# Patient Record
Sex: FEMALE | Race: WHITE | NOT HISPANIC OR LATINO | Employment: OTHER | URBAN - METROPOLITAN AREA
[De-identification: names, ages, dates, MRNs, and addresses within clinical notes are randomized per-mention and may not be internally consistent; named-entity substitution may affect disease eponyms.]

---

## 2017-02-22 ENCOUNTER — RECORDS - HEALTHEAST (OUTPATIENT)
Dept: LAB | Facility: CLINIC | Age: 67
End: 2017-02-22

## 2017-02-22 LAB
CHOLEST SERPL-MCNC: 180 MG/DL
FASTING STATUS PATIENT QL REPORTED: NO
HDLC SERPL-MCNC: 40 MG/DL
LDLC SERPL CALC-MCNC: 101 MG/DL
TRIGL SERPL-MCNC: 197 MG/DL

## 2017-09-20 ENCOUNTER — RECORDS - HEALTHEAST (OUTPATIENT)
Dept: LAB | Facility: CLINIC | Age: 67
End: 2017-09-20

## 2017-09-20 LAB
CHOLEST SERPL-MCNC: 247 MG/DL
FASTING STATUS PATIENT QL REPORTED: YES
HDLC SERPL-MCNC: 66 MG/DL
LDLC SERPL CALC-MCNC: 155 MG/DL
TRIGL SERPL-MCNC: 130 MG/DL

## 2017-10-20 ENCOUNTER — RECORDS - HEALTHEAST (OUTPATIENT)
Dept: ADMINISTRATIVE | Facility: OTHER | Age: 67
End: 2017-10-20

## 2017-10-20 ENCOUNTER — AMBULATORY - HEALTHEAST (OUTPATIENT)
Dept: CARDIOLOGY | Facility: CLINIC | Age: 67
End: 2017-10-20

## 2017-10-25 ENCOUNTER — OFFICE VISIT - HEALTHEAST (OUTPATIENT)
Dept: CARDIOLOGY | Facility: CLINIC | Age: 67
End: 2017-10-25

## 2017-10-25 ENCOUNTER — COMMUNICATION - HEALTHEAST (OUTPATIENT)
Dept: TELEHEALTH | Facility: CLINIC | Age: 67
End: 2017-10-25

## 2017-10-25 DIAGNOSIS — E78.00 HYPERCHOLESTEROLEMIA: ICD-10-CM

## 2017-10-25 DIAGNOSIS — Z95.2 S/P AVR: ICD-10-CM

## 2017-10-25 DIAGNOSIS — E03.9 HYPOTHYROIDISM: ICD-10-CM

## 2017-10-25 ASSESSMENT — MIFFLIN-ST. JEOR: SCORE: 1379.11

## 2017-11-06 ENCOUNTER — HOSPITAL ENCOUNTER (OUTPATIENT)
Dept: CARDIOLOGY | Facility: CLINIC | Age: 67
Discharge: HOME | End: 2017-11-06
Attending: INTERNAL MEDICINE

## 2017-11-06 DIAGNOSIS — Z95.2 S/P AVR: ICD-10-CM

## 2017-11-06 LAB
AORTIC ROOT: 3 CM
AORTIC VALVE MEAN VELOCITY: 161 CM/S
AV DIMENSIONLESS INDEX VTI: 0.4
AV MEAN GRADIENT: 12 MMHG
AV PEAK GRADIENT: 23.8 MMHG
AV VALVE AREA: 1.2 CM2
AV VELOCITY RATIO: 0.4
BSA FOR ECHO PROCEDURE: 1.94 M2
CV ECHO HEIGHT: 63 IN
CV ECHO WEIGHT: 188 LBS
DOP CALC AO PEAK VEL: 244 CM/S
DOP CALC AO VTI: 54.9 CM
DOP CALC LVOT AREA: 2.83 CM2
DOP CALC LVOT DIAMETER: 1.9 CM
DOP CALC LVOT PEAK VEL: 96.8 CM/S
DOP CALC LVOT STROKE VOLUME: 63.5 CM3
DOP CALCLVOT PEAK VEL VTI: 22.4 CM
EJECTION FRACTION: 63 % (ref 55–75)
FRACTIONAL SHORTENING: 35.6 % (ref 28–44)
INTERVENTRICULAR SEPTUM IN END DIASTOLE: 1.05 CM (ref 0.6–0.9)
IVS/PW RATIO: 1
LA AREA 1: 18.5 CM2
LA AREA 2: 20.2 CM2
LEFT ATRIUM LENGTH: 5.1 CM
LEFT ATRIUM SIZE: 3.5 CM
LEFT ATRIUM VOLUME INDEX: 32.1 ML/M2
LEFT ATRIUM VOLUME: 62.3 CM3
LEFT VENTRICLE CARDIAC INDEX: 2.1 L/MIN/M2
LEFT VENTRICLE CARDIAC OUTPUT: 4.1 L/MIN
LEFT VENTRICLE DIASTOLIC VOLUME INDEX: 33 CM3/M2 (ref 34–74)
LEFT VENTRICLE DIASTOLIC VOLUME: 64 CM3 (ref 46–106)
LEFT VENTRICLE HEART RATE: 64 BPM
LEFT VENTRICLE MASS INDEX: 83.1 G/M2
LEFT VENTRICLE SYSTOLIC VOLUME INDEX: 12.4 CM3/M2 (ref 11–31)
LEFT VENTRICLE SYSTOLIC VOLUME: 24 CM3 (ref 14–42)
LEFT VENTRICULAR INTERNAL DIMENSION IN DIASTOLE: 4.38 CM (ref 3.8–5.2)
LEFT VENTRICULAR INTERNAL DIMENSION IN SYSTOLE: 2.82 CM (ref 2.2–3.5)
LEFT VENTRICULAR MASS: 161.3 G
LEFT VENTRICULAR OUTFLOW TRACT MEAN GRADIENT: 2 MMHG
LEFT VENTRICULAR OUTFLOW TRACT MEAN VELOCITY: 67 CM/S
LEFT VENTRICULAR OUTFLOW TRACT PEAK GRADIENT: 4 MMHG
LEFT VENTRICULAR POSTERIOR WALL IN END DIASTOLE: 1.09 CM (ref 0.6–0.9)
LV STROKE VOLUME INDEX: 32.7 ML/M2
MITRAL VALVE E/A RATIO: 0.7
MV AVERAGE E/E' RATIO: 12.1 CM/S
MV DECELERATION TIME: 327 MS
MV E'TISSUE VEL-LAT: 7.6 CM/S
MV E'TISSUE VEL-MED: 5.75 CM/S
MV LATERAL E/E' RATIO: 10.6
MV MEDIAL E/E' RATIO: 14.1
MV PEAK A VELOCITY: 114 CM/S
MV PEAK E VELOCITY: 80.8 CM/S
NUC REST DIASTOLIC VOLUME INDEX: 3008 LBS
NUC REST SYSTOLIC VOLUME INDEX: 63 IN
PR MAX PG: 3 MMHG
PR PEAK VELOCITY: 88.6 CM/S
TRICUSPID REGURGITATION PEAK PRESSURE GRADIENT: 20.1 MMHG
TRICUSPID VALVE ANULAR PLANE SYSTOLIC EXCURSION: 2.2 CM
TRICUSPID VALVE PEAK REGURGITANT VELOCITY: 224 CM/S

## 2017-11-06 ASSESSMENT — MIFFLIN-ST. JEOR: SCORE: 1351.89

## 2018-02-05 ENCOUNTER — RECORDS - HEALTHEAST (OUTPATIENT)
Dept: ADMINISTRATIVE | Facility: OTHER | Age: 68
End: 2018-02-05

## 2018-03-19 ENCOUNTER — RECORDS - HEALTHEAST (OUTPATIENT)
Dept: LAB | Facility: CLINIC | Age: 68
End: 2018-03-19

## 2018-03-19 ENCOUNTER — RECORDS - HEALTHEAST (OUTPATIENT)
Dept: ADMINISTRATIVE | Facility: OTHER | Age: 68
End: 2018-03-19

## 2018-03-19 LAB
ALBUMIN SERPL-MCNC: 3.8 G/DL (ref 3.5–5)
ALP SERPL-CCNC: 72 U/L (ref 45–120)
ALT SERPL W P-5'-P-CCNC: 31 U/L (ref 0–45)
AST SERPL W P-5'-P-CCNC: 36 U/L (ref 0–40)
BILIRUB DIRECT SERPL-MCNC: 0.2 MG/DL
BILIRUB SERPL-MCNC: 0.7 MG/DL (ref 0–1)
CHOLEST SERPL-MCNC: 244 MG/DL
FASTING STATUS PATIENT QL REPORTED: YES
HDLC SERPL-MCNC: 66 MG/DL
LDLC SERPL CALC-MCNC: 150 MG/DL
PROT SERPL-MCNC: 7.2 G/DL (ref 6–8)
TRIGL SERPL-MCNC: 140 MG/DL
TSH SERPL DL<=0.005 MIU/L-ACNC: 0.85 UIU/ML (ref 0.3–5)

## 2018-05-01 ENCOUNTER — RECORDS - HEALTHEAST (OUTPATIENT)
Dept: ADMINISTRATIVE | Facility: OTHER | Age: 68
End: 2018-05-01

## 2018-06-21 ENCOUNTER — RECORDS - HEALTHEAST (OUTPATIENT)
Dept: ADMINISTRATIVE | Facility: OTHER | Age: 68
End: 2018-06-21

## 2018-06-21 ENCOUNTER — SURGERY - HEALTHEAST (OUTPATIENT)
Dept: CARDIOLOGY | Facility: CLINIC | Age: 68
End: 2018-06-21

## 2018-06-22 ENCOUNTER — RECORDS - HEALTHEAST (OUTPATIENT)
Dept: ADMINISTRATIVE | Facility: OTHER | Age: 68
End: 2018-06-22

## 2018-06-28 ENCOUNTER — RECORDS - HEALTHEAST (OUTPATIENT)
Dept: ADMINISTRATIVE | Facility: OTHER | Age: 68
End: 2018-06-28

## 2018-06-29 ENCOUNTER — AMBULATORY - HEALTHEAST (OUTPATIENT)
Dept: CARDIOLOGY | Facility: CLINIC | Age: 68
End: 2018-06-29

## 2018-06-29 DIAGNOSIS — Z95.0 CARDIAC PACEMAKER IN SITU: ICD-10-CM

## 2018-06-29 LAB
HCC DEVICE COMMENTS: NORMAL
HCC DEVICE IMPLANTING PROVIDER: NORMAL
HCC DEVICE MANUFACTURE: NORMAL
HCC DEVICE MODEL: NORMAL
HCC DEVICE SERIAL NUMBER: NORMAL
HCC DEVICE TYPE: NORMAL

## 2018-06-29 ASSESSMENT — MIFFLIN-ST. JEOR: SCORE: 1351.89

## 2018-07-24 ENCOUNTER — AMBULATORY - HEALTHEAST (OUTPATIENT)
Dept: CARDIOLOGY | Facility: CLINIC | Age: 68
End: 2018-07-24

## 2018-07-24 DIAGNOSIS — Z95.0 CARDIAC PACEMAKER IN SITU: ICD-10-CM

## 2018-08-27 ENCOUNTER — RECORDS - HEALTHEAST (OUTPATIENT)
Dept: ADMINISTRATIVE | Facility: OTHER | Age: 68
End: 2018-08-27

## 2018-09-25 ENCOUNTER — AMBULATORY - HEALTHEAST (OUTPATIENT)
Dept: CARDIOLOGY | Facility: CLINIC | Age: 68
End: 2018-09-25

## 2018-09-25 DIAGNOSIS — Z95.0 CARDIAC PACEMAKER IN SITU: ICD-10-CM

## 2018-09-25 ASSESSMENT — MIFFLIN-ST. JEOR: SCORE: 1351.89

## 2018-10-23 ENCOUNTER — AMBULATORY - HEALTHEAST (OUTPATIENT)
Dept: CARDIOLOGY | Facility: CLINIC | Age: 68
End: 2018-10-23

## 2018-10-23 DIAGNOSIS — Z95.0 CARDIAC PACEMAKER IN SITU: ICD-10-CM

## 2018-10-24 ENCOUNTER — COMMUNICATION - HEALTHEAST (OUTPATIENT)
Dept: CARDIOLOGY | Facility: CLINIC | Age: 68
End: 2018-10-24

## 2018-10-29 ENCOUNTER — RECORDS - HEALTHEAST (OUTPATIENT)
Dept: ADMINISTRATIVE | Facility: OTHER | Age: 68
End: 2018-10-29

## 2018-12-17 ENCOUNTER — RECORDS - HEALTHEAST (OUTPATIENT)
Dept: LAB | Facility: CLINIC | Age: 68
End: 2018-12-17

## 2018-12-17 LAB — TSH SERPL DL<=0.005 MIU/L-ACNC: 0.47 UIU/ML (ref 0.3–5)

## 2018-12-19 ENCOUNTER — RECORDS - HEALTHEAST (OUTPATIENT)
Dept: ADMINISTRATIVE | Facility: OTHER | Age: 68
End: 2018-12-19

## 2018-12-26 ENCOUNTER — AMBULATORY - HEALTHEAST (OUTPATIENT)
Dept: CARDIOLOGY | Facility: CLINIC | Age: 68
End: 2018-12-26

## 2018-12-26 DIAGNOSIS — Z95.0 CARDIAC PACEMAKER IN SITU: ICD-10-CM

## 2018-12-31 ENCOUNTER — RECORDS - HEALTHEAST (OUTPATIENT)
Dept: ADMINISTRATIVE | Facility: OTHER | Age: 68
End: 2018-12-31

## 2019-02-05 ENCOUNTER — COMMUNICATION - HEALTHEAST (OUTPATIENT)
Dept: SCHEDULING | Facility: CLINIC | Age: 69
End: 2019-02-05

## 2019-02-12 ENCOUNTER — RECORDS - HEALTHEAST (OUTPATIENT)
Dept: ADMINISTRATIVE | Facility: OTHER | Age: 69
End: 2019-02-12

## 2019-02-18 ENCOUNTER — RECORDS - HEALTHEAST (OUTPATIENT)
Dept: ADMINISTRATIVE | Facility: OTHER | Age: 69
End: 2019-02-18

## 2019-03-28 ENCOUNTER — AMBULATORY - HEALTHEAST (OUTPATIENT)
Dept: CARDIOLOGY | Facility: CLINIC | Age: 69
End: 2019-03-28

## 2019-03-28 DIAGNOSIS — Z95.0 CARDIAC PACEMAKER IN SITU: ICD-10-CM

## 2019-04-16 ENCOUNTER — RECORDS - HEALTHEAST (OUTPATIENT)
Dept: ADMINISTRATIVE | Facility: OTHER | Age: 69
End: 2019-04-16

## 2019-04-16 ENCOUNTER — RECORDS - HEALTHEAST (OUTPATIENT)
Dept: LAB | Facility: CLINIC | Age: 69
End: 2019-04-16

## 2019-04-16 LAB
ALBUMIN SERPL-MCNC: 3.8 G/DL (ref 3.5–5)
ALP SERPL-CCNC: 70 U/L (ref 45–120)
ALT SERPL W P-5'-P-CCNC: 16 U/L (ref 0–45)
ANION GAP SERPL CALCULATED.3IONS-SCNC: 7 MMOL/L (ref 5–18)
AST SERPL W P-5'-P-CCNC: 26 U/L (ref 0–40)
BILIRUB SERPL-MCNC: 0.4 MG/DL (ref 0–1)
BUN SERPL-MCNC: 15 MG/DL (ref 8–22)
CALCIUM SERPL-MCNC: 9.5 MG/DL (ref 8.5–10.5)
CHLORIDE BLD-SCNC: 108 MMOL/L (ref 98–107)
CHOLEST SERPL-MCNC: 209 MG/DL
CO2 SERPL-SCNC: 25 MMOL/L (ref 22–31)
CREAT SERPL-MCNC: 0.8 MG/DL (ref 0.6–1.1)
FASTING STATUS PATIENT QL REPORTED: NO
GFR SERPL CREATININE-BSD FRML MDRD: >60 ML/MIN/1.73M2
GLUCOSE BLD-MCNC: 82 MG/DL (ref 70–125)
HDLC SERPL-MCNC: 65 MG/DL
LDLC SERPL CALC-MCNC: 115 MG/DL
POTASSIUM BLD-SCNC: 4.4 MMOL/L (ref 3.5–5)
PROT SERPL-MCNC: 7.2 G/DL (ref 6–8)
SODIUM SERPL-SCNC: 140 MMOL/L (ref 136–145)
TRIGL SERPL-MCNC: 144 MG/DL
TSH SERPL DL<=0.005 MIU/L-ACNC: 0.73 UIU/ML (ref 0.3–5)

## 2019-06-13 ENCOUNTER — RECORDS - HEALTHEAST (OUTPATIENT)
Dept: ADMINISTRATIVE | Facility: OTHER | Age: 69
End: 2019-06-13

## 2019-06-30 ENCOUNTER — AMBULATORY - HEALTHEAST (OUTPATIENT)
Dept: CARDIOLOGY | Facility: CLINIC | Age: 69
End: 2019-06-30

## 2019-06-30 DIAGNOSIS — Z95.0 CARDIAC PACEMAKER IN SITU: ICD-10-CM

## 2019-07-01 ENCOUNTER — COMMUNICATION - HEALTHEAST (OUTPATIENT)
Dept: CARDIOLOGY | Facility: CLINIC | Age: 69
End: 2019-07-01

## 2019-07-02 ENCOUNTER — AMBULATORY - HEALTHEAST (OUTPATIENT)
Dept: CARDIOLOGY | Facility: CLINIC | Age: 69
End: 2019-07-02

## 2019-07-02 ENCOUNTER — HOSPITAL ENCOUNTER (OUTPATIENT)
Dept: CARDIOLOGY | Facility: CLINIC | Age: 69
Discharge: HOME | End: 2019-07-02
Attending: INTERNAL MEDICINE

## 2019-07-02 DIAGNOSIS — I25.10 ATHEROSCLEROTIC HEART DISEASE OF NATIVE CORONARY ARTERY WITHOUT ANGINA PECTORIS: ICD-10-CM

## 2019-07-02 DIAGNOSIS — I47.29 NON-SUSTAINED VENTRICULAR TACHYCARDIA (H): ICD-10-CM

## 2019-07-02 DIAGNOSIS — Z95.0 CARDIAC PACEMAKER IN SITU: ICD-10-CM

## 2019-07-02 DIAGNOSIS — I49.2: ICD-10-CM

## 2019-07-02 DIAGNOSIS — Z95.2 S/P AVR: ICD-10-CM

## 2019-07-02 ASSESSMENT — MIFFLIN-ST. JEOR: SCORE: 1320.14

## 2019-07-03 LAB
ATRIAL RATE - MUSE: 78 BPM
DIASTOLIC BLOOD PRESSURE - MUSE: NORMAL MMHG
INTERPRETATION ECG - MUSE: NORMAL
P AXIS - MUSE: 59 DEGREES
PR INTERVAL - MUSE: 182 MS
QRS DURATION - MUSE: 80 MS
QT - MUSE: 396 MS
QTC - MUSE: 451 MS
R AXIS - MUSE: 9 DEGREES
SYSTOLIC BLOOD PRESSURE - MUSE: NORMAL MMHG
T AXIS - MUSE: 64 DEGREES
VENTRICULAR RATE- MUSE: 78 BPM

## 2019-07-10 ENCOUNTER — AMBULATORY - HEALTHEAST (OUTPATIENT)
Dept: CARDIOLOGY | Facility: CLINIC | Age: 69
End: 2019-07-10

## 2019-07-10 DIAGNOSIS — Z95.0 CARDIAC PACEMAKER IN SITU: ICD-10-CM

## 2019-07-11 ENCOUNTER — HOSPITAL ENCOUNTER (OUTPATIENT)
Dept: NUCLEAR MEDICINE | Facility: CLINIC | Age: 69
Discharge: HOME | End: 2019-07-11
Attending: INTERNAL MEDICINE

## 2019-07-11 ENCOUNTER — HOSPITAL ENCOUNTER (OUTPATIENT)
Dept: CARDIOLOGY | Facility: CLINIC | Age: 69
Discharge: HOME | End: 2019-07-11
Attending: INTERNAL MEDICINE

## 2019-07-11 DIAGNOSIS — I25.10 ATHEROSCLEROTIC HEART DISEASE OF NATIVE CORONARY ARTERY WITHOUT ANGINA PECTORIS: ICD-10-CM

## 2019-07-11 DIAGNOSIS — I47.29 NON-SUSTAINED VENTRICULAR TACHYCARDIA (H): ICD-10-CM

## 2019-07-11 DIAGNOSIS — I49.2: ICD-10-CM

## 2019-07-11 LAB
CV STRESS CURRENT BP HE: NORMAL
CV STRESS CURRENT HR HE: 105
CV STRESS CURRENT HR HE: 108
CV STRESS CURRENT HR HE: 109
CV STRESS CURRENT HR HE: 112
CV STRESS CURRENT HR HE: 122
CV STRESS CURRENT HR HE: 126
CV STRESS CURRENT HR HE: 126
CV STRESS CURRENT HR HE: 128
CV STRESS CURRENT HR HE: 128
CV STRESS CURRENT HR HE: 68
CV STRESS CURRENT HR HE: 74
CV STRESS CURRENT HR HE: 80
CV STRESS CURRENT HR HE: 81
CV STRESS CURRENT HR HE: 81
CV STRESS CURRENT HR HE: 82
CV STRESS CURRENT HR HE: 85
CV STRESS CURRENT HR HE: 85
CV STRESS CURRENT HR HE: 87
CV STRESS CURRENT HR HE: 88
CV STRESS CURRENT HR HE: 93
CV STRESS CURRENT HR HE: 96
CV STRESS CURRENT HR HE: 96
CV STRESS CURRENT HR HE: 99
CV STRESS DEVIATION TIME HE: NORMAL
CV STRESS ECHO PERCENT HR HE: NORMAL
CV STRESS EXERCISE STAGE HE: NORMAL
CV STRESS FINAL RESTING BP HE: NORMAL
CV STRESS FINAL RESTING HR HE: 85
CV STRESS MAX HR HE: 128
CV STRESS MAX TREADMILL GRADE HE: 14
CV STRESS MAX TREADMILL SPEED HE: 3.4
CV STRESS PEAK DIA BP HE: NORMAL
CV STRESS PEAK SYS BP HE: NORMAL
CV STRESS PHASE HE: NORMAL
CV STRESS PROTOCOL HE: NORMAL
CV STRESS RESTING PT POSITION HE: NORMAL
CV STRESS RESTING PT POSITION HE: NORMAL
CV STRESS ST DEVIATION AMOUNT HE: NORMAL
CV STRESS ST DEVIATION ELEVATION HE: NORMAL
CV STRESS ST EVELATION AMOUNT HE: NORMAL
CV STRESS TEST TYPE HE: NORMAL
CV STRESS TOTAL STAGE TIME MIN 1 HE: NORMAL
NUC STRESS EJECTION FRACTION: 68 %
STRESS ECHO BASELINE BP: NORMAL
STRESS ECHO BASELINE HR: 69
STRESS ECHO CALCULATED PERCENT HR: 84 %
STRESS ECHO LAST STRESS BP: NORMAL
STRESS ECHO LAST STRESS HR: 128
STRESS ECHO POST ESTIMATED WORKLOAD: 9.7
STRESS ECHO POST EXERCISE DUR MIN: 8
STRESS ECHO POST EXERCISE DUR SEC: 0
STRESS ECHO TARGET HR: 130

## 2019-07-12 ENCOUNTER — AMBULATORY - HEALTHEAST (OUTPATIENT)
Dept: CARDIOLOGY | Facility: CLINIC | Age: 69
End: 2019-07-12

## 2019-07-12 DIAGNOSIS — I49.2: ICD-10-CM

## 2019-07-12 DIAGNOSIS — R00.0 TACHYCARDIA: ICD-10-CM

## 2019-07-16 ENCOUNTER — AMBULATORY - HEALTHEAST (OUTPATIENT)
Dept: CARDIOLOGY | Facility: CLINIC | Age: 69
End: 2019-07-16

## 2019-07-16 ENCOUNTER — COMMUNICATION - HEALTHEAST (OUTPATIENT)
Dept: CARDIOLOGY | Facility: CLINIC | Age: 69
End: 2019-07-16

## 2019-07-16 ENCOUNTER — RECORDS - HEALTHEAST (OUTPATIENT)
Dept: ADMINISTRATIVE | Facility: OTHER | Age: 69
End: 2019-07-16

## 2019-07-16 DIAGNOSIS — I49.2: ICD-10-CM

## 2019-07-16 DIAGNOSIS — R00.0 TACHYCARDIA: ICD-10-CM

## 2019-07-17 ENCOUNTER — AMBULATORY - HEALTHEAST (OUTPATIENT)
Dept: CARDIOLOGY | Facility: CLINIC | Age: 69
End: 2019-07-17

## 2019-07-17 DIAGNOSIS — I25.10 CORONARY ARTERY DISEASE INVOLVING NATIVE CORONARY ARTERY WITHOUT ANGINA PECTORIS: ICD-10-CM

## 2019-07-17 DIAGNOSIS — Z95.0 CARDIAC PACEMAKER IN SITU: ICD-10-CM

## 2019-07-17 DIAGNOSIS — I47.29 NON-SUSTAINED VENTRICULAR TACHYCARDIA (H): ICD-10-CM

## 2019-07-17 DIAGNOSIS — Z95.2 S/P AVR: ICD-10-CM

## 2019-07-17 ASSESSMENT — MIFFLIN-ST. JEOR: SCORE: 1324.68

## 2019-07-24 ENCOUNTER — HOSPITAL ENCOUNTER (OUTPATIENT)
Dept: CARDIOLOGY | Facility: CLINIC | Age: 69
Discharge: HOME | End: 2019-07-24
Attending: INTERNAL MEDICINE

## 2019-07-24 DIAGNOSIS — I47.29 NON-SUSTAINED VENTRICULAR TACHYCARDIA (H): ICD-10-CM

## 2019-08-08 ENCOUNTER — RECORDS - HEALTHEAST (OUTPATIENT)
Dept: ADMINISTRATIVE | Facility: OTHER | Age: 69
End: 2019-08-08

## 2019-10-03 ENCOUNTER — RECORDS - HEALTHEAST (OUTPATIENT)
Dept: ADMINISTRATIVE | Facility: OTHER | Age: 69
End: 2019-10-03

## 2019-10-20 ENCOUNTER — AMBULATORY - HEALTHEAST (OUTPATIENT)
Dept: CARDIOLOGY | Facility: CLINIC | Age: 69
End: 2019-10-20

## 2019-10-20 DIAGNOSIS — Z95.0 CARDIAC PACEMAKER IN SITU: ICD-10-CM

## 2019-10-29 ENCOUNTER — RECORDS - HEALTHEAST (OUTPATIENT)
Dept: ADMINISTRATIVE | Facility: OTHER | Age: 69
End: 2019-10-29

## 2019-11-21 ENCOUNTER — RECORDS - HEALTHEAST (OUTPATIENT)
Dept: ADMINISTRATIVE | Facility: OTHER | Age: 69
End: 2019-11-21

## 2020-01-06 ENCOUNTER — RECORDS - HEALTHEAST (OUTPATIENT)
Dept: ADMINISTRATIVE | Facility: OTHER | Age: 70
End: 2020-01-06

## 2020-01-16 ENCOUNTER — RECORDS - HEALTHEAST (OUTPATIENT)
Dept: ADMINISTRATIVE | Facility: OTHER | Age: 70
End: 2020-01-16

## 2020-01-23 ENCOUNTER — AMBULATORY - HEALTHEAST (OUTPATIENT)
Dept: CARDIOLOGY | Facility: CLINIC | Age: 70
End: 2020-01-23

## 2020-01-23 DIAGNOSIS — I45.9 STOKES-ADAMS SYNCOPE: ICD-10-CM

## 2020-01-23 DIAGNOSIS — Z95.0 CARDIAC PACEMAKER IN SITU: ICD-10-CM

## 2020-02-27 ENCOUNTER — COMMUNICATION - HEALTHEAST (OUTPATIENT)
Dept: ANTICOAGULATION | Facility: CLINIC | Age: 70
End: 2020-02-27

## 2020-02-27 ENCOUNTER — OFFICE VISIT - HEALTHEAST (OUTPATIENT)
Dept: FAMILY MEDICINE | Facility: CLINIC | Age: 70
End: 2020-02-27

## 2020-02-27 DIAGNOSIS — Z86.2 HX OF LEUKOCYTOSIS: ICD-10-CM

## 2020-02-27 DIAGNOSIS — I47.29 NON-SUSTAINED VENTRICULAR TACHYCARDIA (H): ICD-10-CM

## 2020-02-27 DIAGNOSIS — E78.00 HYPERCHOLESTEROLEMIA: ICD-10-CM

## 2020-02-27 DIAGNOSIS — Z23 ENCOUNTER FOR ADMINISTRATION OF VACCINE: ICD-10-CM

## 2020-02-27 DIAGNOSIS — Z95.2 S/P AVR: ICD-10-CM

## 2020-02-27 DIAGNOSIS — Z11.59 NEED FOR HEPATITIS C SCREENING TEST: ICD-10-CM

## 2020-02-27 DIAGNOSIS — Z79.01 ANTICOAGULATED ON WARFARIN: ICD-10-CM

## 2020-02-27 DIAGNOSIS — E03.9 ACQUIRED HYPOTHYROIDISM: ICD-10-CM

## 2020-02-27 DIAGNOSIS — Z95.0 CARDIAC PACEMAKER IN SITU: ICD-10-CM

## 2020-02-27 DIAGNOSIS — Z95.2 HX OF MECHANICAL AORTIC VALVE REPLACEMENT: ICD-10-CM

## 2020-02-27 DIAGNOSIS — R31.29 MICROSCOPIC HEMATURIA: ICD-10-CM

## 2020-02-27 DIAGNOSIS — I25.10 CORONARY ARTERY DISEASE INVOLVING NATIVE CORONARY ARTERY OF NATIVE HEART WITHOUT ANGINA PECTORIS: ICD-10-CM

## 2020-02-27 DIAGNOSIS — Z76.89 ENCOUNTER TO ESTABLISH CARE WITH NEW DOCTOR: ICD-10-CM

## 2020-02-27 LAB
ALBUMIN UR-MCNC: NEGATIVE MG/DL
ANION GAP SERPL CALCULATED.3IONS-SCNC: 10 MMOL/L (ref 5–18)
APPEARANCE UR: CLEAR
BACTERIA #/AREA URNS HPF: ABNORMAL HPF
BILIRUB UR QL STRIP: NEGATIVE
BUN SERPL-MCNC: 14 MG/DL (ref 8–22)
CALCIUM SERPL-MCNC: 9.8 MG/DL (ref 8.5–10.5)
CHLORIDE BLD-SCNC: 105 MMOL/L (ref 98–107)
CHOLEST SERPL-MCNC: 227 MG/DL
CO2 SERPL-SCNC: 25 MMOL/L (ref 22–31)
COLOR UR AUTO: YELLOW
CREAT SERPL-MCNC: 0.91 MG/DL (ref 0.6–1.1)
ERYTHROCYTE [DISTWIDTH] IN BLOOD BY AUTOMATED COUNT: 12.6 % (ref 11–14.5)
FASTING STATUS PATIENT QL REPORTED: NO
GFR SERPL CREATININE-BSD FRML MDRD: >60 ML/MIN/1.73M2
GLUCOSE BLD-MCNC: 78 MG/DL (ref 70–125)
GLUCOSE UR STRIP-MCNC: NEGATIVE MG/DL
HCT VFR BLD AUTO: 41.3 % (ref 35–47)
HDLC SERPL-MCNC: 69 MG/DL
HGB BLD-MCNC: 14 G/DL (ref 12–16)
HGB UR QL STRIP: ABNORMAL
INR PPP: 2.3 (ref 0.9–1.1)
KETONES UR STRIP-MCNC: NEGATIVE MG/DL
LDLC SERPL CALC-MCNC: 127 MG/DL
LEUKOCYTE ESTERASE UR QL STRIP: NEGATIVE
MCH RBC QN AUTO: 30.3 PG (ref 27–34)
MCHC RBC AUTO-ENTMCNC: 33.9 G/DL (ref 32–36)
MCV RBC AUTO: 89 FL (ref 80–100)
MUCOUS THREADS #/AREA URNS LPF: ABNORMAL LPF
NITRATE UR QL: NEGATIVE
PH UR STRIP: 5.5 [PH] (ref 5–8)
PLATELET # BLD AUTO: 226 THOU/UL (ref 140–440)
PMV BLD AUTO: 8.6 FL (ref 7–10)
POTASSIUM BLD-SCNC: 4.4 MMOL/L (ref 3.5–5)
RBC # BLD AUTO: 4.63 MILL/UL (ref 3.8–5.4)
RBC #/AREA URNS AUTO: ABNORMAL HPF
SODIUM SERPL-SCNC: 140 MMOL/L (ref 136–145)
SP GR UR STRIP: 1.02 (ref 1–1.03)
SQUAMOUS #/AREA URNS AUTO: ABNORMAL LPF
TRIGL SERPL-MCNC: 157 MG/DL
TSH SERPL DL<=0.005 MIU/L-ACNC: 1.72 UIU/ML (ref 0.3–5)
UROBILINOGEN UR STRIP-ACNC: ABNORMAL
WBC #/AREA URNS AUTO: ABNORMAL HPF
WBC: 7.5 THOU/UL (ref 4–11)

## 2020-02-27 ASSESSMENT — MIFFLIN-ST. JEOR: SCORE: 1353.88

## 2020-02-28 LAB — HCV AB SERPL QL IA: NEGATIVE

## 2020-03-12 ENCOUNTER — AMBULATORY - HEALTHEAST (OUTPATIENT)
Dept: LAB | Facility: CLINIC | Age: 70
End: 2020-03-12

## 2020-03-12 ENCOUNTER — COMMUNICATION - HEALTHEAST (OUTPATIENT)
Dept: ANTICOAGULATION | Facility: CLINIC | Age: 70
End: 2020-03-12

## 2020-03-12 DIAGNOSIS — Z95.2 S/P AVR: ICD-10-CM

## 2020-03-12 DIAGNOSIS — Z79.01 ANTICOAGULATED ON WARFARIN: ICD-10-CM

## 2020-03-12 DIAGNOSIS — Z95.2 HX OF MECHANICAL AORTIC VALVE REPLACEMENT: ICD-10-CM

## 2020-03-12 LAB — INR PPP: 3.7 (ref 0.9–1.1)

## 2020-03-17 ENCOUNTER — RECORDS - HEALTHEAST (OUTPATIENT)
Dept: ADMINISTRATIVE | Facility: OTHER | Age: 70
End: 2020-03-17

## 2020-03-19 ENCOUNTER — COMMUNICATION - HEALTHEAST (OUTPATIENT)
Dept: ANTICOAGULATION | Facility: CLINIC | Age: 70
End: 2020-03-19

## 2020-03-19 ENCOUNTER — AMBULATORY - HEALTHEAST (OUTPATIENT)
Dept: LAB | Facility: CLINIC | Age: 70
End: 2020-03-19

## 2020-03-19 DIAGNOSIS — Z79.01 ANTICOAGULATED ON WARFARIN: ICD-10-CM

## 2020-03-19 DIAGNOSIS — Z95.2 S/P AVR: ICD-10-CM

## 2020-03-19 DIAGNOSIS — Z95.2 HX OF MECHANICAL AORTIC VALVE REPLACEMENT: ICD-10-CM

## 2020-03-19 LAB — INR PPP: 2.1 (ref 0.9–1.1)

## 2020-04-02 ENCOUNTER — COMMUNICATION - HEALTHEAST (OUTPATIENT)
Dept: ANTICOAGULATION | Facility: CLINIC | Age: 70
End: 2020-04-02

## 2020-04-02 ENCOUNTER — AMBULATORY - HEALTHEAST (OUTPATIENT)
Dept: LAB | Facility: CLINIC | Age: 70
End: 2020-04-02

## 2020-04-02 DIAGNOSIS — Z95.2 HX OF MECHANICAL AORTIC VALVE REPLACEMENT: ICD-10-CM

## 2020-04-02 DIAGNOSIS — Z79.01 ANTICOAGULATED ON WARFARIN: ICD-10-CM

## 2020-04-02 DIAGNOSIS — Z95.2 S/P AVR: ICD-10-CM

## 2020-04-02 LAB — INR PPP: 2.3 (ref 0.9–1.1)

## 2020-04-16 ENCOUNTER — AMBULATORY - HEALTHEAST (OUTPATIENT)
Dept: LAB | Facility: CLINIC | Age: 70
End: 2020-04-16

## 2020-04-16 ENCOUNTER — COMMUNICATION - HEALTHEAST (OUTPATIENT)
Dept: ANTICOAGULATION | Facility: CLINIC | Age: 70
End: 2020-04-16

## 2020-04-16 DIAGNOSIS — Z95.2 S/P AVR: ICD-10-CM

## 2020-04-16 DIAGNOSIS — Z79.01 ANTICOAGULATED ON WARFARIN: ICD-10-CM

## 2020-04-16 DIAGNOSIS — Z95.2 HX OF MECHANICAL AORTIC VALVE REPLACEMENT: ICD-10-CM

## 2020-04-16 LAB — INR PPP: 2.3 (ref 0.9–1.1)

## 2020-04-23 ENCOUNTER — AMBULATORY - HEALTHEAST (OUTPATIENT)
Dept: CARDIOLOGY | Facility: CLINIC | Age: 70
End: 2020-04-23

## 2020-04-23 DIAGNOSIS — I44.39 HIGH DEGREE ATRIOVENTRICULAR BLOCK: ICD-10-CM

## 2020-04-23 DIAGNOSIS — Z95.0 CARDIAC PACEMAKER IN SITU: ICD-10-CM

## 2020-04-30 ENCOUNTER — AMBULATORY - HEALTHEAST (OUTPATIENT)
Dept: LAB | Facility: CLINIC | Age: 70
End: 2020-04-30

## 2020-04-30 ENCOUNTER — COMMUNICATION - HEALTHEAST (OUTPATIENT)
Dept: ANTICOAGULATION | Facility: CLINIC | Age: 70
End: 2020-04-30

## 2020-04-30 DIAGNOSIS — Z95.2 HX OF MECHANICAL AORTIC VALVE REPLACEMENT: ICD-10-CM

## 2020-04-30 DIAGNOSIS — Z95.2 S/P AVR: ICD-10-CM

## 2020-04-30 DIAGNOSIS — Z79.01 ANTICOAGULATED ON WARFARIN: ICD-10-CM

## 2020-04-30 LAB — INR PPP: 1.8 (ref 0.9–1.1)

## 2020-05-14 ENCOUNTER — AMBULATORY - HEALTHEAST (OUTPATIENT)
Dept: LAB | Facility: CLINIC | Age: 70
End: 2020-05-14

## 2020-05-14 ENCOUNTER — COMMUNICATION - HEALTHEAST (OUTPATIENT)
Dept: ANTICOAGULATION | Facility: CLINIC | Age: 70
End: 2020-05-14

## 2020-05-14 DIAGNOSIS — Z95.2 S/P AVR: ICD-10-CM

## 2020-05-14 DIAGNOSIS — Z95.2 HX OF MECHANICAL AORTIC VALVE REPLACEMENT: ICD-10-CM

## 2020-05-14 DIAGNOSIS — Z79.01 ANTICOAGULATED ON WARFARIN: ICD-10-CM

## 2020-05-14 LAB — INR PPP: 1.7 (ref 0.9–1.1)

## 2020-05-28 ENCOUNTER — COMMUNICATION - HEALTHEAST (OUTPATIENT)
Dept: ANTICOAGULATION | Facility: CLINIC | Age: 70
End: 2020-05-28

## 2020-05-28 ENCOUNTER — AMBULATORY - HEALTHEAST (OUTPATIENT)
Dept: LAB | Facility: CLINIC | Age: 70
End: 2020-05-28

## 2020-05-28 DIAGNOSIS — Z95.2 HX OF MECHANICAL AORTIC VALVE REPLACEMENT: ICD-10-CM

## 2020-05-28 DIAGNOSIS — Z79.01 ANTICOAGULATED ON WARFARIN: ICD-10-CM

## 2020-05-28 DIAGNOSIS — Z95.2 S/P AVR: ICD-10-CM

## 2020-05-28 LAB — INR PPP: 1.7 (ref 0.9–1.1)

## 2020-06-08 ENCOUNTER — COMMUNICATION - HEALTHEAST (OUTPATIENT)
Dept: FAMILY MEDICINE | Facility: CLINIC | Age: 70
End: 2020-06-08

## 2020-06-08 DIAGNOSIS — I44.39 HIGH DEGREE ATRIOVENTRICULAR BLOCK: ICD-10-CM

## 2020-06-08 DIAGNOSIS — I49.2: ICD-10-CM

## 2020-06-08 DIAGNOSIS — I25.10 CORONARY ARTERY DISEASE INVOLVING NATIVE CORONARY ARTERY OF NATIVE HEART WITHOUT ANGINA PECTORIS: ICD-10-CM

## 2020-06-08 DIAGNOSIS — Z79.01 ANTICOAGULATED ON WARFARIN: ICD-10-CM

## 2020-06-08 DIAGNOSIS — R00.0 TACHYCARDIA: ICD-10-CM

## 2020-06-11 ENCOUNTER — AMBULATORY - HEALTHEAST (OUTPATIENT)
Dept: LAB | Facility: CLINIC | Age: 70
End: 2020-06-11

## 2020-06-11 ENCOUNTER — COMMUNICATION - HEALTHEAST (OUTPATIENT)
Dept: ANTICOAGULATION | Facility: CLINIC | Age: 70
End: 2020-06-11

## 2020-06-11 DIAGNOSIS — Z95.2 HX OF MECHANICAL AORTIC VALVE REPLACEMENT: ICD-10-CM

## 2020-06-11 DIAGNOSIS — Z95.2 S/P AVR: ICD-10-CM

## 2020-06-11 DIAGNOSIS — Z79.01 ANTICOAGULATED ON WARFARIN: ICD-10-CM

## 2020-06-11 LAB — INR PPP: 1.9 (ref 0.9–1.1)

## 2020-06-29 ENCOUNTER — AMBULATORY - HEALTHEAST (OUTPATIENT)
Dept: LAB | Facility: CLINIC | Age: 70
End: 2020-06-29

## 2020-06-29 ENCOUNTER — COMMUNICATION - HEALTHEAST (OUTPATIENT)
Dept: ANTICOAGULATION | Facility: CLINIC | Age: 70
End: 2020-06-29

## 2020-06-29 DIAGNOSIS — Z79.01 ANTICOAGULATED ON WARFARIN: ICD-10-CM

## 2020-06-29 DIAGNOSIS — Z95.2 S/P AVR: ICD-10-CM

## 2020-06-29 DIAGNOSIS — Z95.2 HX OF MECHANICAL AORTIC VALVE REPLACEMENT: ICD-10-CM

## 2020-06-29 LAB — INR PPP: 1.9 (ref 0.9–1.1)

## 2020-07-20 ENCOUNTER — OFFICE VISIT - HEALTHEAST (OUTPATIENT)
Dept: FAMILY MEDICINE | Facility: CLINIC | Age: 70
End: 2020-07-20

## 2020-07-20 ENCOUNTER — COMMUNICATION - HEALTHEAST (OUTPATIENT)
Dept: SCHEDULING | Facility: CLINIC | Age: 70
End: 2020-07-20

## 2020-07-20 DIAGNOSIS — B02.9 HERPES ZOSTER WITHOUT COMPLICATION: ICD-10-CM

## 2020-07-20 ASSESSMENT — MIFFLIN-ST. JEOR: SCORE: 1365.5

## 2020-07-27 ENCOUNTER — COMMUNICATION - HEALTHEAST (OUTPATIENT)
Dept: ANTICOAGULATION | Facility: CLINIC | Age: 70
End: 2020-07-27

## 2020-07-27 ENCOUNTER — AMBULATORY - HEALTHEAST (OUTPATIENT)
Dept: LAB | Facility: CLINIC | Age: 70
End: 2020-07-27

## 2020-07-27 DIAGNOSIS — Z95.2 HX OF MECHANICAL AORTIC VALVE REPLACEMENT: ICD-10-CM

## 2020-07-27 DIAGNOSIS — Z79.01 ANTICOAGULATED ON WARFARIN: ICD-10-CM

## 2020-07-27 DIAGNOSIS — Z95.2 S/P AVR: ICD-10-CM

## 2020-07-27 LAB — INR PPP: 2 (ref 0.9–1.1)

## 2020-07-28 ENCOUNTER — AMBULATORY - HEALTHEAST (OUTPATIENT)
Dept: CARDIOLOGY | Facility: CLINIC | Age: 70
End: 2020-07-28

## 2020-07-28 DIAGNOSIS — Z95.0 CARDIAC PACEMAKER IN SITU: ICD-10-CM

## 2020-07-28 DIAGNOSIS — I44.39 HIGH DEGREE ATRIOVENTRICULAR BLOCK: ICD-10-CM

## 2020-07-30 ENCOUNTER — OFFICE VISIT - HEALTHEAST (OUTPATIENT)
Dept: FAMILY MEDICINE | Facility: CLINIC | Age: 70
End: 2020-07-30

## 2020-07-30 DIAGNOSIS — K59.01 SLOW TRANSIT CONSTIPATION: ICD-10-CM

## 2020-07-30 DIAGNOSIS — B02.9 HERPES ZOSTER WITHOUT COMPLICATION: ICD-10-CM

## 2020-08-17 ENCOUNTER — COMMUNICATION - HEALTHEAST (OUTPATIENT)
Dept: FAMILY MEDICINE | Facility: CLINIC | Age: 70
End: 2020-08-17

## 2020-08-17 DIAGNOSIS — Z79.01 ANTICOAGULATED ON WARFARIN: ICD-10-CM

## 2020-08-24 ENCOUNTER — AMBULATORY - HEALTHEAST (OUTPATIENT)
Dept: LAB | Facility: CLINIC | Age: 70
End: 2020-08-24

## 2020-08-24 ENCOUNTER — COMMUNICATION - HEALTHEAST (OUTPATIENT)
Dept: ANTICOAGULATION | Facility: CLINIC | Age: 70
End: 2020-08-24

## 2020-08-24 DIAGNOSIS — Z95.2 HX OF MECHANICAL AORTIC VALVE REPLACEMENT: ICD-10-CM

## 2020-08-24 DIAGNOSIS — Z79.01 ANTICOAGULATED ON WARFARIN: ICD-10-CM

## 2020-08-24 DIAGNOSIS — Z95.2 S/P AVR: ICD-10-CM

## 2020-08-24 LAB — INR PPP: 1.9 (ref 0.9–1.1)

## 2020-10-06 ENCOUNTER — AMBULATORY - HEALTHEAST (OUTPATIENT)
Dept: NURSING | Facility: CLINIC | Age: 70
End: 2020-10-06

## 2020-10-06 ENCOUNTER — AMBULATORY - HEALTHEAST (OUTPATIENT)
Dept: LAB | Facility: CLINIC | Age: 70
End: 2020-10-06

## 2020-10-06 ENCOUNTER — COMMUNICATION - HEALTHEAST (OUTPATIENT)
Dept: ANTICOAGULATION | Facility: CLINIC | Age: 70
End: 2020-10-06

## 2020-10-06 DIAGNOSIS — Z23 NEED FOR VACCINATION: ICD-10-CM

## 2020-10-06 DIAGNOSIS — Z79.01 ANTICOAGULATED ON WARFARIN: ICD-10-CM

## 2020-10-06 DIAGNOSIS — Z95.2 HX OF MECHANICAL AORTIC VALVE REPLACEMENT: ICD-10-CM

## 2020-10-06 DIAGNOSIS — Z95.2 S/P AVR: ICD-10-CM

## 2020-10-06 LAB — INR PPP: 2.1 (ref 0.9–1.1)

## 2020-10-09 ENCOUNTER — COMMUNICATION - HEALTHEAST (OUTPATIENT)
Dept: FAMILY MEDICINE | Facility: CLINIC | Age: 70
End: 2020-10-09

## 2020-10-09 DIAGNOSIS — Z79.01 ANTICOAGULATED ON WARFARIN: ICD-10-CM

## 2020-10-23 ENCOUNTER — COMMUNICATION - HEALTHEAST (OUTPATIENT)
Dept: CARDIOLOGY | Facility: CLINIC | Age: 70
End: 2020-10-23

## 2020-10-26 ENCOUNTER — OFFICE VISIT - HEALTHEAST (OUTPATIENT)
Dept: CARDIOLOGY | Facility: CLINIC | Age: 70
End: 2020-10-26

## 2020-10-26 DIAGNOSIS — I44.39 HIGH DEGREE ATRIOVENTRICULAR BLOCK: ICD-10-CM

## 2020-10-26 DIAGNOSIS — I25.10 CORONARY ARTERY DISEASE INVOLVING NATIVE CORONARY ARTERY WITHOUT ANGINA PECTORIS: ICD-10-CM

## 2020-10-26 DIAGNOSIS — Z95.0 CARDIAC PACEMAKER IN SITU: ICD-10-CM

## 2020-10-26 DIAGNOSIS — I47.29 NON-SUSTAINED VENTRICULAR TACHYCARDIA (H): ICD-10-CM

## 2020-10-26 DIAGNOSIS — Z95.2 HX OF MECHANICAL AORTIC VALVE REPLACEMENT: ICD-10-CM

## 2020-10-26 ASSESSMENT — MIFFLIN-ST. JEOR: SCORE: 1356.43

## 2020-10-27 ENCOUNTER — COMMUNICATION - HEALTHEAST (OUTPATIENT)
Dept: CARDIOLOGY | Facility: CLINIC | Age: 70
End: 2020-10-27

## 2020-11-04 ENCOUNTER — AMBULATORY - HEALTHEAST (OUTPATIENT)
Dept: CARDIOLOGY | Facility: CLINIC | Age: 70
End: 2020-11-04

## 2020-11-04 DIAGNOSIS — Z95.0 CARDIAC PACEMAKER IN SITU: ICD-10-CM

## 2020-11-04 DIAGNOSIS — I47.29 NON-SUSTAINED VENTRICULAR TACHYCARDIA (H): ICD-10-CM

## 2020-11-17 ENCOUNTER — AMBULATORY - HEALTHEAST (OUTPATIENT)
Dept: LAB | Facility: CLINIC | Age: 70
End: 2020-11-17

## 2020-11-17 ENCOUNTER — COMMUNICATION - HEALTHEAST (OUTPATIENT)
Dept: ANTICOAGULATION | Facility: CLINIC | Age: 70
End: 2020-11-17

## 2020-11-17 DIAGNOSIS — Z95.2 HX OF MECHANICAL AORTIC VALVE REPLACEMENT: ICD-10-CM

## 2020-11-17 DIAGNOSIS — Z79.01 ANTICOAGULATED ON WARFARIN: ICD-10-CM

## 2020-11-17 DIAGNOSIS — Z95.2 S/P AVR: ICD-10-CM

## 2020-11-17 LAB — INR PPP: 1.8 (ref 0.9–1.1)

## 2020-12-08 ENCOUNTER — AMBULATORY - HEALTHEAST (OUTPATIENT)
Dept: FAMILY MEDICINE | Facility: CLINIC | Age: 70
End: 2020-12-08

## 2020-12-09 ENCOUNTER — OFFICE VISIT - HEALTHEAST (OUTPATIENT)
Dept: FAMILY MEDICINE | Facility: CLINIC | Age: 70
End: 2020-12-09

## 2020-12-09 ENCOUNTER — AMBULATORY - HEALTHEAST (OUTPATIENT)
Dept: FAMILY MEDICINE | Facility: CLINIC | Age: 70
End: 2020-12-09

## 2020-12-09 DIAGNOSIS — M25.571 PAIN IN JOINT, ANKLE AND FOOT, RIGHT: ICD-10-CM

## 2020-12-09 DIAGNOSIS — Z13.1 DIABETES MELLITUS SCREENING: ICD-10-CM

## 2020-12-09 DIAGNOSIS — E78.00 HYPERCHOLESTEROLEMIA: ICD-10-CM

## 2020-12-09 DIAGNOSIS — I47.29 NON-SUSTAINED VENTRICULAR TACHYCARDIA (H): ICD-10-CM

## 2020-12-09 DIAGNOSIS — Z79.01 ANTICOAGULATED ON WARFARIN: ICD-10-CM

## 2020-12-09 DIAGNOSIS — Z95.0 CARDIAC PACEMAKER IN SITU: ICD-10-CM

## 2020-12-09 DIAGNOSIS — I25.10 CORONARY ARTERY DISEASE INVOLVING NATIVE CORONARY ARTERY OF NATIVE HEART WITHOUT ANGINA PECTORIS: ICD-10-CM

## 2020-12-09 DIAGNOSIS — Z82.61 FAMILY HISTORY OF RHEUMATOID ARTHRITIS: ICD-10-CM

## 2020-12-09 DIAGNOSIS — E66.811 CLASS 1 OBESITY DUE TO EXCESS CALORIES WITH SERIOUS COMORBIDITY AND BODY MASS INDEX (BMI) OF 33.0 TO 33.9 IN ADULT: ICD-10-CM

## 2020-12-09 DIAGNOSIS — E03.9 ACQUIRED HYPOTHYROIDISM: ICD-10-CM

## 2020-12-09 DIAGNOSIS — Z00.00 ROUTINE GENERAL MEDICAL EXAMINATION AT A HEALTH CARE FACILITY: ICD-10-CM

## 2020-12-09 DIAGNOSIS — Z12.31 ENCOUNTER FOR SCREENING MAMMOGRAM FOR MALIGNANT NEOPLASM OF BREAST: ICD-10-CM

## 2020-12-09 DIAGNOSIS — E66.09 CLASS 1 OBESITY DUE TO EXCESS CALORIES WITH SERIOUS COMORBIDITY AND BODY MASS INDEX (BMI) OF 33.0 TO 33.9 IN ADULT: ICD-10-CM

## 2020-12-09 DIAGNOSIS — I44.39 HIGH DEGREE ATRIOVENTRICULAR BLOCK: ICD-10-CM

## 2020-12-09 DIAGNOSIS — R73.01 ELEVATED FASTING GLUCOSE: ICD-10-CM

## 2020-12-09 DIAGNOSIS — Z95.2 HX OF MECHANICAL AORTIC VALVE REPLACEMENT: ICD-10-CM

## 2020-12-09 LAB
ANION GAP SERPL CALCULATED.3IONS-SCNC: 11 MMOL/L (ref 5–18)
BUN SERPL-MCNC: 17 MG/DL (ref 8–22)
CALCIUM SERPL-MCNC: 9.5 MG/DL (ref 8.5–10.5)
CHLORIDE BLD-SCNC: 104 MMOL/L (ref 98–107)
CHOLEST SERPL-MCNC: 248 MG/DL
CO2 SERPL-SCNC: 25 MMOL/L (ref 22–31)
CREAT SERPL-MCNC: 0.89 MG/DL (ref 0.6–1.1)
ERYTHROCYTE [DISTWIDTH] IN BLOOD BY AUTOMATED COUNT: 11.9 % (ref 11–14.5)
FASTING STATUS PATIENT QL REPORTED: YES
GFR SERPL CREATININE-BSD FRML MDRD: >60 ML/MIN/1.73M2
GLUCOSE BLD-MCNC: 92 MG/DL (ref 70–125)
HBA1C MFR BLD: 5.4 %
HCT VFR BLD AUTO: 43.2 % (ref 35–47)
HDLC SERPL-MCNC: 78 MG/DL
HGB BLD-MCNC: 14.2 G/DL (ref 12–16)
LDLC SERPL CALC-MCNC: 145 MG/DL
MCH RBC QN AUTO: 30.2 PG (ref 27–34)
MCHC RBC AUTO-ENTMCNC: 33 G/DL (ref 32–36)
MCV RBC AUTO: 92 FL (ref 80–100)
PLATELET # BLD AUTO: 256 THOU/UL (ref 140–440)
PMV BLD AUTO: 8.6 FL (ref 7–10)
POTASSIUM BLD-SCNC: 4.8 MMOL/L (ref 3.5–5)
RBC # BLD AUTO: 4.72 MILL/UL (ref 3.8–5.4)
SODIUM SERPL-SCNC: 140 MMOL/L (ref 136–145)
TRIGL SERPL-MCNC: 126 MG/DL
TSH SERPL DL<=0.005 MIU/L-ACNC: 4.03 UIU/ML (ref 0.3–5)
WBC: 7.7 THOU/UL (ref 4–11)

## 2020-12-09 RX ORDER — LEVOTHYROXINE SODIUM 88 UG/1
88 TABLET ORAL DAILY
Qty: 90 TABLET | Refills: 3 | Status: SHIPPED | OUTPATIENT
Start: 2020-12-09 | End: 2022-01-14

## 2020-12-09 RX ORDER — VERAPAMIL HYDROCHLORIDE 240 MG/1
240 CAPSULE, EXTENDED RELEASE ORAL DAILY
Qty: 90 CAPSULE | Refills: 3 | Status: SHIPPED | OUTPATIENT
Start: 2020-12-09 | End: 2021-12-21

## 2020-12-09 RX ORDER — PRAVASTATIN SODIUM 40 MG
40 TABLET ORAL DAILY
Qty: 90 TABLET | Refills: 3 | Status: SHIPPED | OUTPATIENT
Start: 2020-12-09 | End: 2021-12-21

## 2020-12-09 ASSESSMENT — MIFFLIN-ST. JEOR: SCORE: 1349.06

## 2020-12-21 ENCOUNTER — AMBULATORY - HEALTHEAST (OUTPATIENT)
Dept: LAB | Facility: CLINIC | Age: 70
End: 2020-12-21

## 2020-12-21 DIAGNOSIS — Z82.61 FAMILY HISTORY OF RHEUMATOID ARTHRITIS: ICD-10-CM

## 2020-12-21 DIAGNOSIS — M25.571 PAIN IN JOINT, ANKLE AND FOOT, RIGHT: ICD-10-CM

## 2020-12-21 LAB
C REACTIVE PROTEIN LHE: 0.4 MG/DL (ref 0–0.8)
ERYTHROCYTE [SEDIMENTATION RATE] IN BLOOD BY WESTERGREN METHOD: 8 MM/HR (ref 0–20)
RHEUMATOID FACT SERPL-ACNC: <15 IU/ML (ref 0–30)
URATE SERPL-MCNC: 4.9 MG/DL (ref 2–7.5)

## 2020-12-22 LAB
ANA SER QL: 0.2 U
CCP AB SER IA-ACNC: 0.5 U/ML

## 2020-12-28 ENCOUNTER — COMMUNICATION - HEALTHEAST (OUTPATIENT)
Dept: FAMILY MEDICINE | Facility: CLINIC | Age: 70
End: 2020-12-28

## 2020-12-28 DIAGNOSIS — Z79.2 PROPHYLACTIC ANTIBIOTIC: ICD-10-CM

## 2020-12-29 ENCOUNTER — COMMUNICATION - HEALTHEAST (OUTPATIENT)
Dept: ANTICOAGULATION | Facility: CLINIC | Age: 70
End: 2020-12-29

## 2020-12-29 ENCOUNTER — AMBULATORY - HEALTHEAST (OUTPATIENT)
Dept: LAB | Facility: CLINIC | Age: 70
End: 2020-12-29

## 2020-12-29 DIAGNOSIS — Z95.2 S/P AVR: ICD-10-CM

## 2020-12-29 DIAGNOSIS — Z79.01 ANTICOAGULATED ON WARFARIN: ICD-10-CM

## 2020-12-29 DIAGNOSIS — Z95.2 HX OF MECHANICAL AORTIC VALVE REPLACEMENT: ICD-10-CM

## 2020-12-29 LAB — INR PPP: 1.7 (ref 0.9–1.1)

## 2021-01-13 ENCOUNTER — COMMUNICATION - HEALTHEAST (OUTPATIENT)
Dept: ANTICOAGULATION | Facility: CLINIC | Age: 71
End: 2021-01-13

## 2021-01-13 ENCOUNTER — AMBULATORY - HEALTHEAST (OUTPATIENT)
Dept: LAB | Facility: CLINIC | Age: 71
End: 2021-01-13

## 2021-01-13 DIAGNOSIS — Z79.01 ANTICOAGULATED ON WARFARIN: ICD-10-CM

## 2021-01-13 DIAGNOSIS — Z95.2 S/P AVR: ICD-10-CM

## 2021-01-13 DIAGNOSIS — Z95.2 HX OF MECHANICAL AORTIC VALVE REPLACEMENT: ICD-10-CM

## 2021-01-13 LAB — INR PPP: 1.9 (ref 0.9–1.1)

## 2021-02-03 ENCOUNTER — AMBULATORY - HEALTHEAST (OUTPATIENT)
Dept: CARDIOLOGY | Facility: CLINIC | Age: 71
End: 2021-02-03

## 2021-02-03 DIAGNOSIS — Z95.0 CARDIAC PACEMAKER IN SITU: ICD-10-CM

## 2021-02-03 DIAGNOSIS — I44.39 HIGH DEGREE ATRIOVENTRICULAR BLOCK: ICD-10-CM

## 2021-02-03 DIAGNOSIS — R00.0 TACHYCARDIA: ICD-10-CM

## 2021-02-03 DIAGNOSIS — I47.29 NON-SUSTAINED VENTRICULAR TACHYCARDIA (H): ICD-10-CM

## 2021-02-04 ENCOUNTER — COMMUNICATION - HEALTHEAST (OUTPATIENT)
Dept: ANTICOAGULATION | Facility: CLINIC | Age: 71
End: 2021-02-04

## 2021-02-04 DIAGNOSIS — Z95.2 HX OF MECHANICAL AORTIC VALVE REPLACEMENT: ICD-10-CM

## 2021-02-10 ENCOUNTER — COMMUNICATION - HEALTHEAST (OUTPATIENT)
Dept: ANTICOAGULATION | Facility: CLINIC | Age: 71
End: 2021-02-10

## 2021-02-10 ENCOUNTER — AMBULATORY - HEALTHEAST (OUTPATIENT)
Dept: LAB | Facility: CLINIC | Age: 71
End: 2021-02-10

## 2021-02-10 DIAGNOSIS — Z79.01 ANTICOAGULATED ON WARFARIN: ICD-10-CM

## 2021-02-10 DIAGNOSIS — Z95.2 HX OF MECHANICAL AORTIC VALVE REPLACEMENT: ICD-10-CM

## 2021-02-10 LAB — INR PPP: 1.8 (ref 0.9–1.1)

## 2021-02-16 ENCOUNTER — COMMUNICATION - HEALTHEAST (OUTPATIENT)
Dept: FAMILY MEDICINE | Facility: CLINIC | Age: 71
End: 2021-02-16

## 2021-02-16 DIAGNOSIS — Z79.01 ANTICOAGULATED ON WARFARIN: ICD-10-CM

## 2021-02-16 RX ORDER — WARFARIN SODIUM 5 MG/1
TABLET ORAL
Qty: 123 TABLET | Refills: 1 | Status: SHIPPED | OUTPATIENT
Start: 2021-02-16 | End: 2021-09-07

## 2021-03-02 ENCOUNTER — RECORDS - HEALTHEAST (OUTPATIENT)
Dept: ADMINISTRATIVE | Facility: OTHER | Age: 71
End: 2021-03-02

## 2021-03-02 ENCOUNTER — RECORDS - HEALTHEAST (OUTPATIENT)
Dept: RADIOLOGY | Facility: CLINIC | Age: 71
End: 2021-03-02

## 2021-03-02 ENCOUNTER — HOSPITAL ENCOUNTER (OUTPATIENT)
Dept: MAMMOGRAPHY | Facility: CLINIC | Age: 71
Discharge: HOME | End: 2021-03-02
Attending: FAMILY MEDICINE

## 2021-03-02 DIAGNOSIS — Z12.31 ENCOUNTER FOR SCREENING MAMMOGRAM FOR MALIGNANT NEOPLASM OF BREAST: ICD-10-CM

## 2021-03-10 ENCOUNTER — COMMUNICATION - HEALTHEAST (OUTPATIENT)
Dept: ANTICOAGULATION | Facility: CLINIC | Age: 71
End: 2021-03-10

## 2021-03-10 ENCOUNTER — AMBULATORY - HEALTHEAST (OUTPATIENT)
Dept: LAB | Facility: CLINIC | Age: 71
End: 2021-03-10

## 2021-03-10 DIAGNOSIS — Z79.01 ANTICOAGULATED ON WARFARIN: ICD-10-CM

## 2021-03-10 DIAGNOSIS — Z95.2 HX OF MECHANICAL AORTIC VALVE REPLACEMENT: ICD-10-CM

## 2021-03-10 LAB — INR PPP: 2.2 (ref 0.9–1.1)

## 2021-03-19 ENCOUNTER — COMMUNICATION - HEALTHEAST (OUTPATIENT)
Dept: FAMILY MEDICINE | Facility: CLINIC | Age: 71
End: 2021-03-19

## 2021-03-19 DIAGNOSIS — K59.01 SLOW TRANSIT CONSTIPATION: ICD-10-CM

## 2021-03-23 ENCOUNTER — OFFICE VISIT - HEALTHEAST (OUTPATIENT)
Dept: FAMILY MEDICINE | Facility: CLINIC | Age: 71
End: 2021-03-23

## 2021-03-23 ENCOUNTER — COMMUNICATION - HEALTHEAST (OUTPATIENT)
Dept: ANTICOAGULATION | Facility: CLINIC | Age: 71
End: 2021-03-23

## 2021-03-23 ENCOUNTER — COMMUNICATION - HEALTHEAST (OUTPATIENT)
Dept: FAMILY MEDICINE | Facility: CLINIC | Age: 71
End: 2021-03-23

## 2021-03-23 DIAGNOSIS — Z95.2 HX OF MECHANICAL AORTIC VALVE REPLACEMENT: ICD-10-CM

## 2021-03-23 DIAGNOSIS — M79.674 PAIN OF TOE OF RIGHT FOOT: ICD-10-CM

## 2021-03-23 DIAGNOSIS — I25.10 CORONARY ARTERY DISEASE INVOLVING NATIVE CORONARY ARTERY OF NATIVE HEART WITHOUT ANGINA PECTORIS: ICD-10-CM

## 2021-03-23 DIAGNOSIS — E66.811 CLASS 1 OBESITY DUE TO EXCESS CALORIES WITH SERIOUS COMORBIDITY AND BODY MASS INDEX (BMI) OF 32.0 TO 32.9 IN ADULT: ICD-10-CM

## 2021-03-23 DIAGNOSIS — I47.29 NON-SUSTAINED VENTRICULAR TACHYCARDIA (H): ICD-10-CM

## 2021-03-23 DIAGNOSIS — Z79.01 ANTICOAGULATED ON WARFARIN: ICD-10-CM

## 2021-03-23 DIAGNOSIS — E78.00 HYPERCHOLESTEROLEMIA: ICD-10-CM

## 2021-03-23 DIAGNOSIS — E03.9 ACQUIRED HYPOTHYROIDISM: ICD-10-CM

## 2021-03-23 DIAGNOSIS — E66.09 CLASS 1 OBESITY DUE TO EXCESS CALORIES WITH SERIOUS COMORBIDITY AND BODY MASS INDEX (BMI) OF 32.0 TO 32.9 IN ADULT: ICD-10-CM

## 2021-03-23 DIAGNOSIS — Z95.0 CARDIAC PACEMAKER IN SITU: ICD-10-CM

## 2021-03-23 DIAGNOSIS — I44.39 HIGH DEGREE ATRIOVENTRICULAR BLOCK: ICD-10-CM

## 2021-03-23 DIAGNOSIS — Z01.818 PRE-OP EXAM: ICD-10-CM

## 2021-03-23 LAB
ATRIAL RATE - MUSE: 61 BPM
DIASTOLIC BLOOD PRESSURE - MUSE: NORMAL
HGB BLD-MCNC: 14 G/DL (ref 12–16)
INR PPP: 2 (ref 0.9–1.1)
INTERPRETATION ECG - MUSE: NORMAL
P AXIS - MUSE: 66 DEGREES
PR INTERVAL - MUSE: 214 MS
QRS DURATION - MUSE: 82 MS
QT - MUSE: 440 MS
QTC - MUSE: 442 MS
R AXIS - MUSE: -9 DEGREES
SYSTOLIC BLOOD PRESSURE - MUSE: NORMAL
T AXIS - MUSE: 62 DEGREES
VENTRICULAR RATE- MUSE: 61 BPM

## 2021-04-01 ENCOUNTER — COMMUNICATION - HEALTHEAST (OUTPATIENT)
Dept: ANTICOAGULATION | Facility: CLINIC | Age: 71
End: 2021-04-01

## 2021-04-01 DIAGNOSIS — Z79.01 ANTICOAGULATED ON WARFARIN: ICD-10-CM

## 2021-04-01 DIAGNOSIS — Z95.2 HX OF MECHANICAL AORTIC VALVE REPLACEMENT: ICD-10-CM

## 2021-04-12 ENCOUNTER — AMBULATORY - HEALTHEAST (OUTPATIENT)
Dept: LAB | Facility: CLINIC | Age: 71
End: 2021-04-12

## 2021-04-12 ENCOUNTER — COMMUNICATION - HEALTHEAST (OUTPATIENT)
Dept: ANTICOAGULATION | Facility: CLINIC | Age: 71
End: 2021-04-12

## 2021-04-12 DIAGNOSIS — Z95.2 HX OF MECHANICAL AORTIC VALVE REPLACEMENT: ICD-10-CM

## 2021-04-12 DIAGNOSIS — Z79.01 ANTICOAGULATED ON WARFARIN: ICD-10-CM

## 2021-04-12 LAB — INR PPP: 1.3 (ref 0.9–1.1)

## 2021-04-20 ENCOUNTER — AMBULATORY - HEALTHEAST (OUTPATIENT)
Dept: LAB | Facility: CLINIC | Age: 71
End: 2021-04-20

## 2021-04-20 ENCOUNTER — COMMUNICATION - HEALTHEAST (OUTPATIENT)
Dept: ANTICOAGULATION | Facility: CLINIC | Age: 71
End: 2021-04-20

## 2021-04-20 DIAGNOSIS — Z79.01 ANTICOAGULATED ON WARFARIN: ICD-10-CM

## 2021-04-20 DIAGNOSIS — Z95.2 HX OF MECHANICAL AORTIC VALVE REPLACEMENT: ICD-10-CM

## 2021-04-20 LAB — INR PPP: 2.3 (ref 0.9–1.1)

## 2021-04-30 ENCOUNTER — COMMUNICATION - HEALTHEAST (OUTPATIENT)
Dept: ANTICOAGULATION | Facility: CLINIC | Age: 71
End: 2021-04-30

## 2021-04-30 ENCOUNTER — AMBULATORY - HEALTHEAST (OUTPATIENT)
Dept: LAB | Facility: CLINIC | Age: 71
End: 2021-04-30

## 2021-04-30 DIAGNOSIS — Z95.2 HX OF MECHANICAL AORTIC VALVE REPLACEMENT: ICD-10-CM

## 2021-04-30 DIAGNOSIS — Z79.01 ANTICOAGULATED ON WARFARIN: ICD-10-CM

## 2021-04-30 LAB — INR PPP: 2.1 (ref 0.9–1.1)

## 2021-05-06 ENCOUNTER — AMBULATORY - HEALTHEAST (OUTPATIENT)
Dept: CARDIOLOGY | Facility: CLINIC | Age: 71
End: 2021-05-06

## 2021-05-06 DIAGNOSIS — I45.9 STOKES-ADAMS SYNCOPE: ICD-10-CM

## 2021-05-06 DIAGNOSIS — I44.39 HIGH DEGREE ATRIOVENTRICULAR BLOCK: ICD-10-CM

## 2021-05-06 DIAGNOSIS — I49.2: ICD-10-CM

## 2021-05-06 DIAGNOSIS — I47.29 NON-SUSTAINED VENTRICULAR TACHYCARDIA (H): ICD-10-CM

## 2021-05-06 DIAGNOSIS — R00.0 TACHYCARDIA: ICD-10-CM

## 2021-05-06 DIAGNOSIS — Z95.0 CARDIAC PACEMAKER IN SITU: ICD-10-CM

## 2021-05-14 ENCOUNTER — AMBULATORY - HEALTHEAST (OUTPATIENT)
Dept: LAB | Facility: CLINIC | Age: 71
End: 2021-05-14

## 2021-05-14 ENCOUNTER — COMMUNICATION - HEALTHEAST (OUTPATIENT)
Dept: ANTICOAGULATION | Facility: CLINIC | Age: 71
End: 2021-05-14

## 2021-05-14 DIAGNOSIS — Z95.2 HX OF MECHANICAL AORTIC VALVE REPLACEMENT: ICD-10-CM

## 2021-05-14 DIAGNOSIS — Z79.01 ANTICOAGULATED ON WARFARIN: ICD-10-CM

## 2021-05-14 LAB — INR PPP: 2 (ref 0.9–1.1)

## 2021-05-24 ENCOUNTER — RECORDS - HEALTHEAST (OUTPATIENT)
Dept: ADMINISTRATIVE | Facility: CLINIC | Age: 71
End: 2021-05-24

## 2021-05-25 ENCOUNTER — RECORDS - HEALTHEAST (OUTPATIENT)
Dept: ADMINISTRATIVE | Facility: CLINIC | Age: 71
End: 2021-05-25

## 2021-05-30 ENCOUNTER — RECORDS - HEALTHEAST (OUTPATIENT)
Dept: ADMINISTRATIVE | Facility: CLINIC | Age: 71
End: 2021-05-30

## 2021-05-30 NOTE — PATIENT INSTRUCTIONS - HE
Vandana Thao    It was a pleasure to see you at [facility] today.    Continue verapamil 240 mg each morning  24-hour Holter ordered in 1 week  Resume activity without restriction  Call if frequent palpitations irregular heart beating lightheadedness or fainting, transtelephonic pacemaker check could be considered at the time of symptoms  Follow up appointment:   Dr. Rizo in device clinic in 1 year    Contact Debby at [phone] with questions or concerns.    Ryan Rizo MD

## 2021-05-30 NOTE — PROGRESS NOTES
In clinic device check with Device RN and Dr. Rizo.  Please see link for full device report.  Patient was informed of results and next follow up during today's visit.

## 2021-05-30 NOTE — TELEPHONE ENCOUNTER
Per Mary PHELPS Device RN, Dr. Rizo would like to see patient tomorrow AM. Patient agrees and will see him tomorrow in Device Clinic DT. Device RN at 8:50 and Dr. Rizo at 9:20 AM. She is very appreciative.   Mckenna Bailon, RN

## 2021-05-30 NOTE — PATIENT INSTRUCTIONS - HE
Vandana Thao    It was a pleasure to see you at [facility] today.    You have palpitations probably associated with junctional premature beats or tachycardia, non-life-threatening  Holter monitor is ordered today  Stress nuclear imaging is ordered next week, do not eat breakfast that morning take pills with a sip of water  Consideration will be given to adding metoprolol after these studies  Follow up appointment:   Dr. Rizo in device clinic in 2 months    Contact Debby at [phone] with questions or concerns.    Ryan Rizo MD

## 2021-05-30 NOTE — PROGRESS NOTES
In clinic device check with Dr. Rizo.  Please see link for full device report.  Patient was informed of results and next follow up during today's visit.

## 2021-05-30 NOTE — TELEPHONE ENCOUNTER
"Type: Routine remote pacemaker transmission.   Presenting Rhythm: Sinus, AP-VS, and rare PVC, rate 68 bpm.   Lead/Battery status: Stable.   Arrhythmias: Since 3/28/19, a total of two seconds of AT/AF detected, burden <0.1%, no EGMs available for review.  37 episodes labeled VT and 2,676 labeled non-sustained, review of available EGMs appear to be ~2-30 seconds VT (waveform suspended on some episodes), rate 130s-180s. EF 60% 6/21/18.  Anticoagulant:  Comments: Normal pacemaker function. Routed to device RN for review. KLP     Transmission reviewed, significant increase in NSVT/VT burden since last remote 3 months ago. Longest on logbook is ~1min 42 seconds with rates 165-180s on 6.27.19. Reviewed with patient, she states she has felt her heart racing quite often, denies any near-syncope/syncope so far. States she notices it the most when trying to \"be more active, often when I try to do something I notice my heart start racing.\" Denies chest pain, shortness of breath. States she has been seeing her PMD for thyroid issues, and at one point said they thought she was having issues with  \"thyroid meds, so they had been adjusting them. I thought maybe after they adjusted meds it was better, but then heart started acting up again.\"     Patient states she was planning on leaving for the 4th of July celebrations tomorrow morning at 8:30 AM. I reviewed with patient that I was quite concerned about these rhythms and how frequent they are occurring. I wanted to review with the covering EP MD. She verbalized understanding and will wait to hear back from me.     Mckenna Bailon RN    "

## 2021-05-31 ENCOUNTER — RECORDS - HEALTHEAST (OUTPATIENT)
Dept: ADMINISTRATIVE | Facility: CLINIC | Age: 71
End: 2021-05-31

## 2021-05-31 VITALS — BODY MASS INDEX: 33.31 KG/M2 | WEIGHT: 188 LBS | HEIGHT: 63 IN

## 2021-05-31 VITALS — BODY MASS INDEX: 34.38 KG/M2 | WEIGHT: 194 LBS | HEIGHT: 63 IN

## 2021-06-01 ENCOUNTER — RECORDS - HEALTHEAST (OUTPATIENT)
Dept: ADMINISTRATIVE | Facility: CLINIC | Age: 71
End: 2021-06-01

## 2021-06-01 VITALS — HEIGHT: 63 IN | BODY MASS INDEX: 33.31 KG/M2 | WEIGHT: 188 LBS

## 2021-06-01 VITALS
BODY MASS INDEX: 33.32 KG/M2 | BODY MASS INDEX: 33.32 KG/M2 | WEIGHT: 188.1 LBS | WEIGHT: 188.1 LBS | BODY MASS INDEX: 33.32 KG/M2 | WEIGHT: 188.1 LBS

## 2021-06-01 VITALS — WEIGHT: 189.8 LBS | BODY MASS INDEX: 34.77 KG/M2 | BODY MASS INDEX: 33.62 KG/M2 | WEIGHT: 196.3 LBS

## 2021-06-02 VITALS — WEIGHT: 188 LBS | BODY MASS INDEX: 33.31 KG/M2 | HEIGHT: 63 IN

## 2021-06-03 VITALS — HEIGHT: 63 IN | BODY MASS INDEX: 32.07 KG/M2 | WEIGHT: 181 LBS

## 2021-06-03 VITALS — BODY MASS INDEX: 32.25 KG/M2 | HEIGHT: 63 IN | WEIGHT: 182 LBS

## 2021-06-04 ENCOUNTER — AMBULATORY - HEALTHEAST (OUTPATIENT)
Dept: LAB | Facility: CLINIC | Age: 71
End: 2021-06-04

## 2021-06-04 ENCOUNTER — COMMUNICATION - HEALTHEAST (OUTPATIENT)
Dept: ANTICOAGULATION | Facility: CLINIC | Age: 71
End: 2021-06-04

## 2021-06-04 VITALS
SYSTOLIC BLOOD PRESSURE: 156 MMHG | WEIGHT: 191 LBS | HEIGHT: 63 IN | OXYGEN SATURATION: 96 % | BODY MASS INDEX: 33.84 KG/M2 | HEART RATE: 82 BPM | TEMPERATURE: 97.7 F | DIASTOLIC BLOOD PRESSURE: 78 MMHG

## 2021-06-04 VITALS
DIASTOLIC BLOOD PRESSURE: 70 MMHG | BODY MASS INDEX: 34.29 KG/M2 | HEART RATE: 84 BPM | WEIGHT: 193.56 LBS | SYSTOLIC BLOOD PRESSURE: 130 MMHG | TEMPERATURE: 98.9 F

## 2021-06-04 VITALS
WEIGHT: 188.44 LBS | BODY MASS INDEX: 33.39 KG/M2 | HEIGHT: 63 IN | DIASTOLIC BLOOD PRESSURE: 70 MMHG | SYSTOLIC BLOOD PRESSURE: 120 MMHG | HEART RATE: 75 BPM

## 2021-06-04 DIAGNOSIS — Z79.01 ANTICOAGULATED ON WARFARIN: ICD-10-CM

## 2021-06-04 DIAGNOSIS — Z95.2 HX OF MECHANICAL AORTIC VALVE REPLACEMENT: ICD-10-CM

## 2021-06-04 LAB — INR PPP: 2 (ref 0.9–1.1)

## 2021-06-05 VITALS
BODY MASS INDEX: 32.97 KG/M2 | DIASTOLIC BLOOD PRESSURE: 70 MMHG | WEIGHT: 186.1 LBS | SYSTOLIC BLOOD PRESSURE: 134 MMHG | HEART RATE: 68 BPM | OXYGEN SATURATION: 97 %

## 2021-06-05 VITALS
HEIGHT: 63 IN | SYSTOLIC BLOOD PRESSURE: 130 MMHG | WEIGHT: 187.38 LBS | DIASTOLIC BLOOD PRESSURE: 70 MMHG | HEART RATE: 64 BPM | BODY MASS INDEX: 33.2 KG/M2

## 2021-06-05 VITALS
DIASTOLIC BLOOD PRESSURE: 70 MMHG | WEIGHT: 189 LBS | HEART RATE: 81 BPM | RESPIRATION RATE: 16 BRPM | BODY MASS INDEX: 33.49 KG/M2 | HEIGHT: 63 IN | OXYGEN SATURATION: 97 % | SYSTOLIC BLOOD PRESSURE: 136 MMHG

## 2021-06-06 NOTE — PROGRESS NOTES
Assessment/Plan:    1. Encounter to establish care with new doctor  Establishing care today.  All past medical history reviewed and updated in EMR.    2. Encounter for administration of vaccine  Due for vaccines as below, administered today.  - Td, Preservative Free (green label)  - Pneumococcal polysaccharide vaccine 23-valent 1 yo or older, subq/IM    3. Acquired hypothyroidism  History Graves' disease status post thyroidectomy and now with hypothyroidism.  Currently taking Synthroid 88 mcg daily.  Will recheck thyroid levels today.  - Thyroid Brielle    4. Hx of mechanical aortic valve replacement  5. Anticoagulated on warfarin  Patient with a history of aortic valve stenosis from a bicuspid valve, status post mechanical aortic valve replacement, patient is currently on warfarin for anticoagulation, goal INR range 1.8-2.2 per cardiologist.  Referral to anticoagulation monitoring program placed today.  Will check INR level today.  Prescription given for amoxicillin which patient has been told to take before dental procedures.  - INR  - Ambulatory referral to Anticoagulation Monitoring  - amoxicillin (AMOXIL) 500 MG capsule; Take 4 capsules (2,000 mg total) by mouth once for 1 dose. Pre dental  Dispense: 4 capsule; Refill: 0    6. Microscopic hematuria  Patient reports history microscopic hematuria, previously saw urology and had cystoscopy with bladder biopsy done which was normal, patient reports that she was told that hematuria would be chronic.  We discussed in this setting it would be reasonable to annually check a UA to monitor hematuria level.  - Urinalysis-UC if Indicated    7. Need for hepatitis C screening test  Given age, due for one-time hepatitis C screening.  - Hepatitis C Antibody (Anti-HCV)    8. Hypercholesterolemia  History hyperlipidemia, currently taking pravastatin 40 mg daily, will recheck levels today.  - Lipid Brielle RANDOM    9. Cardiac pacemaker in situ  10. Coronary artery disease  involving native coronary artery of native heart without angina pectoris  11. Non-sustained ventricular tachycardia (H)  Cardiac history as above.  Currently taking verapamil for rate control.  Blood pressure and heart rate normal today.  Patient is asymptomatic/no cardiovascular concerns.  Will check BMP today.  - Basic Metabolic Panel    12. Hx of leukocytosis  History mild leukocytosis, will recheck blood counts today.  - HM2(CBC w/o Differential)      Follow up: 6 months, sooner if needed    Lakeisha Marmolejo MD  [facility]    Subjective:    Patient ID: Vandana Thao is a 69 y.o. female is here today for establish care, constipation    Establish care  -hx aortic valve replacement (mechanical) in 2009 - on warfarin, INR goal 1.8-2.2 - typically around 2.0, typically going in every 8 weeks  -following with cardiology for history of valve replacement and pacemaker for abnormal heart rhythms -doing well at this time, currently taking verapamil  -constipation: tried miralax (made her gassy) and docusate (also made her gassy); prune juice works well, switched to metamucil (somewhat helpful)  -R great toe issues, sees ortho, gets occasional steroid injections, uses percocet occasionally when flares up  -last blood work April 2019  -Patient reports last colonoscopy in 2013, reports no polyps and normal result, JOSE ROBERTO signed today      Patient Active Problem List   Diagnosis     Hypercholesterolemia     History of aortic stenosis     S/P AVR     H/O bicuspid aortic valve     High degree atrioventricular block     Acquired hypothyroidism     Fleming-Monte syncope     Cardiac pacemaker in situ     Non-sustained ventricular tachycardia (H)     Junctional premature beats (H)     Coronary artery disease involving native coronary artery without angina pectoris     Microscopic hematuria     Family history of rheumatoid arthritis     S/P splenectomy     Other insomnia     Anticoagulated on warfarin     Hx of  mechanical aortic valve replacement     Past Medical History:   Diagnosis Date     Hyperlipidemia      Hypothyroidism      Valvular disease     aortic stenosis secondary to bicuspid valve     Past Surgical History:   Procedure Laterality Date     CARDIAC VALVE REPLACEMENT  2009    AVR     COLONOSCOPY  02/04/2013     CYSTOSTOMY W/ BLADDER BIOPSY  2000     EP PACEMAKER INSERT N/A 6/21/2018    Procedure: EP Pacemaker Insertion;  Surgeon: Luis Urena MD;  Location: [facility];  Service:      OK RPLCMT PROST AORTIC VALVE OPEN XCP HOMOGRF/STENT      Description: Aortic Valve Replacement;  Proc Date: 01/13/2009;  Comments: #[address] [facility] [place] [place] Prosthesis     SPLENECTOMY  1979     thyroidectomy  2002     Current Outpatient Medications on File Prior to Visit   Medication Sig Dispense Refill     cholecalciferol, vitamin D3, 1,000 unit tablet Take 1,000 Units by mouth daily.              levothyroxine (SYNTHROID, LEVOTHROID) 100 MCG tablet Take 88 mcg by mouth Daily at 6:00 am.              pravastatin (PRAVACHOL) 40 MG tablet Take 40 mg by mouth daily. nightly       verapamil (VERELAN PM) 240 MG 24 hr capsule Take 1 capsule (240 mg total) by mouth daily. 90 capsule 3     warfarin (COUMADIN) 5 MG tablet Take 5-7.5 mg by mouth See Admin Instructions. 7.5MG every day but Thursday and Sunday she takes 5MG       HYDROcodone-acetaminophen 5-325 mg per tablet        melatonin 3 mg Tab tablet Take 6 mg by mouth at bedtime as needed.       [DISCONTINUED] amoxicillin (AMOXIL) 500 MG capsule Take 2,000 mg by mouth. Pre dental        No current facility-administered medications on file prior to visit.      No Known Allergies  Social History     Socioeconomic History     Marital status:      Spouse name: Not on file     Number of children: Not on file     Years of education: Not on file     Highest education level: Not on file   Occupational History     Not on file   Social Needs     Financial resource strain: Not  on file     Food insecurity:     Worry: Not on file     Inability: Not on file     Transportation needs:     Medical: Not on file     Non-medical: Not on file   Tobacco Use     Smoking status: Former Smoker     Packs/day: 0.50     Years: 20.00     Pack years: 10.00     Types: Cigarettes     Last attempt to quit:      Years since quittin.1     Smokeless tobacco: Never Used   Substance and Sexual Activity     Alcohol use: Yes     Frequency: 2-4 times a month     Drinks per session: 1 or 2     Binge frequency: Never     Drug use: No     Sexual activity: Not on file   Lifestyle     Physical activity:     Days per week: Not on file     Minutes per session: Not on file     Stress: Not on file   Relationships     Social connections:     Talks on phone: Not on file     Gets together: Not on file     Attends Orthodoxy service: Not on file     Active member of club or organization: Not on file     Attends meetings of clubs or organizations: Not on file     Relationship status: Not on file     Intimate partner violence:     Fear of current or ex partner: Not on file     Emotionally abused: Not on file     Physically abused: Not on file     Forced sexual activity: Not on file   Other Topics Concern     Not on file   Social History Narrative     Not on file     Family History   Problem Relation Age of Onset     Acute Myocardial Infarction Brother 43     Rheum arthritis Mother          in 40s     Heart attack Father      Hypertension Father      No Medical Problems Maternal Grandmother      No Medical Problems Maternal Grandfather      No Medical Problems Paternal Grandmother      No Medical Problems Paternal Grandfather      Aortic stenosis Brother         s/p surgery     No Medical Problems Sister      Breast cancer Neg Hx      Colon cancer Neg Hx      Cervical cancer Neg Hx      Review of systems is as stated in HPI, and the remainder of the 10 system review is otherwise negative.    Objective:      /70    "Pulse 75   Ht 5' 3\" (1.6 m)   Wt 188 lb 7 oz (85.5 kg)   BMI 33.38 kg/m      General appearance: awake, NAD, obese  HEENT: atraumatic, normocephalic, no scleral icterus or injection, ears and nose grossly normal, moist mucous membranes  Lungs: breathing comfortably on room air  Extremities: moving all extremities  Neuro: alert, oriented x3, CNs grossly intact, no focal deficits appreciated  Psych: normal mood/affect/behavior, answering questions appropriately, linear thought process      "

## 2021-06-06 NOTE — PATIENT INSTRUCTIONS - HE
Constipation:  -lots of water  -keep using the fiber supplements   -use prune juice as needed  -ok to use docusate-senna stool softener sparingly as needed (pericolace, over the counter)  -reasonable to retry docusate stool softener - can be taken 1-2 times per day  -regular exercise/activity    Will send results via [facility]Place

## 2021-06-07 NOTE — TELEPHONE ENCOUNTER
ANTICOAGULATION  MANAGEMENT    Assessment     Today's INR result of 1.8 is Therapeutic (goal INR of 1.8-2.2)        Warfarin taken as previously instructed    No new diet changes affecting INR    No new medication/supplements affecting INR    Continues to tolerate warfarin with no reported s/s of bleeding or thromboembolism     Previous INR was Supratherapeutic    Plan:     Spoke with Vandana regarding INR result and instructed:     Warfarin Dosing Instructions:  Continue current warfarin dose    7.5 mg every Sun, Wed; 5 mg all other days         Instructed patient to follow up no later than: 2 weeks.    Education provided: importance of taking warfarin as instructed    Vandana verbalizes understanding and agrees to warfarin dosing plan.    Instructed to call the [facility] for any changes, questions or concerns. (#[phone])   ?   Jefry Carver RN    Subjective/Objective:      Vandana Thao, a 69 y.o. female is on warfarin.     Vandana reports:     Home warfarin dose: verbally confirmed home dose with pt and updated on anticoagulation calendar     Missed doses: No     Medication changes:  No     S/S of bleeding or thromboembolism:  No     New Injury or illness:  No     Changes in diet or alcohol consumption:  No     Upcoming surgery, procedure or cardioversion:  No    Anticoagulation Episode Summary     Current INR goal:      TTR:   --   Next INR check:   5/14/2020   INR from last check:   1.80 (4/30/2020)   Weekly max warfarin dose:      Target end date:      INR check location:      Preferred lab:      Send INR reminders to:   ANDREAS JOHN    Indications    Anticoagulated on warfarin [Z79.01]  Hx of mechanical aortic valve replacement [Z95.2]           Comments:   Target goal range 1.8-2.2 per OV note 2/27/20         Anticoagulation Care Providers     Provider Role Specialty Phone number    Lakeisha Marmolejo MD Referring Family Medicine [phone]

## 2021-06-07 NOTE — TELEPHONE ENCOUNTER
ANTICOAGULATION  MANAGEMENT    Assessment     Today's INR result of 2.3 is Supratherapeutic (goal INR of 1.8-2.2)        Warfarin taken as previously instructed    No new diet changes affecting INR    No new medication/supplements affecting INR    Continues to tolerate warfarin with no reported s/s of bleeding or thromboembolism     Previous INR was Supratherapeutic    Plan:     Spoke with Vandana regarding INR result and instructed:     Warfarin Dosing Instructions:  Change warfarin dose to 7.5 mg daily on Sun, Wed; and 5 mg daily rest of week  (6 % change)    Instructed patient to follow up no later than: 2 weeks.     Education provided: importance of taking warfarin as instructed    Vandana verbalizes understanding and agrees to warfarin dosing plan.    Instructed to call the [facility] for any changes, questions or concerns. (#[phone])   ?   Jefry Carver RN    Subjective/Objective:      Vandana Thao, a 69 y.o. female is on warfarin.     Vandana reports:     Home warfarin dose: verbally confirmed home dose with pt and updated on anticoagulation calendar     Missed doses: No     Medication changes:  No     S/S of bleeding or thromboembolism:  No     New Injury or illness:  No     Changes in diet or alcohol consumption:  No     Upcoming surgery, procedure or cardioversion:  No    Anticoagulation Episode Summary     Current INR goal:      TTR:   --   Next INR check:   4/30/2020   INR from last check:   2.30 (4/16/2020)   Weekly max warfarin dose:      Target end date:      INR check location:      Preferred lab:      Send INR reminders to:   ANDREAS JOHN    Indications    Anticoagulated on warfarin [Z79.01]  Hx of mechanical aortic valve replacement [Z95.2]           Comments:   Target goal range 1.8-2.2 per OV note 2/27/20         Anticoagulation Care Providers     Provider Role Specialty Phone number    Lakeisha Marmolejo MD Referring Family Medicine [phone]

## 2021-06-08 NOTE — TELEPHONE ENCOUNTER
ANTICOAGULATION  MANAGEMENT    Assessment     Today's INR result of 1.9 is Therapeutic (goal INR of 1.8-2.2)        Warfarin taken as previously instructed    No new diet changes affecting INR    No new medication/supplements affecting INR    Continues to tolerate warfarin with no reported s/s of bleeding or thromboembolism     Previous INR was Subtherapeutic    Plan:     Spoke with Vandana regarding INR result and instructed:     Warfarin Dosing Instructions:  Continue current warfarin dose    7.5 mg every Sun, Wed, Fri; 5 mg all other days         Instructed patient to follow up no later than: 2 weeks.     Education provided: importance of taking warfarin as instructed    Vandana verbalizes understanding and agrees to warfarin dosing plan.    Instructed to call the [facility] for any changes, questions or concerns. (#[phone])   ?   Jefry Carver RN    Subjective/Objective:      Vandana ABRAHAM Gutierrezon, a 69 y.o. female is on warfarin.     Vandana reports:     Home warfarin dose: verbally confirmed home dose with pt and updated on anticoagulation calendar     Missed doses: No     Medication changes:  No     S/S of bleeding or thromboembolism:  No     New Injury or illness:  No     Changes in diet or alcohol consumption:  No     Upcoming surgery, procedure or cardioversion:  No    Anticoagulation Episode Summary     Current INR goal:      TTR:   --   Next INR check:   6/25/2020   INR from last check:   1.90 (6/11/2020)   Weekly max warfarin dose:      Target end date:      INR check location:      Preferred lab:      Send INR reminders to:   ANDREAS JOHN    Indications    Anticoagulated on warfarin [Z79.01]  Hx of mechanical aortic valve replacement [Z95.2]           Comments:   Target goal range 1.8-2.2 per OV note 2/27/20         Anticoagulation Care Providers     Provider Role Specialty Phone number    Lakeisha Marmolejo MD Referring Family Medicine [phone]

## 2021-06-08 NOTE — TELEPHONE ENCOUNTER
ANTICOAGULATION  MANAGEMENT    Assessment     Today's INR result of 1.7 is Subtherapeutic (goal INR of 1.8-2.2)        Warfarin taken as previously instructed    Increased greens/vitamin K intake may be affecting INR - had green beans last night    No new medication/supplements affecting INR    Continues to tolerate warfarin with no reported s/s of bleeding or thromboembolism     Previous INR was Therapeutic    Plan:     Spoke with Vandana regarding INR result and instructed:     Warfarin Dosing Instructions:  Take 7.5 mg today only then continue current warfarin dose 7.5 mg daily on Sun, Wed; and 5 mg daily rest of week  (0 % change)    Instructed patient to follow up no later than: 2 weeks    Education provided: importance of consistent vitamin K intake, target INR goal and significance of current INR result, importance of following up for INR monitoring at instructed interval and importance of taking warfarin as instructed    Vandana verbalizes understanding and agrees to warfarin dosing plan.    Instructed to call the [facility] for any changes, questions or concerns. (#[phone])   ?   Elle Casanova RN    Subjective/Objective:      Vandana Thao, a 69 y.o. female is on warfarin.     Vandana reports:     Home warfarin dose: verbally confirmed home dose with Vandana and updated on anticoagulation calendar     Missed doses: No     Medication changes:  No     S/S of bleeding or thromboembolism:  No     New Injury or illness:  No     Changes in diet or alcohol consumption:  Yes: slightly more greens     Upcoming surgery, procedure or cardioversion:  No    Anticoagulation Episode Summary     Current INR goal:      TTR:   --   Next INR check:   5/28/2020   INR from last check:   1.70 (5/14/2020)   Weekly max warfarin dose:      Target end date:      INR check location:      Preferred lab:      Send INR reminders to:   ANDREAS JOHN    Indications    Anticoagulated on warfarin [Z79.01]  Hx of mechanical  aortic valve replacement [Z95.2]           Comments:   Target goal range 1.8-2.2 per OV note 2/27/20         Anticoagulation Care Providers     Provider Role Specialty Phone number    Lakeisha Marmolejo MD Referring Family Medicine [phone]

## 2021-06-08 NOTE — TELEPHONE ENCOUNTER
Medication Request    Medication name:   1.   levothyroxine (SYNTHROID, LEVOTHROID) 100 MCG tablet    9/22/2017      Sig - Route: Take 88 mcg by mouth Daily at 6:00 am.       2.   pravastatin (PRAVACHOL) 40 MG tablet    9/22/2017      Sig - Route: Take 40 mg by mouth daily. nightly      3.    Disp  Refills  Start  End     verapamil (VERELAN PM) 240 MG 24 hr capsule  90 capsule  3  7/16/2019      Sig - Route: Take 1 capsule (240 mg total) by mouth daily.      4.   warfarin (COUMADIN) 5 MG tablet    9/20/2017      Sig - Route: Take 5-7.5 mg by mouth See Admin Instructions. 7.5MG every day but Thursday and Sunday she takes 5MG - Oral     Class: Historical Med         Requested Pharmacy: [address]      Reason for request: Historical medication refill. Transferred care to      When did you use medication last?:  06/08/2020    Patient offered appointment:  patient declined     Okay to leave a detailed message: yes    Please send script to patients pharmacy and inform the patient of the outcome to this request.

## 2021-06-08 NOTE — TELEPHONE ENCOUNTER
ANTICOAGULATION  MANAGEMENT    Assessment     Today's INR result of 1.7 is Subtherapeutic (goal INR of 1.8-2.2)        Warfarin taken as previously instructed    No new diet changes affecting INR    No new medication/supplements affecting INR    Continues to tolerate warfarin with no reported s/s of bleeding or thromboembolism     Previous INR was Subtherapeutic    Plan:     Spoke with Vandana regarding INR result and instructed:     Warfarin Dosing Instructions:  Change warfarin dose to 7.5 mg daily on Sun, Wed, Fri; and 5 mg daily rest of week  (6 % change)    Instructed patient to follow up no later than: 2 weeks.    Education provided: importance of consistent vitamin K intake, importance of following up for INR monitoring at instructed interval and importance of taking warfarin as instructed    Vandana verbalizes understanding and agrees to warfarin dosing plan.    Instructed to call the [facility] for any changes, questions or concerns. (#[phone])   ?   Jefry Carver RN    Subjective/Objective:      Vandana Thao, a 69 y.o. female is on warfarin.     Vandana reports:     Home warfarin dose: verbally confirmed home dose with pt and updated on anticoagulation calendar     Missed doses: No     Medication changes:  No     S/S of bleeding or thromboembolism:  No     New Injury or illness:  No     Changes in diet or alcohol consumption:  No     Upcoming surgery, procedure or cardioversion:  No    Anticoagulation Episode Summary     Current INR goal:      TTR:   --   Next INR check:   6/11/2020   INR from last check:   1.70 (5/28/2020)   Weekly max warfarin dose:      Target end date:      INR check location:      Preferred lab:      Send INR reminders to:   ANDREAS JOHN    Indications    Anticoagulated on warfarin [Z79.01]  Hx of mechanical aortic valve replacement [Z95.2]           Comments:   Target goal range 1.8-2.2 per OV note 2/27/20         Anticoagulation Care Providers     Provider Role  Specialty Phone number    Lakeisha Marmolejo MD Referring Family Medicine [phone]

## 2021-06-09 ENCOUNTER — RECORDS - HEALTHEAST (OUTPATIENT)
Dept: CARDIOLOGY | Facility: CLINIC | Age: 71
End: 2021-06-09

## 2021-06-09 NOTE — TELEPHONE ENCOUNTER
ANTICOAGULATION  MANAGEMENT    Assessment     Today's INR result of 1.9 is Therapeutic (goal INR of 1.8-2.2)        Warfarin taken as previously instructed    No new diet changes affecting INR    No new medication/supplements affecting INR    Continues to tolerate warfarin with no reported s/s of bleeding or thromboembolism     Previous INR was Therapeutic    Plan:     Spoke with Vandana regarding INR result and instructed:     Warfarin Dosing Instructions:  Continue current warfarin dose    7.5 mg every Sun, Wed, Fri; 5 mg all other days         Instructed patient to follow up no later than: 4 weeks.     Education provided: importance of taking warfarin as instructed    Vandana verbalizes understanding and agrees to warfarin dosing plan.    Instructed to call the [facility] for any changes, questions or concerns. (#[phone])   ?   Jefry Carver RN    Subjective/Objective:      Vandana Thao, a 70 y.o. female is on warfarin.     Vandana reports:     Home warfarin dose: verbally confirmed home dose with pt and updated on anticoagulation calendar     Missed doses: No     Medication changes:  No     S/S of bleeding or thromboembolism:  No     New Injury or illness:  No     Changes in diet or alcohol consumption:  No     Upcoming surgery, procedure or cardioversion:  No    Anticoagulation Episode Summary     Current INR goal:      TTR:   --   Next INR check:   7/27/2020   INR from last check:   1.90 (6/29/2020)   Weekly max warfarin dose:      Target end date:      INR check location:      Preferred lab:      Send INR reminders to:   ANDREAS JOHN    Indications    Anticoagulated on warfarin [Z79.01]  Hx of mechanical aortic valve replacement [Z95.2]           Comments:   Target goal range 1.8-2.2 per OV note 2/27/20         Anticoagulation Care Providers     Provider Role Specialty Phone number    Lakeisha Marmolejo MD Referring Family Medicine [phone]

## 2021-06-09 NOTE — TELEPHONE ENCOUNTER
RN Triage:    Spoke with 70 yr old Vandana who c/o:    Thinks she may have shingles.    Mid to upper back felt sore/itchy about one week ago.    Raised, red, bumps on her back (mid section) along the spine developed 3 days ago.    Rash appears in a line.    Denies blisters or crusts at this time.    Constant pain/itching wraps around the right side to the front of her body.    Rates pain an 8 on a 0-10 pain scale.    Using pain pills, Hydrocodone, every 4 hours and to help her sleep.    Pt reports increased fatigue with symptoms.    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    Patient does not have a smart device.    PLAN:  Advised OV now per protocol.    Will consult with PCP or covering MD regarding in person office visit or telephone visit as patient has a weakened immune system.  Please advise.  Advised pt to avoid scratching the rash and to cut fingernails short, wash hands frequently.  Advised the shingles sores contain the chickenpox virus and to avoid contact with pregnant women and those with weakened immune systems or anyone who has never had chickenpox.  Advised to call back if symptoms worsen.    Angela Talavera RN   [facility] RN Triage      Reason for Disposition    Shingles rash (matches SYMPTOMS) and weak immune system (e.g., HIV positive,  cancer chemotherapy, chronic steroid treatment, splenectomy) and NOT taking antiviral medication    Additional Information    Negative: Difficult to awaken or acting confused (e.g., disoriented, slurred speech)    Negative: Sounds like a life-threatening emergency to the triager    Negative: Localized rash and doesn't match the SYMPTOMS of shingles    Negative: Back pain and doesn't match the SYMPTOMS of shingles    Negative: Shingles Vaccine (Recombinant Zoster Vaccine; RZV; Shingrix), questions about    Negative: Shingles rash of face and eye pain or blurred vision    Negative: Shingles rash on the eyelid or tip of the nose    Negative: Shingles  rash and spots start appearing other places on body    Negative: Patient sounds very sick or weak to the triager    Protocols used: SHINGLES-A-OH

## 2021-06-09 NOTE — PROGRESS NOTES
ASSESSMENT:  1. Herpes zoster without complication    Valtrex as noted below.  She did take this for a week.  We talked about potential side effects and interactions of medications.  To be fine with her Coumadin.    I also gave her some Vicodin that she can use mostly at night for the severe pain that is accompanying this.    If she gets persistent pain after she is done with the rash and done with the antiviral, we could consider treatment for postherpetic neuralgia but she will let us know if that seems to persist after that.      - valACYclovir (VALTREX) 1000 MG tablet; Take 1 tablet (1,000 mg total) by mouth 3 (three) times a day for 7 days.  Dispense: 21 tablet; Refill: 0  - HYDROcodone-acetaminophen 5-325 mg per tablet; Take 1 tablet by mouth every 8 (eight) hours as needed for pain.  Dispense: 13 tablet; Refill: 0          PLAN:  There are no Patient Instructions on file for this visit.    No orders of the defined types were placed in this encounter.    Medications Discontinued During This Encounter   Medication Reason     HYDROcodone-acetaminophen 5-325 mg per tablet Reorder       No follow-ups on file.    CHIEF COMPLAINT:  Chief Complaint   Patient presents with     Herpes Zoster     Shingles check and stomach is distended, tired and low appetite       HISTORY OF PRESENT ILLNESS:  Vandana is a 70 y.o. female presenting to the clinic today with a rash on her back and some pain that wraps around toward her front.  She has noticed this now for about 4 days.  [place] she thought that she had just hurt her back.  The next day then, she noticed 1 small red spot in the same area that she was having some pain.  The next day, she noticed more red spots which reminded her of the time when she had shingles when she was quite young.  She then had pain was wrapping around in a dermatomal fashion but ending at the midline anteriorly.  She has no other areas of rash or pain.    REVIEW  "OF SYSTEMS:     All other systems are negative.    PFSH:        TOBACCO USE:  Social History     Tobacco Use   Smoking Status Former Smoker     Packs/day: 0.50     Years: 20.00     Pack years: 10.00     Types: Cigarettes     Last attempt to quit: 1979     Years since quittin.5   Smokeless Tobacco Never Used       VITALS:  Vitals:    20 1547   BP: 156/78   Patient Site: Left Arm   Patient Position: Sitting   Cuff Size: Adult Regular   Pulse: 82   Temp: 97.7  F (36.5  C)   SpO2: 96%   Weight: 191 lb (86.6 kg)   Height: 5' 3\" (1.6 m)     Wt Readings from Last 3 Encounters:   20 191 lb (86.6 kg)   20 188 lb 7 oz (85.5 kg)   19 182 lb (82.6 kg)     Body mass index is 33.83 kg/m .    PHYSICAL EXAM:  Constitutional:  Well appearing patient in no acute distress.  Vitals:  Per nursing notes.    HEENT:  Normocephalic, atraumatic.  Ears are clear bilaterally, with no fluid or redness, and landmarks visible.  Pupils are equal and reactive to light, extraocular muscles intact, visual fields are full.  Nose is normal, and oropharynx is clear without redness.    Neck is without lymphadenopathy.    Lungs:  Clear to auscultation bilaterally without wheezes, rales or rhonchi.   Cardiac:  Regular rate and rhythm without murmurs, rubs, or gallops.     She has a few areas of redness with some clear vesicles on the top consistent with zoster.    MEDICATIONS:  Current Outpatient Medications   Medication Sig Dispense Refill     cholecalciferol, vitamin D3, 1,000 unit tablet Take 1,000 Units by mouth daily.              HYDROcodone-acetaminophen 5-325 mg per tablet Take 1 tablet by mouth every 8 (eight) hours as needed for pain. 13 tablet 0     levothyroxine (SYNTHROID, LEVOTHROID) 88 MCG tablet Take 1 tablet (88 mcg total) by mouth Daily at 6:00 am. 90 tablet 3     melatonin 3 mg Tab tablet Take 6 mg by mouth at bedtime as needed.       pravastatin (PRAVACHOL) 40 MG tablet Take 1 tablet (40 mg total) by mouth " daily. nightly 90 tablet 3     verapamiL (VERELAN PM) 240 MG 24 hr capsule Take 1 capsule (240 mg total) by mouth daily. 90 capsule 3     warfarin ANTICOAGULANT (COUMADIN/JANTOVEN) 5 MG tablet Take 1-1.5 tablets (5-7.5 mg total) by mouth See Admin Instructions. 7.5MG every day but Thursday and Sunday she takes 5MG 90 tablet 0     valACYclovir (VALTREX) 1000 MG tablet Take 1 tablet (1,000 mg total) by mouth 3 (three) times a day for 7 days. 21 tablet 0     No current facility-administered medications for this visit.

## 2021-06-10 NOTE — PATIENT INSTRUCTIONS - HE
Shingles   -ok to use tylenol as needed  -start gabapentin 1-2 tablets three times a day as needed - start with one tablet and increase to 2 if needed; main side effect is drowsiness      Constipation and bloating   -ok to continue things that have been helpful in the past  -start senokot-s 2 tablets daily  -consider gas-x/simethicone for gas/bloating

## 2021-06-10 NOTE — TELEPHONE ENCOUNTER
ANTICOAGULATION  MANAGEMENT    Assessment     Today's INR result of 2.0 is Therapeutic (goal INR of 1.8-2.2)        Warfarin taken as previously instructed    No new diet changes affecting INR    No new medication/supplements affecting INR    Continues to tolerate warfarin with no reported s/s of bleeding or thromboembolism     Previous INR was Therapeutic    Plan:     Spoke with Vandana regarding INR result and instructed:     Warfarin Dosing Instructions:  Continue current warfarin dose    7.5 mg every Sun, Wed, Fri; 5 mg all other days         Instructed patient to follow up no later than: 4 weeks.    Education provided: importance of taking warfarin as instructed    Vandana verbalizes understanding and agrees to warfarin dosing plan.    Instructed to call the [facility] for any changes, questions or concerns. (#[phone])   ?   Jefry Carver RN    Subjective/Objective:      Vandana Thao, a 70 y.o. female is on warfarin.     Vandana reports:     Home warfarin dose: as updated on anticoagulation calendar per template     Missed doses: No     Medication changes:  No     S/S of bleeding or thromboembolism:  No     New Injury or illness:  No     Changes in diet or alcohol consumption:  No     Upcoming surgery, procedure or cardioversion:  No    Anticoagulation Episode Summary     Current INR goal:      TTR:   --   Next INR check:   8/24/2020   INR from last check:   2.00 (7/27/2020)   Weekly max warfarin dose:      Target end date:      INR check location:      Preferred lab:      Send INR reminders to:   ANDREAS JOHN    Indications    Anticoagulated on warfarin [Z79.01]  Hx of mechanical aortic valve replacement [Z95.2]           Comments:   Target goal range 1.8-2.2 per OV note 2/27/20         Anticoagulation Care Providers     Provider Role Specialty Phone number    Lakeisha Marmolejo MD Referring Family Medicine [phone]

## 2021-06-10 NOTE — PROGRESS NOTES
"Assessment/Plan:    1. Herpes zoster without complication  Improving/healing shingles infection, no new lesions, pt still having pain. Discussed she can finish vicodin that she has if desired but otherwise will switch to gabapentin for pain control - discussed starting at 100mg three times a day PRN and increasing to 200mg if needed (can certainly increase further if needed and no side effects).   - gabapentin (NEURONTIN) 100 MG capsule; Take 100-200 mg by mouth 3 (three) times a day as needed (shingles pain).  Dispense: 30 capsule; Refill: 1    2. Slow transit constipation  Discussed bloating is likely from constipation and constipation has likely worsened d/t narcotic pain medication use. We discussed continuing prior methods that were helpful for her such as prune juice, metamucil, water - but will also add senna as below, we discussed this is only for short term use.   - senna (SENOKOT) 8.6 mg tablet; Take 2 tablets by mouth daily.  Dispense: 60 tablet; Refill: 0      Follow up: as needed    Lakeisha Marmolejo MD  [facility]    Subjective:    Patient ID: Vandana Thao is a 70 y.o. female is here today for f/u shingles, constipation    Shingles   -pt seen at [facility] on 7/20 and diagnosed with shingles - given valtrex x1 week  -was having stomach pains before the shingles and then noticed the rash  -rash improving after valtrex  -had been using narcotic pain medication for pain control    Constipation  -has been constipated recently   -feeling really bloated  -did have BM this morning  -from note 2/27/20 - \"constipation: tried miralax (made her gassy) and docusate (also made her gassy); prune juice works well, switched to metamucil (somewhat helpful)\"      Patient Active Problem List   Diagnosis     Hypercholesterolemia     H/O bicuspid aortic valve     High degree atrioventricular block     Acquired hypothyroidism     Fleming-Monte syncope     Cardiac pacemaker in situ     Non-sustained " ventricular tachycardia (H)     Junctional premature beats (H)     Coronary artery disease involving native coronary artery without angina pectoris     Microscopic hematuria     S/P splenectomy     Other insomnia     Anticoagulated on warfarin     Hx of mechanical aortic valve replacement     Past Medical History:   Diagnosis Date     History of aortic stenosis 6/21/2018     Hyperlipidemia      Hypothyroidism      Valvular disease     aortic stenosis secondary to bicuspid valve     Past Surgical History:   Procedure Laterality Date     CARDIAC VALVE REPLACEMENT  2009    AVR     COLONOSCOPY  02/04/2013     CYSTOSTOMY W/ BLADDER BIOPSY  2000     EP PACEMAKER INSERT N/A 6/21/2018    Procedure: EP Pacemaker Insertion;  Surgeon: Luis Urena MD;  Location: [facility] Lab;  Service:      [place] RPLCMT PROST AORTIC VALVE OPEN XCP HOMOGRF/STENT      Description: Aortic Valve Replacement;  Proc Date: 01/13/2009;  Comments: #[address] [facility] [place] Prosthesis     SPLENECTOMY  1979     thyroidectomy  2002     Current Outpatient Medications on File Prior to Visit   Medication Sig Dispense Refill     cholecalciferol, vitamin D3, 1,000 unit tablet Take 1,000 Units by mouth daily.              HYDROcodone-acetaminophen 5-325 mg per tablet Take 1 tablet by mouth every 8 (eight) hours as needed for pain. 13 tablet 0     levothyroxine (SYNTHROID, LEVOTHROID) 88 MCG tablet Take 1 tablet (88 mcg total) by mouth Daily at 6:00 am. 90 tablet 3     melatonin 3 mg Tab tablet Take 6 mg by mouth at bedtime as needed.       pravastatin (PRAVACHOL) 40 MG tablet Take 1 tablet (40 mg total) by mouth daily. nightly 90 tablet 3     verapamiL (VERELAN PM) 240 MG 24 hr capsule Take 1 capsule (240 mg total) by mouth daily. 90 capsule 3     warfarin ANTICOAGULANT (COUMADIN/JANTOVEN) 5 MG tablet Take 1-1.5 tablets (5-7.5 mg total) by mouth See Admin Instructions. 7.5MG every day but Thursday and Sunday she takes 5MG 90 tablet 0     No current  facility-administered medications on file prior to visit.      No Known Allergies  Social History     Socioeconomic History     Marital status:      Spouse name: Not on file     Number of children: Not on file     Years of education: Not on file     Highest education level: Not on file   Occupational History     Not on file   Social Needs     Financial resource strain: Not on file     Food insecurity     Worry: Not on file     Inability: Not on file     Transportation needs     Medical: Not on file     Non-medical: Not on file   Tobacco Use     Smoking status: Former Smoker     Packs/day: 0.50     Years: 20.00     Pack years: 10.00     Types: Cigarettes     Last attempt to quit:      Years since quittin.6     Smokeless tobacco: Never Used   Substance and Sexual Activity     Alcohol use: Yes     Frequency: 2-4 times a month     Drinks per session: 1 or 2     Binge frequency: Never     Drug use: No     Sexual activity: Not on file   Lifestyle     Physical activity     Days per week: Not on file     Minutes per session: Not on file     Stress: Not on file   Relationships     Social connections     Talks on phone: Not on file     Gets together: Not on file     Attends Yazidi service: Not on file     Active member of club or organization: Not on file     Attends meetings of clubs or organizations: Not on file     Relationship status: Not on file     Intimate partner violence     Fear of current or ex partner: Not on file     Emotionally abused: Not on file     Physically abused: Not on file     Forced sexual activity: Not on file   Other Topics Concern     Not on file   Social History Narrative     Not on file     Family History   Problem Relation Age of Onset     Acute Myocardial Infarction Brother 43     Rheum arthritis Mother          in 40s     Heart attack Father      Hypertension Father      No Medical Problems Maternal Grandmother      No Medical Problems Maternal Grandfather      No Medical  Problems Paternal Grandmother      No Medical Problems Paternal Grandfather      Aortic stenosis Brother         s/p surgery     No Medical Problems Sister      Breast cancer Neg Hx      Colon cancer Neg Hx      Cervical cancer Neg Hx      Review of systems is as stated in HPI, and the remainder of system review is otherwise negative.    Objective:      /70   Pulse 84   Temp 98.9  F (37.2  C)   Wt 193 lb 9 oz (87.8 kg)   BMI 34.29 kg/m      General appearance: awake, NAD  HEENT: atraumatic, normocephalic, no scleral icterus or injection  Lungs: breathing comfortably on room air  Abd: active bowel sounds, soft, mildly distended, non-tender  Extremities: moving all extremities  Skin: scabbed lesions on R thoracic back, no new lesions noted  Neuro: alert, CNs grossly intact, no focal deficits appreciated  Psych: normal mood/affect/behavior, answering questions appropriately, linear thought process

## 2021-06-10 NOTE — TELEPHONE ENCOUNTER
RN cannot approve Refill Request    RN can NOT refill this medication med is not covered by policy/route to provider. Last office visit: Visit date not found Last Physical: Visit date not found Last MTM visit: Visit date not found Last visit same specialty: 7/30/2020 Lakeisha Marmolejo MD.  Next visit within 3 mo: Visit date not found  Next physical within 3 mo: Visit date not found      Elaina Goff, [facility] Triage/Med Refill 8/17/2020    Requested Prescriptions   Pending Prescriptions Disp Refills     warfarin ANTICOAGULANT (COUMADIN/JANTOVEN) 5 MG tablet [Pharmacy Med Name: WARFARIN SODIUM 5 MG TABLET] 90 tablet 0     Sig: TAKE 1 TABLET (5MG) BY MOUTH ON THURSDAYS AND SUNDAYS. TAKE 1.5 TABS (7.5MG) ALL OTHER DAYS       Warfarin Refill Protocol  Failed - 8/17/2020 12:14 AM        Failed -  Route to appropriate pool/provider     Last Anticoagulation Summary:   Anticoagulation Episode Summary     Current INR goal:      TTR:   --   Next INR check:   8/24/2020   INR from last check:   2.00 (7/27/2020)   Weekly max warfarin dose:      Target end date:      INR check location:      Preferred lab:      Send INR reminders to:   ANDREAS JOHN    Indications    Anticoagulated on warfarin [Z79.01]  Hx of mechanical aortic valve replacement [Z95.2]           Comments:   Target goal range 1.8-2.2 per OV note 2/27/20         Anticoagulation Care Providers     Provider Role Specialty Phone number    Lakeisha Marmolejo MD Referring Family Medicine [phone]                Passed - Provider visit in last year     Last office visit with prescriber/PCP: Visit date not found OR same dept: 7/30/2020 Lakeisha Marmolejo MD OR same specialty: 7/30/2020 Lakeisha Marmolejo MD  Last physical: Visit date not found Last MTM visit: Visit date not found    Next appt within 3 mo: Visit date not found Next physical within 3 mo: Visit date not found  Prescriber OR PCP: Mindy Salazar MD  Last diagnosis associated with med  order: 1. Anticoagulated on warfarin  - warfarin ANTICOAGULANT (COUMADIN/JANTOVEN) 5 MG tablet [Pharmacy Med Name: WARFARIN SODIUM 5 MG TABLET]; TAKE 1 TABLET (5MG) BY MOUTH ON THURSDAYS AND SUNDAYS. TAKE 1.5 TABS (7.5MG) ALL OTHER DAYS  Dispense: 90 tablet; Refill: 0    If protocol passes may refill for 6 months if within 3 months of last provider visit (or a total of 9 months).

## 2021-06-10 NOTE — TELEPHONE ENCOUNTER
ANTICOAGULATION  MANAGEMENT    Assessment     Today's INR result of 1.9 is Therapeutic (goal INR of 1.8-2.2)        Warfarin taken as previously instructed    No new diet changes affecting INR    No new medication/supplements affecting INR    Continues to tolerate warfarin with no reported s/s of bleeding or thromboembolism     Previous INR was Therapeutic    Plan:     Spoke on phone with Vandana regarding INR result and instructed:     Warfarin Dosing Instructions:  Continue current warfarin dose    7.5 mg every Sun, Wed, Fri; 5 mg all other days         Instructed patient to follow up no later than: 6 weeks.    Education provided: importance of following up for INR monitoring at instructed interval and importance of taking warfarin as instructed    Vandana verbalizes understanding and agrees to warfarin dosing plan.    Instructed to call the [facility] for any changes, questions or concerns. (#[phone])   ?   Jefry Carver RN    Subjective/Objective:      Vandana Thao, a 70 y.o. female is on warfarin.     Vandana reports:     Home warfarin dose: as updated on anticoagulation calendar per template     Missed doses: No     Medication changes:  No     S/S of bleeding or thromboembolism:  No     New Injury or illness:  No     Changes in diet or alcohol consumption:  No     Upcoming surgery, procedure or cardioversion:  No    Anticoagulation Episode Summary     Current INR goal:      TTR:   --   Next INR check:   10/5/2020   INR from last check:   1.90 (8/24/2020)   Weekly max warfarin dose:      Target end date:      INR check location:      Preferred lab:      Send INR reminders to:   ANDREAS JOHN    Indications    Anticoagulated on warfarin [Z79.01]  Hx of mechanical aortic valve replacement [Z95.2]           Comments:   Target goal range 1.8-2.2 per OV note 2/27/20         Anticoagulation Care Providers     Provider Role Specialty Phone number    Lakeisha Marmolejo MD Referring Family Medicine  [phone]

## 2021-06-12 NOTE — TELEPHONE ENCOUNTER
Wellness Screening Tool  Symptom Screening:  Do you have one of the following NEW symptoms:    Fever (subjective or >100.0)?  No    A new cough?  No    Shortness of breath?  No     Chills? No     New loss of taste or smell? No     Generalized body aches? No     New persistent headache? No     New sore throat? No     Nausea, vomiting, or diarrhea?  No    Within the past 2 weeks, have you been exposed to someone with a known positive illness below:    COVID-19 (known or suspected)?  No    Chicken pox?  No    Mealses?  No    Pertussis?  No    Patient notified of visitor policy- They may have one person accompany them to their appointment, but they will need to wear a mask and will be screened upon arrival for symptoms: Yes  Pt informed to wear a mask: Yes  Pt notified if they develop any symptoms listed above, prior to their appointment, they are to call the clinic directly at [phone] for further instructions.  Yes  Patient's appointment status: Patient will be seen in clinic as scheduled on 10/26

## 2021-06-12 NOTE — PATIENT INSTRUCTIONS - HE
1. Continue current medications.  2. I will discuss with Dr. Rizo an alternative to Verapmil.  3. Follow up with me in 1 year

## 2021-06-12 NOTE — TELEPHONE ENCOUNTER
ANTICOAGULATION  MANAGEMENT    Assessment     Today's INR result of 2.1 is Therapeutic (goal INR of 1.8-2.2)        Warfarin taken as previously instructed    No new diet changes affecting INR    No new medication/supplements affecting INR    Continues to tolerate warfarin with no reported s/s of bleeding or thromboembolism     Previous INR was Therapeutic    Plan:     Spoke on phone with Vandana regarding INR result and instructed:     Warfarin Dosing Instructions:  Continue current warfarin dose    7.5 mg every Sun, Wed, Fri; 5 mg all other days         Instructed patient to follow up no later than: 6 weeks.     Education provided: importance of following up for INR monitoring at instructed interval and importance of taking warfarin as instructed    Vandana verbalizes understanding and agrees to warfarin dosing plan.    Instructed to call the [facility] for any changes, questions or concerns. (#[phone])   ?   Jefry Carver RN    Subjective/Objective:      Vandana Thao, a 70 y.o. female is on warfarin.     Vandana reports:     Home warfarin dose: as updated on anticoagulation calendar per template     Missed doses: No     Medication changes:  No     S/S of bleeding or thromboembolism:  No     New Injury or illness:  No     Changes in diet or alcohol consumption:  No     Upcoming surgery, procedure or cardioversion:  No    Anticoagulation Episode Summary     Current INR goal:     TTR:  --   Next INR check:  11/17/2020   INR from last check:  2.10 (10/6/2020)   Weekly max warfarin dose:     Target end date:     INR check location:     Preferred lab:     Send INR reminders to:  ANDREAS JOHN    Indications    Anticoagulated on warfarin [Z79.01]  Hx of mechanical aortic valve replacement [Z95.2]           Comments:  Target goal range 1.8-2.2 per OV note 2/27/20         Anticoagulation Care Providers     Provider Role Specialty Phone number    Lakeisha Marmolejo MD Referring Family Medicine  [phone]

## 2021-06-12 NOTE — TELEPHONE ENCOUNTER
RN cannot approve Refill Request    RN can NOT refill this medication Protocol failed and NO refill given. Last office visit: 7/30/2020 Lakeisha Marmolejo MD Last Physical: Visit date not found Last MTM visit: Visit date not found Last visit same specialty: 7/30/2020 Lakeisha Marmolejo MD.  Next visit within 3 mo: Visit date not found  Next physical within 3 mo: Visit date not found      Ronel Delarosa, [facility] Triage/Med Refill 10/10/2020    Requested Prescriptions   Pending Prescriptions Disp Refills     warfarin ANTICOAGULANT (COUMADIN/JANTOVEN) 5 MG tablet [Pharmacy Med Name: WARFARIN SODIUM 5 MG TABLET] 123 tablet 4     Sig: TAKE 1 TABLET BY MOUTH SU/TH, 1.5 TAB ALL OTHER DAYS ONCE A DAY       Warfarin Refill Protocol  Failed - 10/9/2020 10:04 AM        Failed -  Route to appropriate pool/provider     Last Anticoagulation Summary:   Anticoagulation Episode Summary     Current INR goal:     TTR:  --   Next INR check:  11/17/2020   INR from last check:  2.10 (10/6/2020)   Weekly max warfarin dose:     Target end date:     INR check location:     Preferred lab:     Send INR reminders to:  [facility]    Indications    Anticoagulated on warfarin [Z79.01]  Hx of mechanical aortic valve replacement [Z95.2]           Comments:  Target goal range 1.8-2.2 per OV note 2/27/20         Anticoagulation Care Providers     Provider Role Specialty Phone number    Lakeisha Marmolejo MD Referring Family Medicine [phone]                Passed - Provider visit in last year     Last office visit with prescriber/PCP: 7/30/2020 Lakeisha Marmolejo MD OR same dept: 7/30/2020 Lakeisha Marmolejo MD OR same specialty: 7/30/2020 Lakeisha Marmolejo MD  Last physical: Visit date not found Last MTM visit: Visit date not found    Next appt within 3 mo: Visit date not found Next physical within 3 mo: Visit date not found  Prescriber OR PCP: Lakeisha Marmolejo MD  Last diagnosis associated with med order: 1. Anticoagulated on  warfarin  - warfarin ANTICOAGULANT (COUMADIN/JANTOVEN) 5 MG tablet [Pharmacy Med Name: WARFARIN SODIUM 5 MG TABLET]; TAKE 1 TABLET BY MOUTH SU/TH, 1.5 TAB ALL OTHER DAYS ONCE A DAY  Dispense: 123 tablet; Refill: 4    If protocol passes may refill for 6 months if within 3 months of last provider visit (or a total of 9 months).

## 2021-06-13 NOTE — PROGRESS NOTES
Assessment and Plan:   Vandana is a 70 year old female here for physical with additional concern    Patient has been advised of split billing requirements and indicates understanding: Yes     1. Routine general medical examination at a health care facility  Physical completed today. Labs as below. Up to date with vaccines, will get shingles vaccine at pharmacy. Mammogram ordered. JOSE ROBERTO signed today to get last colonoscopy results, pt believes she it up to date. Completed cervical cancer screening.  - Basic Metabolic Panel  - HM2(CBC w/o Differential)    2. Encounter for screening mammogram for malignant neoplasm of breast  Due for screening mammogram, ordered today.  - Mammo Screening Bilateral; Future    3. Acquired hypothyroidism  Hx hypothyroidism, currently taking synthroid, refill provided today, will recheck labs today.  - Thyroid Cascade  - levothyroxine (SYNTHROID, LEVOTHROID) 88 MCG tablet; Take 1 tablet (88 mcg total) by mouth Daily at 6:00 am.  Dispense: 90 tablet; Refill: 3    4. Hypercholesterolemia  Hx HLD, taking statin, will recheck lipid levels today.  - Lipid Cavalier FASTING    6. Hx of mechanical aortic valve replacement  7. Anticoagulated on warfarin  Hx mechanical valve replacement, on chronic warfarin, working with anticoagulation team on INRs and warfarin dosing, overall appears to be primarily in therapeutic range.    8. Coronary artery disease involving native coronary artery of native heart without angina pectoris  5. Cardiac pacemaker in situ  9. Non-sustained ventricular tachycardia (H)  10. High degree atrioventricular block  Follows with cardiology, no reported concerns or symptoms, refills provided today, no change to regimen at this time.  - Basic Metabolic Panel  - pravastatin (PRAVACHOL) 40 MG tablet; Take 1 tablet (40 mg total) by mouth daily. nightly  Dispense: 90 tablet; Refill: 3  - verapamiL (VERELAN PM) 240 MG 24 hr capsule; Take 1 capsule (240 mg total) by mouth daily.   Dispense: 90 capsule; Refill: 3    11. Diabetes mellitus screening  13. Elevated fasting glucose  Given age and weight, will screen for diabetes with A1c. Pt has had elevated fasting glucose in the past.  - Glycosylated Hemoglobin A1c    12. Class 1 obesity due to excess calories with serious comorbidity and body mass index (BMI) of 33.0 to 33.9 in adult  BMI 33.19 today. Discussed healthy diet and regular exercise for weight loss.   - Lipid Cascade FASTING  - Glycosylated Hemoglobin A1c    14. Pain in joint, ankle and foot, right  Pt reporting stiffness, pain and swelling in R ankle; worse in morning and improves during day. Exam benign today, pt states it feels fine right now. Some concern for inflammatory joint disease based on her description of symptoms. She reports her mother had severe RA. Pt does have OA in other joints therefore will evaluate first with XR but discussed may need to do blood testing for inflammatory workup.  - XR Ankle Right 3 or More VWS        The patient's current medical problems were reviewed.    I have had an Advance Directives discussion with the patient.  The following high BMI interventions were performed this visit: encouragement to exercise and lifestyle education regarding diet  The following health maintenance schedule was reviewed with the patient and provided in printed form in the after visit summary:   Health Maintenance   Topic Date Due     DEXA SCAN  1950     COLORECTAL CANCER SCREENING  06/19/1970     ZOSTER VACCINES (2 of 3) 06/05/2014     MAMMOGRAM  12/19/2020     MEDICARE ANNUAL WELLNESS VISIT  12/09/2021     FALL RISK ASSESSMENT  12/09/2021     LIPID  02/27/2025     ADVANCE CARE PLANNING  12/09/2025     TD 18+ HE  02/27/2030     HEPATITIS C SCREENING  Completed     Pneumococcal Vaccine: Pediatrics (0 to 5 Years) and At-Risk Patients (6 to 64 Years)  Completed     Pneumococcal Vaccine: 65+ Years  Completed     INFLUENZA VACCINE RULE BASED  Completed     HEPATITIS  B VACCINES  Aged Out      Lakeisha Marmolejo MD  [facility]    Subjective:   Chief Complaint: Vandana Thao is an 70 y.o. female here for an Annual Wellness visit.   HPI:    -no advanced directive - has paperwork at home but haven't done it yet  -JOSE ROBERTO signed today for colonoscopy results, thinks done in 2012  -L leg/knee discomfort overnight if lying on L side, lateral below knee, sometimes  -R ankle - stiff, swells sometimes, thinks might have sprained it in the past - worse first in morning, better with movement  -has OA in big toe    Review of Systems: Please see above.  The rest of the review of systems are negative for all systems.    Patient Care Team:  Lakeisha Marmolejo MD as PCP - General (Family Medicine)  Lakeisha Marmoeljo MD as Assigned PCP  Ryan Rizo MD as Assigned Heart and Vascular Provider     Patient Active Problem List   Diagnosis     Hypercholesterolemia     H/O bicuspid aortic valve     High degree atrioventricular block     Acquired hypothyroidism     Fleming-Monte syncope     Cardiac pacemaker in situ     Non-sustained ventricular tachycardia (H)     Junctional premature beats (H)     Coronary artery disease involving native coronary artery without angina pectoris     Microscopic hematuria     S/P splenectomy     Other insomnia     Anticoagulated on warfarin     Hx of mechanical aortic valve replacement     Past Medical History:   Diagnosis Date     History of aortic stenosis 6/21/2018     Hyperlipidemia      Hypothyroidism      Valvular disease     aortic stenosis secondary to bicuspid valve      Past Surgical History:   Procedure Laterality Date     CARDIAC VALVE REPLACEMENT  2009    AVR     COLONOSCOPY  02/04/2013     CYSTOSTOMY W/ BLADDER BIOPSY  2000     EP PACEMAKER INSERT N/A 6/21/2018    Procedure: EP Pacemaker Insertion;  Surgeon: Luis Urena MD;  Location: [facility] Lab;  Service:      TX RPLCMT PROST AORTIC VALVE OPEN XCP HOMOGRF/STENT      Description:  Aortic Valve Replacement;  Proc Date: 2009;  Comments: #[address] [place] Prosthesis     SPLENECTOMY  1979     thyroidectomy  2002      Family History   Problem Relation Age of Onset     Acute Myocardial Infarction Brother 43     Rheum arthritis Mother          in 40s     Heart attack Father      Hypertension Father      No Medical Problems Maternal Grandmother      No Medical Problems Maternal Grandfather      No Medical Problems Paternal Grandmother      No Medical Problems Paternal Grandfather      Aortic stenosis Brother         s/p surgery     No Medical Problems Sister      Breast cancer Neg Hx      Colon cancer Neg Hx      Cervical cancer Neg Hx       Social History     Socioeconomic History     Marital status:      Spouse name: Not on file     Number of children: Not on file     Years of education: Not on file     Highest education level: Not on file   Occupational History     Not on file   Social Needs     Financial resource strain: Not on file     Food insecurity     Worry: Not on file     Inability: Not on file     Transportation needs     Medical: Not on file     Non-medical: Not on file   Tobacco Use     Smoking status: Former Smoker     Packs/day: 0.50     Years: 20.00     Pack years: 10.00     Types: Cigarettes     Quit date:      Years since quittin.9     Smokeless tobacco: Never Used   Substance and Sexual Activity     Alcohol use: Yes     Frequency: 2-4 times a month     Drinks per session: 1 or 2     Binge frequency: Never     Drug use: No     Sexual activity: Not on file   Lifestyle     Physical activity     Days per week: Not on file     Minutes per session: Not on file     Stress: Not on file   Relationships     Social connections     Talks on phone: Not on file     Gets together: Not on file     Attends Shinto service: Not on file     Active member of club or organization: Not on file     Attends meetings of clubs or organizations: Not on file     Relationship  "status: Not on file     Intimate partner violence     Fear of current or ex partner: Not on file     Emotionally abused: Not on file     Physically abused: Not on file     Forced sexual activity: Not on file   Other Topics Concern     Not on file   Social History Narrative     Not on file      Current Outpatient Medications   Medication Sig Dispense Refill     cholecalciferol, vitamin D3, 1,000 unit tablet Take 1,000 Units by mouth daily.              levothyroxine (SYNTHROID, LEVOTHROID) 88 MCG tablet Take 1 tablet (88 mcg total) by mouth Daily at 6:00 am. 90 tablet 3     pravastatin (PRAVACHOL) 40 MG tablet Take 1 tablet (40 mg total) by mouth daily. nightly 90 tablet 3     senna (SENOKOT) 8.6 mg tablet Take 2 tablets by mouth daily. 60 tablet 0     verapamiL (VERELAN PM) 240 MG 24 hr capsule Take 1 capsule (240 mg total) by mouth daily. 90 capsule 3     warfarin ANTICOAGULANT (COUMADIN/JANTOVEN) 5 MG tablet Take 1 to 1.5 tablets (5 to 7.5 mg) by mouth daily. Adjust dose per INR results as instructed. 123 tablet 1     No current facility-administered medications for this visit.       Objective:   Vital Signs:   Visit Vitals  /70   Pulse 64   Ht 5' 3\" (1.6 m)   Wt 187 lb 6 oz (85 kg)   BMI 33.19 kg/m         PHYSICAL EXAM  General appearance: awake, NAD  HEENT: atraumatic, normocephalic, no scleral icterus or injection  CV: RRR, no murmurs/rubs/gallops, normal S1 and S2  Lungs: CTAB, no wheezes or crackles, breathing comfortably on room air  Extremities: no LE edema bilaterally, moving all extremities; no ankle swelling today, L knee nontender  Skin: no rashes or lesions  Neuro: alert, oriented x3, CNs grossly intact, no focal deficits appreciated  Psych: normal mood/affect/behavior, answering questions appropriately, linear thought process      Assessment Results 12/9/2020   Activities of Daily Living No help needed   Instrumental Activities of Daily Living No help needed   Mini Cog Total Score 5   Some " recent data might be hidden     A Mini-Cog score of 0-2 suggests the possibility of dementia, score of 3-5 suggests no dementia    Identified Health Risks:     Information regarding advance directives (living avendaño), including where she can download the appropriate form, was provided to the patient via the AVS.

## 2021-06-13 NOTE — TELEPHONE ENCOUNTER
ANTICOAGULATION  MANAGEMENT    Assessment     Today's INR result of 1.8 is Therapeutic (goal INR of 1.8-2.2)        Warfarin taken as previously instructed    No new diet changes affecting INR    No new medication/supplements affecting INR    Continues to tolerate warfarin with no reported s/s of bleeding or thromboembolism     Previous INR was Therapeutic    Plan:     Spoke on phone with Vandana regarding INR result and instructed:     Warfarin Dosing Instructions:  Continue current warfarin dose 7.5 mg daily on Sun, Wed, Fri; and 5 mg daily rest of week  (0 % change)    Instructed patient to follow up no later than: 6 weeks    Education provided: target INR goal and significance of current INR result, importance of following up for INR monitoring at instructed interval, importance of taking warfarin as instructed, importance of notifying clinic for changes in medications and importance of notifying clinic for diarrhea, nausea/vomiting, reduced intake and/or illness    Vandana verbalizes understanding and agrees to warfarin dosing plan.    Instructed to call the [facility] for any changes, questions or concerns. (#[phone])   ?   Elle Casanova RN    Subjective/Objective:      Vandana Thao, a 70 y.o. female is on warfarin.     Vandana reports:     Home warfarin dose: verbally confirmed home dose with Vandana and updated on anticoagulation calendar     Missed doses: No     Mediction changes:  No     S/S of bleeding or thromboembolism:  No     New Injury or illness:  No     Changes in diet or alcohol consumption:  No     Upcoming surgery, procedure or cardioversion:  No    Anticoagulation Episode Summary     Current INR goal:     TTR:  --   Next INR check:  12/29/2020   INR from last check:  1.80 (11/17/2020)   Weekly max warfarin dose:     Target end date:     INR check location:     Preferred lab:     Send INR reminders to:  ANDREAS JOHN    Indications    Anticoagulated on warfarin [Z79.01]  Matthew of  mechanical aortic valve replacement [Z95.2]           Comments:  Target goal range 1.8-2.2 per OV note 2/27/20         Anticoagulation Care Providers     Provider Role Specialty Phone number    Lakeisha Marmolejo MD Referring Family Medicine [phone]

## 2021-06-14 NOTE — TELEPHONE ENCOUNTER
Patient called requesting a prescription for amoxil to take prior to her dental appointment on Thursday.       Please send to the pharmacy if appropriate. She has a history of a heart valve replacement.

## 2021-06-14 NOTE — TELEPHONE ENCOUNTER
ANTICOAGULATION  MANAGEMENT    Assessment     Today's INR result of 1.7 is Subtherapeutic (goal INR of 1.8-2.2)        Warfarin taken as previously instructed    Holidays may be affecting diet and INR    No new medication/supplements affecting INR    Continues to tolerate warfarin with no reported s/s of bleeding or thromboembolism     Previous INR was Therapeutic    Plan:     Spoke on phone with Vandana regarding INR result and instructed:     Warfarin Dosing Instructions:  Continue current warfarin dose 7.5 mg daily on Sun, Wed, Fri; and 5 mg daily rest of week  (0 % change)    Instructed patient to follow up no later than: 2 weeks    Education provided: importance of consistent vitamin K intake, target INR goal and significance of current INR result, importance of notifying clinic for changes in medications and importance of notifying clinic for diarrhea, nausea/vomiting, reduced intake and/or illness    Vandana verbalizes understanding and agrees to warfarin dosing plan.    Instructed to call the [facility] for any changes, questions or concerns. (#[phone])   ?   Elle Casanova RN    Subjective/Objective:      Vandana Thao, a 70 y.o. female is on warfarin.     Vandana reports:     Home warfarin dose: as updated on anticoagulation calendar per template     Missed doses: No     Medication changes:  No     S/S of bleeding or thromboembolism:  No     New Injury or illness:  No     Changes in diet or alcohol consumption:  Yes: likely a few more greens over the holidays     Upcoming surgery, procedure or cardioversion:  No    Anticoagulation Episode Summary     Current INR goal:     TTR:  --   Next INR check:  1/12/2021   INR from last check:  1.70 (12/29/2020)   Weekly max warfarin dose:     Target end date:     INR check location:     Preferred lab:     Send INR reminders to:  ANDREAS JOHN    Indications    Anticoagulated on warfarin [Z79.01]  Hx of mechanical aortic valve replacement [Z95.2]            Comments:  Target goal range 1.8-2.2 per OV note 2/27/20         Anticoagulation Care Providers     Provider Role Specialty Phone number    Lakeisha Marmolejo MD Referring Family Medicine [phone]

## 2021-06-14 NOTE — TELEPHONE ENCOUNTER
ANTICOAGULATION  MANAGEMENT    Assessment     Today's INR result of 1.9 is Therapeutic (goal INR of 1.8-2.2)        Warfarin taken as previously instructed    No new diet changes affecting INR    No new medication/supplements affecting INR    Continues to tolerate warfarin with no reported s/s of bleeding or thromboembolism     Previous INR was Subtherapeutic    Plan:     Spoke on phone with Vandana regarding INR result and instructed:     Warfarin Dosing Instructions:  Continue current warfarin dose 7.5 mg daily on Sun, Wed; and 5 mg daily rest of week  (0 % change)    Instructed patient to follow up no later than: 2-3 weeks    Education provided: target INR goal and significance of current INR result, importance of following up for INR monitoring at instructed interval, importance of taking warfarin as instructed, importance of notifying clinic for changes in medications and importance of notifying clinic for diarrhea, nausea/vomiting, reduced intake and/or illness    Vandana verbalizes understanding and agrees to warfarin dosing plan.    Instructed to call the [facility] for any changes, questions or concerns. (#[phone])   ?   Elle Casanova RN    Subjective/Objective:      Vandana Thao, a 70 y.o. female is on warfarin.     Vandana reports:     Home warfarin dose: verbally confirmed home dose with Vandana and updated on anticoagulation calendar     Missed doses: No     Medication changes:  No     S/S of bleeding or thromboembolism:  No     New Injury or illness:  No     Changes in diet or alcohol consumption:  No     Upcoming surgery, procedure or cardioversion:  No    Anticoagulation Episode Summary     Current INR goal:     TTR:  --   Next INR check:  2/3/2021   INR from last check:  1.90 (1/13/2021)   Weekly max warfarin dose:     Target end date:     INR check location:     Preferred lab:     Send INR reminders to:  ANDREAS JOHN    Indications    Anticoagulated on warfarin [Z79.01]  Matthew of  mechanical aortic valve replacement [Z95.2]           Comments:  Target goal range 1.8-2.2 per OV note 2/27/20         Anticoagulation Care Providers     Provider Role Specialty Phone number    Lakeisha Marmolejo MD Referring Family Medicine [phone]

## 2021-06-15 NOTE — TELEPHONE ENCOUNTER
Provider Review: Anticoagulation Annual Referral Renewal    ACM Renewal Decision:  Renew AC warfarin management      INR Range:   Continue management at current INR goal   Anticipated Duration of Therapy (from today):  Long-term anticoagulation      Lakeisha Marmolejo MD  8:56 AM

## 2021-06-15 NOTE — TELEPHONE ENCOUNTER
ANTICOAGULATION  MANAGEMENT    Assessment     Today's INR result of 2.2 is Therapeutic (goal INR of 1.8-2.2)        Warfarin taken as previously instructed    No new diet changes affecting INR    No new medication/supplements affecting INR    Continues to tolerate warfarin with no reported s/s of bleeding or thromboembolism     Previous INR was Therapeutic    Plan:     Spoke on phone with Vandana regarding INR result and instructed:      Warfarin Dosing Instructions:  Continue current warfarin dose 7.5 mg daily on Zhou/We/Fr; and 5 mg daily rest of week  (0 % change)    Instructed patient to follow up no later than: will have INR checked at pre-op on 3/23    Education provided: target INR goal and significance of current INR result and importance of notifying clinic for changes in medications    Vandana verbalizes understanding and agrees to warfarin dosing plan.    Instructed to call the [facility] for any changes, questions or concerns. (#[phone])   ?   Becca Adames RN    Subjective/Objective:      Vandana Thao, a 70 y.o. female is on warfarin. Vadnana Ross reports:     Home warfarin dose: as updated on anticoagulation calendar per template     Missed doses: No     Medication changes:  No     S/S of bleeding or thromboembolism:  No     New Injury or illness:  No     Changes in diet or alcohol consumption:  No     Upcoming surgery, procedure or cardioversion:  Yes: 4/13 arthritis in big toe, debulking    Anticoagulation Episode Summary     Current INR goal:     TTR:  --   Next INR check:  3/23/2021   INR from last check:  2.20 (3/10/2021)   Weekly max warfarin dose:     Target end date:     INR check location:     Preferred lab:     Send INR reminders to:  ANDREAS JOHN    Indications    Anticoagulated on warfarin [Z79.01]  Hx of mechanical aortic valve replacement [Z95.2]           Comments:  Target goal range 1.8-2.2 per OV note 2/27/20         Anticoagulation Care Providers      Provider Role Specialty Phone number    Lakeisha Marmolejo MD Referring Family Medicine [phone]

## 2021-06-15 NOTE — TELEPHONE ENCOUNTER
RN cannot approve Refill Request    RN can NOT refill this medication Protocol failed and NO refill given. Last office visit: 7/30/2020 Lakeisha Marmolejo MD Last Physical: 12/9/2020 Last MTM visit: Visit date not found Last visit same specialty: 7/30/2020 Lakeisha Marmolejo MD.  Next visit within 3 mo: Visit date not found  Next physical within 3 mo: Visit date not found      Maynor Saldaña, [facility] Triage/Med Refill 2/16/2021    Requested Prescriptions   Pending Prescriptions Disp Refills     warfarin ANTICOAGULANT (COUMADIN/JANTOVEN) 5 MG tablet 123 tablet 1     Sig: Take 1 to 1.5 tablets (5 to 7.5 mg) by mouth daily. Adjust dose per INR results as instructed.       Warfarin Refill Protocol  Failed - 2/16/2021 10:19 AM        Failed -  Route to appropriate pool/provider     Last Anticoagulation Summary:   Anticoagulation Episode Summary     Current INR goal:     TTR:  --   Next INR check:  3/10/2021   INR from last check:  1.80 (2/10/2021)   Weekly max warfarin dose:     Target end date:     INR check location:     Preferred lab:     [facility]    Indications    Anticoagulated on warfarin [Z79.01]  Hx of mechanical aortic valve replacement [Z95.2]           Comments:  Target goal range 1.8-2.2 per OV note 2/27/20         Anticoagulation Care Providers     Provider Role Specialty Phone number    Lakeisha Marmolejo MD Referring Family Medicine [phone]                Passed - Provider visit in last year     Last office visit with prescriber/PCP: 7/30/2020 Lakeisha Marmolejo MD OR same dept: 7/30/2020 Lakeisha Marmolejo MD OR same specialty: 7/30/2020 Lakeisha Marmolejo MD  Last physical: 12/9/2020 Last MTM visit: Visit date not found    Next appt within 3 mo: Visit date not found Next physical within 3 mo: Visit date not found  Prescriber OR PCP: Lakeisha Marmolejo MD  Last diagnosis associated with med order: 1. Anticoagulated on warfarin  - warfarin ANTICOAGULANT  (COUMADIN/JANTOVEN) 5 MG tablet; Take 1 to 1.5 tablets (5 to 7.5 mg) by mouth daily. Adjust dose per INR results as instructed.  Dispense: 123 tablet; Refill: 1    If protocol passes may refill for 6 months if within 3 months of last provider visit (or a total of 9 months).

## 2021-06-15 NOTE — TELEPHONE ENCOUNTER
ACM renewal referral and standing INR orders placed.     Becca Adames, RN  Anticoagulation Clinic

## 2021-06-15 NOTE — TELEPHONE ENCOUNTER
"Anticoagulation Annual Referral Renewal Review    Vandana Thao's chart reviewed for annual renewal of referral to anticoagulation monitoring.        Criteria for anticoagulation nurse and/or pharmacist renewal met   Warfarin indication: Mechanical AVR Yes, per indication   Current with INR monitoring/compliant Yes Yes   Date of last office visit 7/30/20 Yes, had office visit within last year   Time in Therapeutic Range (TTR) Unable to calculate due to non-standard range of 1.8-2.2 no       Vandana Thao did NOT meet all criteria for anticoagulation management program initiated renewal and requires provider review. Using dot phrase, \".acmrenewalprovider\", please advise if Vandana's anticoagulation management referral should be renewed or if patient should be seen in office to review anticoagulation therapy      Becca Adames RN  4:57 PM      "

## 2021-06-15 NOTE — TELEPHONE ENCOUNTER
Warfarin refilled per [facility] protocol.   Last OV on 12/9/20. Referral UTD, last renewed on 2/5/21. INR checked within past 90 days, most recently on 2/10/21.    Current sig checked and matches dosing from last INR check note.      Becca Adames, RN  Anticoagulation Clinic

## 2021-06-15 NOTE — TELEPHONE ENCOUNTER
ANTICOAGULATION  MANAGEMENT    Assessment     Today's INR result of 1.8 is Therapeutic (goal INR of 1.8-2.2)        Warfarin taken as previously instructed    No new diet changes affecting INR    No new medication/supplements affecting INR    Continues to tolerate warfarin with no reported s/s of bleeding or thromboembolism     Previous INR was Therapeutic    Plan:     Spoke on phone with Vandana regarding INR result and instructed:      Warfarin Dosing Instructions:  Continue current warfarin dose 7.5 mg daily on Su/M/Th; and 5 mg daily rest of week  (0 % change)    Instructed patient to follow up no later than: 4 weeks-- scheduled for 3/10    Education provided: target INR goal and significance of current INR result and importance of notifying clinic for changes in medications    Vandana verbalizes understanding and agrees to warfarin dosing plan.    Instructed to call the [facility] for any changes, questions or concerns. (#[phone])   ?   Becca Adames RN    Subjective/Objective:      Vandana Thao, a 70 y.o. female is on warfarin. Vandana Ross reports:     Home warfarin dose: as updated on anticoagulation calendar per template     Missed doses: No     Medication changes:  No     S/S of bleeding or thromboembolism:  No     New Injury or illness:  No     Changes in diet or alcohol consumption:  No     Upcoming surgery, procedure or cardioversion:  No     Anticoagulation Episode Summary     Current INR goal:     TTR:  --   Next INR check:  3/10/2021   INR from last check:  1.80 (2/10/2021)   Weekly max warfarin dose:     Target end date:     INR check location:     Preferred lab:     Send INR reminders to:  [facility]    Indications    Anticoagulated on warfarin [Z79.01]  Hx of mechanical aortic valve replacement [Z95.2]           Comments:  Target goal range 1.8-2.2 per OV note 2/27/20         Anticoagulation Care Providers     Provider Role Specialty Phone number    Lakeisha Marmolejo  MD [facility] [phone]

## 2021-06-16 PROBLEM — Z95.2 HX OF MECHANICAL AORTIC VALVE REPLACEMENT: Status: ACTIVE | Noted: 2020-02-27

## 2021-06-16 PROBLEM — Z87.74 H/O BICUSPID AORTIC VALVE: Status: ACTIVE | Noted: 2018-06-21

## 2021-06-16 PROBLEM — Z95.0 CARDIAC PACEMAKER IN SITU: Status: ACTIVE | Noted: 2018-06-29

## 2021-06-16 PROBLEM — I47.29 NON-SUSTAINED VENTRICULAR TACHYCARDIA (H): Status: ACTIVE | Noted: 2019-07-02

## 2021-06-16 PROBLEM — E03.9 ACQUIRED HYPOTHYROIDISM: Status: ACTIVE | Noted: 2018-06-20

## 2021-06-16 PROBLEM — I25.10 CORONARY ARTERY DISEASE INVOLVING NATIVE CORONARY ARTERY WITHOUT ANGINA PECTORIS: Status: ACTIVE | Noted: 2019-07-17

## 2021-06-16 PROBLEM — I49.2: Status: ACTIVE | Noted: 2019-07-02

## 2021-06-16 PROBLEM — R31.29 MICROSCOPIC HEMATURIA: Status: ACTIVE | Noted: 2020-02-27

## 2021-06-16 PROBLEM — Z79.01 ANTICOAGULATED ON WARFARIN: Status: ACTIVE | Noted: 2020-02-27

## 2021-06-16 PROBLEM — G47.09 OTHER INSOMNIA: Status: ACTIVE | Noted: 2020-02-27

## 2021-06-16 PROBLEM — I44.39 HIGH DEGREE ATRIOVENTRICULAR BLOCK: Status: ACTIVE | Noted: 2018-06-21

## 2021-06-16 PROBLEM — Z90.81 S/P SPLENECTOMY: Status: ACTIVE | Noted: 2020-02-27

## 2021-06-16 NOTE — TELEPHONE ENCOUNTER
Refill Approved    Rx renewed per Medication Renewal Policy. Medication was last renewed on 7/30/20, last OV 12/9/20.    Elaina Goff, [facility] Triage/Med Refill 3/21/2021     Requested Prescriptions   Pending Prescriptions Disp Refills     SENNA 8.6 mg tablet [Pharmacy Med Name: SENNA 8.6 MG TABLET] 60 tablet 0     Sig: TAKE 2 TABLETS BY MOUTH EVERY DAY       GI Medications Refill Protocol Passed - 3/19/2021  9:30 AM        Passed - PCP or prescribing provider visit in last 12 or next 3 months.     Last office visit with prescriber/PCP: 7/30/2020 Lakeisha Marmolejo MD OR same dept: 7/30/2020 Lakeisha Marmolejo MD OR same specialty: 7/30/2020 Lakeisha Marmolejo MD  Last physical: 12/9/2020 Last MTM visit: Visit date not found   Next visit within 3 mo: Visit date not found  Next physical within 3 mo: Visit date not found  Prescriber OR PCP: Lakeisha Marmolejo MD  Last diagnosis associated with med order: 1. Slow transit constipation  - SENNA 8.6 mg tablet [Pharmacy Med Name: SENNA 8.6 MG TABLET]; TAKE 2 TABLETS BY MOUTH EVERY DAY  Dispense: 60 tablet; Refill: 0    If protocol passes may refill for 12 months if within 3 months of last provider visit (or a total of 15 months).

## 2021-06-16 NOTE — TELEPHONE ENCOUNTER
"CONNER-PROCEDURAL ANTICOAGULATION  MANAGEMENT    Assessment     Warfarin interruption plan for de-bulking big toe on 4/13.    Aortic Valve Replacement-Bileaflet, Mechanical (2008)    AVR: 2020 AHA/ACC Management of Valvular Heart disease guidelines recommend no bridge in bileaflet mechanical AVR patients with NO other risk factors for thrombosis (Afib, previous thromboembolism, hypercoagulable condition, LV systolic dysfunction, older generation valves, or > 1 valve)    Plan       Pre-Procedure:    Hold warfarin until after procedure starting:  Friday 4/9 (4 day hold most recent INR 2.0, goal 1.8-2.2)      No bridge       Post-Procedure:    Resume warfarin dose if okay with provider doing procedure on night of procedure, 4/13 PM: 10 mg daily x 2 then resume 7.5 mg Sun, Wed, Friday and 5 mg daily rest of week    Recheck INR ~7 days after resuming warfarin   ?   Leah Soto, PharmD  [facility]    Subjective/Objective:      Vandana Thao, florentino 70 y.o. female    Goal INR Range: 1.8-2.2    Pertinent History:  6/21/2018 echo: bileaflet tilting disc mechanical valve present; EF 60%    Hx of bridging: Yes- colonoscopy 9 years ago    Wt Readings from Last 3 Encounters:   03/23/21 186 lb 1.6 oz (84.4 kg)   12/09/20 187 lb 6 oz (85 kg)   10/26/20 189 lb (85.7 kg)      Ideal body weight: 52.4 kg (115 lb 8.3 oz)  Adjusted ideal body weight: 65.2 kg (143 lb 12 oz)     Estimated body mass index is 32.97 kg/m  as calculated from the following:    Height as of 12/9/20: 5' 3\" (1.6 m).    Weight as of an earlier encounter on 3/23/21: 186 lb 1.6 oz (84.4 kg).    Lab Results   Component Value Date    INR 2.00 (H) 03/23/2021    INR 2.20 (H) 03/10/2021    INR 1.80 (H) 02/10/2021     Lab Results   Component Value Date    HGB 14.0 03/23/2021    HCT 43.2 12/09/2020     12/09/2020     Lab Results   Component Value Date    CREATININE 0.89 12/09/2020    CREATININE 0.91 02/27/2020    CREATININE 0.80 04/16/2019 "     Estimated CrCl:  61 ml/min using adjusted weight 65 kg and creatinine 0.89

## 2021-06-16 NOTE — TELEPHONE ENCOUNTER
ANTICOAGULATION  MANAGEMENT    Assessment     Today's INR result of 2.0 is Therapeutic (goal INR of 1.8-2.2)        Warfarin taken as previously instructed    No new diet changes affecting INR    No new medication/supplements affecting INR    Continues to tolerate warfarin with no reported s/s of bleeding or thromboembolism     Previous INR was Therapeutic    Plan:     Left detailed message for Vandana regarding INR result and instructed:      Warfarin Dosing Instructions:  Continue current warfarin dose 7.5 mg daily on Zhou/We/Fr; and 5 mg daily rest of week  (0 % change)    Instructed patient to follow up no later than: 4 weeks, but will be in touch in the next 10-14 days regarding hold plan for upcoming procedure.     Education provided: target INR goal and significance of current INR result    Requested that patient call back if there are any questions regarding plan, or if they think of a change they forgot to write on template.     Instructed to call the [facility] for any changes, questions or concerns. (#[phone])   ?   Becca Adames RN    Subjective/Objective:      Vandana Thao, a 70 y.o. female is on warfarin.       Vandana reports:     Home warfarin dose: as updated on anticoagulation calendar per template     Missed doses: No     Medication changes:  No     S/S of bleeding or thromboembolism:  No     New Injury or illness:  No     Changes in diet or alcohol consumption:  No     Upcoming surgery, procedure or cardioversion:  Yes: 4/13 arthritic joint debulking    Anticoagulation Episode Summary     Current INR goal:     TTR:  --   Next INR check:  4/20/2021   INR from last check:  2.00 (3/23/2021)   Weekly max warfarin dose:     Target end date:     INR check location:     Preferred lab:     Send INR reminders to:  [facility]    Indications    Anticoagulated on warfarin [Z79.01]  Hx of mechanical aortic valve replacement [Z95.2]           Comments:  Target goal range 1.8-2.2 per OV  note 2/27/20         Anticoagulation Care Providers     Provider Role Specialty Phone number    Lakeisha Marmolejo MD Referring Family Medicine [phone]

## 2021-06-16 NOTE — PROGRESS NOTES
[facility]  [address]  [place] [address]  Dept: [phone]  Dept Fax: [phone]  Primary Provider: Lakeisha Marmolejo MD        PREOPERATIVE EVALUATION:  Today's date: 3/23/2021    Vandana Thao is a 70 y.o. female who presents for a preoperative evaluation.    Surgical Information:  Surgery/Procedure: RIGHT FOOT BIG TOE  Surgery Location: [facility]  Surgeon: Dr. Gruber  Surgery Date: 04/13/21  Time of Surgery: TBD  Where patient plans to recover: At home with family  Fax number for surgical facility: [phone]    Type of Anesthesia Anticipated: to be determined    Assessment & Plan      The proposed surgical procedure is considered INTERMEDIATE risk.    Pre-op exam  Preop exam performed.  EKG shows no acute changes.  Patient takes warfarin for anticoagulation.  Will consult with anticoagulation RN regarding recommendations for anticoagulation for surgery.  - Hemoglobin  - Electrocardiogram Perform - Clinic    Pain of toe of right foot  Recommend acetaminophen as needed for pain.    Coronary artery disease involving native coronary artery of native heart without angina pectoris  Non-sustained ventricular tachycardia (H)  Cardiac pacemaker in situ  She is followed by cardiology.  She is asymptomatic.  She continues verapamil.    Hx of mechanical aortic valve replacement  Anticoagulated on warfarin  We will consult with anticoagulation RN regarding warfarin management.    Hypercholesterolemia  She continues pravastatin.    Acquired hypothyroidism  This is controlled with levothyroxine.    Class 1 obesity due to excess calories with serious comorbidity and body mass index (BMI) of 32.0 to 32.9 in adult       Implanted Device:  Patient has a pacemaker.       RECOMMENDATION:  APPROVAL GIVEN to proceed with proposed procedure, without further diagnostic evaluation.          Subjective     HPI related to upcoming procedure: Patient has been experiencing right  foot large toe pain for 4 years.  She has been diagnosed with osteoarthritis.  She complains of a throbbing sensation when she tries to sleep at night.  She has received numerous cortisone injections with minimal relief.  Walking temporarily improves the pain.  She has not been taking any pain medication.    Patient has hypothyroidism which is controlled with levothyroxine.    She has a history of mechanical aortic valve replacement.  She is anticoagulated with warfarin.    She has a history of CAD, nonsustained ventricular tachycardia, high degree atrioventricular block, and has a cardiac pacemaker in place.  She follows with cardiology.  She is asymptomatic and denies chest pain, shortness of breath with exertion, edema, orthopnea, syncope.  She is taking verapamil and pravastatin.    Preop Questions 3/23/2021   Have you ever had a heart attack or stroke? No   Have you ever had surgery on your heart or blood vessels, such as a stent placement, a coronary artery bypass, or surgery on an artery in your head, neck, heart, or legs? No   Do you have chest pain with activity? No   Do you have a history of  heart failure? No   Do you currently have a cold, bronchitis or symptoms of other infection? No   Do you have a cough, shortness of breath, or wheezing? No   Do you or anyone in your family have previous history of blood clots? No   Do you or does anyone in your family have a serious bleeding problem such as prolonged bleeding following surgeries or cuts? No   Have you ever had problems with anemia or been told to take iron pills? No   Have you had any abnormal blood loss such as black, tarry or bloody stools, or abnormal vaginal bleeding? No   Have you ever had a blood transfusion? No   Are you willing to have a blood transfusion if it is medically needed before, during, or after your surgery? Yes   Have you or any of your relatives ever had problems with anesthesia? No   Do you have sleep apnea, excessive snoring or  daytime drowsiness? No   Do you have any artifical heart valves or other implanted medical devices like a pacemaker, defibrillator, or continuous glucose monitor? YES - Pacemaker and artifical valves   What type of device do you have? Pacemaker   Name of the clinic that manages your device:  [facility]   Do you have artificial joints? No   Are you allergic to latex? No     Health Care Directive:  Patient does not have a Health Care Directive or Living Will: Discussed advance care planning with patient; however, patient declined at this time.    Preoperative Review of :    reviewed - controlled substances prescribed by other outside provider(s).    See problem list for active medical problems.  Problems all longstanding and stable, except as noted/documented.  See ROS for pertinent symptoms related to these conditions.      Review of Systems  Constitutional, neuro, ENT, endocrine, pulmonary, cardiac, gastrointestinal, genitourinary, musculoskeletal, integument and psychiatric systems are negative, except as otherwise noted.      Patient Active Problem List    Diagnosis Date Noted     Microscopic hematuria 02/27/2020     S/P splenectomy 02/27/2020     Other insomnia 02/27/2020     Anticoagulated on warfarin 02/27/2020     Hx of mechanical aortic valve replacement 02/27/2020     Coronary artery disease involving native coronary artery without angina pectoris 07/17/2019     Non-sustained ventricular tachycardia (H) 07/02/2019     Junctional premature beats (H) 07/02/2019     Cardiac pacemaker in situ 06/29/2018     H/O bicuspid aortic valve 06/21/2018     High degree atrioventricular block 06/21/2018     Fleming-Monte syncope      Acquired hypothyroidism 06/20/2018     Hypercholesterolemia      Past Medical History:   Diagnosis Date     History of aortic stenosis 6/21/2018     Hyperlipidemia      Hypothyroidism      Valvular disease     aortic stenosis secondary to bicuspid valve     Past Surgical History:    Procedure Laterality Date     CARDIAC VALVE REPLACEMENT      AVR     COLONOSCOPY  2013     CYSTOSTOMY W/ BLADDER BIOPSY       EP PACEMAKER INSERT N/A 2018    Procedure: EP Pacemaker Insertion;  Surgeon: Luis Urena MD;  Location: [facility];  Service:      NE RPLCMT PROST AORTIC VALVE OPEN XCP HOMOGRF/STENT      Description: Aortic Valve Replacement;  Proc Date: 2009;  Comments: #[address] [facility] [place] [place] Prosthesis     SPLENECTOMY  1979     thyroidectomy  2002     Current Outpatient Medications   Medication Sig Dispense Refill     cholecalciferol, vitamin D3, 1,000 unit tablet Take 1,000 Units by mouth daily.              levothyroxine (SYNTHROID, LEVOTHROID) 88 MCG tablet Take 1 tablet (88 mcg total) by mouth Daily at 6:00 am. 90 tablet 3     pravastatin (PRAVACHOL) 40 MG tablet Take 1 tablet (40 mg total) by mouth daily. nightly 90 tablet 3     SENNA 8.6 mg tablet TAKE 2 TABLETS BY MOUTH EVERY  tablet 2     verapamiL (VERELAN PM) 240 MG 24 hr capsule Take 1 capsule (240 mg total) by mouth daily. 90 capsule 3     warfarin ANTICOAGULANT (COUMADIN/JANTOVEN) 5 MG tablet Take 1 to 1.5 tablets (5 to 7.5 mg) by mouth daily. Adjust dose per INR results as instructed. 123 tablet 1     No current facility-administered medications for this visit.        No Known Allergies    Social History     Tobacco Use     Smoking status: Former Smoker     Packs/day: 0.50     Years: 20.00     Pack years: 10.00     Types: Cigarettes     Quit date:      Years since quittin.2     Smokeless tobacco: Never Used   Substance Use Topics     Alcohol use: Yes     Frequency: 2-4 times a month     Drinks per session: 1 or 2     Binge frequency: Never      Family History   Problem Relation Age of Onset     Acute Myocardial Infarction Brother 43     Rheum arthritis Mother          in 40s     Heart attack Father      Hypertension Father      No Medical Problems Maternal Grandmother      No  Medical Problems Maternal Grandfather      No Medical Problems Paternal Grandmother      No Medical Problems Paternal Grandfather      Aortic stenosis Brother         s/p surgery     No Medical Problems Sister      Breast cancer Neg Hx      Colon cancer Neg Hx      Cervical cancer Neg Hx      Social History     Substance and Sexual Activity   Drug Use No        Objective     /70   Pulse 68   Wt 186 lb 1.6 oz (84.4 kg)   SpO2 97%   BMI 32.97 kg/m    Physical Exam    GENERAL APPEARANCE: healthy, alert and no distress     EYES: EOMI, PERRL     HENT: ear canals and TM's normal and nose and mouth without ulcers or lesions     NECK: no adenopathy, no asymmetry, masses, or scars and thyroid normal to palpation     RESP: lungs clear to auscultation - no rales, rhonchi or wheezes     CV: regular rates and rhythm, normal S1 S2, no S3 or S4 and no murmur, click or rub     ABDOMEN:  soft, nontender, no HSM or masses and bowel sounds normal     MS: extremities normal- no gross deformities noted, no evidence of inflammation in joints, FROM in all extremities.     SKIN: no suspicious lesions or rashes     NEURO: Normal strength and tone, sensory exam grossly normal, mentation intact and speech normal     PSYCH: mentation appears normal. and affect normal/bright     LYMPHATICS: No cervical adenopathy    Recent Labs   Lab Test 03/10/21  0955 02/10/21  1013 12/09/20  1052 12/09/20  1052 02/27/20  1145 02/27/20  1145   HGB  --   --   --  14.2  --  14.0   PLT  --   --   --  256  --  226   INR 2.20* 1.80*   < >  --    < > 2.30*   NA  --   --   --  140  --  140   K  --   --   --  4.8  --  4.4   CREATININE  --   --   --  0.89  --  0.91    < > = values in this interval not displayed.        PRE-OP Diagnostics:   Labs pending at this time. Results will be reviewed when available.  EKG required for known coronary heart disease and not completed in the last 90 days.     I ordered and personally reviewed an EKG showing atrial-paced  with prolonged AV conduction.        REVISED CARDIAC RISK INDEX (RCRI)   The patient has the following serious cardiovascular risks for perioperative complications:   - Coronary Artery Disease (MI, positive stress test, angina, Qs on EKG) = 1 point    RCRI INTERPRETATION: 1 point: Class II (low risk - 0.9% complication rate)         Signed Electronically by: Joseline Pablo CNP    Copy of this evaluation report is provided to requesting physician.

## 2021-06-16 NOTE — TELEPHONE ENCOUNTER
Spoke with Vandana today regarding her warfarin hold history. She states that she held warfarin for one day with no bridge before a procedure on her finger. She also mentioned that when she was 62 years old, she held her warfarin and bridged with lovenox for a colonoscopy. She was not told anything from Dr. Gruber' office regarding any warfarin hold instructions before procedure. Will contact Dr. Gruber' office for more detailed information.     Portia Cuellar, Pharmacy Student

## 2021-06-16 NOTE — TELEPHONE ENCOUNTER
[place] back from Dr Gruber, would like warfarin hold so inr is down to normal limits pre-surgery. After procedure he is ok to restart same day as surgery.

## 2021-06-16 NOTE — TELEPHONE ENCOUNTER
Telephone Encounter by Jefry Carver RN at 3/19/2020 10:40 AM     Author: Jefry Carver RN Service: -- Author Type: RN, Care Manager    Filed: 3/19/2020 10:48 AM Encounter Date: 3/19/2020 Status: Attested    : Jefry Carver RN (RN, Care Manager) Cosigner: Lakeisha Marmolejo MD at 3/19/2020 11:03 AM    Attestation signed by Lakeisha Marmolejo MD at 3/19/2020 11:03 AM    Agree with plan    Dr Marmolejo                ANTICOAGULATION  MANAGEMENT    Assessment     Today's INR result of 2.1 is Therapeutic (goal INR of 1.8-2.2)        Warfarin taken as previously instructed    No new diet changes affecting INR    No new medication/supplements affecting INR    Continues to tolerate warfarin with no reported s/s of bleeding or thromboembolism     Previous INR was Supratherapeutic    Plan:     Spoke with Vandana regarding INR result and instructed:     Warfarin Dosing Instructions:  Continue current warfarin dose    7.5 mg every Mon, Wed, Fri; 5 mg all other days         Instructed patient to follow up no later than: 1-2 weeks.    Education provided: importance of taking warfarin as instructed    Vandana verbalizes understanding and agrees to warfarin dosing plan.    Instructed to call the [facility] for any changes, questions or concerns. (#[phone])   ?   Jefry Carver RN    Subjective/Objective:      Vandana Thao, a 69 y.o. female is on warfarin.     Vandana reports:     Home warfarin dose: template incorrect; verbally confirmed home dose with pt and updated on anticoagulation calendar     Missed doses: No     Medication changes:  No     S/S of bleeding or thromboembolism:  No     New Injury or illness:  No     Changes in diet or alcohol consumption:  No     Upcoming surgery, procedure or cardioversion:  No    Anticoagulation Episode Summary     Current INR goal:      TTR:   --   Next INR check:   4/2/2020   INR from last check:   2.10 (3/19/2020)   Weekly max warfarin dose:       Target end date:      INR check location:      Preferred lab:      Send INR reminders to:   ANDREAS JOHN    Indications    Anticoagulated on warfarin [Z79.01]  Hx of mechanical aortic valve replacement [Z95.2]           Comments:   Target goal range 1.8-2.2 per OV note 2/27/20         Anticoagulation Care Providers     Provider Role Specialty Phone number    Lakeisha Marmolejo MD Referring Family Medicine [phone]

## 2021-06-16 NOTE — TELEPHONE ENCOUNTER
ANTICOAGULATION  MANAGEMENT    Assessment     Today's INR result of 2.3 is Supratherapeutic (goal INR of 1.8-2.2)        Warfarin taken as previously instructed    No new diet changes affecting INR    No new medication/supplements affecting INR    Continues to tolerate warfarin with no reported s/s of bleeding or thromboembolism     Previous INR was Subtherapeutic    Plan:     Spoke on phone with Vandana regarding INR result and instructed:      Warfarin Dosing Instructions:  Continue current warfarin dose 7.5 mg daily on Zhou/We/Fr; and 5 mg daily rest of week  (0 % change)    Instructed patient to follow up no later than: 10 days-- scheduled for 4/30    As patient has a non-standard target goal range, this dosing plan and follow up was discussed with [facility] pharmacist Leah Soto.     Education provided: target INR goal and significance of current INR result    Vandana verbalizes understanding and agrees to warfarin dosing plan.    Instructed to call the [facility] for any changes, questions or concerns. (#[phone])   ?   Becca Adames RN    Subjective/Objective:      Vandana Thao, a 70 y.o. female is on warfarin.       Vandana reports:     Home warfarin dose: template incorrect; verbally confirmed home dose with Vandana and updated on anticoagulation calendar     Missed doses: No     Medication changes:  No     S/S of bleeding or thromboembolism:  No     New Injury or illness:  No     Changes in diet or alcohol consumption:  No     Upcoming surgery, procedure or cardioversion:  No    Anticoagulation Episode Summary     Current INR goal:     TTR:  --   Next INR check:  4/30/2021   INR from last check:  2.30 (4/20/2021)   Weekly max warfarin dose:     Target end date:     INR check location:     Preferred lab:     Send INR reminders to:  ANDREAS JOHN    Indications    Anticoagulated on warfarin [Z79.01]  Hx of mechanical aortic valve replacement [Z95.2]           Comments:  Target goal range  1.8-2.2 per OV note 2/27/20         Anticoagulation Care Providers     Provider Role Specialty Phone number    Lakeisha Marmolejo MD Referring Family Medicine [phone]

## 2021-06-16 NOTE — TELEPHONE ENCOUNTER
Telephone Encounter by Barby Charlton RN at 2/27/2020  2:15 PM     Author: Barby Charlton RN Service: -- Author Type: Registered Nurse    Filed: 2/27/2020  2:41 PM Encounter Date: 2/27/2020 Status: Attested    : Barby Charlton RN (Registered Nurse) Cosigner: Lakeisha Marmolejo MD at 2/27/2020  3:29 PM    Attestation signed by Lakeisha Marmolejo MD at 2/27/2020  3:29 PM    Agree with plan    Dr Marmolejo                ANTICOAGULATION  MANAGEMENT: INITIAL CONSULT    Assessment     Vandana ABRAHAM Thao, a 69 y.o. female  is newly referred to [facility] anticoagulation.    New to [facility] Anticoagulation: continuing on warfarin.         Today's INR result of 2.3 is Supratherapeutic (goal INR of 1.8-2.2)     Non-standard INR goal range for indication; patient states this is the goal range that is on her card from when she had her valve replaced in 2009. She does not have any history of bleeding events that she recalls. ACN discussed briefly with Leah Soto, [facility] pharmacist. May need to clarify further with cardiology if this goal is still applicable.       Previous INR therapeutic per patient report, she states her recent results have been in range and her dose has been the same for quite awhile    Warfarin taken as previously instructed    Factors that may increase sensitivity to warfarin: Female gender and Low greens/vitamink K intake     Factors expected to reduce sensitivity to warfarin: No factors       Home medication list reviewed for potential drug interactions. No new medication/supplements affecting INR    Educational needs identified: none-understands indication, target goal, duration, dietary and medication considerations     Plan:     Warfarin Dosing Instructions: Continue current warfarin dose    5 mg every Sun, Thu; 7.5 mg all other days     As patient has a non-standard target goal range this dosing plan and follow up was discussed with [facility] pharmacist Leah  "Charles.    Instructed patient to follow up no later than: 2 weeks    Education provided: importance of consistent vitamin K intake, target INR goal and significance of current INR result, importance of notifying clinic for changes in medications, importance of notifying clinic for diarrhea, nausea/vomiting, reduced intake and/or illness and importance of notifying clinic of upcoming surgeries and procedures 2 weeks in advance    1:1 nurse visit for education offered; patient declined    Vandana verbalizes understanding and agrees to warfarin dosing plan.     Instructed to call the [facility] for any questions or concerns regarding warfarin dosing. (# [phone])   ?   Barby Charlton RN    Subjective/Objective:      Vandana Thao, a 69 y.o. female  is newly referred to [facility] anticoagulation for warfarin management    Vandana reports:    Anticoagulation:      Warfarin initiation date (approximate): 2009   Indication: Mechanical AVR    Goal INR: 1.8-2.2   On Bridge Therapy: No   Previously on warfarin:Yes, currently on warfarin transferring anticoagulation management to [facility]. Most recent warfarin dose as listed since \"a long time now\"      Medication setup/administration: self     How often do you forget to take your medications?: rarely      Missed warfarin doses since last INR: No     S/sx of bleeding, thromboembolism: No     Typical  vitamin K intake: low;  consistent. Patient states she does limit her Vit K intake because of her narrow goal range but typically tries to have one salad per week     Typical  alcohol intake: yes, occasional     Other dietary considerations: none     New Injury or illness:  No     Upcoming surgery procedure or cardioversion: No    Patient weight:   Wt Readings from Last 1 Encounters:   02/27/20 188 lb 7 oz (85.5 kg)       Recent lab results:       Lab Results   Component Value Date    INR 2.30 (H) 02/27/2020    INR 1.75 (H) 06/22/2018    INR 2.04 (H) " 06/21/2018        Lab Results   Component Value Date    HGB 14.0 02/27/2020    HCT 41.3 02/27/2020     02/27/2020        Lab Results   Component Value Date    ALT 16 04/16/2019    AST 26 04/16/2019    ALBUMIN 3.8 04/16/2019        Lab Results   Component Value Date    CREATININE 0.80 04/16/2019       Medications:    Current Outpatient Medications on File Prior to Visit   Medication Sig   ? amoxicillin (AMOXIL) 500 MG capsule Take 4 capsules (2,000 mg total) by mouth once for 1 dose. Pre dental   ? cholecalciferol, vitamin D3, 1,000 unit tablet Take 1,000 Units by mouth daily.          ? HYDROcodone-acetaminophen 5-325 mg per tablet    ? levothyroxine (SYNTHROID, LEVOTHROID) 100 MCG tablet Take 88 mcg by mouth Daily at 6:00 am.          ? melatonin 3 mg Tab tablet Take 6 mg by mouth at bedtime as needed.   ? pravastatin (PRAVACHOL) 40 MG tablet Take 40 mg by mouth daily. nightly   ? verapamil (VERELAN PM) 240 MG 24 hr capsule Take 1 capsule (240 mg total) by mouth daily.   ? warfarin (COUMADIN) 5 MG tablet Take 5-7.5 mg by mouth See Admin Instructions. 7.5MG every day but Thursday and Sunday she takes 5MG   ? [DISCONTINUED] amoxicillin (AMOXIL) 500 MG capsule Take 2,000 mg by mouth. Pre dental      No current facility-administered medications on file prior to visit.        Past Medical History:  Patient Active Problem List   Diagnosis   ? Hypercholesterolemia   ? History of aortic stenosis   ? S/P AVR   ? H/O bicuspid aortic valve   ? High degree atrioventricular block   ? Acquired hypothyroidism   ? Fleming-Monte syncope   ? Cardiac pacemaker in situ   ? Non-sustained ventricular tachycardia (H)   ? Junctional premature beats (H)   ? Coronary artery disease involving native coronary artery without angina pectoris   ? Microscopic hematuria   ? Family history of rheumatoid arthritis   ? S/P splenectomy   ? Other insomnia   ? Anticoagulated on warfarin   ? Hx of mechanical aortic valve replacement    Past Medical  History:   Diagnosis Date   ? Hyperlipidemia    ? Hypothyroidism    ? Valvular disease     aortic stenosis secondary to bicuspid valve

## 2021-06-16 NOTE — TELEPHONE ENCOUNTER
Telephone Encounter by Jefry Carver RN at 3/12/2020 11:07 AM     Author: Jefry Carver RN Service: -- Author Type: RN, Care Manager    Filed: 3/12/2020 11:17 AM Encounter Date: 3/12/2020 Status: Attested    : Jefry Carver RN (RN, Care Manager) Cosigner: Lakeisha Marmolejo MD at 3/12/2020 11:56 AM    Attestation signed by Lakeisha Marmolejo MD at 3/12/2020 11:56 AM    Agree with plan    Dr Marmolejo                ANTICOAGULATION  MANAGEMENT    Assessment     Today's INR result of 3.7 is Supratherapeutic (goal INR of 1.8-2.2)        Warfarin taken as previously instructed    No new diet changes affecting INR    No new medication/supplements affecting INR    Continues to tolerate warfarin with no reported s/s of bleeding or thromboembolism     Previous INR was Supratherapeutic    Plan:     Spoke with Vandana regarding INR result and instructed:     Warfarin Dosing Instructions: (Pt already took 5 mg today).  Hold tmr 3/13 then change warfarin dose to 7.5 mg daily on Mon, Wed, Fri; and 5 mg daily rest of week  (10.5 % change)    Instructed patient to follow up no later than: 1 week.     Education provided: importance of taking warfarin as instructed    Vandana verbalizes understanding and agrees to warfarin dosing plan.    Instructed to call the [facility] for any changes, questions or concerns. (#[phone])   ?   Jefry Carver RN    Subjective/Objective:      Vandana Thao, a 69 y.o. female is on warfarin.     Vandana reports:     Home warfarin dose: verbally confirmed home dose with pt and updated on anticoagulation calendar     Missed doses: No     Medication changes:  No     S/S of bleeding or thromboembolism:  No     New Injury or illness:  No     Changes in diet or alcohol consumption:  No     Upcoming surgery, procedure or cardioversion:  No    Anticoagulation Episode Summary     Current INR goal:      TTR:   --   Next INR check:   3/19/2020   INR from last check:   3.70  (3/12/2020)   Weekly max warfarin dose:      Target end date:      INR check location:      Preferred lab:      Send INR reminders to:   ANDREAS JOHN    Indications    Anticoagulated on warfarin [Z79.01]  Hx of mechanical aortic valve replacement [Z95.2]           Comments:   Target goal range 1.8-2.2 per OV note 2/27/20         Anticoagulation Care Providers     Provider Role Specialty Phone number    Lakeisha Marmolejo MD Referring Family Medicine [phone]

## 2021-06-16 NOTE — TELEPHONE ENCOUNTER
Called and spoke with Vandana regarding hold plan for upcoming big toe de-bulking scheduled on 4/13.     Reviewed plan as laid out by Leah Soto, PharmD below.     Patient to hold warfarin starting 4/9, with last day of warfarin being 4/8.   Vandana  will hold warfarin 4/9 through 4/12-- will resume warfarin evening of procedure, pending approval from surgeon.   Advised to have INR checked 5-7 days after resuming warfarin.     Vandana verbalized understanding of plan and read-back directions given by writer.     Vandana  is scheduled to have INR checked 4/20.      Becca Adames, RN  Anticoagulation Clinic

## 2021-06-16 NOTE — TELEPHONE ENCOUNTER
Anticoagulation    Vandana called today to schedule pre-procedure INR per surgeon request.      Reviewed hold plan from 3/23 encounter. Vandana with no further questions.    Anticoagulation calendar updated.  ---    Received response from Dr. Gruber see 3/23 encounter    Leah Soto, PharmD

## 2021-06-16 NOTE — TELEPHONE ENCOUNTER
Call placed to Dr. Gruber office for care coordination for big toe procedure on 4/13/2021. Dr. Gruber out of office until 4/5/2021, left message with his team requesting if warfarin hold needed for procedure and if so number of days or target INR.     Portia Cuellar, Pharmacy Student

## 2021-06-16 NOTE — TELEPHONE ENCOUNTER
ANTICOAGULATION  MANAGEMENT    Assessment     Today's INR result of 1.3 is Subtherapeutic (goal INR of 2.0-3.0)        Warfarin recently held as instructed which may be affecting INR    No new diet changes affecting INR    No new medication/supplements affecting INR    Continues to tolerate warfarin with no reported s/s of bleeding or thromboembolism     Previous INR was Therapeutic    Plan:     Left detailed message for Vandana regarding INR result and instructed:      Warfarin Dosing Instructions:  Continue plan as laid out previously in encounter from 3/23    Instructed patient to follow up no later than: 4/20    Education provided: target INR goal and significance of current INR result      Instructed to call the [facility] for any changes, questions or concerns. (#[phone])   ?   Becca Adames RN    Subjective/Objective:      Vandana Thao, a 70 y.o. female is on warfarin.       Vandana reports:     Home warfarin dose: as updated on anticoagulation calendar per template     Missed doses: Yes, due to hold for upcoming procedure     Medication changes:  No     S/S of bleeding or thromboembolism:  No     New Injury or illness:  No     Changes in diet or alcohol consumption:  No     Upcoming surgery, procedure or cardioversion:  Yes: toe debulking on 4/13    Anticoagulation Episode Summary     Current INR goal:     TTR:  --   Next INR check:  4/12/2021   INR from last check:  1.30 (4/12/2021)   Weekly max warfarin dose:     Target end date:     INR check location:     Preferred lab:     Send INR reminders to:  ANDREAS JOHN    Indications    Anticoagulated on warfarin [Z79.01]  Hx of mechanical aortic valve replacement [Z95.2]           Comments:  Target goal range 1.8-2.2 per OV note 2/27/20         Anticoagulation Care Providers     Provider Role Specialty Phone number    Lakeisha Marmolejo MD Referring Family Medicine [phone]

## 2021-06-16 NOTE — TELEPHONE ENCOUNTER
Patient has procedure to de-bulk arthritic big toe on 4/13.    Does not appear that patient has ever bridged with lovenox in the past.  ACN also unable to locate records of any previous warfarin holds.    Routing to Leah Soto, PharmD for review.      Becca Adames, RN  Anticoagulation Clinic

## 2021-06-17 NOTE — TELEPHONE ENCOUNTER
Telephone Encounter by Jefry Carver RN at 4/2/2020 11:44 AM     Author: Jefry Carver RN Service: -- Author Type: RN, Care Manager    Filed: 4/2/2020 11:50 AM Encounter Date: 4/2/2020 Status: Attested    : Jefry Carver RN (RN, Care Manager) Cosigner: Lakeisha Marmolejo MD at 4/2/2020 11:51 AM    Attestation signed by Lakeisha Marmolejo MD at 4/2/2020 11:51 AM    Agree with plan    Dr Marmolejo                ANTICOAGULATION  MANAGEMENT    Assessment     Today's INR result of 2.3 is Supratherapeutic (goal INR of 1.8-2.2)        Warfarin taken as previously instructed    Decreased greens/vitamin K intake may be affecting INR- will resume normal greens.    No new medication/supplements affecting INR    Continues to tolerate warfarin with no reported s/s of bleeding or thromboembolism     Previous INR was Therapeutic    Plan:     Spoke with Vandana regarding INR result and instructed:     Warfarin Dosing Instructions:  Continue current warfarin dose    7.5 mg every Mon, Wed, Fri; 5 mg all other days         Instructed patient to follow up no later than: 2 weeks.    Education provided: importance of consistent vitamin K intake, impact of vitamin K foods on INR and importance of taking warfarin as instructed    Vandana verbalizes understanding and agrees to warfarin dosing plan.    Instructed to call the [facility] for any changes, questions or concerns. (#[phone])   ?   Jefry Carver RN    Subjective/Objective:      Vandana Thao, a 69 y.o. female is on warfarin.     Vandana reports:     Home warfarin dose: verbally confirmed home dose with pt and updated on anticoagulation calendar     Missed doses: No     Medication changes:  No     S/S of bleeding or thromboembolism:  No     New Injury or illness:  No     Changes in diet or alcohol consumption:  Yes, decreased greens.     Upcoming surgery, procedure or cardioversion:  No    Anticoagulation Episode Summary     Current INR  goal:      TTR:   --   Next INR check:   4/16/2020   INR from last check:   2.30 (4/2/2020)   Weekly max warfarin dose:      Target end date:      INR check location:      Preferred lab:      Send INR reminders to:   ANDREAS JOHN    Indications    Anticoagulated on warfarin [Z79.01]  Hx of mechanical aortic valve replacement [Z95.2]           Comments:   Target goal range 1.8-2.2 per OV note 2/27/20         Anticoagulation Care Providers     Provider Role Specialty Phone number    Lakeisha Marmolejo MD Referring Family Medicine [phone]

## 2021-06-17 NOTE — TELEPHONE ENCOUNTER
ANTICOAGULATION  MANAGEMENT    Assessment     Today's INR result of 2.0 is Therapeutic (goal INR of 1.8-2.2)        Warfarin taken as previously instructed    No new diet changes affecting INR    No new medication/supplements affecting INR    Continues to tolerate warfarin with no reported s/s of bleeding or thromboembolism     Previous INR was Therapeutic    Plan:     Left detailed message for Vandana regarding INR result and instructed:      Warfarin Dosing Instructions:  Continue current warfarin dose 7.5 mg daily on Zhou/We/Fr; and 5 mg daily rest of week  (0 % change)    Instructed patient to follow up no later than: 3 weeks    Education provided: target INR goal and significance of current INR result  Reminded via VM: importance of notifying clinic for changes in medications, importance of notifying clinic for diarrhea, nausea/vomiting, reduced intake and/or illness and importance of notifying clinic of upcoming surgeries and procedures 2 weeks in advance    Requested that patient call back if there are any questions regarding plan, or if they think of a change they forgot to write on template.     Instructed to call the [facility] for any changes, questions or concerns. (#[phone])   ?   Becca Adames RN    Subjective/Objective:      Vandana Thao, a 70 y.o. female is on warfarin.       Vandana reports:     Home warfarin dose: as updated on anticoagulation calendar per template     Missed doses: No     Medication changes:  No     S/S of bleeding or thromboembolism:  No     New Injury or illness:  No     Changes in diet or alcohol consumption:  No     Upcoming surgery, procedure or cardioversion:  No    Anticoagulation Episode Summary     Current INR goal:     TTR:  --   Next INR check:  6/4/2021   INR from last check:  2.00 (5/14/2021)   Weekly max warfarin dose:     Target end date:     INR check location:     Preferred lab:     Send INR reminders to:  ANDREAS JOHN    Indications     Anticoagulated on warfarin [Z79.01]  Hx of mechanical aortic valve replacement [Z95.2]           Comments:  Target goal range 1.8-2.2 per OV note 2/27/20         Anticoagulation Care Providers     Provider Role Specialty Phone number    Lakeisha Marmolejo MD Referring Family Medicine [phone]

## 2021-06-17 NOTE — TELEPHONE ENCOUNTER
ANTICOAGULATION  MANAGEMENT    Assessment     Today's INR result of 2.1 is Therapeutic (goal INR of 1.8-2.2)        Warfarin taken as previously instructed    No new diet changes affecting INR    No new medication/supplements affecting INR    Continues to tolerate warfarin with no reported s/s of bleeding or thromboembolism     Previous INR was Supratherapeutic     Plan:     Spoke on phone with Vandana regarding INR result and instructed:      Warfarin Dosing Instructions:  Continue current warfarin dose 7.5 mg daily on Zhou/We/Fr; and 5 mg daily rest of week  (0 % change)    Instructed patient to follow up no later than: 2 weeks-- scheduled for 5/14    Education provided: target INR goal and significance of current INR result, importance of notifying clinic for changes in medications and importance of notifying clinic for diarrhea, nausea/vomiting, reduced intake and/or illness    Vandana verbalizes understanding and agrees to warfarin dosing plan.    Instructed to call the [facility] for any changes, questions or concerns. (#[phone])   ?   Becca Adames RN    Subjective/Objective:      Vandana Thao, a 70 y.o. female is on warfarin.       Vandana reports:     Home warfarin dose: as updated on anticoagulation calendar per template     Missed doses: No     Medication changes:  No     S/S of bleeding or thromboembolism:  No     New Injury or illness:  No     Changes in diet or alcohol consumption:  No     Upcoming surgery, procedure or cardioversion:  No    Anticoagulation Episode Summary     Current INR goal:     TTR:  --   Next INR check:  5/14/2021   INR from last check:  2.10 (4/30/2021)   Weekly max warfarin dose:     Target end date:     INR check location:     Preferred lab:     Send INR reminders to:  ANDREAS JOHN    Indications    Anticoagulated on warfarin [Z79.01]  Hx of mechanical aortic valve replacement [Z95.2]           Comments:  Target goal range 1.8-2.2 per OV note 2/27/20          Anticoagulation Care Providers     Provider Role Specialty Phone number    Lakeisha Marmolejo MD Referring Family Medicine [phone]

## 2021-06-18 NOTE — PATIENT INSTRUCTIONS - HE
Patient Instructions by Lakeisha Marmolejo MD at 12/9/2020  9:50 AM     Author: Lakeisha Marmolejo MD Service: -- Author Type: Physician    Filed: 12/9/2020 10:26 AM Encounter Date: 12/9/2020 Status: Addendum    : Lakeisha Marmolejo MD (Physician)    Related Notes: Original Note by Lakeisha Marmolejo MD (Physician) filed at 12/9/2020 10:17 AM       Mammogram ordered today - can do at [facility] or at our [facility] (they will call you to schedule here)    Get shingles vaccine at pharmacy to make sure it is covered by insurance       Patient Education   Understanding Advance Care Planning  Advance care planning is the process of deciding ones own future medical care. It helps ensure that if you cant speak for yourself, your wishes can still be carried out. The plan is a series of legal documents that note a persons wishes. The documents vary by state. Advance care planning may be done when a person has a serious illness that is expected to get worse. It may be done before major surgery. And it can help you and your family be prepared in case of a major illness or injury. Advance care planning helps with making decisions at these times.       A health care proxy is a person who acts as the voice of a patient when the patient cant speak for himself or herself. The name of this role varies by state. It may be called a Durable Medical Power of  or Durable Power of  for Healthcare. It may be called an agent, surrogate, or advocate. Or it may be called a representative or decision maker. It is an official duty that is identified by a legal document. The document also varies by state.    Why Is Advance Care Planning Important?  If a person communicates their healthcare wishes:    They will be given medical care that matches their values and goals.    Their family members will not be forced to make decisions in a crisis with no guidance.  Creating a Plan  Making an advance care plan is  often done in 3 steps:    Thinking about ones wishes. To create an advance care plan, you should think about what kind of medical treatment you would want if you lose the ability to communicate. Are there any situations in which you would refuse or stop treatment? Are there therapies you would want or not want? And whom do you want to make decisions for you? There are many places to learn more about how to plan for your care. Ask your doctor or  for resources.    Picking a health care proxy. This means choosing a trusted person to speak for you only when you cant speak for yourself. When you cannot make medical decisions, your proxy makes sure the instructions in your advance care plan are followed. A proxy does not make decisions based on his or her own opinions. They must put aside those opinions and values if needed, and carry out your wishes.    Filling out the legal documents. There are several kinds of legal documents for advance care planning. Each one tells health care providers your wishes. The documents may vary by state. They must be signed and may need to be witnessed or notarized. You can cancel or change them whenever you wish. Depending on your state, the documents may include a Healthcare Proxy form, Living Will, Durable Medical Power of , Advance Directive, or others.  The Familys Role  The best help a family can give is to support their loved ones wishes. Open and honest communication is vital. Family should express any concerns they have about the patients choices while the patient can still make decisions.    9811-0623 [employer]. [address] [place] [address], [place] [address]. All rights reserved. This information is not intended as a substitute for professional medical care. Always follow your healthcare professional's instructions.         Also, [employer] [place] offers a free, downloadable health care directive that allows you to share your treatment  choices and personal preferences if you cannot communicate your wishes. It also allows you to appoint another person (called a health care agent) to make health care decisions if you are unable to do so. You can download an advance directive by going here: [email or web address]://[email or web address]     Patient Education   Personalized Prevention Plan  You are due for the preventive services outlined below.  Your care team is available to assist you in scheduling these services.  If you have already completed any of these items, please share that information with your care team to update in your medical record.  Health Maintenance   Topic Date Due   ? DEXA SCAN  1950   ? ADVANCE CARE PLANNING  06/19/1970   ? COLORECTAL CANCER SCREENING  06/19/1970   ? ZOSTER VACCINES (2 of 3) 06/05/2014   ? MAMMOGRAM  12/19/2020   ? MEDICARE ANNUAL WELLNESS VISIT  12/09/2021   ? FALL RISK ASSESSMENT  12/09/2021   ? LIPID  02/27/2025   ? TD 18+ HE  02/27/2030   ? HEPATITIS C SCREENING  Completed   ? Pneumococcal Vaccine: Pediatrics (0 to 5 Years) and At-Risk Patients (6 to 64 Years)  Completed   ? Pneumococcal Vaccine: 65+ Years  Completed   ? INFLUENZA VACCINE RULE BASED  Completed   ? HEPATITIS B VACCINES  Aged Out

## 2021-06-19 NOTE — LETTER
Letter by Jane Goldman RDCS at      Author: Jane Goldman RDCS Service: -- Author Type: --    Filed:  Encounter Date: 3/28/2019 Status: (Other)         Vandana Thao  [id number] [facility] [address]      March 28, 2019      Dear Ms. Gutierrezon,    RE: Remote Results    We are writing to you regarding your recent Remote Pacemaker check from home. Your transmission was received successfully. Battery status is satisfactory at this time.     Your results are within normal limits.    Your next device appointment will be a remote check on July 1, 2019; this will occur automatically.    To schedule or reschedule, please call [phone] and press 1.    NOTE: If you would like to do an extra transmission, please call [phone] and press 3 to speak to a nurse BEFORE transmitting. This ensures that the Device Clinic staff is aware of the reason you are sending a transmission, and can follow-up with you after it has been reviewed.    We will be checking your implanted device from home (remotely) every three months unless otherwise instructed. We will need to see you in the clinic at least once a year. You may need to be seen in the clinic sooner depending on the results of your check.    Please be aware:    The follow-up schedule is like a Physician prescription.    Your remote monitor is paired to your specific implanted device.      Sincerely,    [facility]

## 2021-06-19 NOTE — PROGRESS NOTES
DEVICE CLINIC RN POST-OP NOTE    Reason for visit: 1 week post op pacemaker and wound check.     Device: Med[employer]onic [id number] Saundra XT DR MRI  Procedure date: 6/21/2018  Implant Diagnosis: AV block 3rd degree  Implanting Physician: Dr. Luis Urena      Assessment  Subjective: Luly denies any pain at her device site.   Vitals:   Vitals:    06/29/18 1455   BP: 132/70   Pulse: 64   Resp: 16   Temp:    Heart Sounds: normal  Lung Sounds: clear to auscultation bilaterally  Incision/device pocket: The steri-strips were removed from the incision and it was cleansed with adhesive remover and alcohol wipes. The incision is clean, dry and intact. There are no signs of infection present. The incision is well healed.        Device Findings  Interrogation of device reveals normal sensing and capture thresholds  See the Paceart Report for a full summary of the device information.        Patient Education    [facility]'s Partnership Agreement for Device Patients and Post-operative Checklist were presented and reviewed with patient at today's appointment.    Signs and symptoms of infection, poor wound healing, and device function were reviewed. Range of motion and weight restrictions for the LEFT side are for four weeks. She was issued a Carelink remote monitor and instructed on its set-up and use for remote monitoring by the [employer] representative prior to hospital discharge. These instructions were reviewed with the patient at today s visit.       Plan  Remote from home in 1 month on 7/24/2018  In clinic device check in 3 months on 9/25/2018    France Vital RN BSN PHN  Device Nurse   [facility]

## 2021-06-19 NOTE — LETTER
Letter by Jasmin Franco EPS at      Author: Jasmin Franco EPS Service: -- Author Type: --    Filed:  Encounter Date: 10/20/2019 Status: Signed         Vandana Thao  [id number] [facility] [address]      October 21, 2019      Dear MsManohar Keesha,    RE: Remote Results    We are writing to you regarding your recent Remote Pacemaker check from home. Your transmission was received successfully. Battery status is satisfactory at this time.     Your results are being reviewed.  You will be contacted if further information is necessary.     Your next device appointment will be a remote check on January 21, 2020; this will occur automatically.    To schedule or reschedule, please call [phone] and press 1.    NOTE: If you would like to do an extra transmission, please call [phone] and press 3 to speak to a nurse BEFORE transmitting. This ensures that the Device Clinic staff is aware of the reason you are sending a transmission, and can follow-up with you after it has been reviewed.    We will be checking your implanted device from home (remotely) every three months unless otherwise instructed. We will need to see you in the clinic at least once a year. You may need to be seen in the clinic sooner depending on the results of your check.    Please be aware:    The follow-up schedule is like a Physician prescription.    Your remote monitor is paired to your specific implanted device.      Sincerely,    [facility] Device [facility]

## 2021-06-19 NOTE — LETTER
Letter by Unique Enamorado EPS at      Author: Unique Enamorado EPS Service: -- Author Type: --    Filed:  Encounter Date: 6/30/2019 Status: (Other)         Vandana Thao  [id number] [facility] [address]      July 1, 2019      Dear Ms. Gutierrezon    RE: Remote Results    We are writing to you regarding your recent Remote Pacemaker check from home. Your transmission was received successfully. Battery status is satisfactory at this time.     Your results are being reviewed.  You will be contacted if further information is necessary.     Your next device appointment will be a clinic visit.  Please call in July to schedule.      To schedule or reschedule, please call [phone] and press 1.    NOTE: If you would like to do an extra transmission, please call [phone] and press 3 to speak to a nurse BEFORE transmitting. This ensures that the Device Clinic staff is aware of the reason you are sending a transmission, and can follow-up with you after it has been reviewed.    We will be checking your implanted device from home (remotely) every three months unless otherwise instructed. We will need to see you in the clinic at least once a year. You may need to be seen in the clinic sooner depending on the results of your check.    Please be aware:    The follow-up schedule is like a Physician prescription.    Your remote monitor is paired to your specific implanted device.      Sincerely,    [facility] Device [facility]

## 2021-06-20 NOTE — LETTER
Letter by Mary Rand RN at      Author: Mary Rand RN Service: -- Author Type: --    Filed:  Encounter Date: 1/23/2020 Status: (Other)         Vandana Thao  [address] [facility] [address]      January 24, 2020      Dear Ms. Keesha,    RE: Remote Results    We are writing to you regarding your recent Remote Pacemaker check from home. Your transmission was received successfully. Battery status is satisfactory at this time.     Your results are within normal limits.    Your next device appointment will be a remote check on April 23, 2020; this will occur automatically.    To schedule or reschedule, please call [phone] and press 1.    NOTE: If you would like to do an extra transmission, please call [phone] and press 3 to speak to a nurse BEFORE transmitting. This ensures that the Device Clinic staff is aware of the reason you are sending a transmission, and can follow-up with you after it has been reviewed.    We will be checking your implanted device from home (remotely) every three months unless otherwise instructed. We will need to see you in the clinic at least once a year. You may need to be seen in the clinic sooner depending on the results of your check.    Please be aware:    The follow-up schedule is like a Physician prescription.    Your remote monitor is paired to your specific implanted device.      Sincerely,    [facility] Device [facility]

## 2021-06-20 NOTE — LETTER
Letter by Mary Rand RN at      Author: Mary Rand RN Service: -- Author Type: --    Filed:  Encounter Date: 4/23/2020 Status: (Other)         Vandana Thao  [address] [facility] [address]      April 23, 2020      Dear Ms. Keesha,    RE: Remote Results    We are writing to you regarding your recent Remote Pacemaker check from home. Your transmission was received successfully. Battery status is satisfactory at this time.     Your results are within normal limits.    Your next device appointment will be a remote check on 7/28/2020; this will occur automatically. We will plan to see you in the clinic for an annual device check and appointment with Dr. Zhang in October 2020.     To schedule or reschedule, please call [phone] and press 1.    NOTE: If you would like to do an extra transmission, please call [phone] and press 3 to speak to a nurse BEFORE transmitting. This ensures that the Device Clinic staff is aware of the reason you are sending a transmission, and can follow-up with you after it has been reviewed.    We will be checking your implanted device from home (remotely) every three months unless otherwise instructed. We will need to see you in the clinic at least once a year. You may need to be seen in the clinic sooner depending on the results of your check.    Please be aware:    The follow-up schedule is like a Physician prescription.    Your remote monitor is paired to your specific implanted device.      Sincerely,    [facility] Device [facility]

## 2021-06-20 NOTE — LETTER
Letter by Bethany Zhu RN at      Author: Bethany Zhu RN Service: -- Author Type: --    Filed:  Encounter Date: 7/28/2020 Status: (Other)         Vandana Thao  [id number] [facility] [address]      July 28, 2020      Dear Ms. hTao,    RE: Remote Results    We are writing to you regarding your recent Remote Pacemaker check from home. Your transmission was received successfully. Battery status is satisfactory at this time.     Your results are within normal limits.    Your next device appointment will be a clinic visit.  Please call in August 2020 to schedule.      To schedule or reschedule, please call [phone] and press 1.    NOTE: If you would like to do an extra transmission, please call [phone] and press 3 to speak to a nurse BEFORE transmitting. This ensures that the Device Clinic staff is aware of the reason you are sending a transmission, and can follow-up with you after it has been reviewed.    We will be checking your implanted device from home (remotely) every three months unless otherwise instructed. We will need to see you in the clinic at least once a year. You may need to be seen in the clinic sooner depending on the results of your check.    Please be aware:    The follow-up schedule is like a Physician prescription.    Your remote monitor is paired to your specific implanted device.      Sincerely,    [facility] Device [facility]

## 2021-06-21 NOTE — LETTER
Letter by Diana Lema RN at      Author: Diana Lema RN Service: -- Author Type: --    Filed:  Encounter Date: 5/6/2021 Status: (Other)         Vandana Thao  [address] [facility] [address]      May 6, 2021      Dear MsManohar GongoraKeesha,    RE: Remote Results    We are writing to you regarding your recent Remote Pacemaker check from home. Your transmission was received successfully. Battery status is satisfactory at this time.     Your results are within normal limits.    Your next device appointment will be a clinic visit.  Please call in June to schedule.      To schedule or reschedule, please call [phone] and press 1.    NOTE: If you would like to do an extra transmission, please call [phone] and press 3 to speak to a nurse BEFORE transmitting. This ensures that the Device Clinic staff is aware of the reason you are sending a transmission, and can follow-up with you after it has been reviewed.    We will be checking your implanted device from home (remotely) every three months unless otherwise instructed. We will need to see you in the clinic at least once a year. You may need to be seen in the clinic sooner depending on the results of your check.    Please be aware:    The follow-up schedule is like a Physician prescription.    Your remote monitor is paired to your specific implanted device.      Sincerely,    [facility] Device [facility]

## 2021-06-21 NOTE — LETTER
Letter by Diana Lema RN at      Author: Diana Lema RN Service: -- Author Type: --    Filed:  Encounter Date: 2/3/2021 Status: (Other)         Vandana Thao  [address] [facility] [address]      February 3, 2021      Dear Ms. Gutierrezon,    RE: Remote Results    We are writing to you regarding your recent Remote Pacemaker check from home. Your transmission was received successfully. Battery status is satisfactory at this time.     Your results are within normal limits.    Your next device appointment will be a remote check on 5/5/21; this will occur automatically.    To schedule or reschedule, please call [phone] and press 1.    NOTE: If you would like to do an extra transmission, please call [phone] and press 3 to speak to a nurse BEFORE transmitting. This ensures that the Device Clinic staff is aware of the reason you are sending a transmission, and can follow-up with you after it has been reviewed.    We will be checking your implanted device from home (remotely) every three months unless otherwise instructed. We will need to see you in the clinic at least once a year. You may need to be seen in the clinic sooner depending on the results of your check.    Please be aware:    The follow-up schedule is like a Physician prescription.    Your remote monitor is paired to your specific implanted device.      Sincerely,    [facility] Device [facility]

## 2021-06-21 NOTE — LETTER
Letter by Miriam Mckeon at      Author: Miriam Mckeon Service: -- Author Type: --    Filed:  Encounter Date: 11/4/2020 Status: (Other)         Vandana Thao  [address] [facility] [address]      November 4, 2020      Dear MsManohar Gutierrezon    RE: Remote Results    We are writing to you regarding your recent Remote Pacemaker check from home. Your transmission was received successfully. Battery status is satisfactory at this time.     Your results are within normal limits.    Your next device appointment will be a remote check on February 3, 2021; this will occur automatically.    To schedule or reschedule, please call [phone] and press 1.    NOTE: If you would like to do an extra transmission, please call [phone] and press 3 to speak to a nurse BEFORE transmitting. This ensures that the Device Clinic staff is aware of the reason you are sending a transmission, and can follow-up with you after it has been reviewed.    We will be checking your implanted device from home (remotely) every three months unless otherwise instructed. We will need to see you in the clinic at least once a year. You may need to be seen in the clinic sooner depending on the results of your check.    Please be aware:    The follow-up schedule is like a Physician prescription.    Your remote monitor is paired to your specific implanted device.      Sincerely,    [facility] Device [facility]

## 2021-06-23 NOTE — TELEPHONE ENCOUNTER
Pt has a Pacemaker and leaving for [place] Feb 19th for 10 days  Asking if she has to take the monitor with her for 10 days for her pacemaker  Pacemaker Placed last June     Dr Zhang please advise.     Does she bring the monitor with her to [place]?    Call back     Tanya Nicole, RN [facility] RN Triage          Reason for Disposition    Nursing judgment    Protocols used: NO PROTOCOL AVAILABLE - INFORMATION ONLY-A-[place]

## 2021-06-25 NOTE — PROGRESS NOTES
Progress Notes by Nel Zhang MD at 10/25/2017  9:10 AM     Author: Nel Zhang MD Service: -- Author Type: Physician    Filed: 10/25/2017 10:09 AM Encounter Date: 10/25/2017 Status: Signed    : Nel Zhang MD (Physician)           Click to link to [facility] Heart Care     [facility] HEART CARE NOTE    Thank you, Dr. García, for asking the [facility] Heart Care team to see Ms. Vandana Thao to evaluate aortic valve disease.    Assessment/Recommendations   Assessment:    1.  History of bicuspid aortic valve with severe stenosis status post mechanical aortic valve replacement in 2009.  Vandana continues to do well following her valve replacement with no complaints of exertional dyspnea, exertional chest discomfort or decline in exercise capacity.  INRs have remained fairly steady over the past 5 years.  Her last echocardiogram in 2011 looked good.  I recommended a repeat echo study.  2.  Hyperlipidemia, currently treated with statin  3.  Hypothyroidism, on replacement therapy    Plan:  1.  Continue current medications  2.  Schedule outpatient echocardiogram.  If no issues identified, anticipate follow-up visit in 3-5 years       History of Present Illness    Ms. Vandana Thao is a 67 y.o. female with history of bicuspid aortic valve with severe stenosis, status post mechanical aortic valve replacement in 2009 who presents today for routine visit.  I have not seen her for the past 5 years but she states she has been feeling well with no decline in exercise capacity or symptoms of exertional dyspnea or chest discomfort.  She also denies orthopnea, PND, lower extremity edema, lightheadedness or near syncope.    ECG (personally reviewed): No recent ECG    Cardiac Imaging Studies (personally reviewed): No recent echo     Physical Examination Review of Systems   Vitals:    10/25/17 0927   BP: 128/64   Pulse: 68   Resp: 18     Body mass index is 34.37 kg/(m^2).  Wt Readings from Last 3 Encounters:    10/25/17 194 lb (88 kg)     General Appearance:   Awake, Alert, No acute distress.   HEENT:  No scleral icterus; the mucous membranes were pink and moist.   Neck: No cervical bruits or jugular venous distention    Chest: The spine was straight. The chest was symmetric.   Lungs:   Respirations unlabored; the lungs are clear to auscultation. No wheezing   Cardiovascular:    Regular rate and rhythm.  S1 normal, S2 crisp and mechanical.  A brief 1/6 systolic flow murmur heard at the left upper sternal border.  No other murmurs identified.   Abdomen:  No organomegaly, masses, bruits, or tenderness. Bowels sounds are present   Extremities:  No peripheral edema, clubbing or cyanosis.   Skin: No xanthelasma. Warm, Dry.   Musculoskeletal: No tenderness.   Neurologic: Mood and affect are appropriate.    General: Night Sweats  Eyes: WNL  Ears/Nose/Throat: WNL  Lungs: Cough  Heart: WNL  Stomach: WNL  Bladder: WNL  Muscle/Joints: WNL  Skin: WNL  Nervous System: WNL  Mental Health: WNL     Blood: WNL     Medical History  Surgical History Family History Social History   Past Medical History:   Diagnosis Date   ? Hyperlipidemia    ? Hypothyroidism    ? Valvular disease     aortic stenosis secondary to bicuspid valve    Past Surgical History:   Procedure Laterality Date   ? CARDIAC VALVE REPLACEMENT  2009    AVR   ? ID RPLCMT PROST AORTIC VALVE OPEN XCP HOMOGRF/STENT      Description: Aortic Valve Replacement;  Proc Date: 01/13/2009;  Comments: #[address] [facility] [place] Prosthesis    Family History   Problem Relation Age of Onset   ? Acute Myocardial Infarction Brother 43    Social History     Social History   ? Marital status:      Spouse name: N/A   ? Number of children: N/A   ? Years of education: N/A     Occupational History   ? Not on file.     Social History Main Topics   ? Smoking status: Former Smoker   ? Smokeless tobacco: Never Used   ? Alcohol use Not on file   ? Drug use: Not on file   ? Sexual activity: Not on file      Other Topics Concern   ? Not on file     Social History Narrative          Medications  Allergies   Current Outpatient Prescriptions   Medication Sig Dispense Refill   ? amoxicillin (AMOXIL) 500 MG capsule Take 500 mg by mouth. Pre dental     ? cholecalciferol, vitamin D3, 1,000 unit tablet Take 1,000 Units by mouth 2 (two) times a day.     ? levothyroxine (SYNTHROID, LEVOTHROID) 100 MCG tablet daily     ? pravastatin (PRAVACHOL) 40 MG tablet nightly     ? warfarin (COUMADIN) 5 MG tablet As directed       No current facility-administered medications for this visit.       No Known Allergies      Lab Results    Chemistry/lipid CBC Cardiac Enzymes/BNP/TSH/INR   Lab Results   Component Value Date    CHOL 247 (H) 09/20/2017    HDL 66 09/20/2017    LDLCALC 155 (H) 09/20/2017    TRIG 130 09/20/2017    CREATININE 0.77 08/10/2017    BUN 14 08/10/2017    K 4.6 08/10/2017     08/10/2017     (H) 08/10/2017    CO2 25 08/10/2017    Lab Results   Component Value Date    WBC 7.2 02/22/2017    HGB 14.9 02/22/2017    HCT 45.7 02/22/2017    MCV 93 02/22/2017     02/22/2017    Lab Results   Component Value Date    TSH 5.00 09/20/2017

## 2021-06-25 NOTE — TELEPHONE ENCOUNTER
ANTICOAGULATION  MANAGEMENT    Assessment     Today's INR result of 2.0 is Therapeutic (goal INR of 1.8-2.2)        Warfarin taken as previously instructed    No new diet changes affecting INR    No new medication/supplements affecting INR    Continues to tolerate warfarin with no reported s/s of bleeding or thromboembolism     Previous INR was Therapeutic    Plan:     Spoke on phone with Vandana regarding INR result and instructed:      Warfarin Dosing Instructions:  Continue current warfarin dose 7.5 mg daily on Zhou/We/Fr; and 5 mg daily rest of week  (0 % change)    Instructed patient to follow up no later than: 4 weeks-- scheduled for 7/7    Education provided: target INR goal and significance of current INR result, importance of notifying clinic for changes in medications, importance of notifying clinic for diarrhea, nausea/vomiting, reduced intake and/or illness and importance of notifying clinic of upcoming surgeries and procedures 2 weeks in advance    Vandana verbalizes understanding and agrees to warfarin dosing plan.    Instructed to call the [facility] for any changes, questions or concerns. (#[phone])   ?   Becca Adames RN    Subjective/Objective:      Vandana Thao, a 70 y.o. female is on warfarin.       Vandana reports:     Home warfarin dose: as updated on anticoagulation calendar per template     Missed doses: No     Medication changes:  No     S/S of bleeding or thromboembolism:  No     New Injury or illness:  No     Changes in diet or alcohol consumption:  No     Upcoming surgery, procedure or cardioversion:  No    Anticoagulation Episode Summary     Current INR goal:     TTR:  --   Next INR check:  7/7/2021   INR from last check:  2.00 (6/4/2021)   Weekly max warfarin dose:     Target end date:     INR check location:     Preferred lab:     Send INR reminders to:  ANDREAS JOHN    Indications    Anticoagulated on warfarin [Z79.01]  Hx of mechanical aortic valve replacement  [Z95.2]           Comments:  Target goal range 1.8-2.2 per OV note 2/27/20         Anticoagulation Care Providers     Provider Role Specialty Phone number    Lakeisha Marmolejo MD Referring Family Medicine [phone]

## 2021-06-27 NOTE — PROGRESS NOTES
Progress Notes by Ryan Rizo MD at 7/2/2019  9:20 AM     Author: Ryan Rizo MD Service: -- Author Type: Physician    Filed: 7/2/2019 10:33 AM Encounter Date: 7/2/2019 Status: Signed    : Ryan Rizo MD (Physician)           Click to link to [facility] Heart [facility] HEART CARE ELECTROPHYSIOLOGY CONSULTATION    Thank you, Dr. Wu, for asking the [facility] Heart Care team to see Ms. Vandana Thao to evaluate possible nonsustained ventricular tachycardia found on pacemaker histograms..      Assessment/Recommendations   Clinic Problem List:  1. Junctional premature beats (H)  Holter Monitor    NM Exercise Stress Test   2. Non-sustained ventricular tachycardia (H)  ECG 12 lead with MUSE    Holter Monitor    NM Exercise Stress Test   3. S/P AVR     4. Cardiac pacemaker in situ     5. Atherosclerotic heart disease of native coronary artery without angina pectoris   NM Exercise Stress Test       Assessment:    Palpitations likely associated with junctional premature beats and junctional tachycardia based on 12-lead ECG today.  Maker histograms had the appearance of nonsustained VT given VA dissociation but junctional premature beats are also VA dissociated.  This is felt to be a non-threatening arrhythmia.  Regression of known coronary artery disease should be excluded  Normally functioning prosthetic aortic valve  Normally functioning pacemaker prior history of paroxysmal AV block which is currently not present  Mild coronary artery disease 2008.    Plan:  Holter monitor is ordered today  Stress nuclear imaging is ordered next week, do not eat breakfast that morning take pills with a sip of water  Consideration will be given to adding metoprolol after these studies  Follow up appointment:   Dr. Rizo in device clinic in 2 months       History of Present Illness     Vandana Thao is a 69 y.o. female with a history of bicuspid aortic valve and aortic stenosis for which she  underwent replacement with mechanical aortic valve in 2009.  Coronary angiography at that time showed a 20% plaque in the LAD but no other significant coronary artery disease.  She did well until June 20, 2018 when she presented with sudden lightheadedness and a syncopal episode.  In the emergency department at [facility] she was found to have periodic third-degree AV block.  A temporary then permanent pacemaker was placed and she is had not had such symptoms since that time.  Cardiac echo at that time showed normal left ventricular function ejection fraction 60% and a normally functioning aortic valve.  Recent transtelephonic pacemaker check showed frequent episodes of what appeared to be nonsustained ventricular tachycardia rates of approximately 170 bpm which typically lasted a few seconds but on one occasion lasted a minute 42.  These episodes began on June 22 with multiple episodes on June 25, 2026 and 27th.  She reports a sensation of palpitations associated with exercise particularly when she climbs up 2 flights of stairs.  There is a thumping sensation in her chest but no lightheadedness presyncope syncope chest pain or pressure or dyspnea.  She denies syncopal episodes.  Exercise tolerance has not changed.  Her thyroid replacement dose was decreased from 100 to 88 mcg 10 weeks ago.    Personnaly reviewed: As noted above pacemaker histograms show frequent episodes of possible nonsustained ventricular tachycardia rates 170bpm generally lasting 15 to 20 seconds or less but with a couple episodes lasting just over a minute.  Rhythm strip today shows a 4 beat run with what appears to be interpolated junctional premature beats of VA dissociation.  These beats are narrow complex.  1 beat on the twelve-lead electrocardiogram had right bundle branch block aberrancy.  She has 8% pacing in the atrium less than 1% pacing in the ventricle with excellent thresholds in each chamber.  There were no atrial arrhythmias and  estimated battery longevity is 14 years.  Twelve-lead ECG today shows normal sinus rhythm with what appears to be junctional premature beat both with and without right bundle branch block aberrancy.     Physical Examination Review of Systems   Vitals:    07/02/19 0849   BP: 116/64   Pulse: 64   Resp: 16     Body mass index is 32.06 kg/m .  Wt Readings from Last 3 Encounters:   07/02/19 181 lb (82.1 kg)   09/25/18 188 lb (85.3 kg)   06/29/18 188 lb (85.3 kg)        Appearance:   no distress, the appearing   HEENT: no scleral icterus, normal conjunctivae    Neck: no carotid bruits or thyromegaly   Chest/Lungs:   lungs are clear to auscultation, no rales or wheezing,   pacemaker left subclavian pocket   Cardiovascular:   Jugular venous pressure 4 cm, Apical pulse is regular.  Crisp prosthetic S1,S2 with grade 1/6 systolic ejection murmur,   Abdomen:  no  Hepatosplenomegaly., nontender,  bowel sounds are present   Extremities: no cyanosis or clubbing, No edema   Skin: no xanthelasma, warm.    Neurologic: No gross focal neurologic deficits   Mood/Affect: Alert, cooperative    General: Night Sweats  Eyes: WNL  Ears/Nose/Throat: WNL  Lungs: Cough  Heart: WNL  Stomach: WNL  Bladder: WNL  Muscle/Joints: WNL  Skin: WNL  Nervous System: WNL  Mental Health: WNL     Blood: Easy Bruising     Medical History  Surgical History Family History Social History   Past Medical History:   Diagnosis Date   ? Hyperlipidemia    ? Hypothyroidism    ? Valvular disease     aortic stenosis secondary to bicuspid valve    Past Surgical History:   Procedure Laterality Date   ? CARDIAC VALVE REPLACEMENT  2009    AVR   ? EP PACEMAKER INSERT N/A 6/21/2018    Procedure: EP Pacemaker Insertion;  Surgeon: Luis Urena MD;  Location: [facility] Cath Lab;  Service:    ? [place] RPLCMT PROST AORTIC VALVE OPEN XCP HOMOGRF/STENT      Description: Aortic Valve Replacement;  Proc Date: 01/13/2009;  Comments: #[address] [facility] [facility] [place] Prosthesis    Family History    Problem Relation Age of Onset   ? Acute Myocardial Infarction Brother 43       Social History     Socioeconomic History   ? Marital status:      Spouse name: Not on file   ? Number of children: Not on file   ? Years of education: Not on file   ? Highest education level: Not on file   Occupational History   ? Not on file   Social Needs   ? Financial resource strain: Not on file   ? Food insecurity:     Worry: Not on file     Inability: Not on file   ? Transportation needs:     Medical: Not on file     Non-medical: Not on file   Tobacco Use   ? Smoking status: Former Smoker   ? Smokeless tobacco: Never Used   Substance and Sexual Activity   ? Alcohol use: Yes     Comment: occasionally   ? Drug use: No   ? Sexual activity: Not on file   Lifestyle   ? Physical activity:     Days per week: Not on file     Minutes per session: Not on file   ? Stress: Not on file   Relationships   ? Social connections:     Talks on phone: Not on file     Gets together: Not on file     Attends Tenri[employer] service: Not on file     Active member of club or organization: Not on file     Attends meetings of clubs or organizations: Not on file     Relationship status: Not on file   ? Intimate partner violence:     Fear of current or ex partner: Not on file     Emotionally abused: Not on file     Physically abused: Not on file     Forced sexual activity: Not on file   Other Topics Concern   ? Not on file   Social History Narrative   ? Not on file          Medications  Allergies   Current Outpatient Medications   Medication Sig Dispense Refill   ? cholecalciferol, vitamin D3, 1,000 unit tablet Take 1,000 Units by mouth daily.            ? levothyroxine (SYNTHROID, LEVOTHROID) 100 MCG tablet Take 88 mcg by mouth Daily at 6:00 am.            ? pravastatin (PRAVACHOL) 40 MG tablet Take 40 mg by mouth daily. nightly     ? warfarin (COUMADIN) 5 MG tablet Take 5-7.5 mg by mouth See Admin Instructions. 7.5MG every day but Thursday and Sunday she  takes 5MG     ? amoxicillin (AMOXIL) 500 MG capsule Take 2,000 mg by mouth. Pre dental      ? melatonin 3 mg Tab tablet Take 6 mg by mouth at bedtime as needed.       No current facility-administered medications for this visit.       No Known Allergies

## 2021-06-27 NOTE — PROGRESS NOTES
Progress Notes by Ryan Rizo MD at 7/17/2019  8:30 AM     Author: Ryan Rizo MD Service: -- Author Type: Physician    Filed: 7/17/2019  9:29 AM Encounter Date: 7/17/2019 Status: Signed    : Ryan Rizo MD (Physician)           Click to link to [facility] Heart [facility] HEART [facility] ELECTROPHYSIOLOGY NOTE    Today I had the opportunity to see  Vandana Thao for follow-up evaluation of nonsustained ventricular tachycardia detected on pacemaker histograms.      Assessment/Recommendations   Clinic Problem List:  1. Non-sustained ventricular tachycardia (H)  Holter Monitor   2. S/P AVR     3. Coronary artery disease involving native coronary artery without angina pectoris         Assessment:    Nonsustained ventricular tachycardia/frequent of ventricular premature beats to represent fascicular tachycardia with narrow QRS complexes and VA dissociation.   These are nonthreatening are associated with symptoms of palpitations.  Excellent initial symptomatic response to verapamil.  Status post aortic valve replacement, normal functioning dual-chamber pacemaker  Coronary artery disease 2009, normal stress nuclear imaging without evidence for ischemia    Plan:  Continue verapamil 240 mg each morning  24-hour Holter ordered in 1 week  Resume activity without restriction  Call if frequent palpitations irregular heart beating lightheadedness or fainting, transtelephonic pacemaker check could be considered at the time of symptoms  Follow up appointment:   Dr. Rizo in device clinic in 1 year       History of Present Illness     Vandana Thao is a 69 y.o. female with a history of bicuspid aortic valve and aortic stenosis for which she underwent replacement with mechanical aortic valve in 2009.  Coronary angiography at that time showed a 20% plaque in the LAD but no other significant coronary artery disease.  She did well until June 20, 2018 when she presented with sudden lightheadedness and  a syncopal episode.  In the emergency department at [facility] she was found to have periodic third-degree AV block.  A temporary then permanent pacemaker was placed and she is had not had such symptoms since that time.  Cardiac echo at that time showed normal left ventricular function ejection fraction 60% and a normally functioning aortic valve.  Transtelephonic pacemaker check 7/1/2019 showed frequent episodes of what appeared to be nonsustained ventricular tachycardia rates of approximately 170 bpm which typically lasted a few seconds but on one occasion lasted a minute 42.  These episodes began on June 22 with multiple episodes on June 25, 2026 and 27th.  She reports a sensation of palpitations associated with exercise particularly when she climbs up 2 flights of stairs.  There is a thumping sensation in her chest but no lightheadedness presyncope syncope chest pain or pressure or dyspnea.  Her thyroid replacement dose was decreased from 100 to 88 mcg 10 weeks ago.      Evaluation clinic 7/2/2019 showed runs of narrow complex tachycardia 3-4 beats duration more consistent with junctional tachycardia.  She was seldom paced with intact AV conduction.  Pacemaker histograms confirmed VA dissociation during tachycardia.  Exercise stress nuclear imaging showed normal left ventricular function and no evidence for ischemia on 7/11/2019.  ECG recordings showed narrow complex premature beats and couplets for slightly different QRS complex then conducted beats.  Holter monitor 7/2/2019 very frequent narrow complex beats with VA dissociation and slightly different QRS complex consistent with fascicular form of nonsustained ventricular tachycardia.  Bigeminy was frequent and short nonsustained ventricular tachycardia rates 130 to 150 bpm were noted.  Luna was 27% or 32,000 beats over 24 hours.  Palpitations were associated with frequent fascicular beats and short runs.  She was started on verapamil 240 mg daily yesterday  and has noted little or no palpitations in the past 24 hours.  She denies lightheadedness syncope.  She has curtailed her activity due to concern over palpitations  Personally reviewed.  Accu-Chek today again confirms approximately 22% atrial pacing but less than 1% ventricular pacing with good thresholds in each chamber.  Occasional narrow complex beats were observed today.  Estimated battery longevity is 13.9 years       Physical Examination Review of Systems   Vitals:    07/17/19 0749   BP: 102/68   Pulse: 60   Resp: 16     Body mass index is 32.24 kg/m .  Wt Readings from Last 3 Encounters:   07/17/19 182 lb (82.6 kg)   07/02/19 181 lb (82.1 kg)   09/25/18 188 lb (85.3 kg)        Appearance:   no distress,    HEENT:   no scleral icterus, normal conjunctivae    Neck: no carotid bruits or thyromegaly   Chest/Lungs:   lungs are clear to auscultation, no rales or wheezing,    Cardiovascular:   Jugular venous pressure 5 cm, Apical pulse is 66 bpm and extremely regular with no extrasystoles over 30 seconds.  Crisp S1,S2 with 1/6 systolic ejection murmur,   Abdomen:  no  Hepatosplenomegaly., nontender,  bowel sounds are present   Extremities: no cyanosis or clubbing, No edema   Skin: no xanthelasma, warm.    Neurologic: No gross focal neurologic deficits   Mood/Affect: Alert, cooperative    General: Night Sweats  Eyes: WNL  Ears/Nose/Throat: WNL  Lungs: WNL  Heart: WNL  Stomach: WNL  Bladder: WNL  Muscle/Joints: WNL  Skin: WNL  Nervous System: WNL  Mental Health: WNL     Blood: WNL     Medical History  Surgical History Family History Social History   Past Medical History:   Diagnosis Date   ? Hyperlipidemia    ? Hypothyroidism    ? Valvular disease     aortic stenosis secondary to bicuspid valve    Past Surgical History:   Procedure Laterality Date   ? CARDIAC VALVE REPLACEMENT  2009    AVR   ? EP PACEMAKER INSERT N/A 6/21/2018    Procedure: EP Pacemaker Insertion;  Surgeon: Luis Urena MD;  Location:   [facility];  Service:    ? [place] RPLCMT PROST AORTIC VALVE OPEN XCP HOMOGRF/STENT      Description: Aortic Valve Replacement;  Proc Date: 01/13/2009;  Comments: #21 [facility] [facility] [place] Prosthesis    Family History   Problem Relation Age of Onset   ? Acute Myocardial Infarction Brother 43    Social History     Socioeconomic History   ? Marital status:      Spouse name: Not on file   ? Number of children: Not on file   ? Years of education: Not on file   ? Highest education level: Not on file   Occupational History   ? Not on file   Social Needs   ? Financial resource strain: Not on file   ? Food insecurity:     Worry: Not on file     Inability: Not on file   ? Transportation needs:     Medical: Not on file     Non-medical: Not on file   Tobacco Use   ? Smoking status: Former Smoker   ? Smokeless tobacco: Never Used   Substance and Sexual Activity   ? Alcohol use: Yes     Comment: occasionally   ? Drug use: No   ? Sexual activity: Not on file   Lifestyle   ? Physical activity:     Days per week: Not on file     Minutes per session: Not on file   ? Stress: Not on file   Relationships   ? Social connections:     Talks on phone: Not on file     Gets together: Not on file     Attends Latter-day service: Not on file     Active member of club or organization: Not on file     Attends meetings of clubs or organizations: Not on file     Relationship status: Not on file   ? Intimate partner violence:     Fear of current or ex partner: Not on file     Emotionally abused: Not on file     Physically abused: Not on file     Forced sexual activity: Not on file   Other Topics Concern   ? Not on file   Social History Narrative   ? Not on file          Medications  Allergies   Current Outpatient Medications   Medication Sig Dispense Refill   ? amoxicillin (AMOXIL) 500 MG capsule Take 2,000 mg by mouth. Pre dental      ? cholecalciferol, vitamin D3, 1,000 unit tablet Take 1,000 Units by mouth daily.            ? levothyroxine  (SYNTHROID, LEVOTHROID) 100 MCG tablet Take 88 mcg by mouth Daily at 6:00 am.            ? melatonin 3 mg Tab tablet Take 6 mg by mouth at bedtime as needed.     ? pravastatin (PRAVACHOL) 40 MG tablet Take 40 mg by mouth daily. nightly     ? verapamil (VERELAN PM) 240 MG 24 hr capsule Take 1 capsule (240 mg total) by mouth daily. 90 capsule 3   ? warfarin (COUMADIN) 5 MG tablet Take 5-7.5 mg by mouth See Admin Instructions. 7.5MG every day but Thursday and Sunday she takes 5MG       No current facility-administered medications for this visit.       No Known Allergies

## 2021-06-27 NOTE — PROGRESS NOTES
Progress Notes by Mckenna Bailon RN at 7/12/2019  9:30 AM     Author: Mckenna Bailon RN Service: -- Author Type: Registered Nurse    Filed: 7/12/2019  9:45 AM Encounter Date: 7/12/2019 Status: Signed    : Mckenna Bailon RN (Registered Nurse)       Type: pacemaker alert remote transmission for VHR episodes.   Presenting rhythm: AP-VS 85 bpm.  Battery/lead status: stable.  Arrhythmias: since 7/2/19; 3,360 ventricular high rate episodes, available EGMs appear to be VT/NSVT, longest lasting 30sec 7/9/19 rates avg. 155-165bpm. 6/21/2018: EF: 60%. No mode switch episodes detected.  Comments: normal pacemaker function. Routed to device RN for review. YAHIR     Transmission reviewed, known tachycardias/palpitations and reviewed on previous remote by Dr. Rizo. Holter monitor/stress test ordered and recently completed. See reports in Epic.     Per Dr. Rizo these episodes are not V.tach but fascicular tachycardias. See below:   Ryan Rizo MD Gebel, Mandy L, CHARITY              This is a fascicular tachycardia associated with symptoms of palpitations, symptomatic but not life-threatening.  Please do a TTM next Friday to reassess as I will start her on verapamil today.  dd     Above noted. Will contact device specialist to set up remote for 7/19/19 for review.     Mckenna Bailon, CHARITY

## 2021-06-29 NOTE — PROGRESS NOTES
Progress Notes by Nel Zhang MD at 10/26/2020  3:50 PM     Author: Nel Zhang MD Service: -- Author Type: Physician    Filed: 10/26/2020  4:33 PM Encounter Date: 10/26/2020 Status: Signed    : Nel Zhang MD (Physician)           Thank you, Dr. Marmolejo, for asking the [facility] team to see Ms. Vandana Thao to follow-up on heart block/permanent pacemaker and mechanical aortic valve.    Assessment/Recommendations   Assessment:    1.  Intermittent complete heart block, status post dual-chamber pacemaker insertion in 2018.  Recent remote check in July demonstrated normal device function with no evidence of arrhythmia.  2.  Fascicular tachycardia, well suppressed with current dose of verapamil.  She does report issues with constipation related to the medication and wonders whether there is any other medication she could try.  I will discuss with Dr. Rizo who has seen her for this particular problem.  3.  Aortic valve stenosis, status post mechanical aortic valve replacement in 2008.  She remains on warfarin anticoagulation with stable INRs.  4.  Mild coronary artery disease by preoperative angiography in 2008.  Last nuclear stress test in 2018 demonstrated no ischemia    Plan:  1.  Continue current medications for now  2.  We will discuss with Dr. Rizo whether there are alternative treatments for her arrhythmia  3.  Follow-up with me in 1 to 2 years       History of Present Illness    Ms. Vandana Thao is a 70 y.o. female with aortic valve stenosis status post mechanical aortic valve replacement in 2008, complete heart block in 2018 resulting in syncope status post dual-chamber pacemaker insertion and fascicular tachycardia who presents today for a follow-up visit.  It is been 2 years since her last visit with me.  She states she has been doing well.  Her only complaint is that of constipation related to the verapamil and she wonders whether there is an alternative  medication.  Continues to deny any exertional chest discomfort or dyspnea.    ECG (personally reviewed): No ECG today    Cardiac Imaging Studies (personally reviewed): No new imaging     Physical Examination Review of Systems   Vitals:    10/26/20 1558   BP: 136/70   Pulse: 81   Resp: 16   SpO2: 97%     Body mass index is 33.48 kg/m .  Wt Readings from Last 3 Encounters:   10/26/20 189 lb (85.7 kg)   07/30/20 193 lb 9 oz (87.8 kg)   07/20/20 191 lb (86.6 kg)     General Appearance:   Awake, Alert, No acute distress.   HEENT:  No scleral icterus; the mucous membranes were pink and moist.   Neck: No cervical bruits or jugular venous distention    Chest: The spine was straight. The chest was symmetric.   Lungs:   Respirations unlabored; the lungs are clear to auscultation. No wheezing   Cardiovascular:    Regular rate and rhythm.  S1 normal, S2 crisp and mechanical.  No murmur or gallop   Abdomen:  No organomegaly, masses, bruits, or tenderness. Bowels sounds are present   Extremities:  No peripheral edema, clubbing or cyanosis.   Skin: No xanthelasma. Warm, Dry.   Musculoskeletal: No tenderness.   Neurologic: Mood and affect are appropriate.    General: WNL  Eyes: WNL  Ears/Nose/Throat: WNL  Lungs: WNL  Heart: WNL  Stomach: Constipation  Bladder: WNL  Muscle/Joints: WNL  Skin: WNL  Nervous System: WNL  Mental Health: WNL     Blood: WNL     Medical History  Surgical History Family History Social History   Past Medical History:   Diagnosis Date   ? History of aortic stenosis 6/21/2018   ? Hyperlipidemia    ? Hypothyroidism    ? Valvular disease     aortic stenosis secondary to bicuspid valve    Past Surgical History:   Procedure Laterality Date   ? CARDIAC VALVE REPLACEMENT  2009    AVR   ? COLONOSCOPY  02/04/2013   ? CYSTOSTOMY W/ BLADDER BIOPSY  2000   ? EP PACEMAKER INSERT N/A 6/21/2018    Procedure: EP Pacemaker Insertion;  Surgeon: Luis Urena MD;  Location: [facility] Cath Lab;  Service:    ? ID [id number]  PROST AORTIC VALVE OPEN XCP HOMOGRF/STENT      Description: Aortic Valve Replacement;  Proc Date: 2009;  Comments: #[address] [facility] [facility] [place] Prosthesis   ? SPLENECTOMY     ? thyroidectomy  2002    Family History   Problem Relation Age of Onset   ? Acute Myocardial Infarction Brother 43   ? Rheum arthritis Mother          in 40s   ? Heart attack Father    ? Hypertension Father    ? No Medical Problems Maternal Grandmother    ? No Medical Problems Maternal Grandfather    ? No Medical Problems Paternal Grandmother    ? No Medical Problems Paternal Grandfather    ? Aortic stenosis Brother         s/p surgery   ? No Medical Problems Sister    ? Breast cancer Neg Hx    ? Colon cancer Neg Hx    ? Cervical cancer Neg Hx     Social History     Socioeconomic History   ? Marital status:      Spouse name: Not on file   ? Number of children: Not on file   ? Years of education: Not on file   ? Highest education level: Not on file   Occupational History   ? Not on file   Social Needs   ? Financial resource strain: Not on file   ? Food insecurity     Worry: Not on file     Inability: Not on file   ? Transportation needs     Medical: Not on file     Non-medical: Not on file   Tobacco Use   ? Smoking status: Former Smoker     Packs/day: 0.50     Years: 20.00     Pack years: 10.00     Types: Cigarettes     Quit date:      Years since quittin.8   ? Smokeless tobacco: Never Used   Substance and Sexual Activity   ? Alcohol use: Yes     Frequency: 2-4 times a month     Drinks per session: 1 or 2     Binge frequency: Never   ? Drug use: No   ? Sexual activity: Not on file   Lifestyle   ? Physical activity     Days per week: Not on file     Minutes per session: Not on file   ? Stress: Not on file   Relationships   ? Social connections     Talks on phone: Not on file     Gets together: Not on file     Attends Pentecostalism service: Not on file     Active member of club or organization: Not on file     Attends meetings of  clubs or organizations: Not on file     Relationship status: Not on file   ? Intimate partner violence     Fear of current or ex partner: Not on file     Emotionally abused: Not on file     Physically abused: Not on file     Forced sexual activity: Not on file   Other Topics Concern   ? Not on file   Social History Narrative   ? Not on file          Medications  Allergies   Current Outpatient Medications   Medication Sig Dispense Refill   ? cholecalciferol, vitamin D3, 1,000 unit tablet Take 1,000 Units by mouth daily.            ? gabapentin (NEURONTIN) 100 MG capsule Take 100-200 mg by mouth 3 (three) times a day as needed (shingles pain). 30 capsule 1   ? HYDROcodone-acetaminophen 5-325 mg per tablet Take 1 tablet by mouth every 8 (eight) hours as needed for pain. 13 tablet 0   ? levothyroxine (SYNTHROID, LEVOTHROID) 88 MCG tablet Take 1 tablet (88 mcg total) by mouth Daily at 6:00 am. 90 tablet 3   ? melatonin 3 mg Tab tablet Take 6 mg by mouth at bedtime as needed.     ? pravastatin (PRAVACHOL) 40 MG tablet Take 1 tablet (40 mg total) by mouth daily. nightly 90 tablet 3   ? senna (SENOKOT) 8.6 mg tablet Take 2 tablets by mouth daily. 60 tablet 0   ? verapamiL (VERELAN PM) 240 MG 24 hr capsule Take 1 capsule (240 mg total) by mouth daily. 90 capsule 3   ? warfarin ANTICOAGULANT (COUMADIN/JANTOVEN) 5 MG tablet Take 1 to 1.5 tablets (5 to 7.5 mg) by mouth daily. Adjust dose per INR results as instructed. 123 tablet 1     No current facility-administered medications for this visit.       No Known Allergies      Lab Results    Chemistry/lipid CBC Cardiac Enzymes/BNP/TSH/INR   Lab Results   Component Value Date    CHOL 227 (H) 02/27/2020    HDL 69 02/27/2020    LDLCALC 127 02/27/2020    TRIG 157 (H) 02/27/2020    CREATININE 0.91 02/27/2020    BUN 14 02/27/2020    K 4.4 02/27/2020     02/27/2020     02/27/2020    CO2 25 02/27/2020    Lab Results   Component Value Date    WBC 7.5 02/27/2020    HGB 14.0  02/27/2020    HCT 41.3 02/27/2020    MCV 89 02/27/2020     02/27/2020    Lab Results   Component Value Date    TROPONINI <0.01 06/20/2018    BNP 41 06/20/2018    TSH 1.72 02/27/2020    INR 2.10 (H) 10/06/2020

## 2021-07-03 NOTE — ADDENDUM NOTE
Addendum Note by Lakeisha Marmolejo MD at 12/9/2020  3:13 PM     Author: Lakeisha Marmolejo MD Service: -- Author Type: Physician    Filed: 12/10/2020  8:12 AM Encounter Date: 12/9/2020 Status: Signed    : Lakeisha Marmolejo MD (Physician)    Addended by: LAKEISHA MARMOLEJO on: 12/10/2020 08:12 AM        Modules accepted: Orders

## 2021-07-07 ENCOUNTER — COMMUNICATION - HEALTHEAST (OUTPATIENT)
Dept: ANTICOAGULATION | Facility: CLINIC | Age: 71
End: 2021-07-07

## 2021-07-07 ENCOUNTER — AMBULATORY - HEALTHEAST (OUTPATIENT)
Dept: LAB | Facility: CLINIC | Age: 71
End: 2021-07-07

## 2021-07-07 DIAGNOSIS — Z95.2 HX OF MECHANICAL AORTIC VALVE REPLACEMENT: ICD-10-CM

## 2021-07-07 DIAGNOSIS — Z79.01 ANTICOAGULATED ON WARFARIN: ICD-10-CM

## 2021-07-07 DIAGNOSIS — Z95.2 HX OF MECHANICAL AORTIC VALVE REPLACEMENT: Primary | ICD-10-CM

## 2021-07-07 LAB — INR PPP: 2.2 (ref 0.9–1.1)

## 2021-07-07 NOTE — TELEPHONE ENCOUNTER
Telephone Encounter by Beth Kerr RN at 7/7/2021 11:11 AM     Author: Beth Kerr RN Service: -- Author Type: Registered Nurse    Filed: 7/7/2021 11:18 AM Encounter Date: 7/7/2021 Status: Signed    : Beth Kerr RN (Registered Nurse)       ANTICOAGULATION MANAGEMENT     Vandana Thao 71 y.o., female is on warfarin with Therapeutic INR result (goal range 1.8-2.2)    Recent labs: (last 7 days)     07/07/21  0943   INR 2.20*       ASSESSMENT     Source: Patient/Caregiver Call      Warfarin dosing taken: Warfarin taken as instructed    Diet: No new diet changes affecting INR    Illness, Injury or hospitalization: No    Medication changes: None    Signs or symptoms of bleeding or clotting: No    Previous INR: therapeutic last 2(+) visits    Additional findings: None     PLAN     Recommended plan for no diet, medication or health factor changes affecting INR:     Dosing instructions: Continue your current warfarin dose 7.5 mg daily on Sundays, Wednesdays and Fridays; and 5 mg daily rest of week (0% change)    Follow up no later than: 5 weeks     Telephone call with Vandana who verbalizes understanding and agrees to plan    Lab visit scheduled    Education provided: importance of therapeutic range and target INR goal and significance of current INR result    Plan made per [facility] anticoagulation protocol    Beth Kerr  Anticoagulation Clinic   [phone]    Anticoagulation Episode Summary     Current INR goal:     TTR:  --   Next INR check:  8/11/2021   INR from last check:  2.20 (7/7/2021)   Weekly max warfarin dose:     Target end date:     INR check location:     Preferred lab:     Send INR reminders to:  ANDREAS JOHN    Indications    Anticoagulated on warfarin [Z79.01]  Hx of mechanical aortic valve replacement [Z95.2]           Comments:  Target goal range 1.8-2.2 per OV note 2/27/20         Anticoagulation Care Providers     Provider Role Specialty Phone  number    Lakeisha Marmolejo MD [facility] Medicine [phone]

## 2021-07-14 PROBLEM — I44.2 THIRD DEGREE AV BLOCK (H): Status: RESOLVED | Noted: 2018-06-20 | Resolved: 2019-07-17

## 2021-07-27 ENCOUNTER — DOCUMENTATION ONLY (OUTPATIENT)
Dept: ANTICOAGULATION | Facility: CLINIC | Age: 71
End: 2021-07-27

## 2021-07-27 DIAGNOSIS — Z79.01 ANTICOAGULATED ON WARFARIN: Primary | ICD-10-CM

## 2021-07-27 DIAGNOSIS — Z95.2 HX OF MECHANICAL AORTIC VALVE REPLACEMENT: ICD-10-CM

## 2021-08-11 ENCOUNTER — ANCILLARY PROCEDURE (OUTPATIENT)
Dept: CARDIOLOGY | Facility: CLINIC | Age: 71
End: 2021-08-11
Attending: INTERNAL MEDICINE
Payer: MEDICARE

## 2021-08-11 ENCOUNTER — LAB (OUTPATIENT)
Dept: LAB | Facility: CLINIC | Age: 71
End: 2021-08-11
Payer: MEDICARE

## 2021-08-11 ENCOUNTER — ANTICOAGULATION THERAPY VISIT (OUTPATIENT)
Dept: ANTICOAGULATION | Facility: CLINIC | Age: 71
End: 2021-08-11

## 2021-08-11 VITALS
HEART RATE: 76 BPM | DIASTOLIC BLOOD PRESSURE: 64 MMHG | WEIGHT: 185 LBS | SYSTOLIC BLOOD PRESSURE: 122 MMHG | BODY MASS INDEX: 32.78 KG/M2 | HEIGHT: 63 IN | RESPIRATION RATE: 20 BRPM

## 2021-08-11 DIAGNOSIS — I44.39 HIGH DEGREE ATRIOVENTRICULAR BLOCK: Primary | ICD-10-CM

## 2021-08-11 DIAGNOSIS — Z79.01 ANTICOAGULATED ON WARFARIN: Primary | ICD-10-CM

## 2021-08-11 DIAGNOSIS — Z95.0 CARDIAC PACEMAKER IN SITU: ICD-10-CM

## 2021-08-11 DIAGNOSIS — Z95.2 HX OF MECHANICAL AORTIC VALVE REPLACEMENT: ICD-10-CM

## 2021-08-11 LAB
INR BLD: 2.2 (ref 0.9–1.1)
MDC_IDC_LEAD_IMPLANT_DT: NORMAL
MDC_IDC_LEAD_IMPLANT_DT: NORMAL
MDC_IDC_LEAD_LOCATION: NORMAL
MDC_IDC_LEAD_LOCATION: NORMAL
MDC_IDC_LEAD_LOCATION_DETAIL_1: NORMAL
MDC_IDC_LEAD_LOCATION_DETAIL_1: NORMAL
MDC_IDC_LEAD_MFG: NORMAL
MDC_IDC_LEAD_MFG: NORMAL
MDC_IDC_LEAD_MODEL: NORMAL
MDC_IDC_LEAD_MODEL: NORMAL
MDC_IDC_LEAD_POLARITY_TYPE: NORMAL
MDC_IDC_LEAD_POLARITY_TYPE: NORMAL
MDC_IDC_LEAD_SERIAL: NORMAL
MDC_IDC_LEAD_SERIAL: NORMAL
MDC_IDC_LEAD_SPECIAL_FUNCTION: NORMAL
MDC_IDC_LEAD_SPECIAL_FUNCTION: NORMAL
MDC_IDC_MSMT_BATTERY_DTM: NORMAL
MDC_IDC_MSMT_BATTERY_REMAINING_LONGEVITY: 142 MO
MDC_IDC_MSMT_BATTERY_RRT_TRIGGER: 2.62
MDC_IDC_MSMT_BATTERY_STATUS: NORMAL
MDC_IDC_MSMT_BATTERY_VOLTAGE: 3.02 V
MDC_IDC_MSMT_LEADCHNL_RA_IMPEDANCE_VALUE: 342 OHM
MDC_IDC_MSMT_LEADCHNL_RA_IMPEDANCE_VALUE: 456 OHM
MDC_IDC_MSMT_LEADCHNL_RA_PACING_THRESHOLD_AMPLITUDE: 0.62 V
MDC_IDC_MSMT_LEADCHNL_RA_PACING_THRESHOLD_AMPLITUDE: 0.75 V
MDC_IDC_MSMT_LEADCHNL_RA_PACING_THRESHOLD_PULSEWIDTH: 0.4 MS
MDC_IDC_MSMT_LEADCHNL_RA_PACING_THRESHOLD_PULSEWIDTH: 0.4 MS
MDC_IDC_MSMT_LEADCHNL_RA_SENSING_INTR_AMPL: 3.88 MV
MDC_IDC_MSMT_LEADCHNL_RA_SENSING_INTR_AMPL: 4 MV
MDC_IDC_MSMT_LEADCHNL_RV_IMPEDANCE_VALUE: 361 OHM
MDC_IDC_MSMT_LEADCHNL_RV_IMPEDANCE_VALUE: 456 OHM
MDC_IDC_MSMT_LEADCHNL_RV_PACING_THRESHOLD_AMPLITUDE: 1 V
MDC_IDC_MSMT_LEADCHNL_RV_PACING_THRESHOLD_AMPLITUDE: 1 V
MDC_IDC_MSMT_LEADCHNL_RV_PACING_THRESHOLD_PULSEWIDTH: 0.4 MS
MDC_IDC_MSMT_LEADCHNL_RV_PACING_THRESHOLD_PULSEWIDTH: 0.4 MS
MDC_IDC_MSMT_LEADCHNL_RV_SENSING_INTR_AMPL: 8.75 MV
MDC_IDC_MSMT_LEADCHNL_RV_SENSING_INTR_AMPL: 9.5 MV
MDC_IDC_PG_IMPLANT_DTM: NORMAL
MDC_IDC_PG_MFG: NORMAL
MDC_IDC_PG_MODEL: NORMAL
MDC_IDC_PG_SERIAL: NORMAL
MDC_IDC_PG_TYPE: NORMAL
MDC_IDC_SESS_CLINIC_NAME: NORMAL
MDC_IDC_SESS_DTM: NORMAL
MDC_IDC_SESS_TYPE: NORMAL
MDC_IDC_SET_BRADY_AT_MODE_SWITCH_RATE: 171 {BEATS}/MIN
MDC_IDC_SET_BRADY_HYSTRATE: NORMAL
MDC_IDC_SET_BRADY_LOWRATE: 60 {BEATS}/MIN
MDC_IDC_SET_BRADY_MAX_SENSOR_RATE: 130 {BEATS}/MIN
MDC_IDC_SET_BRADY_MAX_TRACKING_RATE: 130 {BEATS}/MIN
MDC_IDC_SET_BRADY_MODE: NORMAL
MDC_IDC_SET_BRADY_PAV_DELAY_LOW: 270 MS
MDC_IDC_SET_BRADY_SAV_DELAY_LOW: 240 MS
MDC_IDC_SET_LEADCHNL_RA_PACING_AMPLITUDE: 1.5 V
MDC_IDC_SET_LEADCHNL_RA_PACING_ANODE_ELECTRODE_1: NORMAL
MDC_IDC_SET_LEADCHNL_RA_PACING_ANODE_LOCATION_1: NORMAL
MDC_IDC_SET_LEADCHNL_RA_PACING_CAPTURE_MODE: NORMAL
MDC_IDC_SET_LEADCHNL_RA_PACING_CATHODE_ELECTRODE_1: NORMAL
MDC_IDC_SET_LEADCHNL_RA_PACING_CATHODE_LOCATION_1: NORMAL
MDC_IDC_SET_LEADCHNL_RA_PACING_POLARITY: NORMAL
MDC_IDC_SET_LEADCHNL_RA_PACING_PULSEWIDTH: 0.4 MS
MDC_IDC_SET_LEADCHNL_RA_SENSING_ANODE_ELECTRODE_1: NORMAL
MDC_IDC_SET_LEADCHNL_RA_SENSING_ANODE_LOCATION_1: NORMAL
MDC_IDC_SET_LEADCHNL_RA_SENSING_CATHODE_ELECTRODE_1: NORMAL
MDC_IDC_SET_LEADCHNL_RA_SENSING_CATHODE_LOCATION_1: NORMAL
MDC_IDC_SET_LEADCHNL_RA_SENSING_POLARITY: NORMAL
MDC_IDC_SET_LEADCHNL_RA_SENSING_SENSITIVITY: 0.3 MV
MDC_IDC_SET_LEADCHNL_RV_PACING_AMPLITUDE: 2.25 V
MDC_IDC_SET_LEADCHNL_RV_PACING_ANODE_ELECTRODE_1: NORMAL
MDC_IDC_SET_LEADCHNL_RV_PACING_ANODE_LOCATION_1: NORMAL
MDC_IDC_SET_LEADCHNL_RV_PACING_CAPTURE_MODE: NORMAL
MDC_IDC_SET_LEADCHNL_RV_PACING_CATHODE_ELECTRODE_1: NORMAL
MDC_IDC_SET_LEADCHNL_RV_PACING_CATHODE_LOCATION_1: NORMAL
MDC_IDC_SET_LEADCHNL_RV_PACING_POLARITY: NORMAL
MDC_IDC_SET_LEADCHNL_RV_PACING_PULSEWIDTH: 0.4 MS
MDC_IDC_SET_LEADCHNL_RV_SENSING_ANODE_ELECTRODE_1: NORMAL
MDC_IDC_SET_LEADCHNL_RV_SENSING_ANODE_LOCATION_1: NORMAL
MDC_IDC_SET_LEADCHNL_RV_SENSING_CATHODE_ELECTRODE_1: NORMAL
MDC_IDC_SET_LEADCHNL_RV_SENSING_CATHODE_LOCATION_1: NORMAL
MDC_IDC_SET_LEADCHNL_RV_SENSING_POLARITY: NORMAL
MDC_IDC_SET_LEADCHNL_RV_SENSING_SENSITIVITY: 0.9 MV
MDC_IDC_SET_ZONE_DETECTION_INTERVAL: 350 MS
MDC_IDC_SET_ZONE_DETECTION_INTERVAL: 400 MS
MDC_IDC_SET_ZONE_TYPE: NORMAL
MDC_IDC_STAT_AT_BURDEN_PERCENT: 0 %
MDC_IDC_STAT_AT_DTM_END: NORMAL
MDC_IDC_STAT_AT_DTM_START: NORMAL
MDC_IDC_STAT_BRADY_AP_VP_PERCENT: 0.27 %
MDC_IDC_STAT_BRADY_AP_VS_PERCENT: 17.06 %
MDC_IDC_STAT_BRADY_AS_VP_PERCENT: 0.03 %
MDC_IDC_STAT_BRADY_AS_VS_PERCENT: 82.65 %
MDC_IDC_STAT_BRADY_DTM_END: NORMAL
MDC_IDC_STAT_BRADY_DTM_START: NORMAL
MDC_IDC_STAT_BRADY_RA_PERCENT_PACED: 17.32 %
MDC_IDC_STAT_BRADY_RV_PERCENT_PACED: 0.29 %
MDC_IDC_STAT_EPISODE_RECENT_COUNT: 0
MDC_IDC_STAT_EPISODE_RECENT_COUNT: 0
MDC_IDC_STAT_EPISODE_RECENT_COUNT: 72
MDC_IDC_STAT_EPISODE_RECENT_COUNT_DTM_END: NORMAL
MDC_IDC_STAT_EPISODE_RECENT_COUNT_DTM_START: NORMAL
MDC_IDC_STAT_EPISODE_TOTAL_COUNT: 0
MDC_IDC_STAT_EPISODE_TOTAL_COUNT: 554
MDC_IDC_STAT_EPISODE_TOTAL_COUNT: NORMAL
MDC_IDC_STAT_EPISODE_TOTAL_COUNT_DTM_END: NORMAL
MDC_IDC_STAT_EPISODE_TOTAL_COUNT_DTM_START: NORMAL
MDC_IDC_STAT_EPISODE_TYPE: NORMAL

## 2021-08-11 PROCEDURE — 36416 COLLJ CAPILLARY BLOOD SPEC: CPT

## 2021-08-11 PROCEDURE — 85610 PROTHROMBIN TIME: CPT

## 2021-08-11 PROCEDURE — 93280 PM DEVICE PROGR EVAL DUAL: CPT | Performed by: INTERNAL MEDICINE

## 2021-08-11 RX ORDER — LEVOTHYROXINE SODIUM 88 UG/1
88 TABLET ORAL DAILY
COMMUNITY
Start: 2020-12-09 | End: 2021-12-21

## 2021-08-11 ASSESSMENT — MIFFLIN-ST. JEOR: SCORE: 1333.28

## 2021-08-11 NOTE — PROGRESS NOTES
ANTICOAGULATION MANAGEMENT     Vandana Thao 71 year old female is on warfarin with therapeutic INR result. (Goal INR 1.8-2.2)    Recent labs: (last 7 days)     08/11/21  1251   INR 2.2*       ASSESSMENT     Source(s): Patient/Caregiver Call and Template       Warfarin doses taken: Warfarin taken as instructed    Diet: No new diet changes identified    New illness, injury, or hospitalization: No    Medication/supplement changes: None noted    Signs or symptoms of bleeding or clotting: No    Previous INR: Therapeutic last 2(+) visits    Additional findings: None     PLAN     Recommended plan for no diet, medication or health factor changes affecting INR     Dosing Instructions: Continue your current warfarin dose with next INR in 6 weeks       Summary  As of 8/11/2021    Full warfarin instructions:  7.5 mg every Sun, Wed, Fri; 5 mg all other days   Next INR check:  9/22/2021             Telephone call with Vandana who verbalizes understanding and agrees to plan    Lab visit scheduled    Education provided: Target INR goal and significance of current INR result and Contact [phone] with any changes, questions or concerns.     Plan made per [facility] anticoagulation protocol    Becca Adames RN  Anticoagulation Clinic  8/11/2021    _______________________________________________________________________     Anticoagulation Episode Summary     Current INR goal:     TTR:  --   Target end date:     Send INR reminders to:  ANDREAS JOHN    Indications    Anticoagulated on warfarin [Z79.01]  Hx of mechanical aortic valve replacement [Z95.2]           Comments:  Target goal range 1.8-2.2 per OV note 2/27/20         Anticoagulation Care Providers     Provider Role Specialty Phone number    Lakeisha Marmolejo MD Referring Family Medicine [phone]

## 2021-08-25 NOTE — PROGRESS NOTES
Assessment/Plan:    Mass of finger of right hand  Unclear etiology of R middle finger lesion/lump - recommend referral to hand ortho for evaluation and management, anticipate need for removal. Pt in agreement with plan.  - Orthopedic  Referral       Follow up: as needed    Lakeisha Marmolejo MD  [facility]    Subjective:   Vandana Thao is a 71 year old female is here today for finger concern    Finger concern  -R middle finger  -noticed lump last week - medial, between joints, no overlying redness or rash, no breaks in the skin, no warmth  -firm, somewhat tender  -pointer finger had trigger finger - surgery to fix      Patient Active Problem List   Diagnosis     Hypercholesterolemia     H/O bicuspid aortic valve     High degree atrioventricular block     Acquired hypothyroidism     F[place] syncope     Cardiac pacemaker in situ     Non-sustained ventricular tachycardia (H)     Junctional premature beats (H)     Coronary artery disease involving native coronary artery without angina pectoris     Microscopic hematuria     S/P splenectomy     Other insomnia     Anticoagulated on warfarin     Hx of mechanical aortic valve replacement     Past Medical History:   Diagnosis Date     History of aortic stenosis 6/21/2018     Hyperlipidemia      Hypothyroidism      Valvular disease     aortic stenosis secondary to bicuspid valve     Past Surgical History:   Procedure Laterality Date     C REPLACE AORT VALV,PROSTH VALV      Description: Aortic Valve Replacement;  Proc Date: 01/13/2009;  Comments: #[address] [facility] [facility] [place] Prosthesis     CARDIAC VALVE REPLACEMENT  2009    AVR     COLONOSCOPY  02/04/2013     CYSTOSTOMY W/ BLADDER BIOPSY  2000     EP PACEMAKER INSERT N/A 6/21/2018    Procedure: EP Pacemaker Insertion;  Surgeon: Luis Urena MD;  Location: [facility] Cath Lab;  Service:      OTHER SURGICAL HISTORY  2002    thyroidectomy     SPLENECTOMY  1979     Current Outpatient Medications    Medication     cholecalciferol, vitamin D3, 1,000 unit tablet     levothyroxine (SYNTHROID, LEVOTHROID) 88 MCG tablet     levothyroxine (SYNTHROID/LEVOTHROID) 88 MCG tablet     pravastatin (PRAVACHOL) 40 MG tablet     SENNA 8.6 mg tablet     verapamiL (VERELAN PM) 240 MG 24 hr capsule     warfarin ANTICOAGULANT (COUMADIN/JANTOVEN) 5 MG tablet     No current facility-administered medications for this visit.     No Known Allergies  Social History     Socioeconomic History     Marital status:      Spouse name: Not on file     Number of children: Not on file     Years of education: Not on file     Highest education level: Not on file   Occupational History     Not on file   Tobacco Use     Smoking status: Former Smoker     Packs/day: 0.50     Years: 20.00     Pack years: 10.00     Types: Cigarettes     Quit date: 1979     Years since quittin.6     Smokeless tobacco: Never Used   Substance and Sexual Activity     Alcohol use: Yes     Drug use: No     Sexual activity: Not on file   Other Topics Concern     Not on file   Social History Narrative     Not on file     Social Determinants of Health     Financial Resource Strain:      Difficulty of Paying Living Expenses:    Food Insecurity:      Worried About Running Out of Food in the Last Year:      Ran Out of Food in the Last Year:    Transportation Needs:      Lack of Transportation (Medical):      Lack of Transportation (Non-Medical):    Physical Activity:      Days of Exercise per Week:      Minutes of Exercise per Session:    Stress:      Feeling of Stress :    Social Connections:      Frequency of Communication with Friends and Family:      Frequency of Social Gatherings with Friends and Family:      Attends Bahai Services:      Active Member of Clubs or Organizations:      Attends Club or Organization Meetings:      Marital Status:    Intimate Partner Violence:      Fear of Current or Ex-Partner:      Emotionally Abused:      Physically Abused:       Sexually Abused:      Family History   Problem Relation Age of Onset     Acute Myocardial Infarction Brother 43.00     Rheumatoid Arthritis Mother          in 40s     Coronary Artery Disease Father      Hypertension Father      No Known Problems Maternal Grandmother      No Known Problems Maternal Grandfather      No Known Problems Paternal Grandmother      No Known Problems Paternal Grandfather      Aortic stenosis Brother         s/p surgery     No Known Problems Sister      Breast Cancer No family hx of      Colon Cancer No family hx of      Cervical Cancer No family hx of      Review of systems is as stated in HPI, and the remainder of system review is otherwise negative.    Objective:     /70   Pulse 79   Wt 83.2 kg (183 lb 5 oz)   BMI 32.47 kg/m      General appearance: awake, NAD  HEENT: atraumatic, normocephalic, no scleral icterus or injection  Lungs: breathing comfortably on room air  Extremities: R middle finger with firm lesion/lump medially  Skin: no rashes or lesions  Neuro: alert, CNs grossly intact, no focal deficits appreciated  Psych: normal mood/affect/behavior, answering questions appropriately, linear thought process

## 2021-08-27 ENCOUNTER — OFFICE VISIT (OUTPATIENT)
Dept: FAMILY MEDICINE | Facility: CLINIC | Age: 71
End: 2021-08-27
Payer: MEDICARE

## 2021-08-27 VITALS
WEIGHT: 183.31 LBS | HEART RATE: 79 BPM | BODY MASS INDEX: 32.47 KG/M2 | SYSTOLIC BLOOD PRESSURE: 130 MMHG | DIASTOLIC BLOOD PRESSURE: 70 MMHG

## 2021-08-27 DIAGNOSIS — R22.31 MASS OF FINGER OF RIGHT HAND: Primary | ICD-10-CM

## 2021-08-27 PROCEDURE — 99213 OFFICE O/P EST LOW 20 MIN: CPT | Performed by: FAMILY MEDICINE

## 2021-08-31 ENCOUNTER — TRANSFERRED RECORDS (OUTPATIENT)
Dept: HEALTH INFORMATION MANAGEMENT | Facility: CLINIC | Age: 71
End: 2021-08-31

## 2021-09-07 DIAGNOSIS — Z79.01 ANTICOAGULATED ON WARFARIN: ICD-10-CM

## 2021-09-07 RX ORDER — WARFARIN SODIUM 5 MG/1
TABLET ORAL
Qty: 123 TABLET | Refills: 1 | Status: SHIPPED | OUTPATIENT
Start: 2021-09-07 | End: 2021-10-29

## 2021-09-10 ENCOUNTER — OFFICE VISIT (OUTPATIENT)
Dept: FAMILY MEDICINE | Facility: CLINIC | Age: 71
End: 2021-09-10
Payer: MEDICARE

## 2021-09-10 ENCOUNTER — TELEPHONE (OUTPATIENT)
Dept: FAMILY MEDICINE | Facility: CLINIC | Age: 71
End: 2021-09-10

## 2021-09-10 ENCOUNTER — ANTICOAGULATION THERAPY VISIT (OUTPATIENT)
Dept: ANTICOAGULATION | Facility: CLINIC | Age: 71
End: 2021-09-10

## 2021-09-10 VITALS
HEIGHT: 63 IN | HEART RATE: 86 BPM | WEIGHT: 183.25 LBS | SYSTOLIC BLOOD PRESSURE: 120 MMHG | BODY MASS INDEX: 32.47 KG/M2 | DIASTOLIC BLOOD PRESSURE: 80 MMHG

## 2021-09-10 DIAGNOSIS — Z95.2 HX OF MECHANICAL AORTIC VALVE REPLACEMENT: ICD-10-CM

## 2021-09-10 DIAGNOSIS — Z95.0 CARDIAC PACEMAKER IN SITU: ICD-10-CM

## 2021-09-10 DIAGNOSIS — E78.00 HYPERCHOLESTEROLEMIA: ICD-10-CM

## 2021-09-10 DIAGNOSIS — I44.39 HIGH DEGREE ATRIOVENTRICULAR BLOCK: ICD-10-CM

## 2021-09-10 DIAGNOSIS — E03.9 ACQUIRED HYPOTHYROIDISM: ICD-10-CM

## 2021-09-10 DIAGNOSIS — R22.31 MASS OF FINGER OF RIGHT HAND: ICD-10-CM

## 2021-09-10 DIAGNOSIS — Z79.01 ANTICOAGULATED ON WARFARIN: Primary | ICD-10-CM

## 2021-09-10 DIAGNOSIS — Z01.818 PREOP EXAMINATION: Primary | ICD-10-CM

## 2021-09-10 DIAGNOSIS — Z79.01 ANTICOAGULATED ON WARFARIN: ICD-10-CM

## 2021-09-10 DIAGNOSIS — I25.10 CORONARY ARTERY DISEASE INVOLVING NATIVE CORONARY ARTERY OF NATIVE HEART WITHOUT ANGINA PECTORIS: ICD-10-CM

## 2021-09-10 DIAGNOSIS — Z90.81 S/P SPLENECTOMY: ICD-10-CM

## 2021-09-10 LAB
ANION GAP SERPL CALCULATED.3IONS-SCNC: 12 MMOL/L (ref 5–18)
BUN SERPL-MCNC: 16 MG/DL (ref 8–22)
CALCIUM SERPL-MCNC: 10.4 MG/DL (ref 8.5–10.5)
CHLORIDE BLD-SCNC: 105 MMOL/L (ref 98–107)
CO2 SERPL-SCNC: 24 MMOL/L (ref 22–31)
CREAT SERPL-MCNC: 0.85 MG/DL (ref 0.6–1.1)
ERYTHROCYTE [DISTWIDTH] IN BLOOD BY AUTOMATED COUNT: 13.8 % (ref 10–15)
GFR SERPL CREATININE-BSD FRML MDRD: 70 ML/MIN/1.73M2
GLUCOSE BLD-MCNC: 94 MG/DL (ref 70–125)
HCT VFR BLD AUTO: 42.7 % (ref 35–47)
HGB BLD-MCNC: 14.4 G/DL (ref 11.7–15.7)
INR BLD: 2.1 (ref 0.9–1.1)
MCH RBC QN AUTO: 30.3 PG (ref 26.5–33)
MCHC RBC AUTO-ENTMCNC: 33.7 G/DL (ref 31.5–36.5)
MCV RBC AUTO: 90 FL (ref 78–100)
PLATELET # BLD AUTO: 268 10E3/UL (ref 150–450)
POTASSIUM BLD-SCNC: 4.4 MMOL/L (ref 3.5–5)
RBC # BLD AUTO: 4.75 10E6/UL (ref 3.8–5.2)
SODIUM SERPL-SCNC: 141 MMOL/L (ref 136–145)
TSH SERPL DL<=0.005 MIU/L-ACNC: 2.29 UIU/ML (ref 0.3–5)
WBC # BLD AUTO: 9.9 10E3/UL (ref 4–11)

## 2021-09-10 PROCEDURE — 85610 PROTHROMBIN TIME: CPT | Performed by: FAMILY MEDICINE

## 2021-09-10 PROCEDURE — 80048 BASIC METABOLIC PNL TOTAL CA: CPT | Performed by: FAMILY MEDICINE

## 2021-09-10 PROCEDURE — 85027 COMPLETE CBC AUTOMATED: CPT | Performed by: FAMILY MEDICINE

## 2021-09-10 PROCEDURE — 85730 THROMBOPLASTIN TIME PARTIAL: CPT | Performed by: FAMILY MEDICINE

## 2021-09-10 PROCEDURE — 99214 OFFICE O/P EST MOD 30 MIN: CPT | Performed by: FAMILY MEDICINE

## 2021-09-10 PROCEDURE — 84443 ASSAY THYROID STIM HORMONE: CPT | Performed by: FAMILY MEDICINE

## 2021-09-10 PROCEDURE — 36415 COLL VENOUS BLD VENIPUNCTURE: CPT | Performed by: FAMILY MEDICINE

## 2021-09-10 ASSESSMENT — MIFFLIN-ST. JEOR: SCORE: 1325.35

## 2021-09-10 NOTE — PROGRESS NOTES
[facility]  [address]  [facility] [address]  Phone: [phone]  Fax: [phone]  Primary Provider: Lakeisha Marmolejo    PREOPERATIVE EVALUATION:  Today's date: 9/10/2021    Vandana Thao is a 71 year old female who presents for a preoperative evaluation.    Surgical Information:  Surgery/Procedure: excisional biopsy mass R 3rd webspace  Surgery Location: [facility]  Surgeon: Dr Fenton  Surgery Date: 09/22/2021  Time of Surgery: not yet known  Where patient plans to recover: At home with family  Fax number for surgical facility: [phone]    Type of Anesthesia Anticipated: to be determined    Assessment & Plan     The proposed surgical procedure is considered INTERMEDIATE risk.    Preop examination  Mass of finger of right hand  Preop exam completed today for removal of R finger mass, possibly cyst per pt's report. Will check labs as below. Medication plan as below. COVID test ordered to be completed 2-3 days before surgery.   - Basic metabolic panel  (Ca, Cl, CO2, Creat, Gluc, K, Na, BUN)  - CBC with platelets  - Partial thromboplastin time  - Asymptomatic COVID-19 Virus (Coronavirus) by PCR    Hx of mechanical aortic valve replacement  Anticoagulated on warfarin  Hx mechanical aortic valve, on warfarin for anticoagulation, asymptomatic, has been therapeutic on warfarin - anticoagulation team to assist with warfarin/anticoagulation planning preoperatively - plan for pt to hold warfarin 4 days before surgery, no bridging.  - Partial thromboplastin time  - INR point of care    S/P splenectomy  Hx splenectomy - CBC normal today.     Cardiac pacemaker in situ  High degree atrioventricular block  Coronary artery disease involving native coronary artery of native heart without angina pectoris  Pacemaker in place, no concerns, normal/stable EKG within 6 months of surgery.    Acquired hypothyroidism  On levothyroxine, will recheck levels today.   - TSH with  free T4 reflex    Hypercholesterolemia  Hx HLD, on pravastatin, levels mildly elevated last checked in Dec 2020.        Implanted Device:   - Type of device: pacemaker, Patient advised to bring device information on day of surgery.      Risks and Recommendations:  The patient has the following additional risks and recommendations for perioperative complications:   - Consult Hospitalist / IM to assist with post-op medical management if requires hospital stay    Medication Instructions:  hold: senna, vitamin D  continue: synthroid, take pravastatin night before, verapamil  hold warfarin 4 days before surgery, no bridging    RECOMMENDATION:  APPROVAL GIVEN to proceed with proposed procedure, without further diagnostic evaluation.      Subjective     HPI related to upcoming procedure: XR showed not connected to bone, thinks is ganglion cyst    Preop Questions 9/10/2021   1. Have you ever had a heart attack or stroke? No   2. Have you ever had surgery on your heart or blood vessels, such as a stent placement, a coronary artery bypass, or surgery on an artery in your head, neck, heart, or legs? No   3. Do you have chest pain with activity? No   4. Do you have a history of  heart failure? YES - before pacemaker was placed   5. Do you currently have a cold, bronchitis or symptoms of other infection? No   6. Do you have a cough, shortness of breath, or wheezing? No   7. Do you or anyone in your family have previous history of blood clots? No   8. Do you or does anyone in your family have a serious bleeding problem such as prolonged bleeding following surgeries or cuts? No   9. Have you ever had problems with anemia or been told to take iron pills? No   10. Have you had any abnormal blood loss such as black, tarry or bloody stools, or abnormal vaginal bleeding? No   11. Have you ever had a blood transfusion? No   12. Are you willing to have a blood transfusion if it is medically needed before, during, or after your surgery?  Yes   13. Have you or any of your relatives ever had problems with anesthesia? No   14. Do you have sleep apnea, excessive snoring or daytime drowsiness? No   15. Do you have any artifical heart valves or other implanted medical devices like a pacemaker, defibrillator, or continuous glucose monitor? YES - [facility] aortic valve   15a. What type of device do you have? Pacemaker and artificial valve   15b. Name of the clinic that manages your device:  [facility]   16. Do you have artificial joints? No   17. Are you allergic to latex? No       Health Care Directive:  Patient does not have a Health Care Directive or Living Will: full code    Preoperative Review of :   reviewed - no record of controlled substances prescribed.      Review of Systems  Constitutional, neuro, ENT, endocrine, pulmonary, cardiac, gastrointestinal, genitourinary, musculoskeletal, integument and psychiatric systems are negative, except as otherwise noted.    Patient Active Problem List    Diagnosis Date Noted     Microscopic hematuria 02/27/2020     Priority: Medium     Bladder bx in 2000 - nml, told will be chronic         S/P splenectomy 02/27/2020     Priority: Medium     1979 removal for tumor         Other insomnia 02/27/2020     Priority: Medium     Anticoagulated on warfarin 02/27/2020     Priority: Medium     Hx of mechanical aortic valve replacement 02/27/2020     Priority: Medium     Coronary artery disease involving native coronary artery without angina pectoris 07/17/2019     Priority: Medium     Minimal coronary artery disease at aortic valve replacement 2009  Normal stress nuclear imaging by 2019         Non-sustained ventricular tachycardia (H) 07/02/2019     Priority: Medium     Pacemaker histograms 170 bpm 7/2/2019         Junctional premature beats (H) 07/02/2019     Priority: Medium     Cardiac pacemaker in situ 06/29/2018     Priority: Medium     [employer] [id number] [address] MRI  DOI: 6/21/2018         H/O bicuspid  aortic valve 06/21/2018     Priority: Medium     High degree atrioventricular block 06/21/2018     Priority: Medium     Hypercholesterolemia      Priority: Medium     Created by Conversion         Fleming-Monte syncope      Priority: Medium     Acquired hypothyroidism 06/20/2018     Priority: Medium     Hx Graves s/p surgery 2002 now hypothryoid          Past Medical History:   Diagnosis Date     History of aortic stenosis 6/21/2018     Hyperlipidemia      Hypothyroidism      Valvular disease     aortic stenosis secondary to bicuspid valve     Past Surgical History:   Procedure Laterality Date     C REPLACE AORT VALV,PROSTH VALV      Description: Aortic Valve Replacement;  Proc Date: 01/13/2009;  Comments: #21 [employer] [facility] [place] Prosthesis     CARDIAC VALVE REPLACEMENT  2009    AVR     COLONOSCOPY  02/04/2013     CYSTOSTOMY W/ BLADDER BIOPSY  2000     EP PACEMAKER INSERT N/A 6/21/2018    Procedure: EP Pacemaker Insertion;  Surgeon: Luis Urena MD;  Location: [facility];  Service:      OTHER SURGICAL HISTORY  2002    thyroidectomy     SPLENECTOMY  1979     Current Outpatient Medications   Medication Sig Dispense Refill     cholecalciferol, vitamin D3, 1,000 unit tablet [CHOLECALCIFEROL, VITAMIN D3, 1,000 UNIT TABLET] Take 1,000 Units by mouth daily.              levothyroxine (SYNTHROID, LEVOTHROID) 88 MCG tablet [LEVOTHYROXINE (SYNTHROID, LEVOTHROID) 88 MCG TABLET] Take 1 tablet (88 mcg total) by mouth Daily at 6:00 am. 90 tablet 3     levothyroxine (SYNTHROID/LEVOTHROID) 88 MCG tablet Take 88 mcg by mouth daily       pravastatin (PRAVACHOL) 40 MG tablet [PRAVASTATIN (PRAVACHOL) 40 MG TABLET] Take 1 tablet (40 mg total) by mouth daily. nightly 90 tablet 3     SENNA 8.6 mg tablet [SENNA 8.6 MG TABLET] TAKE 2 TABLETS BY MOUTH EVERY  tablet 2     verapamiL (VERELAN PM) 240 MG 24 hr capsule [VERAPAMIL (VERELAN PM) 240 MG 24 HR CAPSULE] Take 1 capsule (240 mg total) by mouth daily. 90 capsule 3      "warfarin ANTICOAGULANT (COUMADIN) 5 MG tablet TAKE 1 TO 1.5 TABLETS (5 TO 7.5 MG) BY MOUTH DAILY. ADJUST DOSE PER INR RESULTS AS INSTRUCTED. 123 tablet 1       No Known Allergies     Social History     Tobacco Use     Smoking status: Former Smoker     Packs/day: 0.50     Years: 20.00     Pack years: 10.00     Types: Cigarettes     Quit date: 1979     Years since quittin.7     Smokeless tobacco: Never Used   Substance Use Topics     Alcohol use: Yes     Family History   Problem Relation Age of Onset     Acute Myocardial Infarction Brother 43.00     Rheumatoid Arthritis Mother          in 40s     Coronary Artery Disease Father      Hypertension Father      No Known Problems Maternal Grandmother      No Known Problems Maternal Grandfather      No Known Problems Paternal Grandmother      No Known Problems Paternal Grandfather      Aortic stenosis Brother         s/p surgery     No Known Problems Sister      Breast Cancer No family hx of      Colon Cancer No family hx of      Cervical Cancer No family hx of      History   Drug Use No         Objective     /80   Pulse 86   Ht 1.6 m (5' 3\")   Wt 83.1 kg (183 lb 4 oz)   BMI 32.46 kg/m      Physical Exam    GENERAL APPEARANCE: healthy, alert and no distress, obese     EYES: EOMI, PERRL     HENT: ear canals and TM's normal     NECK: no adenopathy, no asymmetry, masses, or scars and thyroid normal to palpation     RESP: lungs clear to auscultation - no rales, rhonchi or wheezes     CV: regular rates and rhythm, normal S1 S2, no S3 or S4 and no murmur, click or rub - evidence of mech aortic valve on auscultation     ABDOMEN:  soft, nontender, no HSM or masses and bowel sounds normal     MS: extremities normal- no gross deformities noted, no evidence of inflammation in joints, FROM in all extremities.     SKIN: no suspicious lesions or rashes     NEURO: Normal strength and tone, sensory exam grossly normal, mentation intact and speech normal     PSYCH: " mentation appears normal. and affect normal/bright     LYMPHATICS: No cervical adenopathy    Recent Labs   Lab Test 08/11/21  1251 07/07/21  0943 03/23/21  1022 12/09/20  1052 02/27/20  1145   HGB  --   --  14.0 14.2 14.0   PLT  --   --   --  256 226   INR 2.2* 2.20* 2.00*  --   --    NA  --   --   --  140 140   POTASSIUM  --   --   --  4.8 4.4   CR  --   --   --  0.89 0.91   A1C  --   --   --  5.4  --         Diagnostics:   9/10/2021 13:22   Sodium 141   Potassium 4.4   Chloride 105   Carbon Dioxide 24   Urea Nitrogen 16   Creatinine 0.85   GFR Estimate 70   Calcium 10.4   Anion Gap 12     INR 2.1 - therapeutic on warfarin  PTT 37     9/10/2021 13:22   WBC 9.9   Hemoglobin 14.4   Hematocrit 42.7   Platelet Count 268   RBC Count 4.75   MCV 90   MCH 30.3   MCHC 33.7   RDW 13.8        EKG: Completed 3/23/2021, atrial paced rhythm with rate 61 bpm, normal axis, normal intervals, inverted T waves in aVR and V1 but no other ST or T wave concerns -only change from prior is paced rhythm    Revised Cardiac Risk Index (RCRI):  The patient has the following serious cardiovascular risks for perioperative complications:   - Coronary Artery Disease (MI, positive stress test, angina, Qs on EKG) = 1 point     RCRI Interpretation: 1 point: Class II (low risk - 0.9% complication rate)         Signed Electronically by: Lakeisha Marmolejo MD  Copy of this evaluation report is provided to requesting physician.

## 2021-09-10 NOTE — PROGRESS NOTES
ANTICOAGULATION MANAGEMENT     Vandana Thao 71 year old female is on warfarin with therapeutic INR result. (Goal INR 1.8-2.2)    Recent labs: (last 7 days)     09/10/21  1322   INR 2.1*       ASSESSMENT     Source(s): Chart Review, Patient/Caregiver Call and Template       Warfarin doses taken: Warfarin taken as instructed    Diet: No new diet changes identified    New illness, injury, or hospitalization: No    Medication/supplement changes: None noted    Signs or symptoms of bleeding or clotting: No    Previous INR: Therapeutic last 2(+) visits    Additional findings: Upcoming surgery/procedure excisional biopsy mass R 3rd webspace. Pre op today. Hold plan of 4 days discussed with pateint. She understands agrees with the plan.      PLAN     Recommended plan for no diet, medication or health factor changes affecting INR     Dosing Instructions: Continue your current warfarin dose with next INR in 1 week  After resumption.    Summary  As of 9/10/2021    Full warfarin instructions:  9/18: Hold; 9/19: Hold; 9/20: Hold; 9/21: Hold; 9/22: 12.5 mg; Otherwise 7.5 mg every Sun, Wed, Fri; 5 mg all other days   Next INR check:  9/29/2021             Telephone call with Vandana who verbalizes understanding and agrees to plan and who agrees to plan and repeated back plan correctly    Lab visit scheduled    Education provided: Importance of therapeutic range    Plan made per [facility] anticoagulation protocol    Beth Kerr RN  Anticoagulation Clinic  9/10/2021    _______________________________________________________________________     Anticoagulation Episode Summary     Current INR goal:     TTR:  --   Target end date:     Send INR reminders to:  ANDREAS JOHN    Indications    Anticoagulated on warfarin [Z79.01]  Hx of mechanical aortic valve replacement [Z95.2]           Comments:  Target goal range 1.8-2.2 per OV note 2/27/20         Anticoagulation Care Providers     Provider Role Specialty Phone number     Lakeisha Marmolejo MD [facility] Medicine [phone]

## 2021-09-10 NOTE — TELEPHONE ENCOUNTER
Spoke with patient and discussed her 4 day hold with no bridge needed. Warfarin instructions also done and INR appointment made. She understands and agrees with this plan.

## 2021-09-10 NOTE — TELEPHONE ENCOUNTER
"CONNER-PROCEDURAL ANTICOAGULATION  MANAGEMENT    ASSESSMENT     Warfarin interruption plan for right hand excisional biopsy on Wednesday, 9/22/2021    Indication for Anticoagulation: Mechanical AVR    AVR: 2020 AHA/ACC Management of Valvular Heart disease guidelines recommend no bridge in bileaflet mechanical AVR patients with NO other risk factors for thrombosis (Afib, previous thromboembolism, hypercoagulable condition, LV systolic dysfunction, older generation valves, or > 1 valve)    Patient is anticoagulated on warfarin for a mechanical AVR. She has a [employer] AVR, which is a newer generation bileaflet mechanical valve. Patient does not have additional VTE risk factors and therefore bridging is not indicated.   RECOMMENDATION       Pre-Procedure:  o Hold warfarin for 4 days since INR goal is 1.8-2.2, until after procedure starting: Saturday, 9/18/2021  o No Bridge      Post-Procedure:  o Resume warfarin dose if okay with provider doing procedure on night of procedure Wednesday, 9/22/2021 PM: 12.5 mg warfarin, then continue home dosing.   o Recheck INR ~ 7 days after resuming warfarin         Plan routed to referring provider for approval  ?   Sancho Zambrano, Pharmacy Student    SUBJECTIVE/OBJECTIVE     Vandana Thao, a 71 year old female    Goal INR Range: 1.8-2.2    Patient bridged in past: No    Pertinent History: [employer] [place] AVR (newer generation bileaflet mechanical valve)    Wt Readings from Last 3 Encounters:   09/10/21 83.1 kg (183 lb 4 oz)   08/27/21 83.2 kg (183 lb 5 oz)   08/11/21 83.9 kg (185 lb)      Ideal body weight: 52.4 kg (115 lb 8.3 oz)  Adjusted ideal body weight: 64.7 kg (142 lb 9.8 oz)     Estimated body mass index is 32.46 kg/m  as calculated from the following:    Height as of an earlier encounter on 9/10/21: 1.6 m (5' 3\").    Weight as of an earlier encounter on 9/10/21: 83.1 kg (183 lb 4 oz).    Lab Results   Component Value Date    INR 2.1 (H) 09/10/2021    INR " 2.2 (H) 08/11/2021    INR 2.20 (H) 07/07/2021     Lab Results   Component Value Date    HGB 14.4 09/10/2021    HCT 42.7 09/10/2021     09/10/2021     Lab Results   Component Value Date    CR 0.89 12/09/2020    CR 0.91 02/27/2020    CR 0.80 04/16/2019

## 2021-09-10 NOTE — TELEPHONE ENCOUNTER
PCP requesting [facility] assistance with hold/bridging orders for procedure on 9/22.   Preop OV 9/10. Mass on finger.     Routing to [facility] pharmacist to please review and advise.

## 2021-09-10 NOTE — PATIENT INSTRUCTIONS
Medication plan on day of surgery  hold: senna, vitamin D  continue: synthroid, take pravastatin night before, verapamil  Dr Marmolejo will speak with anticoagulation team and get back to you regarding warfarin plan      Preparing for Your Surgery  Getting started  A nurse will call you to review your health history and instructions. They will give you an arrival time based on your scheduled surgery time.  Please be ready to share the following:    Your doctor's clinic name and phone number    Your medical, surgical and anesthesia history    A list of allergies and sensitivities    A list of medicines, including herbal treatments and over-the-counter drugs    Whether the patient has a legal guardian (ask how to send us the papers in advance)  If you have a child who's having surgery, please ask for a copy of Preparing for Your Child's Surgery.    Preparing for surgery    Within 30 days of surgery: Have a pre-op exam (sometimes called an H&P, or History and Physical). This can be done at a clinic or pre-operative center.  ? If you're having a , you may not need this exam. Talk to your care team    At your pre-op exam, talk to your care team about all medicines you take. If you need to stop any medicines before surgery, ask when to start taking them again.  ? We do this for your safety. Many medicines can make you bleed too much during surgery. Some change how well surgery (anesthesia) drugs work.    Call your insurance company to let them know you're having surgery. (If you don't have insurance, call [phone].)    Call your clinic if there's any change in your health. This includes signs of a cold or flu (sore throat, runny nose, cough, rash, fever). It also includes a scrape or scratch near the surgery site.    If you have questions on the day of surgery, call your hospital or surgery center.  Eating and drinking guidelines  For your safety: Unless your surgeon tells you otherwise, follow the guidelines  below.    Eat and drink as usual until 8 hours before surgery. After that, no food or milk.    Drink clear liquids until 2 hours before surgery. These are liquids you can see through, like water, Gatorade and Propel Water. You may also have black coffee and tea (no cream or milk).    Nothing by mouth within 2 hours of surgery. This includes gum, candy and breath mints.    If you drink, stop drinking alcohol the night before surgery.    If your care team tells you to take medicine on the morning of surgery, it's okay to take it with a sip of water.  Preventing infection    Shower or bathe the night before and morning of your surgery. Follow the instructions your clinic gave you. (If no instructions, use regular soap.)    Don't shave or clip hair near your surgery site. We'll remove the hair if needed.    Don't smoke or vape the morning of surgery. You may chew nicotine gum up to 2 hours before surgery. A nicotine patch is okay.  ? Note: Some surgeries require you to completely quit smoking and nicotine. Check with your surgeon.    Your care team will make every effort to keep you safe from infection. We will:  ? Clean our hands often with soap and water (or an alcohol-based hand rub).  ? Clean the skin at your surgery site with a special soap that kills germs.  ? Give you a special gown to keep you warm. (Cold raises the risk of infection.)  ? Wear special hair covers, masks, gowns and gloves during surgery.  ? Give antibiotic medicine, if prescribed. Not all surgeries need antibiotics.  What to bring on the day of surgery    Photo ID and insurance card    Copy of your health care directive, if you have one    Glasses and hearing aides (bring cases)  ? You can't wear contacts during surgery    Inhaler and eye drops, if you use them (tell us about these when you arrive)    CPAP machine or breathing device, if you use them    A few personal items, if spending the night    If you have . . .  ? A pacemaker or ICD  (cardiac defibrillator): Bring the ID card.  ? An implanted stimulator: Bring the remote control.  ? A legal guardian: Bring a copy of the certified (court-stamped) guardianship papers.  Please remove any jewelry, including body piercings. Leave jewelry and other valuables at home.  If you're going home the day of surgery  Important: If you don't follow the rules below, we must cancel your surgery.     Arrange for someone to drive you home after surgery. You may not drive, take a taxi or take public transportation by yourself (unless you'll have local anesthesia only).    Arrange for a responsible adult to stay with you overnight. If you don't, we may keep you in the hospital overnight, and you may need to pay the costs yourself.  Questions?   If you have any questions for your care team, list them here: _________________________________________________________________________________________________________________________________________________________________________________________________________________________________________________________________________________________________________________________  For informational purposes only. Not to replace the advice of your health care provider. Copyright   2003, 2019 [facility]. All rights reserved. Clinically reviewed by Genny Kilgore MD. [employer] [id number] - REV 4/20.    Before Your Procedure or Hospital Admission  Testing for COVID-19 (Coronavirus)  Thank you for choosing [facility] for your health care needs. The COVID-19 pandemic is a very challenging time for everyone.   Our goal is to keep you and our team here at [facility] safe and healthy. We've taken many steps to make this happen. For example:    We test and screen our staff, care teams and patients for COVID-19.    Everyone at [facility] must wear a mask and stay 6 feet apart.    We are limiting hospital and clinic visitors.  Before you come in  All patients must  "get tested for COVID-19. Your test needs to happen 3 to 4 days before you check in to the hospital or surgery site.   A clinic scheduler will call about a week in advance to set up a testing time at one of our labs. We'll take a swab of your nose or throat.  Note: If you go to a clinic or pharmacy like [facility] or [facility] for your test, make sure you get a test inside the nose. Tests inside the nose are:    A naso-pharyngeal (NP) RT-PCR test    An anterior nares RT-PCR test  Do NOT get a \"rapid\" test or a saliva (spit) test.  After the test, please stay at home and stay out of contact with other people. It will be harder for you to recover if you get COVID-19 before your treatment.  Please follow all current safety guidelines, including:    Limit trips outside your home.    Limit the number of people you see.    Always wear a mask outside your home.    Use social distancing. Stay 6 feet away from others whenever you can.    Wash your hands often.  If your test shows you have COVID-19  If your test is positive, we'll let you know. A positive test means that you have the virus.   We'll probably have to postpone your admission, surgery or procedure. Your doctor will discuss this with you. After that, we'll let you know what to do and when you can re-schedule.   We may need to cancel your treatment on short notice for other reasons, too.  If your test shows you DON'T have COVID-19  Even if your test is negative, you can still get COVID-19. It's rare but, sometimes, the test result is wrong. You could also catch the virus after taking the test.   There's a very small chance that you could catch COVID-19 in the hospital or surgery center. [facility] has taken many steps to prevent this from happening.   Day of your surgery or procedure    Please come wearing a face covering that covers both your nose and mouth.    When you arrive, we'll ask you some questions to find out if you have any signs or symptoms of " "COVID-19.    Ask your care team if you can have visitors. All visitors must wear face coverings and will be screened for signs of COVID-19.  ? Even if no visitors are allowed, you can still have with you:    Your legal guardian or legal decision maker    A parent and one other visitor, if you are younger than 18 years old    A partner and a , if you are in labor  ? We might need to teach you about taking care of yourself after surgery. If so, a visitor can come into the hospital to learn about it, too.  ? The rules for visitors change often, depending on how much the virus is spreading. To learn more, see Visiting a Loved One in the Hospital during the COVID-19 Outbreak.  Please call your care team, hospital or surgery center if you have any questions. We thank you for your understanding and for choosing [facility] for your care.   Questions and Answers  Does it matter where I get tested for COVID-19?  Yes. We urge you to get tested at one of our [facility] COVID-19 testing sites. We process these tests in our lab and can get the results quickly. Your [facility] care team needs to get your results before you check in.  What should I do if I can't get tested at [facility]?  You can get tested somewhere else, but you'll need to take these extra steps:   1. Contact your family doctor or clinic to arrange your test.  2. Take the test within 4 days of your surgery or procedure. We can't accept tests older than 4 days.  3. Make sure you're getting a test inside the nose. Tests inside the nose are:  ? A naso-pharyngeal (NP) RT-PCR test  ? An anterior nares RT-PCR test  Many pharmacies use \"rapid\" tests or saliva (spit) tests. We do NOT accept those tests before surgery or procedures. Tests from inside the nose are the most accurate tests.  1. Make sure your doctor or clinic faxes your results to [facility] at [phone].  If we don't get your results in time, we may have to delay " or cancel your treatment.  For informational purposes only. Not to replace the advice of your health care provider. Copyright   2020 [facility]. All rights reserved. Clinically reviewed by In[facility] and the [facility] COVID-19 Clinical Team. [employer] 179144 - Rev 08/18/21.

## 2021-09-13 ENCOUNTER — MYC MEDICAL ADVICE (OUTPATIENT)
Dept: FAMILY MEDICINE | Facility: CLINIC | Age: 71
End: 2021-09-13

## 2021-09-13 LAB — APTT PPP: 37 SECONDS (ref 22–38)

## 2021-09-20 ENCOUNTER — LAB (OUTPATIENT)
Dept: LAB | Facility: CLINIC | Age: 71
End: 2021-09-20
Payer: MEDICARE

## 2021-09-20 DIAGNOSIS — Z95.2 HX OF MECHANICAL AORTIC VALVE REPLACEMENT: ICD-10-CM

## 2021-09-20 DIAGNOSIS — Z01.818 PREOP EXAMINATION: ICD-10-CM

## 2021-09-20 DIAGNOSIS — R22.31 MASS OF FINGER OF RIGHT HAND: ICD-10-CM

## 2021-09-20 LAB — SARS-COV-2 RNA RESP QL NAA+PROBE: NEGATIVE

## 2021-09-20 PROCEDURE — U0005 INFEC AGEN DETEC AMPLI PROBE: HCPCS

## 2021-09-20 PROCEDURE — U0003 INFECTIOUS AGENT DETECTION BY NUCLEIC ACID (DNA OR RNA); SEVERE ACUTE RESPIRATORY SYNDROME CORONAVIRUS 2 (SARS-COV-2) (CORONAVIRUS DISEASE [COVID-19]), AMPLIFIED PROBE TECHNIQUE, MAKING USE OF HIGH THROUGHPUT TECHNOLOGIES AS DESCRIBED BY CMS-2020-01-R: HCPCS

## 2021-09-22 ENCOUNTER — TRANSFERRED RECORDS (OUTPATIENT)
Dept: HEALTH INFORMATION MANAGEMENT | Facility: CLINIC | Age: 71
End: 2021-09-22

## 2021-09-22 PROCEDURE — 88304 TISSUE EXAM BY PATHOLOGIST: CPT | Mod: TC,ORL | Performed by: ORTHOPAEDIC SURGERY

## 2021-09-23 ENCOUNTER — LAB REQUISITION (OUTPATIENT)
Dept: LAB | Facility: CLINIC | Age: 71
End: 2021-09-23
Payer: MEDICARE

## 2021-09-23 DIAGNOSIS — M67.441 GANGLION, RIGHT HAND: ICD-10-CM

## 2021-09-28 LAB
PATH REPORT.COMMENTS IMP SPEC: NORMAL
PATH REPORT.COMMENTS IMP SPEC: NORMAL
PATH REPORT.FINAL DX SPEC: NORMAL
PATH REPORT.GROSS SPEC: NORMAL
PATH REPORT.MICROSCOPIC SPEC OTHER STN: NORMAL
PATH REPORT.RELEVANT HX SPEC: NORMAL
PHOTO IMAGE: NORMAL

## 2021-09-28 PROCEDURE — 88304 TISSUE EXAM BY PATHOLOGIST: CPT | Mod: 26 | Performed by: PATHOLOGY

## 2021-09-29 ENCOUNTER — ANTICOAGULATION THERAPY VISIT (OUTPATIENT)
Dept: ANTICOAGULATION | Facility: CLINIC | Age: 71
End: 2021-09-29

## 2021-09-29 ENCOUNTER — LAB (OUTPATIENT)
Dept: LAB | Facility: CLINIC | Age: 71
End: 2021-09-29
Payer: MEDICARE

## 2021-09-29 DIAGNOSIS — Z79.01 ANTICOAGULATED ON WARFARIN: Primary | ICD-10-CM

## 2021-09-29 DIAGNOSIS — Z95.2 HX OF MECHANICAL AORTIC VALVE REPLACEMENT: ICD-10-CM

## 2021-09-29 LAB — INR BLD: 1.6 (ref 0.9–1.1)

## 2021-09-29 PROCEDURE — 85610 PROTHROMBIN TIME: CPT

## 2021-09-29 PROCEDURE — 36416 COLLJ CAPILLARY BLOOD SPEC: CPT

## 2021-09-29 NOTE — PROGRESS NOTES
ANTICOAGULATION MANAGEMENT     Vandana Thao 71 year old female is on warfarin with subtherapeutic INR result. (Goal INR The INR goal. 1.8-2.2)    Recent labs: (last 7 days)     09/29/21  1011   INR 1.6*       ASSESSMENT     Source(s): Chart Review, Patient/Caregiver Call and Template       Warfarin doses taken: Warfarin recently held for mass removal and biopsy which may be affecting INR    Diet: No new diet changes identified    New illness, injury, or hospitalization: No    Medication/supplement changes: None noted    Signs or symptoms of bleeding or clotting: No    Previous INR: Therapeutic last 2(+) visits    Additional findings: traveling to [facility] next week to close it up.     PLAN     Recommended plan for no diet, medication or health factor changes affecting INR     Dosing Instructions: Booster dose then continue your current warfarin dose with next INR in 1-2 weeks       Summary  As of 9/29/2021    Full warfarin instructions:  9/29: 10 mg; Otherwise 7.5 mg every Sun, Wed, Fri; 5 mg all other days   Next INR check:  10/13/2021             Telephone call with Vandana who verbalizes understanding and agrees to plan    Lab visit scheduled    Education provided: Importance of following up at instructed interval and Importance of taking warfarin as instructed    Plan made per [facility] anticoagulation protocol    Beth Kerr RN  Anticoagulation Clinic  9/29/2021    _______________________________________________________________________     Anticoagulation Episode Summary     Current INR goal:     TTR:  --   Target end date:     Send INR reminders to:  ANDREAS JOHN    Indications    Anticoagulated on warfarin [Z79.01]  Hx of mechanical aortic valve replacement [Z95.2]           Comments:  Target goal range 1.8-2.2 per OV note 2/27/20         Anticoagulation Care Providers     Provider Role Specialty Phone number    Lakeisha Marmolejo MD Referring Family Medicine [phone]

## 2021-10-01 ENCOUNTER — TRANSFERRED RECORDS (OUTPATIENT)
Dept: HEALTH INFORMATION MANAGEMENT | Facility: CLINIC | Age: 71
End: 2021-10-01

## 2021-10-13 ENCOUNTER — ANTICOAGULATION THERAPY VISIT (OUTPATIENT)
Dept: ANTICOAGULATION | Facility: CLINIC | Age: 71
End: 2021-10-13

## 2021-10-13 ENCOUNTER — LAB (OUTPATIENT)
Dept: LAB | Facility: CLINIC | Age: 71
End: 2021-10-13
Payer: MEDICARE

## 2021-10-13 DIAGNOSIS — Z79.01 ANTICOAGULATED ON WARFARIN: Primary | ICD-10-CM

## 2021-10-13 DIAGNOSIS — Z95.2 HX OF MECHANICAL AORTIC VALVE REPLACEMENT: ICD-10-CM

## 2021-10-13 LAB — INR BLD: 2 (ref 0.9–1.1)

## 2021-10-13 PROCEDURE — 85610 PROTHROMBIN TIME: CPT | Performed by: FAMILY MEDICINE

## 2021-10-13 NOTE — PROGRESS NOTES
ANTICOAGULATION MANAGEMENT     Vandana ROSARIO Keesha 71 year old female is on warfarin with therapeutic INR result. (Goal INR 1.8-2.2)    Recent labs: (last 7 days)     10/13/21  1009   INR 2.0*       ASSESSMENT     Source(s): Chart Review, Patient/Caregiver Call and Template       Warfarin doses taken: Warfarin taken as instructed    Diet: No new diet changes identified    New illness, injury, or hospitalization: No    Medication/supplement changes: None noted    Signs or symptoms of bleeding or clotting: No    Previous INR: Subtherapeutic    Additional findings: None     PLAN     Recommended plan for no diet, medication or health factor changes affecting INR     Dosing Instructions: Continue your current warfarin dose with next INR in 3 weeks       Summary  As of 10/13/2021    Full warfarin instructions:  7.5 mg every Sun, Wed, Fri; 5 mg all other days   Next INR check:  11/3/2021             Telephone call with Vandana who verbalizes understanding and agrees to plan    Lab visit scheduled    Education provided: Importance of therapeutic range    Plan made per [facility] anticoagulation protocol    Beth Kerr, RN  Anticoagulation Clinic  10/13/2021    _______________________________________________________________________     Anticoagulation Episode Summary     Current INR goal:     TTR:  --   Target end date:     Send INR reminders to:  ANDREAS JOHN    Indications    Anticoagulated on warfarin [Z79.01]  Hx of mechanical aortic valve replacement [Z95.2]           Comments:  Target goal range 1.8-2.2 per OV note 2/27/20         Anticoagulation Care Providers     Provider Role Specialty Phone number    Lakeisha Marmolejo MD Referring Family Medicine [phone]

## 2021-10-16 ENCOUNTER — HEALTH MAINTENANCE LETTER (OUTPATIENT)
Age: 71
End: 2021-10-16

## 2021-10-22 ENCOUNTER — TRANSFERRED RECORDS (OUTPATIENT)
Dept: HEALTH INFORMATION MANAGEMENT | Facility: CLINIC | Age: 71
End: 2021-10-22
Payer: MEDICARE

## 2021-10-29 DIAGNOSIS — Z79.01 ANTICOAGULATED ON WARFARIN: ICD-10-CM

## 2021-10-29 RX ORDER — WARFARIN SODIUM 5 MG/1
TABLET ORAL
Qty: 123 TABLET | Refills: 1 | Status: SHIPPED | OUTPATIENT
Start: 2021-10-29 | End: 2022-04-11

## 2021-11-03 ENCOUNTER — LAB (OUTPATIENT)
Dept: LAB | Facility: CLINIC | Age: 71
End: 2021-11-03
Payer: MEDICARE

## 2021-11-03 ENCOUNTER — ANTICOAGULATION THERAPY VISIT (OUTPATIENT)
Dept: ANTICOAGULATION | Facility: CLINIC | Age: 71
End: 2021-11-03

## 2021-11-03 DIAGNOSIS — Z95.2 HX OF MECHANICAL AORTIC VALVE REPLACEMENT: ICD-10-CM

## 2021-11-03 DIAGNOSIS — Z79.01 ANTICOAGULATED ON WARFARIN: Primary | ICD-10-CM

## 2021-11-03 LAB — INR BLD: 2.5 (ref 0.9–1.1)

## 2021-11-03 PROCEDURE — 36416 COLLJ CAPILLARY BLOOD SPEC: CPT

## 2021-11-03 PROCEDURE — 85610 PROTHROMBIN TIME: CPT

## 2021-11-03 NOTE — PROGRESS NOTES
ANTICOAGULATION MANAGEMENT     Vandana Thao 71 year old female is on warfarin with supratherapeutic INR result. (Goal INR 1.8-2.2)    Recent labs: (last 7 days)     11/03/21  1011   INR 2.5*       ASSESSMENT     Source(s): Chart Review and Template       Warfarin doses taken: Warfarin taken as instructed    Diet: No new diet changes identified    New illness, injury, or hospitalization: No    Medication/supplement changes: None noted    Signs or symptoms of bleeding or clotting: No    Previous INR: Therapeutic last visit; previously outside of goal range    Additional findings: None     PLAN     Recommended plan for no diet, medication or health factor changes affecting INR     Dosing Instructions: Continue your current warfarin dose with next INR in 1-2 weeks       Summary  As of 11/3/2021    Full warfarin instructions:  7.5 mg every Sun, Wed, Fri; 5 mg all other days   Next INR check:  11/17/2021             Detailed voice message left for Vandana with dosing instructions and follow up date.     Contact [phone] to schedule and with any changes, questions or concerns.     Education provided: call back if there are any changes she did not write on template.    Plan made with [facility] Pharmacist Leah Kerr, RN  Anticoagulation Clinic  11/3/2021    _______________________________________________________________________     Anticoagulation Episode Summary     Current INR goal:     TTR:  --   Target end date:     Send INR reminders to:  ANDREAS JOHN    Indications    Anticoagulated on warfarin [Z79.01]  Hx of mechanical aortic valve replacement [Z95.2]           Comments:  Target goal range 1.8-2.2 per OV note 2/27/20         Anticoagulation Care Providers     Provider Role Specialty Phone number    Lakeisha Marmolejo MD Referring Family Medicine [phone]

## 2021-12-01 ENCOUNTER — ANTICOAGULATION THERAPY VISIT (OUTPATIENT)
Dept: ANTICOAGULATION | Facility: CLINIC | Age: 71
End: 2021-12-01

## 2021-12-01 ENCOUNTER — LAB (OUTPATIENT)
Dept: LAB | Facility: CLINIC | Age: 71
End: 2021-12-01
Payer: MEDICARE

## 2021-12-01 DIAGNOSIS — Z95.2 HX OF MECHANICAL AORTIC VALVE REPLACEMENT: ICD-10-CM

## 2021-12-01 DIAGNOSIS — Z79.01 ANTICOAGULATED ON WARFARIN: Primary | ICD-10-CM

## 2021-12-01 LAB — INR BLD: 1.6 (ref 0.9–1.1)

## 2021-12-01 PROCEDURE — 36416 COLLJ CAPILLARY BLOOD SPEC: CPT

## 2021-12-01 PROCEDURE — 85610 PROTHROMBIN TIME: CPT

## 2021-12-01 NOTE — PROGRESS NOTES
ANTICOAGULATION MANAGEMENT     Vandanasara Thao 71 year old female is on warfarin with subtherapeutic INR result. (Goal INR 1.8-2.2)    Recent labs: (last 7 days)     12/01/21  0947   INR 1.6*       ASSESSMENT     Source(s): Chart Review, Patient/Caregiver Call and Template       Warfarin doses taken: Warfarin taken as instructed    Diet: Increased greens/vitamin K in diet; plans to resume previous intake    New illness, injury, or hospitalization: No    Medication/supplement changes: None noted    Signs or symptoms of bleeding or clotting: No    Previous INR: Supratherapeutic    Additional findings: None     PLAN     Recommended plan for temporary change(s) affecting INR     Dosing Instructions: Booster dose then continue your current warfarin dose with next INR in 2 weeks       Summary  As of 12/1/2021    Full warfarin instructions:  12/1: 10 mg; Otherwise 7.5 mg every Sun, Wed, Fri; 5 mg all other days   Next INR check:  12/15/2021             Telephone call with Vandana who verbalizes understanding and agrees to plan    Patient offered & declined to schedule next visit    Education provided: Importance of consistent vitamin K intake, Impact of vitamin K foods on INR, Goal range and significance of current result, Monitoring for bleeding signs and symptoms, Monitoring for clotting signs and symptoms and When to seek medical attention/emergency care        Barby Charlton RN  Anticoagulation Clinic  12/1/2021    _______________________________________________________________________     Anticoagulation Episode Summary     Current INR goal:     TTR:  --   Target end date:     Send INR reminders to:  ANDREAS JOHN    Indications    Anticoagulated on warfarin [Z79.01]  Hx of mechanical aortic valve replacement [Z95.2]           Comments:  Target goal range 1.8-2.2 per OV note 2/27/20         Anticoagulation Care Providers     Provider Role Specialty Phone number    Lakeisha Marmolejo MD Referring Family  Medicine [phone]

## 2021-12-13 ENCOUNTER — ANCILLARY PROCEDURE (OUTPATIENT)
Dept: CARDIOLOGY | Facility: CLINIC | Age: 71
End: 2021-12-13
Attending: INTERNAL MEDICINE
Payer: MEDICARE

## 2021-12-13 DIAGNOSIS — I44.39 HIGH DEGREE ATRIOVENTRICULAR BLOCK: ICD-10-CM

## 2021-12-13 DIAGNOSIS — Z95.0 CARDIAC PACEMAKER IN SITU: ICD-10-CM

## 2021-12-13 LAB
MDC_IDC_EPISODE_DTM: NORMAL
MDC_IDC_EPISODE_DURATION: 1 S
MDC_IDC_EPISODE_ID: NORMAL
MDC_IDC_EPISODE_TYPE: NORMAL
MDC_IDC_LEAD_IMPLANT_DT: NORMAL
MDC_IDC_LEAD_IMPLANT_DT: NORMAL
MDC_IDC_LEAD_LOCATION: NORMAL
MDC_IDC_LEAD_LOCATION: NORMAL
MDC_IDC_LEAD_LOCATION_DETAIL_1: NORMAL
MDC_IDC_LEAD_LOCATION_DETAIL_1: NORMAL
MDC_IDC_LEAD_MFG: NORMAL
MDC_IDC_LEAD_MFG: NORMAL
MDC_IDC_LEAD_MODEL: NORMAL
MDC_IDC_LEAD_MODEL: NORMAL
MDC_IDC_LEAD_POLARITY_TYPE: NORMAL
MDC_IDC_LEAD_POLARITY_TYPE: NORMAL
MDC_IDC_LEAD_SERIAL: NORMAL
MDC_IDC_LEAD_SERIAL: NORMAL
MDC_IDC_LEAD_SPECIAL_FUNCTION: NORMAL
MDC_IDC_LEAD_SPECIAL_FUNCTION: NORMAL
MDC_IDC_MSMT_BATTERY_DTM: NORMAL
MDC_IDC_MSMT_BATTERY_REMAINING_LONGEVITY: 137 MO
MDC_IDC_MSMT_BATTERY_RRT_TRIGGER: 2.62
MDC_IDC_MSMT_BATTERY_STATUS: NORMAL
MDC_IDC_MSMT_BATTERY_VOLTAGE: 3.02 V
MDC_IDC_MSMT_LEADCHNL_RA_IMPEDANCE_VALUE: 342 OHM
MDC_IDC_MSMT_LEADCHNL_RA_IMPEDANCE_VALUE: 475 OHM
MDC_IDC_MSMT_LEADCHNL_RA_PACING_THRESHOLD_AMPLITUDE: 0.75 V
MDC_IDC_MSMT_LEADCHNL_RA_PACING_THRESHOLD_PULSEWIDTH: 0.4 MS
MDC_IDC_MSMT_LEADCHNL_RA_SENSING_INTR_AMPL: 4.25 MV
MDC_IDC_MSMT_LEADCHNL_RA_SENSING_INTR_AMPL: 4.25 MV
MDC_IDC_MSMT_LEADCHNL_RV_IMPEDANCE_VALUE: 342 OHM
MDC_IDC_MSMT_LEADCHNL_RV_IMPEDANCE_VALUE: 437 OHM
MDC_IDC_MSMT_LEADCHNL_RV_PACING_THRESHOLD_AMPLITUDE: 0.88 V
MDC_IDC_MSMT_LEADCHNL_RV_PACING_THRESHOLD_PULSEWIDTH: 0.4 MS
MDC_IDC_MSMT_LEADCHNL_RV_SENSING_INTR_AMPL: 9.25 MV
MDC_IDC_MSMT_LEADCHNL_RV_SENSING_INTR_AMPL: 9.25 MV
MDC_IDC_PG_IMPLANT_DTM: NORMAL
MDC_IDC_PG_MFG: NORMAL
MDC_IDC_PG_MODEL: NORMAL
MDC_IDC_PG_SERIAL: NORMAL
MDC_IDC_PG_TYPE: NORMAL
MDC_IDC_SESS_CLINIC_NAME: NORMAL
MDC_IDC_SESS_DTM: NORMAL
MDC_IDC_SESS_TYPE: NORMAL
MDC_IDC_SET_BRADY_AT_MODE_SWITCH_RATE: 171 {BEATS}/MIN
MDC_IDC_SET_BRADY_HYSTRATE: NORMAL
MDC_IDC_SET_BRADY_LOWRATE: 60 {BEATS}/MIN
MDC_IDC_SET_BRADY_MAX_SENSOR_RATE: 130 {BEATS}/MIN
MDC_IDC_SET_BRADY_MAX_TRACKING_RATE: 130 {BEATS}/MIN
MDC_IDC_SET_BRADY_MODE: NORMAL
MDC_IDC_SET_BRADY_PAV_DELAY_LOW: 270 MS
MDC_IDC_SET_BRADY_SAV_DELAY_LOW: 240 MS
MDC_IDC_SET_LEADCHNL_RA_PACING_AMPLITUDE: 1.5 V
MDC_IDC_SET_LEADCHNL_RA_PACING_ANODE_ELECTRODE_1: NORMAL
MDC_IDC_SET_LEADCHNL_RA_PACING_ANODE_LOCATION_1: NORMAL
MDC_IDC_SET_LEADCHNL_RA_PACING_CAPTURE_MODE: NORMAL
MDC_IDC_SET_LEADCHNL_RA_PACING_CATHODE_ELECTRODE_1: NORMAL
MDC_IDC_SET_LEADCHNL_RA_PACING_CATHODE_LOCATION_1: NORMAL
MDC_IDC_SET_LEADCHNL_RA_PACING_POLARITY: NORMAL
MDC_IDC_SET_LEADCHNL_RA_PACING_PULSEWIDTH: 0.4 MS
MDC_IDC_SET_LEADCHNL_RA_SENSING_ANODE_ELECTRODE_1: NORMAL
MDC_IDC_SET_LEADCHNL_RA_SENSING_ANODE_LOCATION_1: NORMAL
MDC_IDC_SET_LEADCHNL_RA_SENSING_CATHODE_ELECTRODE_1: NORMAL
MDC_IDC_SET_LEADCHNL_RA_SENSING_CATHODE_LOCATION_1: NORMAL
MDC_IDC_SET_LEADCHNL_RA_SENSING_POLARITY: NORMAL
MDC_IDC_SET_LEADCHNL_RA_SENSING_SENSITIVITY: 0.3 MV
MDC_IDC_SET_LEADCHNL_RV_PACING_AMPLITUDE: 1.5 V
MDC_IDC_SET_LEADCHNL_RV_PACING_ANODE_ELECTRODE_1: NORMAL
MDC_IDC_SET_LEADCHNL_RV_PACING_ANODE_LOCATION_1: NORMAL
MDC_IDC_SET_LEADCHNL_RV_PACING_CAPTURE_MODE: NORMAL
MDC_IDC_SET_LEADCHNL_RV_PACING_CATHODE_ELECTRODE_1: NORMAL
MDC_IDC_SET_LEADCHNL_RV_PACING_CATHODE_LOCATION_1: NORMAL
MDC_IDC_SET_LEADCHNL_RV_PACING_POLARITY: NORMAL
MDC_IDC_SET_LEADCHNL_RV_PACING_PULSEWIDTH: 0.4 MS
MDC_IDC_SET_LEADCHNL_RV_SENSING_ANODE_ELECTRODE_1: NORMAL
MDC_IDC_SET_LEADCHNL_RV_SENSING_ANODE_LOCATION_1: NORMAL
MDC_IDC_SET_LEADCHNL_RV_SENSING_CATHODE_ELECTRODE_1: NORMAL
MDC_IDC_SET_LEADCHNL_RV_SENSING_CATHODE_LOCATION_1: NORMAL
MDC_IDC_SET_LEADCHNL_RV_SENSING_POLARITY: NORMAL
MDC_IDC_SET_LEADCHNL_RV_SENSING_SENSITIVITY: 0.9 MV
MDC_IDC_SET_ZONE_DETECTION_INTERVAL: 350 MS
MDC_IDC_SET_ZONE_DETECTION_INTERVAL: 400 MS
MDC_IDC_SET_ZONE_TYPE: NORMAL
MDC_IDC_STAT_AT_BURDEN_PERCENT: 0 %
MDC_IDC_STAT_AT_DTM_END: NORMAL
MDC_IDC_STAT_AT_DTM_START: NORMAL
MDC_IDC_STAT_BRADY_AP_VP_PERCENT: 0.12 %
MDC_IDC_STAT_BRADY_AP_VS_PERCENT: 32.16 %
MDC_IDC_STAT_BRADY_AS_VP_PERCENT: 0.02 %
MDC_IDC_STAT_BRADY_AS_VS_PERCENT: 67.69 %
MDC_IDC_STAT_BRADY_DTM_END: NORMAL
MDC_IDC_STAT_BRADY_DTM_START: NORMAL
MDC_IDC_STAT_BRADY_RA_PERCENT_PACED: 32.28 %
MDC_IDC_STAT_BRADY_RV_PERCENT_PACED: 0.15 %
MDC_IDC_STAT_EPISODE_RECENT_COUNT: 0
MDC_IDC_STAT_EPISODE_RECENT_COUNT: 1
MDC_IDC_STAT_EPISODE_RECENT_COUNT_DTM_END: NORMAL
MDC_IDC_STAT_EPISODE_RECENT_COUNT_DTM_START: NORMAL
MDC_IDC_STAT_EPISODE_TOTAL_COUNT: 0
MDC_IDC_STAT_EPISODE_TOTAL_COUNT: 0
MDC_IDC_STAT_EPISODE_TOTAL_COUNT: 554
MDC_IDC_STAT_EPISODE_TOTAL_COUNT: 6
MDC_IDC_STAT_EPISODE_TOTAL_COUNT: NORMAL
MDC_IDC_STAT_EPISODE_TOTAL_COUNT_DTM_END: NORMAL
MDC_IDC_STAT_EPISODE_TOTAL_COUNT_DTM_START: NORMAL
MDC_IDC_STAT_EPISODE_TYPE: NORMAL

## 2021-12-13 PROCEDURE — 93294 REM INTERROG EVL PM/LDLS PM: CPT | Performed by: INTERNAL MEDICINE

## 2021-12-13 PROCEDURE — 93296 REM INTERROG EVL PM/IDS: CPT | Performed by: INTERNAL MEDICINE

## 2021-12-21 ENCOUNTER — LAB (OUTPATIENT)
Dept: LAB | Facility: CLINIC | Age: 71
End: 2021-12-21
Payer: MEDICARE

## 2021-12-21 ENCOUNTER — ANTICOAGULATION THERAPY VISIT (OUTPATIENT)
Dept: ANTICOAGULATION | Facility: CLINIC | Age: 71
End: 2021-12-21

## 2021-12-21 DIAGNOSIS — I25.10 CORONARY ARTERY DISEASE INVOLVING NATIVE CORONARY ARTERY OF NATIVE HEART WITHOUT ANGINA PECTORIS: ICD-10-CM

## 2021-12-21 DIAGNOSIS — I44.39 HIGH DEGREE ATRIOVENTRICULAR BLOCK: ICD-10-CM

## 2021-12-21 DIAGNOSIS — Z79.01 ANTICOAGULATED ON WARFARIN: Primary | ICD-10-CM

## 2021-12-21 DIAGNOSIS — I47.29 NON-SUSTAINED VENTRICULAR TACHYCARDIA (H): ICD-10-CM

## 2021-12-21 DIAGNOSIS — Z95.2 HX OF MECHANICAL AORTIC VALVE REPLACEMENT: ICD-10-CM

## 2021-12-21 DIAGNOSIS — E03.9 ACQUIRED HYPOTHYROIDISM: Primary | ICD-10-CM

## 2021-12-21 LAB — INR BLD: 2 (ref 0.9–1.1)

## 2021-12-21 PROCEDURE — 85610 PROTHROMBIN TIME: CPT

## 2021-12-21 PROCEDURE — 36415 COLL VENOUS BLD VENIPUNCTURE: CPT

## 2021-12-21 RX ORDER — LEVOTHYROXINE SODIUM 88 UG/1
88 TABLET ORAL DAILY
Qty: 30 TABLET | Refills: 0 | Status: SHIPPED | OUTPATIENT
Start: 2021-12-21 | End: 2022-09-13

## 2021-12-21 RX ORDER — VERAPAMIL HYDROCHLORIDE 240 MG/1
240 CAPSULE, EXTENDED RELEASE ORAL DAILY
Qty: 90 CAPSULE | Refills: 3 | Status: SHIPPED | OUTPATIENT
Start: 2021-12-21 | End: 2022-12-14

## 2021-12-21 RX ORDER — PRAVASTATIN SODIUM 40 MG
40 TABLET ORAL DAILY
Qty: 90 TABLET | Refills: 3 | Status: SHIPPED | OUTPATIENT
Start: 2021-12-21 | End: 2023-01-27

## 2021-12-21 NOTE — TELEPHONE ENCOUNTER
Reason for Call:  Other prescription    Detailed comments: refill request;    Levothyroxine 88 MCG  Pravastatin 40 MG  Verapamil 240 MG    Pt/Vandana had to reschedule her apt due to provider call in. Vandana is now scheduled for 01.14.2022 but will be out of these RX's prior to apt    Phone Number Patient can be reached at: Cell number on file:    Telephone Information:   Mobile [phone]       Best Time: anytime    Can we leave a detailed message on this number? YES    Call taken on 12/21/2021 at 9:43 AM by Radhika Dueñas

## 2021-12-21 NOTE — PROGRESS NOTES
ANTICOAGULATION MANAGEMENT     Vandana Thao 71 year old female is on warfarin with therapeutic INR result. (Goal INR 1.8-2.2)    Recent labs: (last 7 days)     12/21/21  0943   INR 2.0*       ASSESSMENT     Source(s): Chart Review, Patient/Caregiver Call and Template       Warfarin doses taken: Warfarin taken as instructed    Diet: No new diet changes identified    New illness, injury, or hospitalization: No    Medication/supplement changes: None noted    Signs or symptoms of bleeding or clotting: No    Previous INR: Subtherapeutic    Additional findings: None     PLAN     Recommended plan for no diet, medication or health factor changes affecting INR     Dosing Instructions: Continue your current warfarin dose with next INR in 3 weeks       Summary  As of 12/21/2021    Full warfarin instructions:  7.5 mg every Sun, Wed, Fri; 5 mg all other days   Next INR check:  1/14/2022             Telephone call with Vandana who verbalizes understanding and agrees to plan    Lab visit scheduled    Education provided: Goal range and significance of current result and Contact [phone] with any changes, questions or concerns.     Plan made per [facility] anticoagulation protocol    Becca Adames RN  Anticoagulation Clinic  12/21/2021    _______________________________________________________________________     Anticoagulation Episode Summary     Current INR goal:     TTR:  --   Target end date:     Send INR reminders to:  ANDREAS JOHN    Indications    Anticoagulated on warfarin [Z79.01]  Hx of mechanical aortic valve replacement [Z95.2]           Comments:  Target goal range 1.8-2.2 per OV note 2/27/20         Anticoagulation Care Providers     Provider Role Specialty Phone number    Lakeisha Marmolejo MD Referring Family Medicine [phone]

## 2022-01-07 ASSESSMENT — ACTIVITIES OF DAILY LIVING (ADL): CURRENT_FUNCTION: NO ASSISTANCE NEEDED

## 2022-01-14 ENCOUNTER — OFFICE VISIT (OUTPATIENT)
Dept: FAMILY MEDICINE | Facility: CLINIC | Age: 72
End: 2022-01-14
Payer: MEDICARE

## 2022-01-14 ENCOUNTER — ANTICOAGULATION THERAPY VISIT (OUTPATIENT)
Dept: ANTICOAGULATION | Facility: CLINIC | Age: 72
End: 2022-01-14

## 2022-01-14 VITALS
HEART RATE: 73 BPM | BODY MASS INDEX: 33.32 KG/M2 | SYSTOLIC BLOOD PRESSURE: 128 MMHG | OXYGEN SATURATION: 98 % | WEIGHT: 188.1 LBS | DIASTOLIC BLOOD PRESSURE: 86 MMHG

## 2022-01-14 DIAGNOSIS — E03.9 ACQUIRED HYPOTHYROIDISM: ICD-10-CM

## 2022-01-14 DIAGNOSIS — Z79.01 ANTICOAGULATED ON WARFARIN: ICD-10-CM

## 2022-01-14 DIAGNOSIS — Z95.2 HX OF MECHANICAL AORTIC VALVE REPLACEMENT: ICD-10-CM

## 2022-01-14 DIAGNOSIS — Z00.00 ENCOUNTER FOR MEDICARE ANNUAL WELLNESS EXAM: Primary | ICD-10-CM

## 2022-01-14 DIAGNOSIS — E78.00 HYPERCHOLESTEROLEMIA: ICD-10-CM

## 2022-01-14 DIAGNOSIS — L60.3 BRITTLE NAILS: ICD-10-CM

## 2022-01-14 DIAGNOSIS — Z78.0 POSTMENOPAUSAL STATUS: ICD-10-CM

## 2022-01-14 DIAGNOSIS — Z79.01 ANTICOAGULATED ON WARFARIN: Primary | ICD-10-CM

## 2022-01-14 DIAGNOSIS — L71.9 ROSACEA: ICD-10-CM

## 2022-01-14 DIAGNOSIS — R79.9 ABNORMAL FINDING OF BLOOD CHEMISTRY, UNSPECIFIED: ICD-10-CM

## 2022-01-14 DIAGNOSIS — I25.10 CORONARY ARTERY DISEASE INVOLVING NATIVE CORONARY ARTERY OF NATIVE HEART WITHOUT ANGINA PECTORIS: ICD-10-CM

## 2022-01-14 DIAGNOSIS — Z13.1 DIABETES MELLITUS SCREENING: ICD-10-CM

## 2022-01-14 LAB
CHOLEST SERPL-MCNC: 230 MG/DL
FASTING STATUS PATIENT QL REPORTED: YES
HBA1C MFR BLD: 5.3 % (ref 0–5.6)
HDLC SERPL-MCNC: 72 MG/DL
HGB BLD-MCNC: 13.7 G/DL (ref 11.7–15.7)
INR BLD: 1.9 (ref 0.9–1.1)
IRON SERPL-MCNC: 97 UG/DL (ref 42–175)
LDLC SERPL CALC-MCNC: 138 MG/DL
TRIGL SERPL-MCNC: 100 MG/DL

## 2022-01-14 PROCEDURE — 36416 COLLJ CAPILLARY BLOOD SPEC: CPT | Performed by: FAMILY MEDICINE

## 2022-01-14 PROCEDURE — 85610 PROTHROMBIN TIME: CPT | Performed by: FAMILY MEDICINE

## 2022-01-14 PROCEDURE — 99213 OFFICE O/P EST LOW 20 MIN: CPT | Mod: 25 | Performed by: FAMILY MEDICINE

## 2022-01-14 PROCEDURE — 80061 LIPID PANEL: CPT | Performed by: FAMILY MEDICINE

## 2022-01-14 PROCEDURE — 0064A COVID-19,PF,MODERNA (18+ YRS BOOSTER .25ML): CPT | Performed by: FAMILY MEDICINE

## 2022-01-14 PROCEDURE — 36415 COLL VENOUS BLD VENIPUNCTURE: CPT | Performed by: FAMILY MEDICINE

## 2022-01-14 PROCEDURE — G0439 PPPS, SUBSEQ VISIT: HCPCS | Performed by: FAMILY MEDICINE

## 2022-01-14 PROCEDURE — 91306 COVID-19,PF,MODERNA (18+ YRS BOOSTER .25ML): CPT | Performed by: FAMILY MEDICINE

## 2022-01-14 PROCEDURE — 85018 HEMOGLOBIN: CPT | Performed by: FAMILY MEDICINE

## 2022-01-14 PROCEDURE — 83540 ASSAY OF IRON: CPT | Performed by: FAMILY MEDICINE

## 2022-01-14 PROCEDURE — 83036 HEMOGLOBIN GLYCOSYLATED A1C: CPT | Performed by: FAMILY MEDICINE

## 2022-01-14 RX ORDER — AMOXICILLIN 500 MG/1
500 TABLET, FILM COATED ORAL 2 TIMES DAILY
Qty: 2 TABLET | Refills: 3 | Status: SHIPPED | OUTPATIENT
Start: 2022-01-14 | End: 2022-01-19

## 2022-01-14 RX ORDER — AMOXICILLIN 500 MG/1
500 TABLET, FILM COATED ORAL 2 TIMES DAILY
COMMUNITY
End: 2022-01-14

## 2022-01-14 RX ORDER — LEVOTHYROXINE SODIUM 88 UG/1
88 TABLET ORAL DAILY
Qty: 90 TABLET | Refills: 3 | Status: SHIPPED | OUTPATIENT
Start: 2022-01-14 | End: 2023-01-16

## 2022-01-14 NOTE — PATIENT INSTRUCTIONS
Throat clearing  -start with daily antihistamine (claritin or allegra) and either flonase nasal spray or nasal saline spray - all over the counter, use for 2-4 weeks  -if not helping then switch gears and try famotidine (pepcid) for possible mild reflux          Patient Education   Personalized Prevention Plan  You are due for the preventive services outlined below.  Your care team is available to assist you in scheduling these services.  If you have already completed any of these items, please share that information with your care team to update in your medical record.  Health Maintenance Due   Topic Date Due     Osteoporosis Screening  Never done     ANNUAL REVIEW OF HM ORDERS  Never done     COVID-19 Vaccine (3 - Booster for Moderna series) 09/25/2021     FALL RISK ASSESSMENT  12/09/2021        Patient Education   Personalized Prevention Plan  You are due for the preventive services outlined below.  Your care team is available to assist you in scheduling these services.  If you have already completed any of these items, please share that information with your care team to update in your medical record.  Health Maintenance Due   Topic Date Due     Osteoporosis Screening  Never done     ANNUAL REVIEW OF HM ORDERS  Never done     COVID-19 Vaccine (3 - Booster for Moderna series) 08/25/2021     FALL RISK ASSESSMENT  12/09/2021     Preventive Health Recommendations    See your health care provider every year to    Review health changes.     Discuss preventive care.      Review your medicines if your doctor has prescribed any.    You no longer need a yearly Pap test unless you've had an abnormal Pap test in the past 10 years. If you have vaginal symptoms, such as bleeding or discharge, be sure to talk with your provider about a Pap test.    Every 1 to 2 years, have a mammogram.  If you are over 69, talk with your health care provider about whether or not you want to continue having screening mammograms.    Every 10  years, have a colonoscopy. Or, have a yearly FIT test (stool test). These exams will check for colon cancer.     Have a cholesterol test every 5 years, or more often if your doctor advises it.     Have a diabetes test (fasting glucose) every three years. If you are at risk for diabetes, you should have this test more often.     At age 65, have a bone density scan (DEXA) to check for osteoporosis (brittle bone disease).    Shots:    Get a flu shot each year.    Get a tetanus shot every 10 years.    Talk to your doctor about your pneumonia vaccines. There are now two you should receive - Pneumovax (PPSV 23) and Prevnar (PCV 13).    Talk to your pharmacist about the shingles vaccine.    Talk to your doctor about the hepatitis B vaccine.    Nutrition:     Eat at least 5 servings of fruits and vegetables each day.    Eat whole-grain bread, whole-wheat pasta and brown rice instead of white grains and rice.    Get adequate Calcium and Vitamin D.     Lifestyle    Exercise at least 150 minutes a week (30 minutes a day, 5 days a week). This will help you control your weight and prevent disease.    Limit alcohol to one drink per day.    No smoking.     Wear sunscreen to prevent skin cancer.     See your dentist twice a year for an exam and cleaning.    See your eye doctor every 1 to 2 years to screen for conditions such as glaucoma, macular degeneration and cataracts.    Personalized Prevention Plan  You are due for the preventive services outlined below.  Your care team is available to assist you in scheduling these services.  If you have already completed any of these items, please share that information with your care team to update in your medical record.  Health Maintenance   Topic Date Due     DEXA  Never done     ANNUAL REVIEW OF HM ORDERS  Never done     COVID-19 Vaccine (3 - Booster for Moderna series) 08/25/2021     FALL RISK ASSESSMENT  12/09/2021     MEDICARE ANNUAL WELLNESS VISIT  01/14/2023     MAMMO SCREENING   03/02/2023     LIPID  12/09/2025     ADVANCE CARE PLANNING  12/09/2025     DTAP/TDAP/TD IMMUNIZATION (5 - Td or Tdap) 02/27/2030     COLORECTAL CANCER SCREENING  12/21/2030     HEPATITIS C SCREENING  Completed     PHQ-2  Completed     INFLUENZA VACCINE  Completed     Pneumococcal Vaccine: 65+ Years  Completed     ZOSTER IMMUNIZATION  Completed     IPV IMMUNIZATION  Aged Out     MENINGITIS IMMUNIZATION  Aged Out     HEPATITIS B IMMUNIZATION  Aged Out

## 2022-01-14 NOTE — PROGRESS NOTES
"  SUBJECTIVE:   Vandana Thao is a 71 year old female who presents for Preventive Visit.    Patient has been advised of split billing requirements and indicates understanding: Yes  Are you in the first 12 months of your Medicare Part B coverage?  No    Physical Health:  Answers for HPI/ROS submitted by the patient on 2022  In general, how would you rate your overall physical health?: good  Frequency of exercise:: 4-5 days/week  Do you usually eat at least 4 servings of fruit and vegetables a day, include whole grains & fiber, and avoid regularly eating high fat or \"junk\" foods? : No  Taking medications regularly:: Yes  Activities of Daily Living: no assistance needed  Home safety: throw rugs in the hallway, lack of grab bars in the bathroom  Hearing Impairment:: no hearing concerns  In the past 6 months, have you been bothered by leaking of urine?: Yes - related constipation  In general, how would you rate your overall mental or emotional health?: good  Additional concerns today:: Yes  Duration of exercise:: 30-45 minutes    Morning and later in day trouble clearing throat - mucus - going on for a month or more    Spot on face, has been there last summer, wears sunscreen, R cheek, red and dry all the time    Mental Health:    In general, how would you rate your overall mental or emotional health? good  PHQ-2 Score: (P) 0    Do you feel safe in your environment? Yes    Have you ever done Advance Care Planning? (For example, a Health Directive, POLST, or a discussion with a medical provider or your loved ones about your wishes): No, advance care planning information given to patient to review.  Patient plans to discuss their wishes with loved ones or provider.   has papers at home to do    Cognitive Screenin) Repeat 3 items (Leader, Season, Table)    2) Clock draw: NORMAL  3) 3 item recall: Recalls 2 objects   Results: NORMAL clock, 1-2 items recalled: COGNITIVE IMPAIRMENT LESS LIKELY      Do you have " sleep apnea, excessive snoring or daytime drowsiness?: no    Reviewed and updated as needed this visit by clinical staff  Tobacco  Allergies  Meds  Problems  Med Hx  Surg Hx  Fam Hx         Reviewed and updated as needed this visit by Provider  Tobacco  Allergies  Meds  Problems  Med Hx  Surg Hx  Fam Hx        Social History     Tobacco Use     Smoking status: Former Smoker     Packs/day: 0.50     Years: 20.00     Pack years: 10.00     Types: Cigarettes     Quit date: 1979     Years since quittin.0     Smokeless tobacco: Never Used   Substance Use Topics     Alcohol use: Yes                           Current providers sharing in care for this patient include:   Patient Care Team:  Lakeisha Marmolejo MD as PCP - General  Lakeisha Marmolejo MD as Assigned PCP  Nel Zhang MD as Assigned Heart and Vascular Provider    The following health maintenance items are reviewed in Epic and correct as of today:  Health Maintenance   Topic Date Due     DEXA  Never done     ANNUAL REVIEW OF HM ORDERS  Never done     COVID-19 Vaccine (3 - Booster for Moderna series) 2021     FALL RISK ASSESSMENT  2021     MEDICARE ANNUAL WELLNESS VISIT  2023     MAMMO SCREENING  2023     LIPID  2025     ADVANCE CARE PLANNING  2027     DTAP/TDAP/TD IMMUNIZATION (5 - Td or Tdap) 2030     COLORECTAL CANCER SCREENING  2030     HEPATITIS C SCREENING  Completed     PHQ-2  Completed     INFLUENZA VACCINE  Completed     Pneumococcal Vaccine: 65+ Years  Completed     ZOSTER IMMUNIZATION  Completed     IPV IMMUNIZATION  Aged Out     MENINGITIS IMMUNIZATION  Aged Out     HEPATITIS B IMMUNIZATION  Aged Out     ROS:  Constitutional, HEENT, cardiovascular, pulmonary, GI, , musculoskeletal, neuro, skin, endocrine and psych systems are negative, except as otherwise noted.    OBJECTIVE:   /86 (BP Location: Left arm, Patient Position: Sitting, Cuff Size: Adult Regular)    "Pulse 73   Wt 85.3 kg (188 lb 1.6 oz)   SpO2 98%   BMI 33.32 kg/m   Estimated body mass index is 33.32 kg/m  as calculated from the following:    Height as of 9/10/21: 1.6 m (5' 3\").    Weight as of this encounter: 85.3 kg (188 lb 1.6 oz).  EXAM:   GENERAL APPEARANCE: healthy, alert and no distress  EYES: Eyes grossly normal to inspection, conjunctivae and sclerae normal  HENT: ear canals and TM's normal  RESP: lungs clear to auscultation - no rales, rhonchi or wheezes  CV: regular rate and rhythm, normal S1 S2, no S3 or S4, no murmur, click or rub, no peripheral edema and peripheral pulses strong  MS: no musculoskeletal defects are noted and gait is age appropriate without ataxia  SKIN: redness of R cheek - suspicious for rosacea  NEURO: Normal strength and tone, sensory exam grossly normal, mentation intact and speech normal  PSYCH: mentation appears normal and affect normal/bright    ASSESSMENT / PLAN:   Vandana was seen today for wellness visit.    Diagnoses and all orders for this visit:    Encounter for Medicare annual wellness exam  Annual wellness visit completed today.  Labs as below.  Up-to-date with colon cancer screening, due in 2023.  DEXA scan ordered today.  -     Hemoglobin; Future    Acquired hypothyroidism  History of hypothyroidism, TSH normal in September.  Refill provided today.  -     levothyroxine (SYNTHROID/LEVOTHROID) 88 MCG tablet; Take 1 tablet (88 mcg) by mouth daily    Hx of mechanical aortic valve replacement  Anticoagulated on warfarin  History of valve replacement, takes amoxicillin before dental appointments, refill provided today.  -     amoxicillin (AMOXIL) 500 MG tablet; Take 1 tablet (500 mg) by mouth 2 times daily Only takes before dentist appts  -     Hemoglobin; Future    Hypercholesterolemia  History of hyperlipidemia, recheck levels today, currently taking pravastatin.  -     Lipid panel reflex to direct LDL Fasting; Future  -     Hemoglobin; Future    Diabetes mellitus " "screening  We will screen for diabetes today with A1c.  -     Hemoglobin A1c; Future    Postmenopausal status  Due for DEXA scan, ordered today.  -     DX Hip/Pelvis/Spine; Future    Rosacea  Redness of cheek suspicious for rosacea, will trial metronidazole cream, if not improving would consider dermatology referral.  -     metroNIDAZOLE (METROCREAM) 0.75 % external cream; Apply topically 2 times daily To face rash/redness (rosacea)    Brittle nails  Brittle nails per patient report, will check iron and hemoglobin levels today, normal thyroid on Synthroid.  -     Iron; Future  -     Hemoglobin; Future      COUNSELING:  Reviewed preventive health counseling, as reflected in patient instructions       Regular exercise       Healthy diet/nutrition       Bladder control       Osteoporosis prevention/bone health       Colon cancer screening    Estimated body mass index is 33.32 kg/m  as calculated from the following:    Height as of 9/10/21: 1.6 m (5' 3\").    Weight as of this encounter: 85.3 kg (188 lb 1.6 oz).    Weight management plan: Discussed healthy diet and exercise guidelines    She reports that she quit smoking about 43 years ago. Her smoking use included cigarettes. She has a 10.00 pack-year smoking history. She has never used smokeless tobacco.    Appropriate preventive services were discussed with this patient, including applicable screening as appropriate for cardiovascular disease, diabetes, osteopenia/osteoporosis, and glaucoma.  As appropriate for age/gender, discussed screening for colorectal cancer, prostate cancer, breast cancer, and cervical cancer. Checklist reviewing preventive services available has been given to the patient.    Reviewed patients plan of care and provided an AVS. The Basic Care Plan (routine screening as documented in Health Maintenance) for Vandana meets the Care Plan requirement. This Care Plan has been established and reviewed with the Patient.    Lakeisha Marmolejo MD  [facility]

## 2022-01-14 NOTE — PROGRESS NOTES
ANTICOAGULATION MANAGEMENT     Vandana Thao 71 year old female is on warfarin with therapeutic INR result. (Goal INR 1.8-2.2)    Recent labs: (last 7 days)     01/14/22  0902   INR 1.9*       ASSESSMENT     Source(s): Chart Review, Patient/Caregiver Call and Template       Warfarin doses taken: Warfarin taken as instructed    Diet: No new diet changes identified    New illness, injury, or hospitalization: Yes: seen in office today for rash    Medication/supplement changes: Metrocreme will start this weekend once pick it up.  which has potential for interaction in future; increasing subsequent INRs     Signs or symptoms of bleeding or clotting: No    Previous INR: Therapeutic last visit; previously outside of goal range    Additional findings: None     PLAN     Recommended plan for temporary change(s) affecting INR     Dosing Instructions: Continue your current warfarin dose with next INR in 7-10  days       Summary  As of 1/14/2022    Full warfarin instructions:  7.5 mg every Sun, Wed, Fri; 5 mg all other days   Next INR check:  1/25/2022             Telephone call with Vandana who verbalizes understanding and agrees to plan    Lab visit scheduled    Education provided: Goal range and significance of current result, Importance of therapeutic range and Potential interaction between warfarin and metrocreme    Plan made per [facility] anticoagulation protocol    Beth Kerr, RN  Anticoagulation Clinic  1/14/2022    _______________________________________________________________________     Anticoagulation Episode Summary     Current INR goal:     TTR:  --   Target end date:     Send INR reminders to:  ANDREAS JOHN    Indications    Anticoagulated on warfarin [Z79.01]  Hx of mechanical aortic valve replacement [Z95.2]           Comments:  Target goal range 1.8-2.2 per OV note 2/27/20         Anticoagulation Care Providers     Provider Role Specialty Phone number    Lakeisha Marmolejo MD Referring Family  Medicine [phone]

## 2022-01-18 ENCOUNTER — TELEPHONE (OUTPATIENT)
Dept: FAMILY MEDICINE | Facility: CLINIC | Age: 72
End: 2022-01-18
Payer: MEDICARE

## 2022-01-18 DIAGNOSIS — Z79.2 PROPHYLACTIC ANTIBIOTIC: Primary | ICD-10-CM

## 2022-01-18 DIAGNOSIS — Z95.2 HX OF MECHANICAL AORTIC VALVE REPLACEMENT: ICD-10-CM

## 2022-01-18 NOTE — TELEPHONE ENCOUNTER
Pharmacy needing clarification regarding the amoxicillin if its meant to be 4 caps 1 hr before dentist apt. Instead of daily please advise and send new script in if incorrect

## 2022-01-19 RX ORDER — AMOXICILLIN 500 MG/1
2000 CAPSULE ORAL ONCE
Qty: 4 CAPSULE | Refills: 3 | Status: SHIPPED | OUTPATIENT
Start: 2022-01-19 | End: 2023-02-24

## 2022-01-19 RX ORDER — AMOXICILLIN 500 MG/1
500 TABLET, FILM COATED ORAL 2 TIMES DAILY
Qty: 2 TABLET | Refills: 3 | Status: SHIPPED | OUTPATIENT
Start: 2022-01-19 | End: 2022-05-20

## 2022-01-19 NOTE — TELEPHONE ENCOUNTER
[facility] calls back to report that the Amoxicillin prescription is still incorrect for the patient. Pharmacist states that an older Amoxicillin script sent on 02/27/2020 is the correct dose.     I was able to locate the previous script. See below.       Please re-send to pharmacy on file.

## 2022-01-19 NOTE — TELEPHONE ENCOUNTER
"This is odd since our med list and the script I sent says nothing about \"1 hr before dental visits\" - resent in our script    Dr Marmolejo  "

## 2022-01-19 NOTE — TELEPHONE ENCOUNTER
"Spoke with pharmacy and relayed message. Pharmacist says sig says \"take 1 hr before dentist appt.\" Pharmacist requesting it be sent in again with a corrected sig.   "

## 2022-01-20 ENCOUNTER — ANCILLARY PROCEDURE (OUTPATIENT)
Dept: BONE DENSITY | Facility: CLINIC | Age: 72
End: 2022-01-20
Attending: FAMILY MEDICINE
Payer: MEDICARE

## 2022-01-20 DIAGNOSIS — Z78.0 POSTMENOPAUSAL STATUS: ICD-10-CM

## 2022-01-20 PROCEDURE — 77080 DXA BONE DENSITY AXIAL: CPT | Mod: TC | Performed by: RADIOLOGY

## 2022-01-25 ENCOUNTER — LAB (OUTPATIENT)
Dept: LAB | Facility: CLINIC | Age: 72
End: 2022-01-25
Payer: MEDICARE

## 2022-01-25 ENCOUNTER — ANTICOAGULATION THERAPY VISIT (OUTPATIENT)
Dept: ANTICOAGULATION | Facility: CLINIC | Age: 72
End: 2022-01-25

## 2022-01-25 DIAGNOSIS — Z95.2 HX OF MECHANICAL AORTIC VALVE REPLACEMENT: ICD-10-CM

## 2022-01-25 DIAGNOSIS — Z79.01 ANTICOAGULATED ON WARFARIN: Primary | ICD-10-CM

## 2022-01-25 LAB — INR BLD: 2.1 (ref 0.9–1.1)

## 2022-01-25 PROCEDURE — 36416 COLLJ CAPILLARY BLOOD SPEC: CPT

## 2022-01-25 PROCEDURE — 85610 PROTHROMBIN TIME: CPT

## 2022-01-25 NOTE — PROGRESS NOTES
ANTICOAGULATION MANAGEMENT     Vandana ROSARIO Keesha 71 year old female is on warfarin with therapeutic INR result. (Goal INR 1.8-2.2)    Recent labs: (last 7 days)     01/25/22  1014   INR 2.1*       ASSESSMENT     Source(s): Chart Review, Patient/Caregiver Call and Template       Warfarin doses taken: Warfarin taken as instructed    Diet: No new diet changes identified    New illness, injury, or hospitalization: No    Medication/supplement changes: None noted    Signs or symptoms of bleeding or clotting: No    Previous INR: Therapeutic last visit; previously outside of goal range    Additional findings: None     PLAN     Recommended plan for no diet, medication or health factor changes affecting INR     Dosing Instructions: Continue your current warfarin dose with next INR in 3 weeks       Summary  As of 1/25/2022    Full warfarin instructions:  7.5 mg every Sun, Wed, Fri; 5 mg all other days   Next INR check:  2/15/2022             Telephone call with Vandana who verbalizes understanding and agrees to plan    Lab visit scheduled    Education provided: Goal range and significance of current result and Contact [phone] with any changes, questions or concerns.     Plan made per [facility] anticoagulation protocol    Becca Adames RN  Anticoagulation Clinic  1/25/2022    _______________________________________________________________________     Anticoagulation Episode Summary     Current INR goal:     TTR:  --   Target end date:     Send INR reminders to:  ANDREAS JOHN    Indications    Anticoagulated on warfarin [Z79.01]  Hx of mechanical aortic valve replacement [Z95.2]           Comments:  Target goal range 1.8-2.2 per OV note 2/27/20         Anticoagulation Care Providers     Provider Role Specialty Phone number    Lakeisha Marmolejo MD Referring Family Medicine [phone]

## 2022-02-16 ENCOUNTER — DOCUMENTATION ONLY (OUTPATIENT)
Dept: ANTICOAGULATION | Facility: CLINIC | Age: 72
End: 2022-02-16
Payer: MEDICARE

## 2022-02-16 DIAGNOSIS — Z95.2 HX OF MECHANICAL AORTIC VALVE REPLACEMENT: ICD-10-CM

## 2022-02-16 DIAGNOSIS — Z79.01 ANTICOAGULATED ON WARFARIN: Primary | ICD-10-CM

## 2022-02-16 NOTE — PROGRESS NOTES
ANTICOAGULATION MANAGEMENT      Vandana Thao due for annual renewal of referral to anticoagulation monitoring. Order pended for your review and signature.      ANTICOAGULATION SUMMARY      Warfarin indication(s)     Heart Valve Replacement    Heart valve present?  Mechanical AVR-  Jet-Shiley (Tilting disc), Kolb-Aguila (Caged Ball) or Monoleaflet valve       Current goal range   1.8-2.2     Goal appropriate for indication? OV with  on 2/27/20 indicated goal range is 1.8-2.2 per cardiology     Current duration of therapy not indicated   Time in Therapeutic Range (TTR)  (Goal > 60%) Unable to obtain due to nonstandard goal range       Office visit with referring provider's group within last year yes on 1/14/22       Becca Adames RN

## 2022-02-22 ENCOUNTER — LAB (OUTPATIENT)
Dept: LAB | Facility: CLINIC | Age: 72
End: 2022-02-22
Payer: MEDICARE

## 2022-02-22 ENCOUNTER — ANTICOAGULATION THERAPY VISIT (OUTPATIENT)
Dept: ANTICOAGULATION | Facility: CLINIC | Age: 72
End: 2022-02-22

## 2022-02-22 DIAGNOSIS — Z95.2 HX OF MECHANICAL AORTIC VALVE REPLACEMENT: ICD-10-CM

## 2022-02-22 DIAGNOSIS — Z79.01 ANTICOAGULATED ON WARFARIN: ICD-10-CM

## 2022-02-22 DIAGNOSIS — Z79.01 ANTICOAGULATED ON WARFARIN: Primary | ICD-10-CM

## 2022-02-22 LAB — INR BLD: 2.3 (ref 0.9–1.1)

## 2022-02-22 PROCEDURE — 36415 COLL VENOUS BLD VENIPUNCTURE: CPT

## 2022-02-22 PROCEDURE — 85610 PROTHROMBIN TIME: CPT

## 2022-02-22 NOTE — PROGRESS NOTES
ANTICOAGULATION MANAGEMENT     Vandana J Keesha 71 year old female is on warfarin with supratherapeutic INR result. (Goal INR 1.5-2.0)    Recent labs: (last 7 days)     02/22/22  0941   INR 2.3*       ASSESSMENT     Source(s): Chart Review, Patient/Caregiver Call and Template       Warfarin doses taken: Warfarin taken as instructed    Diet: Decreased greens/vitamin K in diet; plans to resume previous intake-- had a different day for her weekly salad (usually Monday but hasn't had it yet)    New illness, injury, or hospitalization: No    Medication/supplement changes: None noted    Signs or symptoms of bleeding or clotting: No    Previous INR: Supratherapeutic    Additional findings: None     PLAN     Recommended plan for temporary change(s) affecting INR     Dosing Instructions: Continue your current warfarin dose with next INR in 2 weeks       Summary  As of 2/22/2022    Full warfarin instructions:  7.5 mg every Sun, Wed, Fri; 5 mg all other days   Next INR check:  3/8/2022             Telephone call with Vandana who verbalizes understanding and agrees to plan    Lab visit scheduled    Education provided: Goal range and significance of current result and Contact [phone] with any changes, questions or concerns.     Plan made per [facility] anticoagulation protocol    Becca Adames RN  Anticoagulation Clinic  2/22/2022    _______________________________________________________________________     Anticoagulation Episode Summary     Current INR goal:  1.5-2.0   TTR:  0.0 %   Target end date:  Indefinite   Send INR reminders to:  ANDREAS JOHN    Indications    Anticoagulated on warfarin [Z79.01]  Hx of mechanical aortic valve replacement [Z95.2]           Comments:  Target goal range 1.8-2.2 per OV note 2/27/20         Anticoagulation Care Providers     Provider Role Specialty Phone number    Lakeisha Marmolejo MD Referring Family Medicine [phone]

## 2022-03-08 ENCOUNTER — ANTICOAGULATION THERAPY VISIT (OUTPATIENT)
Dept: ANTICOAGULATION | Facility: CLINIC | Age: 72
End: 2022-03-08

## 2022-03-08 ENCOUNTER — LAB (OUTPATIENT)
Dept: LAB | Facility: CLINIC | Age: 72
End: 2022-03-08
Payer: MEDICARE

## 2022-03-08 DIAGNOSIS — Z79.01 ANTICOAGULATED ON WARFARIN: Primary | ICD-10-CM

## 2022-03-08 DIAGNOSIS — Z95.2 HX OF MECHANICAL AORTIC VALVE REPLACEMENT: ICD-10-CM

## 2022-03-08 DIAGNOSIS — Z79.01 ANTICOAGULATED ON WARFARIN: ICD-10-CM

## 2022-03-08 LAB — INR BLD: 2.6 (ref 0.9–1.1)

## 2022-03-08 PROCEDURE — 36416 COLLJ CAPILLARY BLOOD SPEC: CPT

## 2022-03-08 PROCEDURE — 85610 PROTHROMBIN TIME: CPT

## 2022-03-08 NOTE — PROGRESS NOTES
ANTICOAGULATION MANAGEMENT     Vandana Thao 71 year old female is on warfarin with supratherapeutic INR result. (Goal INR 1.5-2.0) previous goal was 1.8-2.2    Recent labs: (last 7 days)     03/08/22  1018   INR 2.6*       ASSESSMENT       Source(s): Chart Review, Patient/Caregiver Call and Template       Warfarin doses taken: Warfarin taken as instructed    Diet: No new diet changes identified    New illness, injury, or hospitalization: No    Medication/supplement changes: Prilosec started on about one month ago which has potential for interaction; increasing INR    Signs or symptoms of bleeding or clotting: No    Previous INR: Supratherapeutic    Additional findings: None       PLAN     Recommended plan for ongoing change(s) affecting INR     Dosing Instructions: Partial hold then Decrease your warfarin dose (5.9% change) with next INR in 2 weeks       Summary  As of 3/8/2022    Full warfarin instructions:  3/8: 2.5 mg; Otherwise 7.5 mg every Sun, Wed; 5 mg all other days   Next INR check:  3/22/2022             Telephone call with Vandana who verbalizes understanding and agrees to plan    Lab visit scheduled    Education provided: Goal range and significance of current result and Importance of therapeutic range    Plan made per [facility] anticoagulation protocol    Beth Kerr, RN  Anticoagulation Clinic  3/8/2022    _______________________________________________________________________     Anticoagulation Episode Summary     Current INR goal:  1.5-2.0   TTR:  0.0 % (2 wk)   Target end date:  Indefinite   Send INR reminders to:  ANDREAS JOHN    Indications    Anticoagulated on warfarin [Z79.01]  Hx of mechanical aortic valve replacement [Z95.2]           Comments:  Target goal range 1.8-2.2 per OV note 2/27/20         Anticoagulation Care Providers     Provider Role Specialty Phone number    Lakeisha Marmolejo MD Referring Family Medicine [phone]

## 2022-03-22 ENCOUNTER — LAB (OUTPATIENT)
Dept: LAB | Facility: CLINIC | Age: 72
End: 2022-03-22
Payer: MEDICARE

## 2022-03-22 ENCOUNTER — ANTICOAGULATION THERAPY VISIT (OUTPATIENT)
Dept: ANTICOAGULATION | Facility: CLINIC | Age: 72
End: 2022-03-22

## 2022-03-22 DIAGNOSIS — Z79.01 ANTICOAGULATED ON WARFARIN: Primary | ICD-10-CM

## 2022-03-22 DIAGNOSIS — Z79.01 ANTICOAGULATED ON WARFARIN: ICD-10-CM

## 2022-03-22 DIAGNOSIS — Z95.2 HX OF MECHANICAL AORTIC VALVE REPLACEMENT: ICD-10-CM

## 2022-03-22 LAB — INR BLD: 1.9 (ref 0.9–1.1)

## 2022-03-22 PROCEDURE — 85610 PROTHROMBIN TIME: CPT

## 2022-03-22 PROCEDURE — 36416 COLLJ CAPILLARY BLOOD SPEC: CPT

## 2022-03-22 NOTE — PROGRESS NOTES
ANTICOAGULATION MANAGEMENT     Vandana Thao 71 year old female is on warfarin with therapeutic INR result. (Goal INR 1.5-2.0)previous goal 1.8-2.2    Recent labs: (last 7 days)     03/22/22  1011   INR 1.9*       ASSESSMENT       Source(s): Chart Review, Patient/Caregiver Call and Template       Warfarin doses taken: Less warfarin taken than planned which may be affecting INR    Diet: No new diet changes identified    New illness, injury, or hospitalization: No    Medication/supplement changes: None noted    Signs or symptoms of bleeding or clotting: No    Previous INR: Supratherapeutic    Additional findings: None       PLAN     Recommended plan for no diet, medication or health factor changes affecting INR     Dosing Instructions: Continue your current warfarin dose with next INR in 3 weeks       Summary  As of 3/22/2022    Full warfarin instructions:  7.5 mg every Sun; 5 mg all other days   Next INR check:  4/12/2022             Telephone call with Vandana who verbalizes understanding and agrees to plan    Lab visit scheduled    Education provided: Goal range and significance of current result and Importance of therapeutic range    Plan made per [facility] anticoagulation protocol    Beth Kerr RN  Anticoagulation Clinic  3/22/2022    _______________________________________________________________________     Anticoagulation Episode Summary     Current INR goal:  1.5-2.0   TTR:  7.0 % (4 wk)   Target end date:  Indefinite   Send INR reminders to:  ANDREAS JOHN    Indications    Anticoagulated on warfarin [Z79.01]  Hx of mechanical aortic valve replacement [Z95.2]           Comments:  Target goal range 1.8-2.2 per OV note 2/27/20         Anticoagulation Care Providers     Provider Role Specialty Phone number    Lakeisha Marmolejo MD Referring Family Medicine [phone]

## 2022-03-29 ENCOUNTER — ANCILLARY PROCEDURE (OUTPATIENT)
Dept: CARDIOLOGY | Facility: CLINIC | Age: 72
End: 2022-03-29
Attending: INTERNAL MEDICINE
Payer: MEDICARE

## 2022-03-29 DIAGNOSIS — I44.39 HIGH DEGREE ATRIOVENTRICULAR BLOCK: ICD-10-CM

## 2022-03-29 DIAGNOSIS — Z95.0 CARDIAC PACEMAKER IN SITU: ICD-10-CM

## 2022-03-29 PROCEDURE — 93294 REM INTERROG EVL PM/LDLS PM: CPT | Performed by: INTERNAL MEDICINE

## 2022-03-29 PROCEDURE — 93296 REM INTERROG EVL PM/IDS: CPT | Performed by: INTERNAL MEDICINE

## 2022-04-05 ENCOUNTER — ANTICOAGULATION THERAPY VISIT (OUTPATIENT)
Dept: ANTICOAGULATION | Facility: CLINIC | Age: 72
End: 2022-04-05

## 2022-04-05 ENCOUNTER — LAB (OUTPATIENT)
Dept: LAB | Facility: CLINIC | Age: 72
End: 2022-04-05
Payer: MEDICARE

## 2022-04-05 DIAGNOSIS — Z79.01 ANTICOAGULATED ON WARFARIN: Primary | ICD-10-CM

## 2022-04-05 DIAGNOSIS — Z95.2 HX OF MECHANICAL AORTIC VALVE REPLACEMENT: ICD-10-CM

## 2022-04-05 DIAGNOSIS — Z79.01 ANTICOAGULATED ON WARFARIN: ICD-10-CM

## 2022-04-05 LAB — INR BLD: 1.8 (ref 0.9–1.1)

## 2022-04-05 PROCEDURE — 36415 COLL VENOUS BLD VENIPUNCTURE: CPT

## 2022-04-05 PROCEDURE — 85610 PROTHROMBIN TIME: CPT

## 2022-04-05 NOTE — PROGRESS NOTES
ANTICOAGULATION MANAGEMENT     Vandana ROSARIO Keesha 71 year old female is on warfarin with therapeutic INR result. (Goal INR 1.5-2.0)    Recent labs: (last 7 days)     04/05/22  1146   INR 1.8*       ASSESSMENT       Source(s): Chart Review, Patient/Caregiver Call and Template       Warfarin doses taken: Warfarin taken as instructed    Diet: No new diet changes identified    New illness, injury, or hospitalization: No    Medication/supplement changes: None noted    Signs or symptoms of bleeding or clotting: No    Previous INR: Therapeutic and previous out of range    Additional findings: None       PLAN     Recommended plan for no diet, medication or health factor changes affecting INR     Dosing Instructions: continue your current warfarin dose with next INR in 4 weeks       Summary  As of 4/5/2022    Full warfarin instructions:  7.5 mg every Sun; 5 mg all other days   Next INR check:  5/3/2022             Telephone call with Vandana who verbalizes understanding and agrees to plan    Lab visit scheduled    Education provided: Please call back if any changes to your diet, medications or how you've been taking warfarin    Plan made per [facility] anticoagulation protocol    Dorene Howard RN  Anticoagulation Clinic  4/5/2022    _______________________________________________________________________     Anticoagulation Episode Summary     Current INR goal:  1.5-2.0   TTR:  37.8 % (1.4 mo)   Target end date:  Indefinite   Send INR reminders to:  ANDREAS JOHN    Indications    Anticoagulated on warfarin [Z79.01]  Hx of mechanical aortic valve replacement [Z95.2]           Comments:  Target goal range 1.8-2.2 per OV note 2/27/20         Anticoagulation Care Providers     Provider Role Specialty Phone number    Lakeisha Marmolejo MD Referring Family Medicine [phone]

## 2022-04-07 LAB
MDC_IDC_LEAD_IMPLANT_DT: NORMAL
MDC_IDC_LEAD_IMPLANT_DT: NORMAL
MDC_IDC_LEAD_LOCATION: NORMAL
MDC_IDC_LEAD_LOCATION: NORMAL
MDC_IDC_LEAD_LOCATION_DETAIL_1: NORMAL
MDC_IDC_LEAD_LOCATION_DETAIL_1: NORMAL
MDC_IDC_LEAD_MFG: NORMAL
MDC_IDC_LEAD_MFG: NORMAL
MDC_IDC_LEAD_MODEL: NORMAL
MDC_IDC_LEAD_MODEL: NORMAL
MDC_IDC_LEAD_POLARITY_TYPE: NORMAL
MDC_IDC_LEAD_POLARITY_TYPE: NORMAL
MDC_IDC_LEAD_SERIAL: NORMAL
MDC_IDC_LEAD_SERIAL: NORMAL
MDC_IDC_LEAD_SPECIAL_FUNCTION: NORMAL
MDC_IDC_LEAD_SPECIAL_FUNCTION: NORMAL
MDC_IDC_MSMT_BATTERY_DTM: NORMAL
MDC_IDC_MSMT_BATTERY_REMAINING_LONGEVITY: 133 MO
MDC_IDC_MSMT_BATTERY_RRT_TRIGGER: 2.62
MDC_IDC_MSMT_BATTERY_STATUS: NORMAL
MDC_IDC_MSMT_BATTERY_VOLTAGE: 3.01 V
MDC_IDC_MSMT_LEADCHNL_RA_IMPEDANCE_VALUE: 323 OHM
MDC_IDC_MSMT_LEADCHNL_RA_IMPEDANCE_VALUE: 456 OHM
MDC_IDC_MSMT_LEADCHNL_RA_PACING_THRESHOLD_AMPLITUDE: 0.75 V
MDC_IDC_MSMT_LEADCHNL_RA_PACING_THRESHOLD_PULSEWIDTH: 0.4 MS
MDC_IDC_MSMT_LEADCHNL_RA_SENSING_INTR_AMPL: 4 MV
MDC_IDC_MSMT_LEADCHNL_RA_SENSING_INTR_AMPL: 4 MV
MDC_IDC_MSMT_LEADCHNL_RV_IMPEDANCE_VALUE: 323 OHM
MDC_IDC_MSMT_LEADCHNL_RV_IMPEDANCE_VALUE: 418 OHM
MDC_IDC_MSMT_LEADCHNL_RV_PACING_THRESHOLD_AMPLITUDE: 1 V
MDC_IDC_MSMT_LEADCHNL_RV_PACING_THRESHOLD_PULSEWIDTH: 0.4 MS
MDC_IDC_MSMT_LEADCHNL_RV_SENSING_INTR_AMPL: 9.25 MV
MDC_IDC_MSMT_LEADCHNL_RV_SENSING_INTR_AMPL: 9.25 MV
MDC_IDC_PG_IMPLANT_DTM: NORMAL
MDC_IDC_PG_MFG: NORMAL
MDC_IDC_PG_MODEL: NORMAL
MDC_IDC_PG_SERIAL: NORMAL
MDC_IDC_PG_TYPE: NORMAL
MDC_IDC_SESS_CLINIC_NAME: NORMAL
MDC_IDC_SESS_DTM: NORMAL
MDC_IDC_SESS_TYPE: NORMAL
MDC_IDC_SET_BRADY_AT_MODE_SWITCH_RATE: 171 {BEATS}/MIN
MDC_IDC_SET_BRADY_HYSTRATE: NORMAL
MDC_IDC_SET_BRADY_LOWRATE: 60 {BEATS}/MIN
MDC_IDC_SET_BRADY_MAX_SENSOR_RATE: 130 {BEATS}/MIN
MDC_IDC_SET_BRADY_MAX_TRACKING_RATE: 130 {BEATS}/MIN
MDC_IDC_SET_BRADY_MODE: NORMAL
MDC_IDC_SET_BRADY_PAV_DELAY_LOW: 270 MS
MDC_IDC_SET_BRADY_SAV_DELAY_LOW: 240 MS
MDC_IDC_SET_LEADCHNL_RA_PACING_AMPLITUDE: 1.5 V
MDC_IDC_SET_LEADCHNL_RA_PACING_ANODE_ELECTRODE_1: NORMAL
MDC_IDC_SET_LEADCHNL_RA_PACING_ANODE_LOCATION_1: NORMAL
MDC_IDC_SET_LEADCHNL_RA_PACING_CAPTURE_MODE: NORMAL
MDC_IDC_SET_LEADCHNL_RA_PACING_CATHODE_ELECTRODE_1: NORMAL
MDC_IDC_SET_LEADCHNL_RA_PACING_CATHODE_LOCATION_1: NORMAL
MDC_IDC_SET_LEADCHNL_RA_PACING_POLARITY: NORMAL
MDC_IDC_SET_LEADCHNL_RA_PACING_PULSEWIDTH: 0.4 MS
MDC_IDC_SET_LEADCHNL_RA_SENSING_ANODE_ELECTRODE_1: NORMAL
MDC_IDC_SET_LEADCHNL_RA_SENSING_ANODE_LOCATION_1: NORMAL
MDC_IDC_SET_LEADCHNL_RA_SENSING_CATHODE_ELECTRODE_1: NORMAL
MDC_IDC_SET_LEADCHNL_RA_SENSING_CATHODE_LOCATION_1: NORMAL
MDC_IDC_SET_LEADCHNL_RA_SENSING_POLARITY: NORMAL
MDC_IDC_SET_LEADCHNL_RA_SENSING_SENSITIVITY: 0.3 MV
MDC_IDC_SET_LEADCHNL_RV_PACING_AMPLITUDE: 1.5 V
MDC_IDC_SET_LEADCHNL_RV_PACING_ANODE_ELECTRODE_1: NORMAL
MDC_IDC_SET_LEADCHNL_RV_PACING_ANODE_LOCATION_1: NORMAL
MDC_IDC_SET_LEADCHNL_RV_PACING_CAPTURE_MODE: NORMAL
MDC_IDC_SET_LEADCHNL_RV_PACING_CATHODE_ELECTRODE_1: NORMAL
MDC_IDC_SET_LEADCHNL_RV_PACING_CATHODE_LOCATION_1: NORMAL
MDC_IDC_SET_LEADCHNL_RV_PACING_POLARITY: NORMAL
MDC_IDC_SET_LEADCHNL_RV_PACING_PULSEWIDTH: 0.4 MS
MDC_IDC_SET_LEADCHNL_RV_SENSING_ANODE_ELECTRODE_1: NORMAL
MDC_IDC_SET_LEADCHNL_RV_SENSING_ANODE_LOCATION_1: NORMAL
MDC_IDC_SET_LEADCHNL_RV_SENSING_CATHODE_ELECTRODE_1: NORMAL
MDC_IDC_SET_LEADCHNL_RV_SENSING_CATHODE_LOCATION_1: NORMAL
MDC_IDC_SET_LEADCHNL_RV_SENSING_POLARITY: NORMAL
MDC_IDC_SET_LEADCHNL_RV_SENSING_SENSITIVITY: 0.9 MV
MDC_IDC_SET_ZONE_DETECTION_INTERVAL: 350 MS
MDC_IDC_SET_ZONE_DETECTION_INTERVAL: 400 MS
MDC_IDC_SET_ZONE_TYPE: NORMAL
MDC_IDC_STAT_AT_BURDEN_PERCENT: 0 %
MDC_IDC_STAT_AT_DTM_END: NORMAL
MDC_IDC_STAT_AT_DTM_START: NORMAL
MDC_IDC_STAT_BRADY_AP_VP_PERCENT: 0.16 %
MDC_IDC_STAT_BRADY_AP_VS_PERCENT: 32.49 %
MDC_IDC_STAT_BRADY_AS_VP_PERCENT: 0.03 %
MDC_IDC_STAT_BRADY_AS_VS_PERCENT: 67.32 %
MDC_IDC_STAT_BRADY_DTM_END: NORMAL
MDC_IDC_STAT_BRADY_DTM_START: NORMAL
MDC_IDC_STAT_BRADY_RA_PERCENT_PACED: 32.63 %
MDC_IDC_STAT_BRADY_RV_PERCENT_PACED: 0.19 %
MDC_IDC_STAT_EPISODE_RECENT_COUNT: 0
MDC_IDC_STAT_EPISODE_RECENT_COUNT_DTM_END: NORMAL
MDC_IDC_STAT_EPISODE_RECENT_COUNT_DTM_START: NORMAL
MDC_IDC_STAT_EPISODE_TOTAL_COUNT: 0
MDC_IDC_STAT_EPISODE_TOTAL_COUNT: 0
MDC_IDC_STAT_EPISODE_TOTAL_COUNT: 554
MDC_IDC_STAT_EPISODE_TOTAL_COUNT: 6
MDC_IDC_STAT_EPISODE_TOTAL_COUNT: NORMAL
MDC_IDC_STAT_EPISODE_TOTAL_COUNT_DTM_END: NORMAL
MDC_IDC_STAT_EPISODE_TOTAL_COUNT_DTM_START: NORMAL
MDC_IDC_STAT_EPISODE_TYPE: NORMAL

## 2022-04-09 DIAGNOSIS — Z79.01 ANTICOAGULATED ON WARFARIN: ICD-10-CM

## 2022-04-11 RX ORDER — WARFARIN SODIUM 5 MG/1
5-7.5 TABLET ORAL DAILY
Qty: 135 TABLET | Refills: 1 | Status: SHIPPED | OUTPATIENT
Start: 2022-04-11 | End: 2022-12-14

## 2022-04-11 NOTE — TELEPHONE ENCOUNTER
Chart reviewed for warfarin refill request.    INR checked within past 90 days, most recently on 4/5/22. Last OV on 1/14/22. Referral UTD, last renewed on 2/16/22.   Current sig checked and matches dosing from last INR check note.    Warfarin refilled per [facility] protocol.     Becca Adames RN   Anticoagulation Clinic

## 2022-04-11 NOTE — TELEPHONE ENCOUNTER
"Routing refill request to provider for review/approval because:  Labs out of range:  INR  Anticoagulation protocol - route to pool.    Last Written Prescription Date:  10/29/21  Last Fill Quantity: 123,  # refills: 1   Last office visit provider:  1/14/22     Requested Prescriptions   Pending Prescriptions Disp Refills     warfarin ANTICOAGULANT (COUMADIN) 5 MG tablet [Pharmacy Med Name: WARFARIN SODIUM 5 MG TABLET] 123 tablet 1     Sig: TAKE 1 TO 1.5 TABLETS (5 TO 7.5 MG) BY MOUTH DAILY. ADJUST DOSE PER INR RESULTS AS INSTRUCTED.       Vitamin K Antagonists Failed - 4/11/2022  1:45 PM        Failed - INR is within goal in the past 6 weeks     Confirm INR is within goal in the past 6 weeks.     Recent Labs   Lab Test 04/05/22  1146   INR 1.8*                       Passed - Recent (12 mo) or future (30 days) visit within the authorizing provider's specialty     Patient has had an office visit with the authorizing provider or a provider within the authorizing providers department within the previous 12 mos or has a future within next 30 days. See \"Patient Info\" tab in inbasket, or \"Choose Columns\" in Meds & Orders section of the refill encounter.              Passed - Medication is active on med list        Passed - Patient is 18 years of age or older        Passed - Patient is not pregnant        Passed - No positive pregnancy on file in past 12 months             Maynor Saldaña RN 04/11/22 1:45 PM  "

## 2022-05-03 ENCOUNTER — ANTICOAGULATION THERAPY VISIT (OUTPATIENT)
Dept: ANTICOAGULATION | Facility: CLINIC | Age: 72
End: 2022-05-03

## 2022-05-03 ENCOUNTER — LAB (OUTPATIENT)
Dept: LAB | Facility: CLINIC | Age: 72
End: 2022-05-03
Payer: MEDICARE

## 2022-05-03 DIAGNOSIS — Z95.2 HX OF MECHANICAL AORTIC VALVE REPLACEMENT: ICD-10-CM

## 2022-05-03 DIAGNOSIS — Z79.01 ANTICOAGULATED ON WARFARIN: Primary | ICD-10-CM

## 2022-05-03 DIAGNOSIS — Z79.01 ANTICOAGULATED ON WARFARIN: ICD-10-CM

## 2022-05-03 LAB — INR BLD: 1.9 (ref 0.9–1.1)

## 2022-05-03 PROCEDURE — 36416 COLLJ CAPILLARY BLOOD SPEC: CPT

## 2022-05-03 PROCEDURE — 85610 PROTHROMBIN TIME: CPT

## 2022-05-03 NOTE — PROGRESS NOTES
ANTICOAGULATION MANAGEMENT     Vandana Thao 71 year old female is on warfarin with therapeutic INR result. (Goal INR 1.5-2.0)    Recent labs: (last 7 days)     05/03/22  1029   INR 1.9*       ASSESSMENT       Source(s): Chart Review, Patient/Caregiver Call and Template       Warfarin doses taken: Warfarin taken as instructed    Diet: No new diet changes identified    New illness, injury, or hospitalization: No    Medication/supplement changes: None noted    Signs or symptoms of bleeding or clotting: No    Previous INR: Therapeutic last 2(+) visits    Additional findings: None       PLAN     Recommended plan for no diet, medication or health factor changes affecting INR     Dosing Instructions: continue your current warfarin dose with next INR in 5 weeks       Summary  As of 5/3/2022    Full warfarin instructions:  7.5 mg every Sun; 5 mg all other days   Next INR check:  6/7/2022             Telephone call with Vandana who verbalizes understanding and agrees to plan    Lab visit scheduled    Education provided: Goal range and significance of current result and Contact [phone] with any changes, questions or concerns.     Plan made per [facility] anticoagulation protocol    Becca Adames RN  Anticoagulation Clinic  5/3/2022    _______________________________________________________________________     Anticoagulation Episode Summary     Current INR goal:  1.5-2.0   TTR:  62.7 % (2.3 mo)   Target end date:  Indefinite   Send INR reminders to:  ANDREAS JOHN    Indications    Anticoagulated on warfarin [Z79.01]  Hx of mechanical aortic valve replacement [Z95.2]           Comments:  Target goal range 1.8-2.2 per OV note 2/27/20         Anticoagulation Care Providers     Provider Role Specialty Phone number    Lakeisha Marmolejo MD Referring Family Medicine [phone]

## 2022-05-09 ENCOUNTER — LAB (OUTPATIENT)
Dept: FAMILY MEDICINE | Facility: CLINIC | Age: 72
End: 2022-05-09
Payer: MEDICARE

## 2022-05-09 DIAGNOSIS — Z20.822 SUSPECTED COVID-19 VIRUS INFECTION: ICD-10-CM

## 2022-05-09 PROCEDURE — U0003 INFECTIOUS AGENT DETECTION BY NUCLEIC ACID (DNA OR RNA); SEVERE ACUTE RESPIRATORY SYNDROME CORONAVIRUS 2 (SARS-COV-2) (CORONAVIRUS DISEASE [COVID-19]), AMPLIFIED PROBE TECHNIQUE, MAKING USE OF HIGH THROUGHPUT TECHNOLOGIES AS DESCRIBED BY CMS-2020-01-R: HCPCS

## 2022-05-09 PROCEDURE — U0005 INFEC AGEN DETEC AMPLI PROBE: HCPCS

## 2022-05-09 PROCEDURE — 99207 PR NO CHARGE LOS: CPT

## 2022-05-10 LAB — SARS-COV-2 RNA RESP QL NAA+PROBE: NEGATIVE

## 2022-05-20 ENCOUNTER — OFFICE VISIT (OUTPATIENT)
Dept: FAMILY MEDICINE | Facility: CLINIC | Age: 72
End: 2022-05-20
Payer: MEDICARE

## 2022-05-20 VITALS
SYSTOLIC BLOOD PRESSURE: 164 MMHG | TEMPERATURE: 98.1 F | RESPIRATION RATE: 16 BRPM | BODY MASS INDEX: 33.46 KG/M2 | WEIGHT: 188.9 LBS | HEART RATE: 80 BPM | DIASTOLIC BLOOD PRESSURE: 84 MMHG | OXYGEN SATURATION: 95 %

## 2022-05-20 DIAGNOSIS — H65.92 OME (OTITIS MEDIA WITH EFFUSION), LEFT: Primary | ICD-10-CM

## 2022-05-20 PROCEDURE — 99213 OFFICE O/P EST LOW 20 MIN: CPT

## 2022-05-20 NOTE — PROGRESS NOTES
Assessment & Plan     1. OME (otitis media with effusion), left      Use loratadine D 12 hr for the next 5-7 days. Follow up in clinic if not improving over the next week.                 [facility]    Juan Carlos Ross is a 71 year old female who presents to clinic today for the following health issues:  Chief Complaint   Patient presents with     URI     Ongoing cold sx that don't go away since April, now complains of pain in left ear and some sore throat at this side      HPI    New symptoms started with sore throat and left ear pain about 2 weeks ago. Negative COVID on 5/9/22. No facial pain. Some post nasal drainage. No runny nose. Rare cough. No body aches. Left ear with no discharge. Took loratadine D 12 hr for 2 days. Took one yesterday.           Review of Systems        Objective    BP (!) 164/84   Pulse 80   Temp 98.1  F (36.7  C) (Oral)   Resp 16   Wt 85.7 kg (188 lb 14.4 oz)   SpO2 95%   BMI 33.46 kg/m    Physical Exam  Vitals and nursing note reviewed.   Constitutional:       General: She is not in acute distress.     Appearance: She is well-developed. She is not diaphoretic.   HENT:      Head: Normocephalic and atraumatic.      Right Ear: Tympanic membrane and external ear normal.      Left Ear: Tympanic membrane and external ear normal.   Eyes:      Pupils: Pupils are equal, round, and reactive to light.   Cardiovascular:      Rate and Rhythm: Normal rate and regular rhythm.   Pulmonary:      Effort: Pulmonary effort is normal. No respiratory distress.      Breath sounds: Normal breath sounds.   Musculoskeletal:      Cervical back: Normal range of motion and neck supple.   Lymphadenopathy:      Cervical: No cervical adenopathy.   Skin:     General: Skin is warm and dry.   Neurological:      Mental Status: She is alert.      Cranial Nerves: No cranial nerve deficit.

## 2022-06-07 ENCOUNTER — LAB (OUTPATIENT)
Dept: LAB | Facility: CLINIC | Age: 72
End: 2022-06-07
Payer: MEDICARE

## 2022-06-07 ENCOUNTER — ANTICOAGULATION THERAPY VISIT (OUTPATIENT)
Dept: ANTICOAGULATION | Facility: CLINIC | Age: 72
End: 2022-06-07

## 2022-06-07 DIAGNOSIS — Z95.2 HX OF MECHANICAL AORTIC VALVE REPLACEMENT: ICD-10-CM

## 2022-06-07 DIAGNOSIS — Z79.01 ANTICOAGULATED ON WARFARIN: Primary | ICD-10-CM

## 2022-06-07 DIAGNOSIS — Z79.01 ANTICOAGULATED ON WARFARIN: ICD-10-CM

## 2022-06-07 LAB — INR BLD: 1.7 (ref 0.9–1.1)

## 2022-06-07 PROCEDURE — 36416 COLLJ CAPILLARY BLOOD SPEC: CPT

## 2022-06-07 PROCEDURE — 85610 PROTHROMBIN TIME: CPT

## 2022-06-07 NOTE — PROGRESS NOTES
ANTICOAGULATION MANAGEMENT     Vandana Thao 71 year old female is on warfarin with therapeutic INR result. (Goal INR 1.5-2.0)    Recent labs: (last 7 days)     06/07/22  1031   INR 1.7*       ASSESSMENT       Source(s): Chart Review and Template       Warfarin doses taken: Warfarin taken as instructed    Diet: No new diet changes identified    New illness, injury, or hospitalization: No    Medication/supplement changes: None noted    Signs or symptoms of bleeding or clotting: No    Previous INR: Therapeutic last 2(+) visits    Additional findings: None       PLAN     Recommended plan for no diet, medication or health factor changes affecting INR     Dosing Instructions: continue your current warfarin dose with next INR in 6 weeks       Summary  As of 6/7/2022    Full warfarin instructions:  7.5 mg every Sun; 5 mg all other days   Next INR check:  7/19/2022             Detailed voice message left for Vandana with dosing instructions and follow up date.     Contact [phone] to schedule and with any changes, questions or concerns.     Education provided: Please call back if any changes to your diet, medications or how you've been taking warfarin and Goal range and significance of current result    Plan made per [facility] anticoagulation protocol    Becca Adames RN  Anticoagulation Clinic  6/7/2022    _______________________________________________________________________     Anticoagulation Episode Summary     Current INR goal:  1.5-2.0   TTR:  75.1 % (3.5 mo)   Target end date:  Indefinite   Send INR reminders to:  ANDREAS JOHN    Indications    Anticoagulated on warfarin [Z79.01]  Hx of mechanical aortic valve replacement [Z95.2]           Comments:           Anticoagulation Care Providers     Provider Role Specialty Phone number    Lakeisha Marmolejo MD Referring [facility] Medicine [phone]

## 2022-07-12 ENCOUNTER — ANCILLARY PROCEDURE (OUTPATIENT)
Dept: CARDIOLOGY | Facility: CLINIC | Age: 72
End: 2022-07-12
Attending: INTERNAL MEDICINE
Payer: MEDICARE

## 2022-07-12 DIAGNOSIS — I44.2 THIRD DEGREE AV BLOCK (H): ICD-10-CM

## 2022-07-12 DIAGNOSIS — Z95.0 CARDIAC PACEMAKER IN SITU: ICD-10-CM

## 2022-07-12 LAB
MDC_IDC_LEAD_IMPLANT_DT: NORMAL
MDC_IDC_LEAD_IMPLANT_DT: NORMAL
MDC_IDC_LEAD_LOCATION: NORMAL
MDC_IDC_LEAD_LOCATION: NORMAL
MDC_IDC_LEAD_LOCATION_DETAIL_1: NORMAL
MDC_IDC_LEAD_LOCATION_DETAIL_1: NORMAL
MDC_IDC_LEAD_MFG: NORMAL
MDC_IDC_LEAD_MFG: NORMAL
MDC_IDC_LEAD_MODEL: NORMAL
MDC_IDC_LEAD_MODEL: NORMAL
MDC_IDC_LEAD_POLARITY_TYPE: NORMAL
MDC_IDC_LEAD_POLARITY_TYPE: NORMAL
MDC_IDC_LEAD_SERIAL: NORMAL
MDC_IDC_LEAD_SERIAL: NORMAL
MDC_IDC_LEAD_SPECIAL_FUNCTION: NORMAL
MDC_IDC_LEAD_SPECIAL_FUNCTION: NORMAL
MDC_IDC_MSMT_BATTERY_DTM: NORMAL
MDC_IDC_MSMT_BATTERY_REMAINING_LONGEVITY: 130 MO
MDC_IDC_MSMT_BATTERY_RRT_TRIGGER: 2.62
MDC_IDC_MSMT_BATTERY_STATUS: NORMAL
MDC_IDC_MSMT_BATTERY_VOLTAGE: 3.01 V
MDC_IDC_MSMT_LEADCHNL_RA_IMPEDANCE_VALUE: 323 OHM
MDC_IDC_MSMT_LEADCHNL_RA_IMPEDANCE_VALUE: 494 OHM
MDC_IDC_MSMT_LEADCHNL_RA_PACING_THRESHOLD_AMPLITUDE: 0.88 V
MDC_IDC_MSMT_LEADCHNL_RA_PACING_THRESHOLD_PULSEWIDTH: 0.4 MS
MDC_IDC_MSMT_LEADCHNL_RA_SENSING_INTR_AMPL: 3.88 MV
MDC_IDC_MSMT_LEADCHNL_RA_SENSING_INTR_AMPL: 3.88 MV
MDC_IDC_MSMT_LEADCHNL_RV_IMPEDANCE_VALUE: 323 OHM
MDC_IDC_MSMT_LEADCHNL_RV_IMPEDANCE_VALUE: 418 OHM
MDC_IDC_MSMT_LEADCHNL_RV_PACING_THRESHOLD_AMPLITUDE: 1 V
MDC_IDC_MSMT_LEADCHNL_RV_PACING_THRESHOLD_PULSEWIDTH: 0.4 MS
MDC_IDC_MSMT_LEADCHNL_RV_SENSING_INTR_AMPL: 8.5 MV
MDC_IDC_MSMT_LEADCHNL_RV_SENSING_INTR_AMPL: 8.5 MV
MDC_IDC_PG_IMPLANT_DTM: NORMAL
MDC_IDC_PG_MFG: NORMAL
MDC_IDC_PG_MODEL: NORMAL
MDC_IDC_PG_SERIAL: NORMAL
MDC_IDC_PG_TYPE: NORMAL
MDC_IDC_SESS_CLINIC_NAME: NORMAL
MDC_IDC_SESS_DTM: NORMAL
MDC_IDC_SESS_TYPE: NORMAL
MDC_IDC_SET_BRADY_AT_MODE_SWITCH_RATE: 171 {BEATS}/MIN
MDC_IDC_SET_BRADY_HYSTRATE: NORMAL
MDC_IDC_SET_BRADY_LOWRATE: 60 {BEATS}/MIN
MDC_IDC_SET_BRADY_MAX_SENSOR_RATE: 130 {BEATS}/MIN
MDC_IDC_SET_BRADY_MAX_TRACKING_RATE: 130 {BEATS}/MIN
MDC_IDC_SET_BRADY_MODE: NORMAL
MDC_IDC_SET_BRADY_PAV_DELAY_LOW: 270 MS
MDC_IDC_SET_BRADY_SAV_DELAY_LOW: 240 MS
MDC_IDC_SET_LEADCHNL_RA_PACING_AMPLITUDE: 1.5 V
MDC_IDC_SET_LEADCHNL_RA_PACING_ANODE_ELECTRODE_1: NORMAL
MDC_IDC_SET_LEADCHNL_RA_PACING_ANODE_LOCATION_1: NORMAL
MDC_IDC_SET_LEADCHNL_RA_PACING_CAPTURE_MODE: NORMAL
MDC_IDC_SET_LEADCHNL_RA_PACING_CATHODE_ELECTRODE_1: NORMAL
MDC_IDC_SET_LEADCHNL_RA_PACING_CATHODE_LOCATION_1: NORMAL
MDC_IDC_SET_LEADCHNL_RA_PACING_POLARITY: NORMAL
MDC_IDC_SET_LEADCHNL_RA_PACING_PULSEWIDTH: 0.4 MS
MDC_IDC_SET_LEADCHNL_RA_SENSING_ANODE_ELECTRODE_1: NORMAL
MDC_IDC_SET_LEADCHNL_RA_SENSING_ANODE_LOCATION_1: NORMAL
MDC_IDC_SET_LEADCHNL_RA_SENSING_CATHODE_ELECTRODE_1: NORMAL
MDC_IDC_SET_LEADCHNL_RA_SENSING_CATHODE_LOCATION_1: NORMAL
MDC_IDC_SET_LEADCHNL_RA_SENSING_POLARITY: NORMAL
MDC_IDC_SET_LEADCHNL_RA_SENSING_SENSITIVITY: 0.3 MV
MDC_IDC_SET_LEADCHNL_RV_PACING_AMPLITUDE: 1.5 V
MDC_IDC_SET_LEADCHNL_RV_PACING_ANODE_ELECTRODE_1: NORMAL
MDC_IDC_SET_LEADCHNL_RV_PACING_ANODE_LOCATION_1: NORMAL
MDC_IDC_SET_LEADCHNL_RV_PACING_CAPTURE_MODE: NORMAL
MDC_IDC_SET_LEADCHNL_RV_PACING_CATHODE_ELECTRODE_1: NORMAL
MDC_IDC_SET_LEADCHNL_RV_PACING_CATHODE_LOCATION_1: NORMAL
MDC_IDC_SET_LEADCHNL_RV_PACING_POLARITY: NORMAL
MDC_IDC_SET_LEADCHNL_RV_PACING_PULSEWIDTH: 0.4 MS
MDC_IDC_SET_LEADCHNL_RV_SENSING_ANODE_ELECTRODE_1: NORMAL
MDC_IDC_SET_LEADCHNL_RV_SENSING_ANODE_LOCATION_1: NORMAL
MDC_IDC_SET_LEADCHNL_RV_SENSING_CATHODE_ELECTRODE_1: NORMAL
MDC_IDC_SET_LEADCHNL_RV_SENSING_CATHODE_LOCATION_1: NORMAL
MDC_IDC_SET_LEADCHNL_RV_SENSING_POLARITY: NORMAL
MDC_IDC_SET_LEADCHNL_RV_SENSING_SENSITIVITY: 0.9 MV
MDC_IDC_SET_ZONE_DETECTION_INTERVAL: 350 MS
MDC_IDC_SET_ZONE_DETECTION_INTERVAL: 400 MS
MDC_IDC_SET_ZONE_TYPE: NORMAL
MDC_IDC_STAT_AT_BURDEN_PERCENT: 0 %
MDC_IDC_STAT_AT_DTM_END: NORMAL
MDC_IDC_STAT_AT_DTM_START: NORMAL
MDC_IDC_STAT_BRADY_AP_VP_PERCENT: 0.21 %
MDC_IDC_STAT_BRADY_AP_VS_PERCENT: 39.85 %
MDC_IDC_STAT_BRADY_AS_VP_PERCENT: 0.02 %
MDC_IDC_STAT_BRADY_AS_VS_PERCENT: 59.91 %
MDC_IDC_STAT_BRADY_DTM_END: NORMAL
MDC_IDC_STAT_BRADY_DTM_START: NORMAL
MDC_IDC_STAT_BRADY_RA_PERCENT_PACED: 40.06 %
MDC_IDC_STAT_BRADY_RV_PERCENT_PACED: 0.23 %
MDC_IDC_STAT_EPISODE_RECENT_COUNT: 0
MDC_IDC_STAT_EPISODE_RECENT_COUNT_DTM_END: NORMAL
MDC_IDC_STAT_EPISODE_RECENT_COUNT_DTM_START: NORMAL
MDC_IDC_STAT_EPISODE_TOTAL_COUNT: 0
MDC_IDC_STAT_EPISODE_TOTAL_COUNT: 0
MDC_IDC_STAT_EPISODE_TOTAL_COUNT: 554
MDC_IDC_STAT_EPISODE_TOTAL_COUNT: 6
MDC_IDC_STAT_EPISODE_TOTAL_COUNT: NORMAL
MDC_IDC_STAT_EPISODE_TOTAL_COUNT_DTM_END: NORMAL
MDC_IDC_STAT_EPISODE_TOTAL_COUNT_DTM_START: NORMAL
MDC_IDC_STAT_EPISODE_TYPE: NORMAL

## 2022-07-12 PROCEDURE — 93296 REM INTERROG EVL PM/IDS: CPT | Performed by: INTERNAL MEDICINE

## 2022-07-12 PROCEDURE — 93294 REM INTERROG EVL PM/LDLS PM: CPT | Performed by: INTERNAL MEDICINE

## 2022-07-19 ENCOUNTER — ANTICOAGULATION THERAPY VISIT (OUTPATIENT)
Dept: ANTICOAGULATION | Facility: CLINIC | Age: 72
End: 2022-07-19

## 2022-07-19 ENCOUNTER — LAB (OUTPATIENT)
Dept: LAB | Facility: CLINIC | Age: 72
End: 2022-07-19
Payer: MEDICARE

## 2022-07-19 DIAGNOSIS — Z79.01 ANTICOAGULATED ON WARFARIN: Primary | ICD-10-CM

## 2022-07-19 DIAGNOSIS — Z95.2 HX OF MECHANICAL AORTIC VALVE REPLACEMENT: ICD-10-CM

## 2022-07-19 DIAGNOSIS — Z79.01 ANTICOAGULATED ON WARFARIN: ICD-10-CM

## 2022-07-19 LAB — INR BLD: 2.1 (ref 0.9–1.1)

## 2022-07-19 PROCEDURE — 85610 PROTHROMBIN TIME: CPT

## 2022-07-19 PROCEDURE — 36416 COLLJ CAPILLARY BLOOD SPEC: CPT

## 2022-07-19 NOTE — PROGRESS NOTES
ANTICOAGULATION MANAGEMENT     Vandana Thao 72 year old female is on warfarin with supratherapeutic INR result. (Goal INR 1.5-2.0)    Recent labs: (last 7 days)     07/19/22  1020   INR 2.1*       ASSESSMENT       Source(s): Chart Review    Previous INR was Therapeutic last 2(+) visits    Medication, diet, health changes since last INR chart reviewed; none identified           PLAN     Recommended plan for no diet, medication or health factor changes affecting INR     Dosing Instructions: continue your current warfarin dose with next INR in 2 weeks       Summary  As of 7/19/2022    Full warfarin instructions:  7.5 mg every Sun; 5 mg all other days   Next INR check:  8/2/2022             Detailed voice message left for Vandana with dosing instructions and follow up date.     Contact [phone] to schedule and with any changes, questions or concerns.     Education provided: Goal range and significance of current result and Contact [phone] with any changes, questions or concerns.     Plan made per [facility] anticoagulation protocol    Dorene Howard RN  Anticoagulation Clinic  7/19/2022    _______________________________________________________________________     Anticoagulation Episode Summary     Current INR goal:  1.5-2.0   TTR:  75.1 % (4.9 mo)   Target end date:  Indefinite   Send INR reminders to:  ANDREAS JOHN    Indications    Anticoagulated on warfarin [Z79.01]  Hx of mechanical aortic valve replacement [Z95.2]           Comments:           Anticoagulation Care Providers     Provider Role Specialty Phone number    Lakeisha Marmolejo MD Referring Family Medicine [phone]

## 2022-08-09 ENCOUNTER — TELEPHONE (OUTPATIENT)
Dept: ANTICOAGULATION | Facility: CLINIC | Age: 72
End: 2022-08-09

## 2022-08-09 NOTE — TELEPHONE ENCOUNTER
ANTICOAGULATION     Vandana Thao is overdue for INR check.      Left message for patient to call and schedule lab appointment as soon as possible. If returning call, please schedule.     Dorene Howard RN

## 2022-08-10 ENCOUNTER — ANTICOAGULATION THERAPY VISIT (OUTPATIENT)
Dept: ANTICOAGULATION | Facility: CLINIC | Age: 72
End: 2022-08-10

## 2022-08-10 ENCOUNTER — LAB (OUTPATIENT)
Dept: LAB | Facility: CLINIC | Age: 72
End: 2022-08-10
Payer: MEDICARE

## 2022-08-10 DIAGNOSIS — Z79.01 ANTICOAGULATED ON WARFARIN: ICD-10-CM

## 2022-08-10 DIAGNOSIS — Z79.01 ANTICOAGULATED ON WARFARIN: Primary | ICD-10-CM

## 2022-08-10 DIAGNOSIS — Z95.2 HX OF MECHANICAL AORTIC VALVE REPLACEMENT: ICD-10-CM

## 2022-08-10 LAB — INR BLD: 1.9 (ref 0.9–1.1)

## 2022-08-10 PROCEDURE — 85610 PROTHROMBIN TIME: CPT

## 2022-08-10 PROCEDURE — 36416 COLLJ CAPILLARY BLOOD SPEC: CPT

## 2022-08-10 NOTE — PROGRESS NOTES
ANTICOAGULATION MANAGEMENT     Vandana Thao 72 year old female is on warfarin with therapeutic INR result. (Goal INR 1.5-2.0)    Recent labs: (last 7 days)     08/10/22  0912   INR 1.9*       ASSESSMENT       Source(s): Chart Review and Template       Warfarin doses taken: Warfarin taken as instructed    Diet: No new diet changes identified    New illness, injury, or hospitalization: No    Medication/supplement changes: None noted    Signs or symptoms of bleeding or clotting: No    Previous INR: Supratherapeutic    Additional findings: None       PLAN     Recommended plan for no diet, medication or health factor changes affecting INR     Dosing Instructions: Continue your current warfarin dose with next INR in 3 weeks       Summary  As of 8/10/2022    Full warfarin instructions:  7.5 mg every Sun; 5 mg all other days   Next INR check:  8/31/2022             Detailed voice message left for Vandana with dosing instructions and follow up date.     Contact [phone] to schedule and with any changes, questions or concerns.     Education provided: Please call back if any changes to your diet, medications or how you've been taking warfarin and Goal range and significance of current result    Plan made per [facility] anticoagulation protocol    Becca Adames RN  Anticoagulation Clinic  8/10/2022    _______________________________________________________________________     Anticoagulation Episode Summary     Current INR goal:  1.5-2.0   TTR:  71.7 % (5.6 mo)   Target end date:  Indefinite   Send INR reminders to:  ANDREAS JOHN    Indications    Anticoagulated on warfarin [Z79.01]  Hx of mechanical aortic valve replacement [Z95.2]           Comments:           Anticoagulation Care Providers     Provider Role Specialty Phone number    Lakeisha Marmolejo MD Referring [facility] Medicine [phone]

## 2022-08-25 ENCOUNTER — ANCILLARY PROCEDURE (OUTPATIENT)
Dept: CARDIOLOGY | Facility: CLINIC | Age: 72
End: 2022-08-25
Attending: INTERNAL MEDICINE
Payer: MEDICARE

## 2022-08-25 DIAGNOSIS — I49.5 SICK SINUS SYNDROME (H): ICD-10-CM

## 2022-08-25 DIAGNOSIS — Z95.0 PACEMAKER: Primary | ICD-10-CM

## 2022-08-25 PROCEDURE — 93280 PM DEVICE PROGR EVAL DUAL: CPT | Performed by: INTERNAL MEDICINE

## 2022-08-26 LAB
MDC_IDC_LEAD_IMPLANT_DT: NORMAL
MDC_IDC_LEAD_IMPLANT_DT: NORMAL
MDC_IDC_LEAD_LOCATION: NORMAL
MDC_IDC_LEAD_LOCATION: NORMAL
MDC_IDC_LEAD_LOCATION_DETAIL_1: NORMAL
MDC_IDC_LEAD_LOCATION_DETAIL_1: NORMAL
MDC_IDC_LEAD_MFG: NORMAL
MDC_IDC_LEAD_MFG: NORMAL
MDC_IDC_LEAD_MODEL: NORMAL
MDC_IDC_LEAD_MODEL: NORMAL
MDC_IDC_LEAD_POLARITY_TYPE: NORMAL
MDC_IDC_LEAD_POLARITY_TYPE: NORMAL
MDC_IDC_LEAD_SERIAL: NORMAL
MDC_IDC_LEAD_SERIAL: NORMAL
MDC_IDC_LEAD_SPECIAL_FUNCTION: NORMAL
MDC_IDC_LEAD_SPECIAL_FUNCTION: NORMAL
MDC_IDC_MSMT_BATTERY_DTM: NORMAL
MDC_IDC_MSMT_BATTERY_REMAINING_LONGEVITY: 129 MO
MDC_IDC_MSMT_BATTERY_RRT_TRIGGER: 2.62
MDC_IDC_MSMT_BATTERY_STATUS: NORMAL
MDC_IDC_MSMT_BATTERY_VOLTAGE: 3.01 V
MDC_IDC_MSMT_LEADCHNL_RA_IMPEDANCE_VALUE: 342 OHM
MDC_IDC_MSMT_LEADCHNL_RA_IMPEDANCE_VALUE: 494 OHM
MDC_IDC_MSMT_LEADCHNL_RA_IMPEDANCE_VALUE: 494 OHM
MDC_IDC_MSMT_LEADCHNL_RA_PACING_THRESHOLD_AMPLITUDE: 0.75 V
MDC_IDC_MSMT_LEADCHNL_RA_PACING_THRESHOLD_AMPLITUDE: 0.75 V
MDC_IDC_MSMT_LEADCHNL_RA_PACING_THRESHOLD_PULSEWIDTH: 0.4 MS
MDC_IDC_MSMT_LEADCHNL_RA_PACING_THRESHOLD_PULSEWIDTH: 0.4 MS
MDC_IDC_MSMT_LEADCHNL_RA_SENSING_INTR_AMPL: 3.38 MV
MDC_IDC_MSMT_LEADCHNL_RA_SENSING_INTR_AMPL: 4 MV
MDC_IDC_MSMT_LEADCHNL_RA_SENSING_INTR_AMPL: 4 MV
MDC_IDC_MSMT_LEADCHNL_RV_IMPEDANCE_VALUE: 342 OHM
MDC_IDC_MSMT_LEADCHNL_RV_IMPEDANCE_VALUE: 437 OHM
MDC_IDC_MSMT_LEADCHNL_RV_IMPEDANCE_VALUE: 437 OHM
MDC_IDC_MSMT_LEADCHNL_RV_PACING_THRESHOLD_AMPLITUDE: 1.12 V
MDC_IDC_MSMT_LEADCHNL_RV_PACING_THRESHOLD_AMPLITUDE: 1.25 V
MDC_IDC_MSMT_LEADCHNL_RV_PACING_THRESHOLD_PULSEWIDTH: 0.4 MS
MDC_IDC_MSMT_LEADCHNL_RV_PACING_THRESHOLD_PULSEWIDTH: 0.4 MS
MDC_IDC_MSMT_LEADCHNL_RV_SENSING_INTR_AMPL: 10.25 MV
MDC_IDC_MSMT_LEADCHNL_RV_SENSING_INTR_AMPL: 10.3 MV
MDC_IDC_MSMT_LEADCHNL_RV_SENSING_INTR_AMPL: 7.25 MV
MDC_IDC_PG_IMPLANT_DTM: NORMAL
MDC_IDC_PG_MFG: NORMAL
MDC_IDC_PG_MODEL: NORMAL
MDC_IDC_PG_SERIAL: NORMAL
MDC_IDC_PG_TYPE: NORMAL
MDC_IDC_SESS_CLINIC_NAME: NORMAL
MDC_IDC_SESS_DTM: NORMAL
MDC_IDC_SESS_TYPE: NORMAL
MDC_IDC_SET_BRADY_AT_MODE_SWITCH_RATE: 171 {BEATS}/MIN
MDC_IDC_SET_BRADY_HYSTRATE: NORMAL
MDC_IDC_SET_BRADY_LOWRATE: 60 {BEATS}/MIN
MDC_IDC_SET_BRADY_MAX_SENSOR_RATE: 130 {BEATS}/MIN
MDC_IDC_SET_BRADY_MAX_TRACKING_RATE: 130 {BEATS}/MIN
MDC_IDC_SET_BRADY_MODE: NORMAL
MDC_IDC_SET_BRADY_PAV_DELAY_LOW: 270 MS
MDC_IDC_SET_BRADY_SAV_DELAY_LOW: 240 MS
MDC_IDC_SET_LEADCHNL_RA_PACING_AMPLITUDE: NORMAL
MDC_IDC_SET_LEADCHNL_RA_PACING_ANODE_ELECTRODE_1: NORMAL
MDC_IDC_SET_LEADCHNL_RA_PACING_ANODE_LOCATION_1: NORMAL
MDC_IDC_SET_LEADCHNL_RA_PACING_CAPTURE_MODE: NORMAL
MDC_IDC_SET_LEADCHNL_RA_PACING_CATHODE_ELECTRODE_1: NORMAL
MDC_IDC_SET_LEADCHNL_RA_PACING_CATHODE_LOCATION_1: NORMAL
MDC_IDC_SET_LEADCHNL_RA_PACING_POLARITY: NORMAL
MDC_IDC_SET_LEADCHNL_RA_PACING_PULSEWIDTH: 0.4 MS
MDC_IDC_SET_LEADCHNL_RA_SENSING_ANODE_ELECTRODE_1: NORMAL
MDC_IDC_SET_LEADCHNL_RA_SENSING_ANODE_LOCATION_1: NORMAL
MDC_IDC_SET_LEADCHNL_RA_SENSING_CATHODE_ELECTRODE_1: NORMAL
MDC_IDC_SET_LEADCHNL_RA_SENSING_CATHODE_LOCATION_1: NORMAL
MDC_IDC_SET_LEADCHNL_RA_SENSING_POLARITY: NORMAL
MDC_IDC_SET_LEADCHNL_RA_SENSING_SENSITIVITY: 0.3 MV
MDC_IDC_SET_LEADCHNL_RV_PACING_AMPLITUDE: NORMAL
MDC_IDC_SET_LEADCHNL_RV_PACING_ANODE_ELECTRODE_1: NORMAL
MDC_IDC_SET_LEADCHNL_RV_PACING_ANODE_LOCATION_1: NORMAL
MDC_IDC_SET_LEADCHNL_RV_PACING_CAPTURE_MODE: NORMAL
MDC_IDC_SET_LEADCHNL_RV_PACING_CATHODE_ELECTRODE_1: NORMAL
MDC_IDC_SET_LEADCHNL_RV_PACING_CATHODE_LOCATION_1: NORMAL
MDC_IDC_SET_LEADCHNL_RV_PACING_POLARITY: NORMAL
MDC_IDC_SET_LEADCHNL_RV_PACING_PULSEWIDTH: 0.4 MS
MDC_IDC_SET_LEADCHNL_RV_SENSING_ANODE_ELECTRODE_1: NORMAL
MDC_IDC_SET_LEADCHNL_RV_SENSING_ANODE_LOCATION_1: NORMAL
MDC_IDC_SET_LEADCHNL_RV_SENSING_CATHODE_ELECTRODE_1: NORMAL
MDC_IDC_SET_LEADCHNL_RV_SENSING_CATHODE_LOCATION_1: NORMAL
MDC_IDC_SET_LEADCHNL_RV_SENSING_POLARITY: NORMAL
MDC_IDC_SET_LEADCHNL_RV_SENSING_SENSITIVITY: 0.9 MV
MDC_IDC_SET_ZONE_DETECTION_INTERVAL: 350 MS
MDC_IDC_SET_ZONE_DETECTION_INTERVAL: 400 MS
MDC_IDC_SET_ZONE_TYPE: NORMAL
MDC_IDC_STAT_AT_BURDEN_PERCENT: 0 %
MDC_IDC_STAT_AT_DTM_END: NORMAL
MDC_IDC_STAT_AT_DTM_START: NORMAL
MDC_IDC_STAT_BRADY_AP_VP_PERCENT: 0.15 %
MDC_IDC_STAT_BRADY_AP_VS_PERCENT: 34.61 %
MDC_IDC_STAT_BRADY_AS_VP_PERCENT: 0.02 %
MDC_IDC_STAT_BRADY_AS_VS_PERCENT: 65.22 %
MDC_IDC_STAT_BRADY_DTM_END: NORMAL
MDC_IDC_STAT_BRADY_DTM_START: NORMAL
MDC_IDC_STAT_BRADY_RA_PERCENT_PACED: 34.74 %
MDC_IDC_STAT_BRADY_RV_PERCENT_PACED: 0.17 %
MDC_IDC_STAT_EPISODE_RECENT_COUNT: 0
MDC_IDC_STAT_EPISODE_RECENT_COUNT: 0
MDC_IDC_STAT_EPISODE_RECENT_COUNT: 1
MDC_IDC_STAT_EPISODE_RECENT_COUNT_DTM_END: NORMAL
MDC_IDC_STAT_EPISODE_RECENT_COUNT_DTM_START: NORMAL
MDC_IDC_STAT_EPISODE_TOTAL_COUNT: 0
MDC_IDC_STAT_EPISODE_TOTAL_COUNT: 554
MDC_IDC_STAT_EPISODE_TOTAL_COUNT: NORMAL
MDC_IDC_STAT_EPISODE_TOTAL_COUNT_DTM_END: NORMAL
MDC_IDC_STAT_EPISODE_TOTAL_COUNT_DTM_START: NORMAL
MDC_IDC_STAT_EPISODE_TYPE: NORMAL

## 2022-09-12 ENCOUNTER — OFFICE VISIT (OUTPATIENT)
Dept: FAMILY MEDICINE | Facility: CLINIC | Age: 72
End: 2022-09-12
Payer: MEDICARE

## 2022-09-12 ENCOUNTER — ANTICOAGULATION THERAPY VISIT (OUTPATIENT)
Dept: ANTICOAGULATION | Facility: CLINIC | Age: 72
End: 2022-09-12

## 2022-09-12 VITALS
WEIGHT: 185 LBS | OXYGEN SATURATION: 97 % | HEART RATE: 75 BPM | BODY MASS INDEX: 32.77 KG/M2 | DIASTOLIC BLOOD PRESSURE: 72 MMHG | SYSTOLIC BLOOD PRESSURE: 144 MMHG

## 2022-09-12 DIAGNOSIS — Z95.0 CARDIAC PACEMAKER IN SITU: ICD-10-CM

## 2022-09-12 DIAGNOSIS — Z87.74 H/O BICUSPID AORTIC VALVE: ICD-10-CM

## 2022-09-12 DIAGNOSIS — Z95.2 HX OF MECHANICAL AORTIC VALVE REPLACEMENT: ICD-10-CM

## 2022-09-12 DIAGNOSIS — E89.0 POSTOPERATIVE HYPOTHYROIDISM: ICD-10-CM

## 2022-09-12 DIAGNOSIS — Z01.818 PREOP GENERAL PHYSICAL EXAM: Primary | ICD-10-CM

## 2022-09-12 DIAGNOSIS — Z90.81 S/P SPLENECTOMY: ICD-10-CM

## 2022-09-12 DIAGNOSIS — I25.10 CORONARY ARTERY DISEASE INVOLVING NATIVE CORONARY ARTERY OF NATIVE HEART WITHOUT ANGINA PECTORIS: ICD-10-CM

## 2022-09-12 DIAGNOSIS — I44.39 HIGH DEGREE ATRIOVENTRICULAR BLOCK: ICD-10-CM

## 2022-09-12 DIAGNOSIS — Z79.01 ANTICOAGULATED ON WARFARIN: ICD-10-CM

## 2022-09-12 DIAGNOSIS — Z79.01 ANTICOAGULATED ON WARFARIN: Primary | ICD-10-CM

## 2022-09-12 DIAGNOSIS — I47.29 NON-SUSTAINED VENTRICULAR TACHYCARDIA (H): ICD-10-CM

## 2022-09-12 DIAGNOSIS — H25.9 AGE-RELATED CATARACT OF BOTH EYES, UNSPECIFIED AGE-RELATED CATARACT TYPE: ICD-10-CM

## 2022-09-12 DIAGNOSIS — E78.00 HYPERCHOLESTEROLEMIA: ICD-10-CM

## 2022-09-12 LAB
INR BLD: 1.5 (ref 0.9–1.1)
TSH SERPL DL<=0.005 MIU/L-ACNC: 4.63 UIU/ML (ref 0.3–4.2)

## 2022-09-12 PROCEDURE — 36416 COLLJ CAPILLARY BLOOD SPEC: CPT | Performed by: FAMILY MEDICINE

## 2022-09-12 PROCEDURE — 84443 ASSAY THYROID STIM HORMONE: CPT | Performed by: FAMILY MEDICINE

## 2022-09-12 PROCEDURE — 85610 PROTHROMBIN TIME: CPT | Performed by: FAMILY MEDICINE

## 2022-09-12 PROCEDURE — 90662 IIV NO PRSV INCREASED AG IM: CPT | Performed by: FAMILY MEDICINE

## 2022-09-12 PROCEDURE — 99214 OFFICE O/P EST MOD 30 MIN: CPT | Mod: 25 | Performed by: FAMILY MEDICINE

## 2022-09-12 PROCEDURE — 36415 COLL VENOUS BLD VENIPUNCTURE: CPT | Performed by: FAMILY MEDICINE

## 2022-09-12 PROCEDURE — G0008 ADMIN INFLUENZA VIRUS VAC: HCPCS | Performed by: FAMILY MEDICINE

## 2022-09-12 NOTE — PROGRESS NOTES
[facility]  [address]  [facility] [address]  Phone: [phone]  Fax: [phone]  Primary Provider: Lakeisha Marmolejo  Pre-op Performing Provider: MYLA GROSSMAN    {Provider  Link to PREOP SmartSet  Use this to apply standard patient instructions to AVS; includes medication directions, common orders, guidelines for anemia, warfarin, additional testing   :[id number]}  PREOPERATIVE EVALUATION:  Today's date: 9/12/2022    Vandana Thao is a 72 year old female who presents for a preoperative evaluation.    Surgical Information:  Surgery/Procedure: Bilateral eye cataract  Surgery Location: [facility] surgery [facility]  Surgeon: Dr. Saurav Thornton  Surgery Date: 9/22/22 (right) and 10/6/22 (left)  Time of Surgery:   Where patient plans to recover: At home with family  Fax number for surgical facility: [phone]      Type of Anesthesia Anticipated: Choice    Assessment & Plan     The proposed surgical procedure is considered LOW risk.    Preop general physical exam  Surgical risks include history of heart block and tachycardia, both adequately controlled with combination of verapamil and pacemaker, dyslipidemia managed with pravastatin, nonobstructive coronary artery disease, and previous heart valve replacement on warfarin.  At this time her medical conditions are optimally medically managed.  She may proceed with surgery as scheduled without further clinical clarification or evaluation.  May proceed with choice of anesthesia.    Bilateral cataract  To proceed with interval surgeries    Coronary artery disease involving native coronary artery of native heart without angina pectoris  This remains asymptomatic.  Continue risk factor modification.  Blood pressure little bit elevated today, will follow over time, no intervention necessary.  She is not taking aspirin due to warfarin intake.  - ASPIRIN NOT PRESCRIBED (INTENTIONAL); Please choose reason not prescribed from  choices below.    High degree atrioventricular block  Cardiac pacemaker in situ  Controlled with dual-chamber pacemaker.    Non-sustained ventricular tachycardia (H)  This is without current symptoms on verapamil, continue.    H/O bicuspid aortic valve  Hx of mechanical aortic valve replacement  Anticoagulated on warfarin  Symptoms adequately controlled.  We will check INR today.    S/P splenectomy  No interventions necessary    Hypercholesterolemia  She remains on pravastatin.    Postoperative hypothyroidism  Will check TSH today, continue levothyroxine.       Implanted Device:   - Type of device: Dual-chamber pacemaker, mechanical aortic valve Patient advised to bring device information on day of surgery.      Risks and Recommendations:  The patient has the following additional risks and recommendations for perioperative complications:   - No identified additional risk factors other than previously addressed    Medication Instructions:  Patient is to take all scheduled medications on the day of surgery    RECOMMENDATION:  APPROVAL GIVEN to proceed with proposed procedure, without further diagnostic evaluation.      25 minutes spent on the date of the encounter doing chart review, history and exam, documentation and further activities per the note        Subjective     HPI related to upcoming procedure: Progressive blurring of vision and disruption of nighttime driving due to bilateral cataracts, requiring surgical intervention.    History of nonobstructive mild coronary artery disease by angiogram 2018, followed by cardiology.  No prior history of ischemic events.  Remains on pravastatin and management of dyslipidemia.  History of NSVT and fascicular tachycardia, managed with verapamil, with history of heart block that led to syncope so dual-chamber pacemaker in place.  Previous aortic valve replacement in 2008, remains anticoagulated with warfarin.  History of Graves' hyperthyroidism, status post surgical  resection of thyroid, remains on levothyroxine.  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    Preop Questions 9/9/2022   1. Have you ever had a heart attack or stroke? No   2. Have you ever had surgery on your heart or blood vessels, such as a stent placement, a coronary artery bypass, or surgery on an artery in your head, neck, heart, or legs? No   3. Do you have chest pain with activity? No   4. Do you have a history of  heart failure? No   5. Do you currently have a cold, bronchitis or symptoms of other infection? No   6. Do you have a cough, shortness of breath, or wheezing? No   7. Do you or anyone in your family have previous history of blood clots? No   8. Do you or does anyone in your family have a serious bleeding problem such as prolonged bleeding following surgeries or cuts? No   9. Have you ever had problems with anemia or been told to take iron pills? No   10. Have you had any abnormal blood loss such as black, tarry or bloody stools, or abnormal vaginal bleeding? No   11. Have you ever had a blood transfusion? No   12. Are you willing to have a blood transfusion if it is medically needed before, during, or after your surgery? Yes   13. Have you or any of your relatives ever had problems with anesthesia? No   14. Do you have sleep apnea, excessive snoring or daytime drowsiness? No   15. Do you have any artifical heart valves or other implanted medical devices like a pacemaker, defibrillator, or continuous glucose monitor? YES -   15a. What type of device do you have? Mechanical valve and pacemaker   15b. Name of the clinic that manages your device:  [facility]   16. Do you have artificial joints? No   17. Are you allergic to latex? No       Health Care Directive:  Patient does not have a Health Care Directive or Living Will: Discussed advance care planning with patient; information given to patient to review.    Preoperative Review of :   reviewed - controlled substances prescribed by  other outside provider(s). (Tramadol Sept 2021)    Review of Systems  Constitutional, neuro, ENT, endocrine, pulmonary, cardiac, gastrointestinal, genitourinary, musculoskeletal, integument and psychiatric systems are negative, except as otherwise noted.    Patient Active Problem List    Diagnosis Date Noted     Microscopic hematuria 02/27/2020     Priority: Medium     Bladder bx in 2000 - nml, told will be chronic         S/P splenectomy 02/27/2020     Priority: Medium     1979 removal for tumor         Other insomnia 02/27/2020     Priority: Medium     Anticoagulated on warfarin 02/27/2020     Priority: Medium     Hx of mechanical aortic valve replacement 02/27/2020     Priority: Medium     Coronary artery disease involving native coronary artery of native heart without angina pectoris 07/17/2019     Priority: Medium     Minimal coronary artery disease at aortic valve replacement 2009  Normal stress nuclear imaging by 2019         Non-sustained ventricular tachycardia (H) 07/02/2019     Priority: Medium     Pacemaker histograms 170 bpm 7/2/2019         Junctional premature beats (H) 07/02/2019     Priority: Medium     Cardiac pacemaker in situ 06/29/2018     Priority: Medium     [employer] [id number] Saundra XT DR MRI  DOI: 6/21/2018         H/O bicuspid aortic valve 06/21/2018     Priority: Medium     High degree atrioventricular block 06/21/2018     Priority: Medium     Hypercholesterolemia      Priority: Medium     Created by Conversion         Acquired hypothyroidism 06/20/2018     Priority: Medium     Hx Graves s/p surgery 2002 now hypothryoid          Past Medical History:   Diagnosis Date     History of aortic stenosis 6/21/2018     Hyperlipidemia      Hypothyroidism      Valvular disease     aortic stenosis secondary to bicuspid valve     Past Surgical History:   Procedure Laterality Date     CARDIAC VALVE REPLACEMENT  2009    AVR     COLONOSCOPY  02/04/2013     CYSTOSTOMY W/ BLADDER BIOPSY  2000     EP PACEMAKER  INSERT N/A 2018    Procedure: EP Pacemaker Insertion;  Surgeon: Luis Urena MD;  Location: [facility];  Service:      OTHER SURGICAL HISTORY      thyroidectomy     SPLENECTOMY       ZZC REPLACE AORT VALV,PROSTH VALV      Description: Aortic Valve Replacement;  Proc Date: 2009;  Comments: #[facility] [place] Prosthesis     Current Outpatient Medications   Medication Sig Dispense Refill     ASPIRIN NOT PRESCRIBED (INTENTIONAL) Please choose reason not prescribed from choices below.       cholecalciferol, vitamin D3, 1,000 unit tablet [CHOLECALCIFEROL, VITAMIN D3, 1,000 UNIT TABLET] Take 1,000 Units by mouth daily.              levothyroxine (SYNTHROID/LEVOTHROID) 88 MCG tablet Take 1 tablet (88 mcg) by mouth daily 90 tablet 3     levothyroxine (SYNTHROID/LEVOTHROID) 88 MCG tablet Take 1 tablet (88 mcg) by mouth daily 30 tablet 0     pravastatin (PRAVACHOL) 40 MG tablet Take 1 tablet (40 mg) by mouth daily 90 tablet 3     verapamil ER (VERELAN) 240 MG 24 hr capsule Take 1 capsule (240 mg) by mouth daily 90 capsule 3     warfarin ANTICOAGULANT (COUMADIN) 5 MG tablet Take 1-1.5 tablets (5-7.5 mg) by mouth in the morning. Adjust dose per INR as directed 135 tablet 1     amoxicillin (AMOXIL) 500 MG capsule Take 4 capsules (2,000 mg) by mouth once for 1 dose Prior to dental procedure. 4 capsule 3       No Known Allergies     Social History     Tobacco Use     Smoking status: Former Smoker     Packs/day: 0.50     Years: 20.00     Pack years: 10.00     Types: Cigarettes     Quit date: 1979     Years since quittin.7     Smokeless tobacco: Never Used   Substance Use Topics     Alcohol use: Yes     Comment: 1-2 drinks per week or less     Family History   Problem Relation Age of Onset     Acute Myocardial Infarction Brother 43.00     Rheumatoid Arthritis Mother          in 40s     Coronary Artery Disease Father      Hypertension Father      No Known Problems Maternal Grandmother       No Known Problems Maternal Grandfather      No Known Problems Paternal Grandmother      No Known Problems Paternal Grandfather      Aortic stenosis Brother         s/p surgery     No Known Problems Sister      Breast Cancer No family hx of      Colon Cancer No family hx of      Cervical Cancer No family hx of      History   Drug Use No         Objective     BP (!) 144/72   Pulse 75   Wt 83.9 kg (185 lb)   SpO2 97%   BMI 32.77 kg/m      Physical Exam    GENERAL APPEARANCE: healthy, alert and no distress     EYES: EOMI, PERRL     HENT: ear canals and TM's normal and nose and mouth without ulcers or lesions     NECK: no adenopathy, no asymmetry, masses, or scars and thyroid normal to palpation     RESP: lungs clear to auscultation - no rales, rhonchi or wheezes     CV: regular rates and rhythm, normal S1 S2, no S3 or S4 and no murmur, click or rub     ABDOMEN:  soft, nontender, no HSM or masses and bowel sounds normal     MS: extremities normal- no gross deformities noted, no evidence of inflammation in joints, FROM in all extremities.     SKIN: no suspicious lesions or rashes     NEURO: Normal strength and tone, sensory exam grossly normal, mentation intact and speech normal     PSYCH: mentation appears normal. and affect normal/bright     LYMPHATICS: No cervical adenopathy    Recent Labs   Lab Test 08/10/22  0912 07/19/22  1020 01/14/22  0902 01/14/22  0900 09/29/21  1011 09/10/21  1322 12/29/20  0955 12/09/20  1052   HGB  --   --   --  13.7  --  14.4   < > 14.2   PLT  --   --   --   --   --  268  --  256   INR 1.9* 2.1*   < >  --    < > 2.1*   < >  --    NA  --   --   --   --   --  141  --  140   POTASSIUM  --   --   --   --   --  4.4  --  4.8   CR  --   --   --   --   --  0.85  --  0.89   A1C  --   --   --  5.3  --   --   --  5.4    < > = values in this interval not displayed.        Diagnostics:  Labs pending at this time.  Results will be reviewed when available.   No EKG required for low risk surgery  (cataract, skin procedure, breast biopsy, etc).    Revised Cardiac Risk Index (RCRI):  The patient has the following serious cardiovascular risks for perioperative complications:   - No serious cardiac risks = 0 points     RCRI Interpretation: 0 points: Class I (very low risk - 0.4% complication rate)           Signed Electronically by: Maggi Muro MD  Copy of this evaluation report is provided to requesting physician.

## 2022-09-12 NOTE — PROGRESS NOTES
ANTICOAGULATION MANAGEMENT     Vandana Thao 72 year old female is on warfarin with therapeutic INR result. (Goal INR 1.5-2.0)    Recent labs: (last 7 days)     09/12/22  1232   INR 1.5*       ASSESSMENT       Source(s): Chart Review and Patient/Caregiver Call       Warfarin doses taken: Warfarin taken as instructed    Diet: No new diet changes identified    New illness, injury, or hospitalization: No    Medication/supplement changes: None noted    Signs or symptoms of bleeding or clotting: No    Previous INR: Therapeutic last 2(+) visits    Additional findings: Upcoming surgery/procedure cataract surgeries scheduled 9/22 and 10/6       PLAN     Recommended plan for no diet, medication or health factor changes affecting INR     Dosing Instructions: Continue your current warfarin dose with next INR in 6 weeks       Summary  As of 9/12/2022    Full warfarin instructions:  7.5 mg every Sun; 5 mg all other days   Next INR check:  10/24/2022             Telephone call with Vandana who verbalizes understanding and agrees to plan    Lab visit scheduled    Education provided: Goal range and significance of current result, Importance of notifying clinic for changes in medications; a sooner lab recheck maybe needed. and Contact [phone] with any changes, questions or concerns.     Plan made per [facility] anticoagulation protocol    Dorene Howard RN  Anticoagulation Clinic  9/12/2022    _______________________________________________________________________     Anticoagulation Episode Summary     Current INR goal:  1.5-2.0   TTR:  76.3 % (6.7 mo)   Target end date:  Indefinite   Send INR reminders to:  ANDREAS JOHN    Indications    Anticoagulated on warfarin [Z79.01]  Hx of mechanical aortic valve replacement [Z95.2]           Comments:           Anticoagulation Care Providers     Provider Role Specialty Phone number    Lakeisha Marmolejo MD Referring Family Medicine [phone]

## 2022-09-13 DIAGNOSIS — E03.9 ACQUIRED HYPOTHYROIDISM: ICD-10-CM

## 2022-09-13 RX ORDER — LEVOTHYROXINE SODIUM 100 UG/1
100 TABLET ORAL DAILY
Qty: 90 TABLET | Refills: 1 | Status: SHIPPED | OUTPATIENT
Start: 2022-09-13 | End: 2023-02-19

## 2022-09-13 NOTE — RESULT ENCOUNTER NOTE
Your thyroid is mildly undertreated.  I like you to increase your levothyroxine dose to 100 mcg daily.  Return for a lab only visit to recheck your thyroid in 6 to 8 weeks.  If you believe your results may be related to any recent missed doses of your medication, please let me know.

## 2022-10-24 ENCOUNTER — ANTICOAGULATION THERAPY VISIT (OUTPATIENT)
Dept: ANTICOAGULATION | Facility: CLINIC | Age: 72
End: 2022-10-24

## 2022-10-24 ENCOUNTER — LAB (OUTPATIENT)
Dept: LAB | Facility: CLINIC | Age: 72
End: 2022-10-24
Payer: MEDICARE

## 2022-10-24 DIAGNOSIS — Z95.2 HX OF MECHANICAL AORTIC VALVE REPLACEMENT: ICD-10-CM

## 2022-10-24 DIAGNOSIS — Z79.01 ANTICOAGULATED ON WARFARIN: ICD-10-CM

## 2022-10-24 DIAGNOSIS — Z79.01 ANTICOAGULATED ON WARFARIN: Primary | ICD-10-CM

## 2022-10-24 LAB — INR BLD: 1.7 (ref 0.9–1.1)

## 2022-10-24 PROCEDURE — 36416 COLLJ CAPILLARY BLOOD SPEC: CPT

## 2022-10-24 PROCEDURE — 85610 PROTHROMBIN TIME: CPT

## 2022-10-24 NOTE — PROGRESS NOTES
ANTICOAGULATION MANAGEMENT     Vandana ABRAHAM Thao 72 year old female is on warfarin with therapeutic INR result. (Goal INR 1.5-2.0)    Recent labs: (last 7 days)     10/24/22  1039   INR 1.7*       ASSESSMENT       Source(s): Chart Review, Patient/Caregiver Call and Template       Warfarin doses taken: Warfarin taken as instructed    Diet: No new diet changes identified    New illness, injury, or hospitalization: No    Medication/supplement changes: None noted    Signs or symptoms of bleeding or clotting: No    Previous INR: Therapeutic last 2(+) visits    Additional findings: None       PLAN     Recommended plan for no diet, medication or health factor changes affecting INR     Dosing Instructions: Continue your current warfarin dose with next INR in 6 weeks       Summary  As of 10/24/2022    Full warfarin instructions:  7.5 mg every Sun; 5 mg all other days; Starting 10/24/2022   Next INR check:  12/5/2022             Telephone call with Vandana who verbalizes understanding and agrees to plan    Lab visit scheduled    Education provided:     Please call back if any changes to your diet, medications or how you've been taking warfarin    Goal range and lab monitoring: goal range and significance of current result and Importance of therapeutic range    Plan made per [facility] anticoagulation protocol    Beth Kerr RN  Anticoagulation Clinic  10/24/2022    _______________________________________________________________________     Anticoagulation Episode Summary     Current INR goal:  1.5-2.0   TTR:  80.4 % (8.1 mo)   Target end date:  Indefinite   Send INR reminders to:  ANDREAS JOHN    Indications    Anticoagulated on warfarin [Z79.01]  Hx of mechanical aortic valve replacement [Z95.2]           Comments:           Anticoagulation Care Providers     Provider Role Specialty Phone number    Lakeisha Marmolejo MD Referring Family Medicine [phone]

## 2022-10-31 ENCOUNTER — TELEPHONE (OUTPATIENT)
Dept: CARDIOLOGY | Facility: CLINIC | Age: 72
End: 2022-10-31

## 2022-10-31 NOTE — TELEPHONE ENCOUNTER
Patient called device clinic to discuss concerns with her pacemaker and upcoming extensive radiation treatments for her .  States her  has a brain tumor and they are doing some surgery and experimental radiation if needed this month.  Patient was worried that the radiation treatment will affect her pacemaker.    Reviewed with patient that as long as they are following the appropriate precautions as far as distancing (patient reports she is to stay 3 ft away from  for ~2 weeks) then it should be okay with pacemaker.  Discussed that she has a home transmitter that we will send any automatic alerts if there are device concerns.  She also has her routine device check scheduled for November 28.  Advised to call with any concerns prior to that transmission.  Denies any other questions or concerns at this time    Mckenna Bailon RN

## 2022-11-28 ENCOUNTER — ANCILLARY PROCEDURE (OUTPATIENT)
Dept: CARDIOLOGY | Facility: CLINIC | Age: 72
End: 2022-11-28
Attending: INTERNAL MEDICINE
Payer: MEDICARE

## 2022-11-28 DIAGNOSIS — I44.2 AV BLOCK, COMPLETE (H): ICD-10-CM

## 2022-11-28 DIAGNOSIS — Z95.0 PACEMAKER: ICD-10-CM

## 2022-12-06 ENCOUNTER — ANTICOAGULATION THERAPY VISIT (OUTPATIENT)
Dept: ANTICOAGULATION | Facility: CLINIC | Age: 72
End: 2022-12-06

## 2022-12-06 ENCOUNTER — LAB (OUTPATIENT)
Dept: LAB | Facility: CLINIC | Age: 72
End: 2022-12-06
Payer: MEDICARE

## 2022-12-06 DIAGNOSIS — Z95.2 HX OF MECHANICAL AORTIC VALVE REPLACEMENT: ICD-10-CM

## 2022-12-06 DIAGNOSIS — Z79.01 ANTICOAGULATED ON WARFARIN: ICD-10-CM

## 2022-12-06 DIAGNOSIS — Z79.01 ANTICOAGULATED ON WARFARIN: Primary | ICD-10-CM

## 2022-12-06 LAB — INR BLD: 1.7 (ref 0.9–1.1)

## 2022-12-06 PROCEDURE — 85610 PROTHROMBIN TIME: CPT

## 2022-12-06 PROCEDURE — 36416 COLLJ CAPILLARY BLOOD SPEC: CPT

## 2022-12-06 NOTE — PROGRESS NOTES
ANTICOAGULATION MANAGEMENT     Vandana Thao 72 year old female is on warfarin with therapeutic INR result. (Goal INR 1.5-2.0)    Recent labs: (last 7 days)     12/06/22  1303   INR 1.7*       ASSESSMENT       Source(s): Chart Review, Patient/Caregiver Call and Template       Warfarin doses taken: Warfarin taken as instructed    Diet: No new diet changes identified    New illness, injury, or hospitalization: No    Medication/supplement changes: None noted    Signs or symptoms of bleeding or clotting: No    Previous INR: Therapeutic last 2(+) visits    Additional findings: None       PLAN     Recommended plan for no diet, medication or health factor changes affecting INR     Dosing Instructions: Continue your current warfarin dose with next INR in 6 weeks       Summary  As of 12/6/2022    Full warfarin instructions:  7.5 mg every Sun; 5 mg all other days; Starting 12/6/2022   Next INR check:  1/17/2023             Telephone call with Vandana who verbalizes understanding and agrees to plan    Lab visit scheduled    Education provided:     Contact [phone] with any changes, questions or concerns.     Plan made per [facility] anticoagulation protocol    Dorene Howard RN  Anticoagulation Clinic  12/6/2022    _______________________________________________________________________     Anticoagulation Episode Summary     Current INR goal:  1.5-2.0   TTR:  83.3 % (9.6 mo)   Target end date:  Indefinite   Send INR reminders to:  ANDREAS JOHN    Indications    Anticoagulated on warfarin [Z79.01]  Hx of mechanical aortic valve replacement [Z95.2]           Comments:           Anticoagulation Care Providers     Provider Role Specialty Phone number    Lakeisha Marmolejo MD Referring Family Medicine [phone]

## 2022-12-14 DIAGNOSIS — Z79.01 ANTICOAGULATED ON WARFARIN: ICD-10-CM

## 2022-12-14 RX ORDER — WARFARIN SODIUM 5 MG/1
5-7.5 TABLET ORAL DAILY
Qty: 110 TABLET | Refills: 1 | Status: SHIPPED | OUTPATIENT
Start: 2022-12-14 | End: 2023-07-07

## 2022-12-14 NOTE — TELEPHONE ENCOUNTER
ANTICOAGULATION MANAGEMENT:  Medication Refill    Anticoagulation Summary  As of 12/6/2022    Warfarin maintenance plan:  7.5 mg (5 mg x 1.5) every Sun; 5 mg (5 mg x 1) all other days; Starting 12/6/2022   Next INR check:  1/17/2023   Target end date:  Indefinite    Indications    Anticoagulated on warfarin [Z79.01]  Hx of mechanical aortic valve replacement [Z95.2]             Anticoagulation Care Providers     Provider Role Specialty Phone number    Lakeisha Marmolejo MD Referring Family Medicine [phone]          Visit with referring provider/group within last year: Yes    [facility] referral signed within last year: Yes    Vandana meets all criteria for refill (current [facility] referral, office visit with referring provider/group in last year, lab monitoring up to date or not exceeding 2 weeks overdue). Rx instructions and quantity supplied updated to match patient's current dosing plan. Warfarin 90 day supply with 1 refill granted per [facility] protocol     Beth Kerr RN  Anticoagulation Clinic

## 2022-12-20 LAB
MDC_IDC_LEAD_IMPLANT_DT: NORMAL
MDC_IDC_LEAD_IMPLANT_DT: NORMAL
MDC_IDC_LEAD_LOCATION: NORMAL
MDC_IDC_LEAD_LOCATION: NORMAL
MDC_IDC_LEAD_LOCATION_DETAIL_1: NORMAL
MDC_IDC_LEAD_LOCATION_DETAIL_1: NORMAL
MDC_IDC_LEAD_MFG: NORMAL
MDC_IDC_LEAD_MFG: NORMAL
MDC_IDC_LEAD_MODEL: NORMAL
MDC_IDC_LEAD_MODEL: NORMAL
MDC_IDC_LEAD_POLARITY_TYPE: NORMAL
MDC_IDC_LEAD_POLARITY_TYPE: NORMAL
MDC_IDC_LEAD_SERIAL: NORMAL
MDC_IDC_LEAD_SERIAL: NORMAL
MDC_IDC_LEAD_SPECIAL_FUNCTION: NORMAL
MDC_IDC_LEAD_SPECIAL_FUNCTION: NORMAL
MDC_IDC_MSMT_BATTERY_DTM: NORMAL
MDC_IDC_MSMT_BATTERY_REMAINING_LONGEVITY: 125 MO
MDC_IDC_MSMT_BATTERY_RRT_TRIGGER: 2.62
MDC_IDC_MSMT_BATTERY_STATUS: NORMAL
MDC_IDC_MSMT_BATTERY_VOLTAGE: 3.01 V
MDC_IDC_MSMT_LEADCHNL_RA_IMPEDANCE_VALUE: 323 OHM
MDC_IDC_MSMT_LEADCHNL_RA_IMPEDANCE_VALUE: 475 OHM
MDC_IDC_MSMT_LEADCHNL_RA_PACING_THRESHOLD_AMPLITUDE: 0.62 V
MDC_IDC_MSMT_LEADCHNL_RA_PACING_THRESHOLD_PULSEWIDTH: 0.4 MS
MDC_IDC_MSMT_LEADCHNL_RA_SENSING_INTR_AMPL: 3.25 MV
MDC_IDC_MSMT_LEADCHNL_RA_SENSING_INTR_AMPL: 3.25 MV
MDC_IDC_MSMT_LEADCHNL_RV_IMPEDANCE_VALUE: 323 OHM
MDC_IDC_MSMT_LEADCHNL_RV_IMPEDANCE_VALUE: 418 OHM
MDC_IDC_MSMT_LEADCHNL_RV_PACING_THRESHOLD_AMPLITUDE: 1.25 V
MDC_IDC_MSMT_LEADCHNL_RV_PACING_THRESHOLD_PULSEWIDTH: 0.4 MS
MDC_IDC_MSMT_LEADCHNL_RV_SENSING_INTR_AMPL: 12.62 MV
MDC_IDC_MSMT_LEADCHNL_RV_SENSING_INTR_AMPL: 12.62 MV
MDC_IDC_PG_IMPLANT_DTM: NORMAL
MDC_IDC_PG_MFG: NORMAL
MDC_IDC_PG_MODEL: NORMAL
MDC_IDC_PG_SERIAL: NORMAL
MDC_IDC_PG_TYPE: NORMAL
MDC_IDC_SESS_CLINIC_NAME: NORMAL
MDC_IDC_SESS_DTM: NORMAL
MDC_IDC_SESS_TYPE: NORMAL
MDC_IDC_SET_BRADY_AT_MODE_SWITCH_RATE: 171 {BEATS}/MIN
MDC_IDC_SET_BRADY_HYSTRATE: NORMAL
MDC_IDC_SET_BRADY_LOWRATE: 60 {BEATS}/MIN
MDC_IDC_SET_BRADY_MAX_SENSOR_RATE: 130 {BEATS}/MIN
MDC_IDC_SET_BRADY_MAX_TRACKING_RATE: 130 {BEATS}/MIN
MDC_IDC_SET_BRADY_MODE: NORMAL
MDC_IDC_SET_BRADY_PAV_DELAY_LOW: 270 MS
MDC_IDC_SET_BRADY_SAV_DELAY_LOW: 240 MS
MDC_IDC_SET_LEADCHNL_RA_PACING_AMPLITUDE: 1.5 V
MDC_IDC_SET_LEADCHNL_RA_PACING_ANODE_ELECTRODE_1: NORMAL
MDC_IDC_SET_LEADCHNL_RA_PACING_ANODE_LOCATION_1: NORMAL
MDC_IDC_SET_LEADCHNL_RA_PACING_CAPTURE_MODE: NORMAL
MDC_IDC_SET_LEADCHNL_RA_PACING_CATHODE_ELECTRODE_1: NORMAL
MDC_IDC_SET_LEADCHNL_RA_PACING_CATHODE_LOCATION_1: NORMAL
MDC_IDC_SET_LEADCHNL_RA_PACING_POLARITY: NORMAL
MDC_IDC_SET_LEADCHNL_RA_PACING_PULSEWIDTH: 0.4 MS
MDC_IDC_SET_LEADCHNL_RA_SENSING_ANODE_ELECTRODE_1: NORMAL
MDC_IDC_SET_LEADCHNL_RA_SENSING_ANODE_LOCATION_1: NORMAL
MDC_IDC_SET_LEADCHNL_RA_SENSING_CATHODE_ELECTRODE_1: NORMAL
MDC_IDC_SET_LEADCHNL_RA_SENSING_CATHODE_LOCATION_1: NORMAL
MDC_IDC_SET_LEADCHNL_RA_SENSING_POLARITY: NORMAL
MDC_IDC_SET_LEADCHNL_RA_SENSING_SENSITIVITY: 0.3 MV
MDC_IDC_SET_LEADCHNL_RV_PACING_AMPLITUDE: 2 V
MDC_IDC_SET_LEADCHNL_RV_PACING_ANODE_ELECTRODE_1: NORMAL
MDC_IDC_SET_LEADCHNL_RV_PACING_ANODE_LOCATION_1: NORMAL
MDC_IDC_SET_LEADCHNL_RV_PACING_CAPTURE_MODE: NORMAL
MDC_IDC_SET_LEADCHNL_RV_PACING_CATHODE_ELECTRODE_1: NORMAL
MDC_IDC_SET_LEADCHNL_RV_PACING_CATHODE_LOCATION_1: NORMAL
MDC_IDC_SET_LEADCHNL_RV_PACING_POLARITY: NORMAL
MDC_IDC_SET_LEADCHNL_RV_PACING_PULSEWIDTH: 0.4 MS
MDC_IDC_SET_LEADCHNL_RV_SENSING_ANODE_ELECTRODE_1: NORMAL
MDC_IDC_SET_LEADCHNL_RV_SENSING_ANODE_LOCATION_1: NORMAL
MDC_IDC_SET_LEADCHNL_RV_SENSING_CATHODE_ELECTRODE_1: NORMAL
MDC_IDC_SET_LEADCHNL_RV_SENSING_CATHODE_LOCATION_1: NORMAL
MDC_IDC_SET_LEADCHNL_RV_SENSING_POLARITY: NORMAL
MDC_IDC_SET_LEADCHNL_RV_SENSING_SENSITIVITY: 0.9 MV
MDC_IDC_SET_ZONE_DETECTION_INTERVAL: 350 MS
MDC_IDC_SET_ZONE_DETECTION_INTERVAL: 400 MS
MDC_IDC_SET_ZONE_TYPE: NORMAL
MDC_IDC_STAT_AT_BURDEN_PERCENT: 0 %
MDC_IDC_STAT_AT_DTM_END: NORMAL
MDC_IDC_STAT_AT_DTM_START: NORMAL
MDC_IDC_STAT_BRADY_AP_VP_PERCENT: 0.06 %
MDC_IDC_STAT_BRADY_AP_VS_PERCENT: 38.67 %
MDC_IDC_STAT_BRADY_AS_VP_PERCENT: 0.02 %
MDC_IDC_STAT_BRADY_AS_VS_PERCENT: 61.25 %
MDC_IDC_STAT_BRADY_DTM_END: NORMAL
MDC_IDC_STAT_BRADY_DTM_START: NORMAL
MDC_IDC_STAT_BRADY_RA_PERCENT_PACED: 38.72 %
MDC_IDC_STAT_BRADY_RV_PERCENT_PACED: 0.08 %
MDC_IDC_STAT_EPISODE_RECENT_COUNT: 0
MDC_IDC_STAT_EPISODE_RECENT_COUNT_DTM_END: NORMAL
MDC_IDC_STAT_EPISODE_RECENT_COUNT_DTM_START: NORMAL
MDC_IDC_STAT_EPISODE_TOTAL_COUNT: 0
MDC_IDC_STAT_EPISODE_TOTAL_COUNT: 0
MDC_IDC_STAT_EPISODE_TOTAL_COUNT: 554
MDC_IDC_STAT_EPISODE_TOTAL_COUNT: 6
MDC_IDC_STAT_EPISODE_TOTAL_COUNT: NORMAL
MDC_IDC_STAT_EPISODE_TOTAL_COUNT_DTM_END: NORMAL
MDC_IDC_STAT_EPISODE_TOTAL_COUNT_DTM_START: NORMAL
MDC_IDC_STAT_EPISODE_TYPE: NORMAL

## 2022-12-20 PROCEDURE — 93296 REM INTERROG EVL PM/IDS: CPT | Performed by: INTERNAL MEDICINE

## 2022-12-20 PROCEDURE — 93294 REM INTERROG EVL PM/LDLS PM: CPT | Performed by: INTERNAL MEDICINE

## 2023-01-15 PROBLEM — I49.2: Status: RESOLVED | Noted: 2019-07-02 | Resolved: 2023-01-15

## 2023-01-15 ASSESSMENT — ENCOUNTER SYMPTOMS
DIZZINESS: 0
ARTHRALGIAS: 1
FEVER: 0
COUGH: 0
MYALGIAS: 0
HEADACHES: 0
CONSTIPATION: 1
BREAST MASS: 0
WEAKNESS: 0
DIARRHEA: 0
ABDOMINAL PAIN: 0
PALPITATIONS: 0
HEMATURIA: 0
SORE THROAT: 0
SHORTNESS OF BREATH: 0
EYE PAIN: 0
JOINT SWELLING: 0
HEMATOCHEZIA: 0
PARESTHESIAS: 0
FREQUENCY: 0
CHILLS: 0
DYSURIA: 0
HEARTBURN: 0
NAUSEA: 0

## 2023-01-15 ASSESSMENT — ACTIVITIES OF DAILY LIVING (ADL): CURRENT_FUNCTION: NO ASSISTANCE NEEDED

## 2023-01-16 ENCOUNTER — OFFICE VISIT (OUTPATIENT)
Dept: FAMILY MEDICINE | Facility: CLINIC | Age: 73
End: 2023-01-16
Payer: MEDICARE

## 2023-01-16 ENCOUNTER — ANTICOAGULATION THERAPY VISIT (OUTPATIENT)
Dept: ANTICOAGULATION | Facility: CLINIC | Age: 73
End: 2023-01-16

## 2023-01-16 VITALS
HEART RATE: 67 BPM | HEIGHT: 63 IN | SYSTOLIC BLOOD PRESSURE: 130 MMHG | BODY MASS INDEX: 33.2 KG/M2 | DIASTOLIC BLOOD PRESSURE: 74 MMHG | OXYGEN SATURATION: 96 % | TEMPERATURE: 98 F | WEIGHT: 187.4 LBS | RESPIRATION RATE: 20 BRPM

## 2023-01-16 DIAGNOSIS — Z79.01 ANTICOAGULATED ON WARFARIN: Primary | ICD-10-CM

## 2023-01-16 DIAGNOSIS — Z12.11 SCREENING FOR COLON CANCER: ICD-10-CM

## 2023-01-16 DIAGNOSIS — E89.0 POSTOPERATIVE HYPOTHYROIDISM: ICD-10-CM

## 2023-01-16 DIAGNOSIS — Z95.2 HX OF MECHANICAL AORTIC VALVE REPLACEMENT: ICD-10-CM

## 2023-01-16 DIAGNOSIS — Z87.74 H/O BICUSPID AORTIC VALVE: ICD-10-CM

## 2023-01-16 DIAGNOSIS — E78.00 HYPERCHOLESTEROLEMIA: ICD-10-CM

## 2023-01-16 DIAGNOSIS — I25.10 CORONARY ARTERY DISEASE INVOLVING NATIVE CORONARY ARTERY OF NATIVE HEART WITHOUT ANGINA PECTORIS: ICD-10-CM

## 2023-01-16 DIAGNOSIS — I44.39 HIGH DEGREE ATRIOVENTRICULAR BLOCK: ICD-10-CM

## 2023-01-16 DIAGNOSIS — I47.29 NON-SUSTAINED VENTRICULAR TACHYCARDIA (H): ICD-10-CM

## 2023-01-16 DIAGNOSIS — Z95.0 CARDIAC PACEMAKER IN SITU: ICD-10-CM

## 2023-01-16 DIAGNOSIS — Z00.00 MEDICARE ANNUAL WELLNESS VISIT, SUBSEQUENT: Primary | ICD-10-CM

## 2023-01-16 DIAGNOSIS — Z79.01 ANTICOAGULATED ON WARFARIN: ICD-10-CM

## 2023-01-16 DIAGNOSIS — M85.80 OSTEOPENIA, UNSPECIFIED LOCATION: ICD-10-CM

## 2023-01-16 DIAGNOSIS — Z12.31 ENCOUNTER FOR SCREENING MAMMOGRAM FOR MALIGNANT NEOPLASM OF BREAST: ICD-10-CM

## 2023-01-16 DIAGNOSIS — Z90.81 S/P SPLENECTOMY: ICD-10-CM

## 2023-01-16 LAB
ALBUMIN SERPL BCG-MCNC: 4.3 G/DL (ref 3.5–5.2)
ALP SERPL-CCNC: 69 U/L (ref 35–104)
ALT SERPL W P-5'-P-CCNC: 31 U/L (ref 10–35)
ANION GAP SERPL CALCULATED.3IONS-SCNC: 14 MMOL/L (ref 7–15)
AST SERPL W P-5'-P-CCNC: 41 U/L (ref 10–35)
BILIRUB SERPL-MCNC: 0.5 MG/DL
BUN SERPL-MCNC: 16.1 MG/DL (ref 8–23)
CALCIUM SERPL-MCNC: 9.6 MG/DL (ref 8.8–10.2)
CHLORIDE SERPL-SCNC: 104 MMOL/L (ref 98–107)
CHOLEST SERPL-MCNC: 225 MG/DL
CREAT SERPL-MCNC: 0.88 MG/DL (ref 0.51–0.95)
DEPRECATED HCO3 PLAS-SCNC: 22 MMOL/L (ref 22–29)
ERYTHROCYTE [DISTWIDTH] IN BLOOD BY AUTOMATED COUNT: 14.3 % (ref 10–15)
GFR SERPL CREATININE-BSD FRML MDRD: 70 ML/MIN/1.73M2
GLUCOSE SERPL-MCNC: 93 MG/DL (ref 70–99)
HCT VFR BLD AUTO: 40.7 % (ref 35–47)
HDLC SERPL-MCNC: 71 MG/DL
HGB BLD-MCNC: 13.6 G/DL (ref 11.7–15.7)
INR BLD: 1.7 (ref 0.9–1.1)
LDLC SERPL CALC-MCNC: 134 MG/DL
MCH RBC QN AUTO: 29.8 PG (ref 26.5–33)
MCHC RBC AUTO-ENTMCNC: 33.4 G/DL (ref 31.5–36.5)
MCV RBC AUTO: 89 FL (ref 78–100)
NONHDLC SERPL-MCNC: 154 MG/DL
PLATELET # BLD AUTO: 251 10E3/UL (ref 150–450)
POTASSIUM SERPL-SCNC: 4.6 MMOL/L (ref 3.4–5.3)
PROT SERPL-MCNC: 7.4 G/DL (ref 6.4–8.3)
RBC # BLD AUTO: 4.56 10E6/UL (ref 3.8–5.2)
SODIUM SERPL-SCNC: 140 MMOL/L (ref 136–145)
TRIGL SERPL-MCNC: 101 MG/DL
TSH SERPL DL<=0.005 MIU/L-ACNC: 0.89 UIU/ML (ref 0.3–4.2)
WBC # BLD AUTO: 8.4 10E3/UL (ref 4–11)

## 2023-01-16 PROCEDURE — 85610 PROTHROMBIN TIME: CPT | Performed by: FAMILY MEDICINE

## 2023-01-16 PROCEDURE — 36415 COLL VENOUS BLD VENIPUNCTURE: CPT | Performed by: FAMILY MEDICINE

## 2023-01-16 PROCEDURE — G0439 PPPS, SUBSEQ VISIT: HCPCS | Performed by: FAMILY MEDICINE

## 2023-01-16 PROCEDURE — 80053 COMPREHEN METABOLIC PANEL: CPT | Performed by: FAMILY MEDICINE

## 2023-01-16 PROCEDURE — 85027 COMPLETE CBC AUTOMATED: CPT | Performed by: FAMILY MEDICINE

## 2023-01-16 PROCEDURE — 36416 COLLJ CAPILLARY BLOOD SPEC: CPT | Performed by: FAMILY MEDICINE

## 2023-01-16 PROCEDURE — 80061 LIPID PANEL: CPT | Performed by: FAMILY MEDICINE

## 2023-01-16 PROCEDURE — 84443 ASSAY THYROID STIM HORMONE: CPT | Performed by: FAMILY MEDICINE

## 2023-01-16 PROCEDURE — 0134A COVID-19 VACCINE BIVALENT BOOSTER 18+ (MODERNA): CPT | Performed by: FAMILY MEDICINE

## 2023-01-16 PROCEDURE — 91313 COVID-19 VACCINE BIVALENT BOOSTER 18+ (MODERNA): CPT | Performed by: FAMILY MEDICINE

## 2023-01-16 PROCEDURE — 82306 VITAMIN D 25 HYDROXY: CPT | Performed by: FAMILY MEDICINE

## 2023-01-16 PROCEDURE — 99214 OFFICE O/P EST MOD 30 MIN: CPT | Mod: 25 | Performed by: FAMILY MEDICINE

## 2023-01-16 ASSESSMENT — ENCOUNTER SYMPTOMS
FREQUENCY: 0
CONSTIPATION: 1
WEAKNESS: 0
CHILLS: 0
HEARTBURN: 0
COUGH: 0
SORE THROAT: 0
PALPITATIONS: 0
ARTHRALGIAS: 1
PARESTHESIAS: 0
SHORTNESS OF BREATH: 0
ABDOMINAL PAIN: 0
NAUSEA: 0
HEADACHES: 0
JOINT SWELLING: 0
MYALGIAS: 0
DYSURIA: 0
EYE PAIN: 0
HEMATURIA: 0
FEVER: 0
BREAST MASS: 0
DIARRHEA: 0
DIZZINESS: 0
HEMATOCHEZIA: 0

## 2023-01-16 ASSESSMENT — ACTIVITIES OF DAILY LIVING (ADL): CURRENT_FUNCTION: NO ASSISTANCE NEEDED

## 2023-01-16 ASSESSMENT — PAIN SCALES - GENERAL: PAINLEVEL: NO PAIN (0)

## 2023-01-16 NOTE — PROGRESS NOTES
Anticoagulation-Extended Recheck Request    Under [facility] Anticoagulation protocol, Anticoagulation team may extend INR recheck interval + 1 week for each stable in range INR to max of 6 weeks. Extended interval rechecks between 8-12 weeks for patients on stable maintenance therapy require approval from referring provider.    Anticoagulation clinic suggests consideration of extended intervals in medically stable patients, with standard INR goals, and no change in warfarin dose for last 4-6 months    Vandana Thao, 72 year old, female has been on:    Current recheck interval: 6 week  Compliant: Yes  Stable warfarin dose since: 8/10/22  INR Goal: 1.5-2.0  Time in Therapeutic range: 85.4%    Lab Results   Component Value Date    INR 1.7 01/16/2023    INR 1.7 12/06/2022    INR 1.7 10/24/2022    INR 1.5 09/12/2022    INR 1.9 08/10/2022    INR 2.1 07/19/2022    INR 1.7 06/07/2022    INR 1.9 05/03/2022    INR 2.20 07/07/2021    INR 2.00 06/04/2021    INR 2.00 05/14/2021    INR 2.10 04/30/2021    INR 2.30 04/20/2021    INR 1.30 04/12/2021    INR 2.00 03/23/2021    INR 2.20 03/10/2021         Please advise if Vandana is approved to have extended interval rechecks every 8 weeks while on stable maintenance therapy    Thank you,    Becca Adames RN

## 2023-01-16 NOTE — PROGRESS NOTES
ANTICOAGULATION MANAGEMENT     Vandana Thao 72 year old female is on warfarin with therapeutic INR result. (Goal INR 1.5-2.0)    Recent labs: (last 7 days)     01/16/23  0954   INR 1.7*       ASSESSMENT       Source(s): Chart Review, Patient/Caregiver Call and Template       Warfarin doses taken: Warfarin taken as instructed    Diet: No new diet changes identified    New illness, injury, or hospitalization: No    Medication/supplement changes: None noted    Signs or symptoms of bleeding or clotting: No    Previous INR: Therapeutic last 2(+) visits    Additional findings: None       PLAN     Recommended plan for no diet, medication or health factor changes affecting INR     Dosing Instructions: Continue your current warfarin dose with next INR in 6 weeks       Summary  As of 1/16/2023    Full warfarin instructions:  7.5 mg every Sun; 5 mg all other days   Next INR check:  2/27/2023             Telephone call with Vandana who verbalizes understanding and agrees to plan    Lab visit scheduled    Education provided:     Contact [phone] with any changes, questions or concerns.     Plan made per [facility] anticoagulation protocol    Becca Adames RN  Anticoagulation Clinic  1/16/2023    _______________________________________________________________________     Anticoagulation Episode Summary     Current INR goal:  1.5-2.0   TTR:  85.4 % (10.9 mo)   Target end date:  Indefinite   Send INR reminders to:  ANDREAS JOHN    Indications    Anticoagulated on warfarin [Z79.01]  Hx of mechanical aortic valve replacement [Z95.2]           Comments:           Anticoagulation Care Providers     Provider Role Specialty Phone number    Lakeisha Marmolejo MD Referring Family Medicine [phone]

## 2023-01-16 NOTE — PATIENT INSTRUCTIONS
I recommend consideration of the H. Influenza B (Hib) vaccine, Menactra vaccine series, and Meningitis B vaccine series due to your previous spleen removal.          Patient Education   Personalized Prevention Plan  You are due for the preventive services outlined below.  Your care team is available to assist you in scheduling these services.  If you have already completed any of these items, please share that information with your care team to update in your medical record.  Health Maintenance Due   Topic Date Due    COVID-19 Vaccine (4 - Booster for Moderna series) 03/11/2022       Understanding USDA MyPlate  The USDA has guidelines to help you make healthy food choices. These are called MyPlate. MyPlate shows the food groups that make up healthy meals using the image of a place setting. Before you eat, think about the healthiest choices for what to put on your plate or in your cup or bowl. To learn more about building a healthy plate, visit www.choosemyplate.gov.    The food groups  Fruits. Any fruit or 100% fruit juice counts as part of the Fruit Group. Fruits may be fresh, canned, frozen, or dried, and may be whole, cut-up, or pureed. Make 1/2 of your plate fruits and vegetables.  Vegetables. Any vegetable or 100% vegetable juice counts as a member of the Vegetable Group. Vegetables may be fresh, frozen, canned, or dried. They can be served raw or cooked and may be whole, cut-up, or mashed. Make 1/2 of your plate fruits and vegetables.  Grains. All foods made from grains are part of the Grains Group. These include wheat, rice, oats, cornmeal, and barley. Grains are often used to make foods such as bread, pasta, oatmeal, cereal, tortillas, and grits. Grains should be no more than 1/4 of your plate. At least half of your grains should be whole grains.  Protein. This group includes meat, poultry, seafood, beans and peas, eggs, processed soy products (such as tofu), nuts (including nut butters), and seeds. Make  protein choices no more than 1/4 of your plate. Meat and poultry choices should be lean or low fat.  Dairy. The Dairy Group includes all fluid milk products and foods made from milk that contain calcium, such as yogurt and cheese. (Foods that have little calcium, such as cream, butter, and cream cheese, are not part of this group.) Most dairy choices should be low-fat or fat-free.  Oils. Oils aren't a food group, but they do contain essential nutrients. However it's important to watch your intake of oils. These are fats that are liquid at room temperature. They include canola, corn, olive, soybean, vegetable, and sunflower oil. Foods that are mainly oil include mayonnaise, certain salad dressings, and soft margarines. You likely already get your daily oil allowance from the foods you eat.  Things to limit  Eating healthy also means limiting these things in your diet:     Salt (sodium). Many processed foods have a lot of sodium. To keep sodium intake down, eat fresh vegetables, meats, poultry, and seafood when possible. Purchase low-sodium, reduced-sodium, or no-salt-added food products at the store. And don't add salt to your meals at home. Instead, season them with herbs and spices such as dill, oregano, cumin, and paprika. Or try adding flavor with lemon or lime zest and juice.  Saturated fat. Saturated fats are most often found in animal products such as beef, pork, and chicken. They are often solid at room temperature, such as butter. To reduce your saturated fat intake, choose leaner cuts of meat and poultry. And try healthier cooking methods such as grilling, broiling, roasting, or baking. For a simple lower-fat swap, use plain nonfat yogurt instead of mayonnaise when making potato salad or macaroni salad.  Added sugars. These are sugars added to foods. They are in foods such as ice cream, candy, soda, fruit drinks, sports drinks, energy drinks, cookies, pastries, jams, and syrups. Cut down on added sugars by  sharing sweet treats with a family member or friend. You can also choose fruit for dessert, and drink water or other unsweetened beverages.     [employer] last reviewed this educational content on 6/1/2020 2000-2021 [employer]. All rights reserved. This information is not intended as a substitute for professional medical care. Always follow your healthcare professional's instructions.          Signs of Hearing Loss      Hearing much better with one ear can be a sign of hearing loss.   Hearing loss is a problem shared by many people. In fact, it is one of the most common health problems, particularly as people age. Most people age 65 and older have some hearing loss. By age 80, almost everyone does. Hearing loss often occurs slowly over the years. So you may not realize your hearing has gotten worse.  Have your hearing checked  Call your healthcare provider if you:  Have to strain to hear normal conversation  Have to watch other people s faces very carefully to follow what they re saying  Need to ask people to repeat what they ve said  Often misunderstand what people are saying  Turn the volume of the television or radio up so high that others complain  Feel that people are mumbling when they re talking to you  Find that the effort to hear leaves you feeling tired and irritated  Notice, when using the phone, that you hear better with one ear than the other  [employer] last reviewed this educational content on 1/1/2020 2000-2021 [employer]. All rights reserved. This information is not intended as a substitute for professional medical care. Always follow your healthcare professional's instructions.

## 2023-01-16 NOTE — PROGRESS NOTES
"SUBJECTIVE:   Vandana is a 72 year old who presents for Preventive Visit.    Patient has been advised of split billing requirements and indicates understanding: Yes  Are you in the first 12 months of your Medicare coverage?  No    Cataract surgery performed in September, has had some shadowing of her left eye since then.  Saw her ophthalmologist who recommended treatment of dry eyes.  Persisting shadow.  She is planning to follow-up as she believes it may be from drooping of her eyelid.  No other changes.  History of NSVT, well suppressed with verapamil.  She has a pacemaker in place due to previous AV blockade.  Prior history of coronary artery disease remains asymptomatic.  She taking pravastatin for her cholesterol.  Taking warfarin due to previous bicuspid aortic valve replacement.  History of Graves' hyperthyroidism status post initially radioactive iodine and later surgery and treatment of this.  Her levothyroxine dose was increased in September and she is due for follow-up of this.    Her  was diagnosed with glioblastoma and required surgery in November, this is been very stressful.  He is currently undergoing chemotherapy.  While he is doing well they are still dealing with the potential prognosis.  She is feeling adequately supported.  She has 2 children and 5 grandchildren all of whom she is close with them and feels supported there.  Sometimes has some disrupted sleep but she awakens early.  Managing this by reading.    Healthy Habits:     In general, how would you rate your overall health?  Good    Frequency of exercise:  2-3 days/week    Duration of exercise:  15-30 minutes    Do you usually eat at least 4 servings of fruit and vegetables a day, include whole grains    & fiber and avoid regularly eating high fat or \"junk\" foods?  No    Taking medications regularly:  Yes    Medication side effects:  None    Ability to successfully perform activities of daily living:  No assistance needed    Home " Safety:  Lack of grab bars in the bathroom    Hearing Impairment:  Difficulty following a conversation in a noisy restaurant or crowded room    In the past 6 months, have you been bothered by leaking of urine?  No    In general, how would you rate your overall mental or emotional health?  Good      PHQ-2 Total Score: 0    Additional concerns today:  Yes      Have you ever done Advance Care Planning? (For example, a Health Directive, POLST, or a discussion with a medical provider or your loved ones about your wishes): No, advance care planning information given to patient to review.  Patient plans to discuss their wishes with loved ones or provider.        Fall risk  Fallen 2 or more times in the past year?: No  Any fall with injury in the past year?: No    Cognitive Screening   1) Repeat 3 items (Leader, Season, Table)    2) Clock draw: NORMAL  3) 3 item recall: Recalls 3 objects  Results: 3 items recalled: COGNITIVE IMPAIRMENT LESS LIKELY    Mini-CogTM Copyright JESS Huber. Licensed by the author for use in [facility]; reprinted with permission (sienna@[facility]). All rights reserved.      Do you have sleep apnea, excessive snoring or daytime drowsiness?: no    Reviewed and updated as needed this visit by clinical staff                  Reviewed and updated as needed this visit by Provider                 Social History     Tobacco Use     Smoking status: Former     Packs/day: 0.50     Years: 20.00     Pack years: 10.00     Types: Cigarettes     Quit date: 1979     Years since quittin.0     Smokeless tobacco: Never   Substance Use Topics     Alcohol use: Yes     Comment: 1-2 drinks per week or less     If you drink alcohol do you typically have >3 drinks per day or >7 drinks per week? No    Alcohol Use 1/15/2023   Prescreen: >3 drinks/day or >7 drinks/week? No     Current providers sharing in care for this patient include: Patient Care Team:  Maggi Muro MD as PCP - General (Family  Medicine)  Lakeisha Marmolejo MD as Assigned PCP    The following health maintenance items are reviewed in Epic and correct as of today:  Health Maintenance   Topic Date Due     ANNUAL REVIEW OF HM ORDERS  Never done     COVID-19 Vaccine (4 - Booster for Moderna series) 03/11/2022     MEDICARE ANNUAL WELLNESS VISIT  01/14/2023     MAMMO SCREENING  03/02/2023     FALL RISK ASSESSMENT  01/16/2024     DEXA  01/20/2024     LIPID  01/14/2027     ADVANCE CARE PLANNING  01/14/2027     DTAP/TDAP/TD IMMUNIZATION (5 - Td or Tdap) 02/27/2030     COLORECTAL CANCER SCREENING  12/21/2030     HEPATITIS C SCREENING  Completed     PHQ-2 (once per calendar year)  Completed     INFLUENZA VACCINE  Completed     Pneumococcal Vaccine: 65+ Years  Completed     ZOSTER IMMUNIZATION  Completed     IPV IMMUNIZATION  Aged Out     MENINGITIS IMMUNIZATION  Aged Out     Lab work is in process  Labs reviewed in EPIC  BP Readings from Last 3 Encounters:   01/16/23 130/74   09/12/22 (!) 144/72   05/20/22 (!) 164/84    Wt Readings from Last 3 Encounters:   01/16/23 85 kg (187 lb 6.4 oz)   09/12/22 83.9 kg (185 lb)   05/20/22 85.7 kg (188 lb 14.4 oz)                  Patient Active Problem List   Diagnosis     Hypercholesterolemia     H/O bicuspid aortic valve     High degree atrioventricular block     Postoperative hypothyroidism     Cardiac pacemaker in situ     Non-sustained ventricular tachycardia     Coronary artery disease involving native coronary artery of native heart without angina pectoris     Microscopic hematuria     S/P splenectomy     Other insomnia     Anticoagulated on warfarin     Hx of mechanical aortic valve replacement     Past Surgical History:   Procedure Laterality Date     CARDIAC VALVE REPLACEMENT  01/01/2009    AVR     COLONOSCOPY  02/04/2013     CYSTOSTOMY W/ BLADDER BIOPSY  01/01/2000     EP PACEMAKER INSERT N/A 06/21/2018    Procedure: EP Pacemaker Insertion;  Surgeon: Luis Urena MD;  Location: [facility]  Cath Lab;  Service:      EYE SURGERY  10/2022     OTHER SURGICAL HISTORY  2002    thyroidectomy     SPLENECTOMY  1979     ZZC REPLACE AORT VALV,PROSTH VALV      Description: Aortic Valve Replacement;  Proc Date: 2009;  Comments: #[address] [facility] Prosthesis       Social History     Tobacco Use     Smoking status: Former     Packs/day: 0.50     Years: 20.00     Pack years: 10.00     Types: Cigarettes     Quit date: 1979     Years since quittin.0     Smokeless tobacco: Never   Substance Use Topics     Alcohol use: Yes     Comment: 1-2 drinks per week or less     Family History   Problem Relation Age of Onset     Acute Myocardial Infarction Brother 43     Rheumatoid Arthritis Mother          in 40s     Coronary Artery Disease Father      Hypertension Father      No Known Problems Maternal Grandmother      No Known Problems Maternal Grandfather      No Known Problems Paternal Grandmother      No Known Problems Paternal Grandfather      Aortic stenosis Brother         s/p surgery     No Known Problems Sister      Breast Cancer No family hx of      Colon Cancer No family hx of      Cervical Cancer No family hx of      Other Cancer No family hx of          Current Outpatient Medications   Medication Sig Dispense Refill     ASPIRIN NOT PRESCRIBED (INTENTIONAL) Please choose reason not prescribed from choices below.       cholecalciferol, vitamin D3, 1,000 unit tablet [CHOLECALCIFEROL, VITAMIN D3, 1,000 UNIT TABLET] Take 1,000 Units by mouth daily.              levothyroxine (SYNTHROID/LEVOTHROID) 100 MCG tablet Take 1 tablet (100 mcg) by mouth daily 90 tablet 1     pravastatin (PRAVACHOL) 40 MG tablet Take 1 tablet (40 mg) by mouth daily 90 tablet 3     verapamil ER (VERELAN) 240 MG 24 hr capsule Take 1 capsule (240 mg) by mouth daily 90 capsule 1     warfarin ANTICOAGULANT (COUMADIN) 5 MG tablet Take 1-1.5 tablets (5-7.5 mg) by mouth daily Adjust dose per INR as directed 110 tablet 1      "amoxicillin (AMOXIL) 500 MG capsule Take 4 capsules (2,000 mg) by mouth once for 1 dose Prior to dental procedure. 4 capsule 3     No Known Allergies  Recent Labs   Lab Test 09/12/22  1232 01/14/22  0900 09/10/21  1322 12/09/20  1052 02/27/20  1145 04/16/19  0929 06/20/18  2129 03/19/18  0940   A1C  --  5.3  --  5.4  --   --   --   --    LDL  --  138*  --  145* 127 115  --  150*   HDL  --  72  --  78 69 65  --  66   TRIG  --  100  --  126 157* 144  --  140   ALT  --   --   --   --   --  16  --  31   CR  --   --  0.85 0.89 0.91 0.80   < >  --    GFRESTIMATED  --   --  70 >60 >60 >60   < >  --    GFRESTBLACK  --   --   --  >60 >60 >60   < >  --    POTASSIUM  --   --  4.4 4.8 4.4 4.4   < >  --    TSH 4.63*  --  2.29 4.03 1.72 0.73   < > 0.85    < > = values in this interval not displayed.            Review of Systems   Constitutional: Negative for chills and fever.   HENT: Negative for congestion, ear pain, hearing loss and sore throat.    Eyes: Positive for visual disturbance. Negative for pain.   Respiratory: Negative for cough and shortness of breath.    Cardiovascular: Negative for chest pain, palpitations and peripheral edema.   Gastrointestinal: Positive for constipation. Negative for abdominal pain, diarrhea, heartburn, hematochezia and nausea.   Breasts:  Negative for tenderness, breast mass and discharge.   Genitourinary: Negative for dysuria, frequency, genital sores, hematuria, pelvic pain, urgency, vaginal bleeding and vaginal discharge.   Musculoskeletal: Positive for arthralgias. Negative for joint swelling and myalgias.   Skin: Negative for rash.   Neurological: Negative for dizziness, weakness, headaches and paresthesias.   Psychiatric/Behavioral: Negative for mood changes.       OBJECTIVE:   There were no vitals taken for this visit. Estimated body mass index is 32.77 kg/m  as calculated from the following:    Height as of 9/10/21: 1.6 m (5' 3\").    Weight as of 9/12/22: 83.9 kg (185 lb).  Physical " Exam  GENERAL APPEARANCE: healthy, alert and no distress  EYES: Eyes grossly normal to inspection, PERRL and conjunctivae and sclerae normal  HENT: ear canals and TM's normal, nose and mouth without ulcers or lesions, oropharynx clear and oral mucous membranes moist  NECK: no adenopathy, no asymmetry, masses, or scars and thyroid normal to palpation  RESP: lungs clear to auscultation - no rales, rhonchi or wheezes  BREAST: normal without masses, tenderness or nipple discharge and no palpable axillary masses or adenopathy  CV: regular rate and rhythm, normal S1 S2, no S3 or S4, no murmur, click or rub, no peripheral edema and peripheral pulses strong  ABDOMEN: soft, nontender, no hepatosplenomegaly, no masses and bowel sounds normal  MS: no musculoskeletal defects are noted and gait is age appropriate without ataxia  SKIN: no suspicious lesions or rashes  NEURO: Normal strength and tone, sensory exam grossly normal, mentation intact and speech normal  PSYCH: mentation appears normal and affect normal/bright    Diagnostic Test Results:  Labs reviewed in Epic    ASSESSMENT / PLAN:     Medicare annual wellness visit, subsequent  At today's visit, we discussed lifestyle interventions to promote self-management and wellness, including maintenance of a healthy weight, healthy diet, regular physical activity and exercise, and falls prevention.  COVID booster administered today.  Order placed for screening mammogram.  Discussed options for colon cancer screening, order placed for Cologuard.  She is up-to-date with bone density screening.  Information provided regarding [facility] directive.    Coronary artery disease involving native coronary artery of native heart without angina pectoris  Asymptomatic.  No aspirin due to current warfarin intake.  She remains on pravastatin.  We will check lipid panel and comprehensive metabolic panel today.  - Lipid panel reflex to direct LDL Fasting; Future    High degree  atrioventricular block  Cardiac pacemaker in situ  Controlled with pacemaker and doing well.    Non-sustained ventricular tachycardia  Well suppressed with verapamil.  Continue.    H/O bicuspid aortic valve  Hx of mechanical aortic valve replacement  Anticoagulated on warfarin  Stable.  We will check CBC and monitoring of warfarin.  - CBC with platelets; Future    S/P splenectomy  Will check CBC.  Advised completion of haemophilus influenza B vaccine, Menactra series, and meningitis B vaccine series.  She will check insurance coverage of this and consider having this at a pharmacy versus coming back to clinic for these.  - CBC with platelets; Future    Hypercholesterolemia  As above  - Lipid panel reflex to direct LDL Fasting; Future  - Comprehensive metabolic panel; Future    Postoperative hypothyroidism  Tolerating increased dose of levothyroxine well.  We will check TSH today.  - TSH; Future    Osteopenia, unspecified location  Encourage regular weightbearing activities.  We will check vitamin D level.  - Vitamin D Deficiency; Future    Screening for colon cancer  - STEF([employer]); Future    Encounter for screening mammogram for malignant neoplasm of breast  - *MA Screening Digital Bilateral; Future     Patient has been advised of split billing requirements and indicates understanding: Yes      COUNSELING:  Reviewed preventive health counseling, as reflected in patient instructions        She reports that she quit smoking about 44 years ago. Her smoking use included cigarettes. She has a 10.00 pack-year smoking history. She has never used smokeless tobacco.      Appropriate preventive services were discussed with this patient, including applicable screening as appropriate for cardiovascular disease, diabetes, osteopenia/osteoporosis, and glaucoma.  As appropriate for age/gender, discussed screening for colorectal cancer, prostate cancer, breast cancer, and cervical cancer. Checklist reviewing  preventive services available has been given to the patient.    Reviewed patients plan of care and provided an AVS. The Basic Care Plan (routine screening as documented in Health Maintenance) for Vandana meets the Care Plan requirement. This Care Plan has been established and reviewed with the Patient.          Maggi Muro MD  [facility]    Identified Health Risks:    The patient was counseled and encouraged to consider modifying their diet and eating habits. She was provided with information on recommended healthy diet options.  The patient was provided with written information regarding signs of hearing loss.

## 2023-01-17 LAB — DEPRECATED CALCIDIOL+CALCIFEROL SERPL-MC: 35 UG/L (ref 20–75)

## 2023-01-25 ENCOUNTER — HOSPITAL ENCOUNTER (OUTPATIENT)
Dept: MAMMOGRAPHY | Facility: CLINIC | Age: 73
Discharge: HOME | End: 2023-01-25
Attending: FAMILY MEDICINE | Admitting: FAMILY MEDICINE
Payer: MEDICARE

## 2023-01-25 DIAGNOSIS — Z12.31 ENCOUNTER FOR SCREENING MAMMOGRAM FOR MALIGNANT NEOPLASM OF BREAST: ICD-10-CM

## 2023-01-25 PROCEDURE — 77067 SCR MAMMO BI INCL CAD: CPT

## 2023-01-26 DIAGNOSIS — I25.10 CORONARY ARTERY DISEASE INVOLVING NATIVE CORONARY ARTERY OF NATIVE HEART WITHOUT ANGINA PECTORIS: ICD-10-CM

## 2023-01-27 RX ORDER — PRAVASTATIN SODIUM 40 MG
TABLET ORAL
Qty: 90 TABLET | Refills: 3 | Status: SHIPPED | OUTPATIENT
Start: 2023-01-27 | End: 2024-01-22

## 2023-01-27 NOTE — TELEPHONE ENCOUNTER
"Last Written Prescription Date:  12/21/2021  Last Fill Quantity: 90,  # refills: 3   Last office visit provider:  1/16/2023     Requested Prescriptions   Pending Prescriptions Disp Refills     pravastatin (PRAVACHOL) 40 MG tablet [Pharmacy Med Name: PRAVASTATIN SODIUM 40 MG TAB] 90 tablet 3     Sig: TAKE 1 TABLET BY MOUTH EVERY DAY       Statins Protocol Passed - 1/26/2023 11:05 AM        Passed - LDL on file in past 12 months     Recent Labs   Lab Test 01/16/23  0954   *             Passed - No abnormal creatine kinase in past 12 months     No lab results found.             Passed - Recent (12 mo) or future (30 days) visit within the authorizing provider's specialty     Patient has had an office visit with the authorizing provider or a provider within the authorizing providers department within the previous 12 mos or has a future within next 30 days. See \"Patient Info\" tab in inbasket, or \"Choose Columns\" in Meds & Orders section of the refill encounter.              Passed - Medication is active on med list        Passed - Patient is age 18 or older        Passed - No active pregnancy on record        Passed - No positive pregnancy test in past 12 months             Radhika Goldstein, CHARITY 01/27/23 1:07 PM      "

## 2023-02-01 LAB — NONINV COLON CA DNA+OCC BLD SCRN STL QL: NEGATIVE

## 2023-02-17 ENCOUNTER — DOCUMENTATION ONLY (OUTPATIENT)
Dept: ANTICOAGULATION | Facility: CLINIC | Age: 73
End: 2023-02-17
Payer: MEDICARE

## 2023-02-17 DIAGNOSIS — Z87.74 H/O BICUSPID AORTIC VALVE: ICD-10-CM

## 2023-02-17 DIAGNOSIS — Z95.2 HX OF MECHANICAL AORTIC VALVE REPLACEMENT: ICD-10-CM

## 2023-02-17 DIAGNOSIS — Z79.01 ANTICOAGULATED ON WARFARIN: Primary | ICD-10-CM

## 2023-02-17 NOTE — PROGRESS NOTES
ANTICOAGULATION CLINIC REFERRAL RENEWAL REQUEST       An annual renewal order is required for all patients referred to [facility].?  Please review and sign the pended referral order for Vandana Thao.       ANTICOAGULATION SUMMARY      Warfarin indication(s)   Mechanical AVR    Mechanical heart valve present?  Mechanical  AVR-Bileaflet       Current goal range   INR: 2.0-3.0     Goal appropriate for indication? Goal INR 2-3, standard for indication(s) above     Time in Therapeutic Range (TTR)  (Goal > 60%) 85.4%       Office visit with referring provider's group within last year yes on 1/16/23       Dorene Howard RN  [facility]

## 2023-02-18 DIAGNOSIS — E03.9 ACQUIRED HYPOTHYROIDISM: ICD-10-CM

## 2023-02-19 RX ORDER — LEVOTHYROXINE SODIUM 100 UG/1
TABLET ORAL
Qty: 90 TABLET | Refills: 3 | Status: SHIPPED | OUTPATIENT
Start: 2023-02-19 | End: 2024-02-20

## 2023-02-19 NOTE — TELEPHONE ENCOUNTER
"Last Written Prescription Date:  9/13/22  Last Fill Quantity: 90,  # refills: 1   Last office visit provider:  1/16/23     Requested Prescriptions   Pending Prescriptions Disp Refills     levothyroxine (SYNTHROID/LEVOTHROID) 100 MCG tablet [Pharmacy Med Name: LEVOTHYROXINE 100 MCG TABLET] 90 tablet 1     Sig: TAKE 1 TABLET BY MOUTH EVERY DAY       Thyroid Protocol Passed - 2/18/2023 10:53 AM        Passed - Patient is 12 years or older        Passed - Recent (12 mo) or future (30 days) visit within the authorizing provider's specialty     Patient has had an office visit with the authorizing provider or a provider within the authorizing providers department within the previous 12 mos or has a future within next 30 days. See \"Patient Info\" tab in inbasket, or \"Choose Columns\" in Meds & Orders section of the refill encounter.              Passed - Medication is active on med list        Passed - Normal TSH on file in past 12 months     Recent Labs   Lab Test 01/16/23  0954   TSH 0.89              Passed - No active pregnancy on record     If patient is pregnant or has had a positive pregnancy test, please check TSH.          Passed - No positive pregnancy test in past 12 months     If patient is pregnant or has had a positive pregnancy test, please check TSH.               Ronel Delarosa RN 02/19/23 1:11 PM  "

## 2023-02-27 ENCOUNTER — LAB (OUTPATIENT)
Dept: LAB | Facility: CLINIC | Age: 73
End: 2023-02-27
Payer: MEDICARE

## 2023-02-27 ENCOUNTER — ANTICOAGULATION THERAPY VISIT (OUTPATIENT)
Dept: ANTICOAGULATION | Facility: CLINIC | Age: 73
End: 2023-02-27

## 2023-02-27 DIAGNOSIS — Z95.2 HX OF MECHANICAL AORTIC VALVE REPLACEMENT: ICD-10-CM

## 2023-02-27 DIAGNOSIS — Z87.74 H/O BICUSPID AORTIC VALVE: ICD-10-CM

## 2023-02-27 DIAGNOSIS — Z79.01 ANTICOAGULATED ON WARFARIN: ICD-10-CM

## 2023-02-27 DIAGNOSIS — Z79.01 ANTICOAGULATED ON WARFARIN: Primary | ICD-10-CM

## 2023-02-27 LAB — INR BLD: 1.9 (ref 0.9–1.1)

## 2023-02-27 PROCEDURE — 85610 PROTHROMBIN TIME: CPT

## 2023-02-27 PROCEDURE — 36415 COLL VENOUS BLD VENIPUNCTURE: CPT

## 2023-02-27 NOTE — PROGRESS NOTES
ANTICOAGULATION MANAGEMENT     Vandana Thao 72 year old female is on warfarin with therapeutic INR result. (Goal INR 1.5-2.0)    Recent labs: (last 7 days)     02/27/23  1015   INR 1.9*       ASSESSMENT     Warfarin Lab Questionnaire    Dose in Tablet or Mg 2/27/2023   TAB or MG? milligram (mg)     Pt Rptd Dose SUNDAY MONDAY TUESDAY WEDNESDAY THURSDAY FRIDAY SATURDAY 2/27/2023 7.5 7      - 5mg 7        -5mg  7          -5mg 7         -5mg 7       -5mg 7         -5mg     Warfarin Lab Questionnaire 2/27/2023   Missed doses? No   Medication changes? No   Abnormal bleeding? No   Shortness of breath? No   Injuries or illness since last INR? No   Changes in diet or alcohol? No   Upcoming surgery, procedure? No   Best number to call with results? [phone]       Additional findings: Confirmed with pt that she took 7.5mg on Sundays and 5mg all other days of the week not 7mg.       PLAN     Recommended plan for no diet, medication or health factor changes affecting INR     Dosing Instructions: Continue your current warfarin dose with next INR in 8 weeks       Summary  As of 2/27/2023    Full warfarin instructions:  7.5 mg every Sun; 5 mg all other days   Next INR check:  4/24/2023             Telephone call with Vandana who verbalizes understanding and agrees to plan    Lab visit scheduled    Education provided:     Goal range and lab monitoring: goal range and significance of current result and Importance of therapeutic range    Plan made per [facility] anticoagulation protocol    Terrance Zimmerman RN  Anticoagulation Clinic  2/27/2023    _______________________________________________________________________     Anticoagulation Episode Summary     Current INR goal:  1.5-2.0   TTR:  88.3 % (1 y)   Target end date:  Indefinite   Send INR reminders to:  ANDREAS JOHN    Indications    Anticoagulated on warfarin [Z79.01]  Hx of mechanical aortic valve replacement [Z95.2]  H/O bicuspid aortic valve [Z87.74]           Comments:   Approved for 8 week checks per -- see encounter from 01/16/23         Anticoagulation Care Providers     Provider Role Specialty Phone number    Lakeisha Marmolejo MD Referring Family Medicine [phone]    Maggi Muro MD Referring Family Medicine [phone]

## 2023-03-06 DIAGNOSIS — E03.9 ACQUIRED HYPOTHYROIDISM: ICD-10-CM

## 2023-03-07 RX ORDER — LEVOTHYROXINE SODIUM 88 UG/1
TABLET ORAL
Qty: 90 TABLET | Refills: 3 | OUTPATIENT
Start: 2023-03-07

## 2023-03-07 NOTE — TELEPHONE ENCOUNTER
"Last Written Prescription Date:  1/14/22  Last Fill Quantity: 90,  # refills: 3   Last office visit provider:  1/16/23 - per OV note dose was increased in Sept to 100 mcg daily. This dose refilled 2/19/23 #90 3 RFs    Requested Prescriptions   Pending Prescriptions Disp Refills     levothyroxine (SYNTHROID/LEVOTHROID) 88 MCG tablet [Pharmacy Med Name: LEVOTHYROXINE 88 MCG TABLET] 90 tablet 3     Sig: TAKE 1 TABLET BY MOUTH EVERY DAY       Thyroid Protocol Passed - 3/6/2023  7:32 AM        Passed - Patient is 12 years or older        Passed - Recent (12 mo) or future (30 days) visit within the authorizing provider's specialty     Patient has had an office visit with the authorizing provider or a provider within the authorizing providers department within the previous 12 mos or has a future within next 30 days. See \"Patient Info\" tab in inbasket, or \"Choose Columns\" in Meds & Orders section of the refill encounter.              Passed - Medication is active on med list        Passed - Normal TSH on file in past 12 months     Recent Labs   Lab Test 01/16/23  0954   TSH 0.89              Passed - No active pregnancy on record     If patient is pregnant or has had a positive pregnancy test, please check TSH.          Passed - No positive pregnancy test in past 12 months     If patient is pregnant or has had a positive pregnancy test, please check TSH.               GT BRAMBILA RN 03/07/23 2:48 PM  "

## 2023-03-08 ENCOUNTER — ANCILLARY PROCEDURE (OUTPATIENT)
Dept: CARDIOLOGY | Facility: CLINIC | Age: 73
End: 2023-03-08
Attending: INTERNAL MEDICINE
Payer: MEDICARE

## 2023-03-08 DIAGNOSIS — I49.5 SICK SINUS SYNDROME (H): ICD-10-CM

## 2023-03-08 DIAGNOSIS — Z95.0 PACEMAKER: ICD-10-CM

## 2023-03-08 LAB
MDC_IDC_LEAD_IMPLANT_DT: NORMAL
MDC_IDC_LEAD_IMPLANT_DT: NORMAL
MDC_IDC_LEAD_LOCATION: NORMAL
MDC_IDC_LEAD_LOCATION: NORMAL
MDC_IDC_LEAD_LOCATION_DETAIL_1: NORMAL
MDC_IDC_LEAD_LOCATION_DETAIL_1: NORMAL
MDC_IDC_LEAD_MFG: NORMAL
MDC_IDC_LEAD_MFG: NORMAL
MDC_IDC_LEAD_MODEL: NORMAL
MDC_IDC_LEAD_MODEL: NORMAL
MDC_IDC_LEAD_POLARITY_TYPE: NORMAL
MDC_IDC_LEAD_POLARITY_TYPE: NORMAL
MDC_IDC_LEAD_SERIAL: NORMAL
MDC_IDC_LEAD_SERIAL: NORMAL
MDC_IDC_LEAD_SPECIAL_FUNCTION: NORMAL
MDC_IDC_LEAD_SPECIAL_FUNCTION: NORMAL
MDC_IDC_MSMT_BATTERY_DTM: NORMAL
MDC_IDC_MSMT_BATTERY_REMAINING_LONGEVITY: 121 MO
MDC_IDC_MSMT_BATTERY_RRT_TRIGGER: 2.62
MDC_IDC_MSMT_BATTERY_STATUS: NORMAL
MDC_IDC_MSMT_BATTERY_VOLTAGE: 3.01 V
MDC_IDC_MSMT_LEADCHNL_RA_IMPEDANCE_VALUE: 323 OHM
MDC_IDC_MSMT_LEADCHNL_RA_IMPEDANCE_VALUE: 456 OHM
MDC_IDC_MSMT_LEADCHNL_RA_PACING_THRESHOLD_AMPLITUDE: 0.62 V
MDC_IDC_MSMT_LEADCHNL_RA_PACING_THRESHOLD_PULSEWIDTH: 0.4 MS
MDC_IDC_MSMT_LEADCHNL_RA_SENSING_INTR_AMPL: 4 MV
MDC_IDC_MSMT_LEADCHNL_RA_SENSING_INTR_AMPL: 4 MV
MDC_IDC_MSMT_LEADCHNL_RV_IMPEDANCE_VALUE: 323 OHM
MDC_IDC_MSMT_LEADCHNL_RV_IMPEDANCE_VALUE: 418 OHM
MDC_IDC_MSMT_LEADCHNL_RV_PACING_THRESHOLD_AMPLITUDE: 1.12 V
MDC_IDC_MSMT_LEADCHNL_RV_PACING_THRESHOLD_PULSEWIDTH: 0.4 MS
MDC_IDC_MSMT_LEADCHNL_RV_SENSING_INTR_AMPL: 9 MV
MDC_IDC_MSMT_LEADCHNL_RV_SENSING_INTR_AMPL: 9 MV
MDC_IDC_PG_IMPLANT_DTM: NORMAL
MDC_IDC_PG_MFG: NORMAL
MDC_IDC_PG_MODEL: NORMAL
MDC_IDC_PG_SERIAL: NORMAL
MDC_IDC_PG_TYPE: NORMAL
MDC_IDC_SESS_CLINIC_NAME: NORMAL
MDC_IDC_SESS_DTM: NORMAL
MDC_IDC_SESS_TYPE: NORMAL
MDC_IDC_SET_BRADY_AT_MODE_SWITCH_RATE: 171 {BEATS}/MIN
MDC_IDC_SET_BRADY_HYSTRATE: NORMAL
MDC_IDC_SET_BRADY_LOWRATE: 60 {BEATS}/MIN
MDC_IDC_SET_BRADY_MAX_SENSOR_RATE: 130 {BEATS}/MIN
MDC_IDC_SET_BRADY_MAX_TRACKING_RATE: 130 {BEATS}/MIN
MDC_IDC_SET_BRADY_MODE: NORMAL
MDC_IDC_SET_BRADY_PAV_DELAY_LOW: 270 MS
MDC_IDC_SET_BRADY_SAV_DELAY_LOW: 240 MS
MDC_IDC_SET_LEADCHNL_RA_PACING_AMPLITUDE: 1.5 V
MDC_IDC_SET_LEADCHNL_RA_PACING_ANODE_ELECTRODE_1: NORMAL
MDC_IDC_SET_LEADCHNL_RA_PACING_ANODE_LOCATION_1: NORMAL
MDC_IDC_SET_LEADCHNL_RA_PACING_CAPTURE_MODE: NORMAL
MDC_IDC_SET_LEADCHNL_RA_PACING_CATHODE_ELECTRODE_1: NORMAL
MDC_IDC_SET_LEADCHNL_RA_PACING_CATHODE_LOCATION_1: NORMAL
MDC_IDC_SET_LEADCHNL_RA_PACING_POLARITY: NORMAL
MDC_IDC_SET_LEADCHNL_RA_PACING_PULSEWIDTH: 0.4 MS
MDC_IDC_SET_LEADCHNL_RA_SENSING_ANODE_ELECTRODE_1: NORMAL
MDC_IDC_SET_LEADCHNL_RA_SENSING_ANODE_LOCATION_1: NORMAL
MDC_IDC_SET_LEADCHNL_RA_SENSING_CATHODE_ELECTRODE_1: NORMAL
MDC_IDC_SET_LEADCHNL_RA_SENSING_CATHODE_LOCATION_1: NORMAL
MDC_IDC_SET_LEADCHNL_RA_SENSING_POLARITY: NORMAL
MDC_IDC_SET_LEADCHNL_RA_SENSING_SENSITIVITY: 0.3 MV
MDC_IDC_SET_LEADCHNL_RV_PACING_AMPLITUDE: 1.75 V
MDC_IDC_SET_LEADCHNL_RV_PACING_ANODE_ELECTRODE_1: NORMAL
MDC_IDC_SET_LEADCHNL_RV_PACING_ANODE_LOCATION_1: NORMAL
MDC_IDC_SET_LEADCHNL_RV_PACING_CAPTURE_MODE: NORMAL
MDC_IDC_SET_LEADCHNL_RV_PACING_CATHODE_ELECTRODE_1: NORMAL
MDC_IDC_SET_LEADCHNL_RV_PACING_CATHODE_LOCATION_1: NORMAL
MDC_IDC_SET_LEADCHNL_RV_PACING_POLARITY: NORMAL
MDC_IDC_SET_LEADCHNL_RV_PACING_PULSEWIDTH: 0.4 MS
MDC_IDC_SET_LEADCHNL_RV_SENSING_ANODE_ELECTRODE_1: NORMAL
MDC_IDC_SET_LEADCHNL_RV_SENSING_ANODE_LOCATION_1: NORMAL
MDC_IDC_SET_LEADCHNL_RV_SENSING_CATHODE_ELECTRODE_1: NORMAL
MDC_IDC_SET_LEADCHNL_RV_SENSING_CATHODE_LOCATION_1: NORMAL
MDC_IDC_SET_LEADCHNL_RV_SENSING_POLARITY: NORMAL
MDC_IDC_SET_LEADCHNL_RV_SENSING_SENSITIVITY: 0.9 MV
MDC_IDC_SET_ZONE_DETECTION_INTERVAL: 350 MS
MDC_IDC_SET_ZONE_DETECTION_INTERVAL: 400 MS
MDC_IDC_SET_ZONE_TYPE: NORMAL
MDC_IDC_STAT_AT_BURDEN_PERCENT: 0 %
MDC_IDC_STAT_AT_DTM_END: NORMAL
MDC_IDC_STAT_AT_DTM_START: NORMAL
MDC_IDC_STAT_BRADY_AP_VP_PERCENT: 0.13 %
MDC_IDC_STAT_BRADY_AP_VS_PERCENT: 41.53 %
MDC_IDC_STAT_BRADY_AS_VP_PERCENT: 0.02 %
MDC_IDC_STAT_BRADY_AS_VS_PERCENT: 58.33 %
MDC_IDC_STAT_BRADY_DTM_END: NORMAL
MDC_IDC_STAT_BRADY_DTM_START: NORMAL
MDC_IDC_STAT_BRADY_RA_PERCENT_PACED: 41.65 %
MDC_IDC_STAT_BRADY_RV_PERCENT_PACED: 0.15 %
MDC_IDC_STAT_EPISODE_RECENT_COUNT: 0
MDC_IDC_STAT_EPISODE_RECENT_COUNT_DTM_END: NORMAL
MDC_IDC_STAT_EPISODE_RECENT_COUNT_DTM_START: NORMAL
MDC_IDC_STAT_EPISODE_TOTAL_COUNT: 0
MDC_IDC_STAT_EPISODE_TOTAL_COUNT: 0
MDC_IDC_STAT_EPISODE_TOTAL_COUNT: 554
MDC_IDC_STAT_EPISODE_TOTAL_COUNT: 6
MDC_IDC_STAT_EPISODE_TOTAL_COUNT: NORMAL
MDC_IDC_STAT_EPISODE_TOTAL_COUNT_DTM_END: NORMAL
MDC_IDC_STAT_EPISODE_TOTAL_COUNT_DTM_START: NORMAL
MDC_IDC_STAT_EPISODE_TYPE: NORMAL

## 2023-03-08 PROCEDURE — 99207 CARDIAC DEVICE CHECK - REMOTE: CPT | Performed by: INTERNAL MEDICINE

## 2023-04-24 ENCOUNTER — LAB (OUTPATIENT)
Dept: LAB | Facility: CLINIC | Age: 73
End: 2023-04-24
Payer: MEDICARE

## 2023-04-24 ENCOUNTER — ANTICOAGULATION THERAPY VISIT (OUTPATIENT)
Dept: ANTICOAGULATION | Facility: CLINIC | Age: 73
End: 2023-04-24

## 2023-04-24 DIAGNOSIS — Z87.74 H/O BICUSPID AORTIC VALVE: ICD-10-CM

## 2023-04-24 DIAGNOSIS — Z79.01 ANTICOAGULATED ON WARFARIN: ICD-10-CM

## 2023-04-24 DIAGNOSIS — Z95.2 HX OF MECHANICAL AORTIC VALVE REPLACEMENT: ICD-10-CM

## 2023-04-24 DIAGNOSIS — Z79.01 ANTICOAGULATED ON WARFARIN: Primary | ICD-10-CM

## 2023-04-24 LAB — INR BLD: 1.9 (ref 0.9–1.1)

## 2023-04-24 PROCEDURE — 36416 COLLJ CAPILLARY BLOOD SPEC: CPT

## 2023-04-24 PROCEDURE — 85610 PROTHROMBIN TIME: CPT

## 2023-04-24 NOTE — PROGRESS NOTES
ANTICOAGULATION MANAGEMENT     Vandana Thao 72 year old female is on warfarin with therapeutic INR result. (Goal INR 1.5-2.0)    Recent labs: (last 7 days)     04/24/23  1012   INR 1.9*       ASSESSMENT     Warfarin Lab Questionnaire    Warfarin Doses Last 7 Days      4/24/2023    10:14 AM   Dose in Tablet or Mg   TAB or MG? milligram (mg)     Pt Rptd Dose SUNDAY MONDAY TUESDAY WED THURS FRIDAY SATURDAY 4/24/2023  10:14 AM 7.5 5 5 5 5 5 5         4/24/2023   Warfarin Lab Questionnaire   Missed doses within past 14 days? No   Changes in diet or alcohol within past 14 days? No   Medication changes since last result? No   Injuries or illness since last result? No   New shortness of breath, severe headaches or sudden changes in vision since last result? No   Abnormal bleeding since last result? No   Upcoming surgery, procedure? No   Best number to call with results? [phone]        Previous result: Therapeutic last 2(+) visits  Additional findings: None       PLAN     Recommended plan for no diet, medication or health factor changes affecting INR     Dosing Instructions: Continue your current warfarin dose with next INR in 8 weeks       Summary  As of 4/24/2023    Full warfarin instructions:  7.5 mg every Sun; 5 mg all other days   Next INR check:  6/19/2023             Detailed voice message left for Vandana with dosing instructions and follow up date.   Sent [employer] message with dosing and follow up instructions    Contact [phone] to schedule and with any changes, questions or concerns.     Education provided:     Please call back if any changes to your diet, medications or how you've been taking warfarin    Goal range and lab monitoring: goal range and significance of current result    Plan made per [facility] anticoagulation protocol    Becca Adames, RN  Anticoagulation Clinic  4/24/2023    _______________________________________________________________________     Anticoagulation Episode Summary      Current INR goal:  1.5-2.0   TTR:  94.1 % (1 y)   Target end date:  Indefinite   Send INR reminders to:  ANDREAS JOHN    Indications    Anticoagulated on warfarin [Z79.01]  Hx of mechanical aortic valve replacement [Z95.2]  H/O bicuspid aortic valve [Z87.74]           Comments:  Approved for 8 week checks per -- see encounter from 01/16/23         Anticoagulation Care Providers     Provider Role Specialty Phone number    Lakeisha Marmolejo MD Referring Family Medicine [phone]    Maggi Muro MD Referring Family Medicine [phone]

## 2023-05-09 DIAGNOSIS — E03.9 ACQUIRED HYPOTHYROIDISM: ICD-10-CM

## 2023-05-10 RX ORDER — LEVOTHYROXINE SODIUM 88 UG/1
TABLET ORAL
Qty: 90 TABLET | Refills: 3 | OUTPATIENT
Start: 2023-05-10

## 2023-05-10 NOTE — TELEPHONE ENCOUNTER
"Routing refill request to provider for review/approval because:  already on 100 mcg this dose was d/c'd on new dose    Last Written Prescription Date:  1/14/2022  Last Fill Quantity: 30,  # refills: 0   Last office visit provider:  1/16/2023     Requested Prescriptions   Pending Prescriptions Disp Refills     levothyroxine (SYNTHROID/LEVOTHROID) 88 MCG tablet [Pharmacy Med Name: LEVOTHYROXINE 88 MCG TABLET] 90 tablet 3     Sig: TAKE 1 TABLET BY MOUTH EVERY DAY       Thyroid Protocol Passed - 5/10/2023 12:31 PM        Passed - Patient is 12 years or older        Passed - Recent (12 mo) or future (30 days) visit within the authorizing provider's specialty     Patient has had an office visit with the authorizing provider or a provider within the authorizing providers department within the previous 12 mos or has a future within next 30 days. See \"Patient Info\" tab in inbasket, or \"Choose Columns\" in Meds & Orders section of the refill encounter.              Passed - Medication is active on med list        Passed - Normal TSH on file in past 12 months     Recent Labs   Lab Test 01/16/23  0954   TSH 0.89              Passed - No active pregnancy on record     If patient is pregnant or has had a positive pregnancy test, please check TSH.          Passed - No positive pregnancy test in past 12 months     If patient is pregnant or has had a positive pregnancy test, please check TSH.               Cassidy Chapman RN 05/10/23 12:32 PM  "

## 2023-05-14 ENCOUNTER — HEALTH MAINTENANCE LETTER (OUTPATIENT)
Age: 73
End: 2023-05-14

## 2023-05-23 DIAGNOSIS — E03.9 ACQUIRED HYPOTHYROIDISM: ICD-10-CM

## 2023-05-23 RX ORDER — LEVOTHYROXINE SODIUM 88 UG/1
TABLET ORAL
Qty: 90 TABLET | Refills: 3 | OUTPATIENT
Start: 2023-05-23

## 2023-05-24 NOTE — TELEPHONE ENCOUNTER
Outpatient Medication Detail     Disp Refills Start End ELLIS   levothyroxine (SYNTHROID/LEVOTHROID) 88 MCG tablet (Discontinued) 90 tablet 3 1/14/2022 1/16/2023 No   Sig - Route: Take 1 tablet (88 mcg) by mouth daily - Oral   Sent to pharmacy as: Levothyroxine Sodium 88 MCG Oral Tablet (SYNTHROID/LEVOTHROID)   Class: E-Prescribe

## 2023-06-10 DIAGNOSIS — I44.39 HIGH DEGREE ATRIOVENTRICULAR BLOCK: ICD-10-CM

## 2023-06-10 DIAGNOSIS — I47.29 NON-SUSTAINED VENTRICULAR TACHYCARDIA (H): ICD-10-CM

## 2023-06-10 RX ORDER — VERAPAMIL HYDROCHLORIDE 240 MG/1
CAPSULE, EXTENDED RELEASE ORAL
Qty: 90 CAPSULE | Refills: 1 | Status: SHIPPED | OUTPATIENT
Start: 2023-06-10 | End: 2023-11-08

## 2023-06-10 NOTE — TELEPHONE ENCOUNTER
"  Last Written Prescription Date:  12/14/22  Last Fill Quantity: 90,  # refills: 1   Last office visit provider:  1/16/23     Requested Prescriptions   Pending Prescriptions Disp Refills     verapamil ER (VERELAN) 240 MG 24 hr capsule [Pharmacy Med Name: VERAPAMIL  MG CAPSULE] 90 capsule 1     Sig: TAKE 1 CAPSULE BY MOUTH EVERY DAY       Calcium Channel Blockers Protocol  Passed - 6/10/2023  1:12 AM        Passed - Blood pressure under 140/90 in past 12 months     BP Readings from Last 3 Encounters:   01/16/23 130/74   09/12/22 (!) 144/72   05/20/22 (!) 164/84                 Passed - Normal ALT in past 12 months     Recent Labs   Lab Test 01/16/23  0954   ALT 31             Passed - Recent (12 mo) or future (30 days) visit within the authorizing provider's specialty     Patient has had an office visit with the authorizing provider or a provider within the authorizing providers department within the previous 12 mos or has a future within next 30 days. See \"Patient Info\" tab in inbasket, or \"Choose Columns\" in Meds & Orders section of the refill encounter.              Passed - Medication is active on med list        Passed - Patient is age 18 or older        Passed - No active pregnancy on record        Passed - Normal serum creatinine on file in past 12 months     Recent Labs   Lab Test 01/16/23  0954   CR 0.88       Ok to refill medication if creatinine is low          Passed - No positive pregnancy test in past 12 months             Mindy Mera RN 06/10/23 3:12 PM  "

## 2023-06-13 ENCOUNTER — ANCILLARY PROCEDURE (OUTPATIENT)
Dept: CARDIOLOGY | Facility: CLINIC | Age: 73
End: 2023-06-13
Attending: INTERNAL MEDICINE
Payer: MEDICARE

## 2023-06-13 DIAGNOSIS — I49.5 SICK SINUS SYNDROME (H): ICD-10-CM

## 2023-06-13 DIAGNOSIS — Z95.0 PACEMAKER: ICD-10-CM

## 2023-06-14 DIAGNOSIS — Z95.0 PACEMAKER: Primary | ICD-10-CM

## 2023-06-14 DIAGNOSIS — I49.5 SICK SINUS SYNDROME (H): ICD-10-CM

## 2023-06-14 LAB
MDC_IDC_LEAD_IMPLANT_DT: NORMAL
MDC_IDC_LEAD_IMPLANT_DT: NORMAL
MDC_IDC_LEAD_LOCATION: NORMAL
MDC_IDC_LEAD_LOCATION: NORMAL
MDC_IDC_LEAD_LOCATION_DETAIL_1: NORMAL
MDC_IDC_LEAD_LOCATION_DETAIL_1: NORMAL
MDC_IDC_LEAD_MFG: NORMAL
MDC_IDC_LEAD_MFG: NORMAL
MDC_IDC_LEAD_MODEL: NORMAL
MDC_IDC_LEAD_MODEL: NORMAL
MDC_IDC_LEAD_POLARITY_TYPE: NORMAL
MDC_IDC_LEAD_POLARITY_TYPE: NORMAL
MDC_IDC_LEAD_SERIAL: NORMAL
MDC_IDC_LEAD_SERIAL: NORMAL
MDC_IDC_LEAD_SPECIAL_FUNCTION: NORMAL
MDC_IDC_LEAD_SPECIAL_FUNCTION: NORMAL
MDC_IDC_MSMT_BATTERY_DTM: NORMAL
MDC_IDC_MSMT_BATTERY_REMAINING_LONGEVITY: 119 MO
MDC_IDC_MSMT_BATTERY_RRT_TRIGGER: 2.62
MDC_IDC_MSMT_BATTERY_STATUS: NORMAL
MDC_IDC_MSMT_BATTERY_VOLTAGE: 3 V
MDC_IDC_MSMT_LEADCHNL_RA_IMPEDANCE_VALUE: 323 OHM
MDC_IDC_MSMT_LEADCHNL_RA_IMPEDANCE_VALUE: 475 OHM
MDC_IDC_MSMT_LEADCHNL_RA_PACING_THRESHOLD_AMPLITUDE: 0.62 V
MDC_IDC_MSMT_LEADCHNL_RA_PACING_THRESHOLD_PULSEWIDTH: 0.4 MS
MDC_IDC_MSMT_LEADCHNL_RA_SENSING_INTR_AMPL: 3.88 MV
MDC_IDC_MSMT_LEADCHNL_RA_SENSING_INTR_AMPL: 3.88 MV
MDC_IDC_MSMT_LEADCHNL_RV_IMPEDANCE_VALUE: 323 OHM
MDC_IDC_MSMT_LEADCHNL_RV_IMPEDANCE_VALUE: 437 OHM
MDC_IDC_MSMT_LEADCHNL_RV_PACING_THRESHOLD_AMPLITUDE: 1.12 V
MDC_IDC_MSMT_LEADCHNL_RV_PACING_THRESHOLD_PULSEWIDTH: 0.4 MS
MDC_IDC_MSMT_LEADCHNL_RV_SENSING_INTR_AMPL: 8.38 MV
MDC_IDC_MSMT_LEADCHNL_RV_SENSING_INTR_AMPL: 8.38 MV
MDC_IDC_PG_IMPLANT_DTM: NORMAL
MDC_IDC_PG_MFG: NORMAL
MDC_IDC_PG_MODEL: NORMAL
MDC_IDC_PG_SERIAL: NORMAL
MDC_IDC_PG_TYPE: NORMAL
MDC_IDC_SESS_CLINIC_NAME: NORMAL
MDC_IDC_SESS_DTM: NORMAL
MDC_IDC_SESS_TYPE: NORMAL
MDC_IDC_SET_BRADY_AT_MODE_SWITCH_RATE: 171 {BEATS}/MIN
MDC_IDC_SET_BRADY_HYSTRATE: NORMAL
MDC_IDC_SET_BRADY_LOWRATE: 60 {BEATS}/MIN
MDC_IDC_SET_BRADY_MAX_SENSOR_RATE: 130 {BEATS}/MIN
MDC_IDC_SET_BRADY_MAX_TRACKING_RATE: 130 {BEATS}/MIN
MDC_IDC_SET_BRADY_MODE: NORMAL
MDC_IDC_SET_BRADY_PAV_DELAY_LOW: 270 MS
MDC_IDC_SET_BRADY_SAV_DELAY_LOW: 240 MS
MDC_IDC_SET_LEADCHNL_RA_PACING_AMPLITUDE: 1.5 V
MDC_IDC_SET_LEADCHNL_RA_PACING_ANODE_ELECTRODE_1: NORMAL
MDC_IDC_SET_LEADCHNL_RA_PACING_ANODE_LOCATION_1: NORMAL
MDC_IDC_SET_LEADCHNL_RA_PACING_CAPTURE_MODE: NORMAL
MDC_IDC_SET_LEADCHNL_RA_PACING_CATHODE_ELECTRODE_1: NORMAL
MDC_IDC_SET_LEADCHNL_RA_PACING_CATHODE_LOCATION_1: NORMAL
MDC_IDC_SET_LEADCHNL_RA_PACING_POLARITY: NORMAL
MDC_IDC_SET_LEADCHNL_RA_PACING_PULSEWIDTH: 0.4 MS
MDC_IDC_SET_LEADCHNL_RA_SENSING_ANODE_ELECTRODE_1: NORMAL
MDC_IDC_SET_LEADCHNL_RA_SENSING_ANODE_LOCATION_1: NORMAL
MDC_IDC_SET_LEADCHNL_RA_SENSING_CATHODE_ELECTRODE_1: NORMAL
MDC_IDC_SET_LEADCHNL_RA_SENSING_CATHODE_LOCATION_1: NORMAL
MDC_IDC_SET_LEADCHNL_RA_SENSING_POLARITY: NORMAL
MDC_IDC_SET_LEADCHNL_RA_SENSING_SENSITIVITY: 0.3 MV
MDC_IDC_SET_LEADCHNL_RV_PACING_AMPLITUDE: 1.75 V
MDC_IDC_SET_LEADCHNL_RV_PACING_ANODE_ELECTRODE_1: NORMAL
MDC_IDC_SET_LEADCHNL_RV_PACING_ANODE_LOCATION_1: NORMAL
MDC_IDC_SET_LEADCHNL_RV_PACING_CAPTURE_MODE: NORMAL
MDC_IDC_SET_LEADCHNL_RV_PACING_CATHODE_ELECTRODE_1: NORMAL
MDC_IDC_SET_LEADCHNL_RV_PACING_CATHODE_LOCATION_1: NORMAL
MDC_IDC_SET_LEADCHNL_RV_PACING_POLARITY: NORMAL
MDC_IDC_SET_LEADCHNL_RV_PACING_PULSEWIDTH: 0.4 MS
MDC_IDC_SET_LEADCHNL_RV_SENSING_ANODE_ELECTRODE_1: NORMAL
MDC_IDC_SET_LEADCHNL_RV_SENSING_ANODE_LOCATION_1: NORMAL
MDC_IDC_SET_LEADCHNL_RV_SENSING_CATHODE_ELECTRODE_1: NORMAL
MDC_IDC_SET_LEADCHNL_RV_SENSING_CATHODE_LOCATION_1: NORMAL
MDC_IDC_SET_LEADCHNL_RV_SENSING_POLARITY: NORMAL
MDC_IDC_SET_LEADCHNL_RV_SENSING_SENSITIVITY: 0.9 MV
MDC_IDC_SET_ZONE_DETECTION_INTERVAL: 350 MS
MDC_IDC_SET_ZONE_DETECTION_INTERVAL: 400 MS
MDC_IDC_SET_ZONE_TYPE: NORMAL
MDC_IDC_STAT_AT_BURDEN_PERCENT: 0 %
MDC_IDC_STAT_AT_DTM_END: NORMAL
MDC_IDC_STAT_AT_DTM_START: NORMAL
MDC_IDC_STAT_BRADY_AP_VP_PERCENT: 0.06 %
MDC_IDC_STAT_BRADY_AP_VS_PERCENT: 42.59 %
MDC_IDC_STAT_BRADY_AS_VP_PERCENT: 0.02 %
MDC_IDC_STAT_BRADY_AS_VS_PERCENT: 57.33 %
MDC_IDC_STAT_BRADY_DTM_END: NORMAL
MDC_IDC_STAT_BRADY_DTM_START: NORMAL
MDC_IDC_STAT_BRADY_RA_PERCENT_PACED: 42.64 %
MDC_IDC_STAT_BRADY_RV_PERCENT_PACED: 0.08 %
MDC_IDC_STAT_EPISODE_RECENT_COUNT: 0
MDC_IDC_STAT_EPISODE_RECENT_COUNT_DTM_END: NORMAL
MDC_IDC_STAT_EPISODE_RECENT_COUNT_DTM_START: NORMAL
MDC_IDC_STAT_EPISODE_TOTAL_COUNT: 0
MDC_IDC_STAT_EPISODE_TOTAL_COUNT: 0
MDC_IDC_STAT_EPISODE_TOTAL_COUNT: 554
MDC_IDC_STAT_EPISODE_TOTAL_COUNT: 6
MDC_IDC_STAT_EPISODE_TOTAL_COUNT: NORMAL
MDC_IDC_STAT_EPISODE_TOTAL_COUNT_DTM_END: NORMAL
MDC_IDC_STAT_EPISODE_TOTAL_COUNT_DTM_START: NORMAL
MDC_IDC_STAT_EPISODE_TYPE: NORMAL

## 2023-06-14 PROCEDURE — 93294 REM INTERROG EVL PM/LDLS PM: CPT | Performed by: INTERNAL MEDICINE

## 2023-06-14 PROCEDURE — 93296 REM INTERROG EVL PM/IDS: CPT | Performed by: INTERNAL MEDICINE

## 2023-06-20 ENCOUNTER — ANTICOAGULATION THERAPY VISIT (OUTPATIENT)
Dept: ANTICOAGULATION | Facility: CLINIC | Age: 73
End: 2023-06-20

## 2023-06-20 ENCOUNTER — LAB (OUTPATIENT)
Dept: LAB | Facility: CLINIC | Age: 73
End: 2023-06-20
Payer: MEDICARE

## 2023-06-20 DIAGNOSIS — Z87.74 H/O BICUSPID AORTIC VALVE: ICD-10-CM

## 2023-06-20 DIAGNOSIS — Z79.01 ANTICOAGULATED ON WARFARIN: ICD-10-CM

## 2023-06-20 DIAGNOSIS — Z95.2 HX OF MECHANICAL AORTIC VALVE REPLACEMENT: ICD-10-CM

## 2023-06-20 DIAGNOSIS — Z79.01 ANTICOAGULATED ON WARFARIN: Primary | ICD-10-CM

## 2023-06-20 LAB — INR BLD: 2 (ref 0.9–1.1)

## 2023-06-20 PROCEDURE — 85610 PROTHROMBIN TIME: CPT

## 2023-06-20 PROCEDURE — 36416 COLLJ CAPILLARY BLOOD SPEC: CPT

## 2023-06-20 NOTE — PROGRESS NOTES
ANTICOAGULATION MANAGEMENT     Vandana ABRAHAM Thao 73 year old female is on warfarin with therapeutic INR result. (Goal INR 1.5-2.0)    Recent labs: (last 7 days)     06/20/23  1010   INR 2.0*       ASSESSMENT       Source(s): Chart Review and Patient/Caregiver Call       Warfarin doses taken: Warfarin taken as instructed    Diet: No new diet changes identified    Medication/supplement changes: None noted    New illness, injury, or hospitalization: No    Signs or symptoms of bleeding or clotting: No    Previous result: Therapeutic last 2(+) visits    Additional findings: None         PLAN     Recommended plan for no diet, medication or health factor changes affecting INR     Dosing Instructions: Continue your current warfarin dose with next INR in 8 weeks       Summary  As of 6/20/2023    Full warfarin instructions:  7.5 mg every Sun; 5 mg all other days   Next INR check:  8/15/2023             Telephone call with Vandana who verbalizes understanding and agrees to plan    Lab visit scheduled    Education provided:     Goal range and lab monitoring: goal range and significance of current result    Contact [phone] with any changes, questions or concerns.     Plan made per [facility] anticoagulation protocol    Dorene Howard RN  Anticoagulation Clinic  6/20/2023    _______________________________________________________________________     Anticoagulation Episode Summary     Current INR goal:  1.5-2.0   TTR:  94.1 % (1 y)   Target end date:  Indefinite   Send INR reminders to:  ANDREAS JOHN    Indications    Anticoagulated on warfarin [Z79.01]  Hx of mechanical aortic valve replacement [Z95.2]  H/O bicuspid aortic valve [Z87.74]           Comments:  Approved for 8 week checks per -- see encounter from 01/16/23         Anticoagulation Care Providers     Provider Role Specialty Phone number    Lakeisha Marmolejo MD Referring Family Medicine [phone]    Maggi Muro MD Referring Family Medicine  [phone]

## 2023-07-06 DIAGNOSIS — Z79.01 ANTICOAGULATED ON WARFARIN: ICD-10-CM

## 2023-07-07 RX ORDER — WARFARIN SODIUM 5 MG/1
5-7.5 TABLET ORAL DAILY
Qty: 110 TABLET | Refills: 1 | Status: SHIPPED | OUTPATIENT
Start: 2023-07-07 | End: 2024-02-05

## 2023-07-07 NOTE — TELEPHONE ENCOUNTER
"Routing refill request to provider for review/approval because:  Labs out of range:  INR    Last Written Prescription Date:  12/14/2022  Last Fill Quantity: 110,  # refills: 1   Last office visit provider:  1/16/2023     Requested Prescriptions   Pending Prescriptions Disp Refills     warfarin ANTICOAGULANT (COUMADIN) 5 MG tablet [Pharmacy Med Name: WARFARIN SODIUM 5 MG TABLET] 110 tablet 1     Sig: TAKE 1-1.5 TABLETS (5-7.5 MG) BY MOUTH DAILY ADJUST DOSE PER INR AS DIRECTED       Vitamin K Antagonists Failed - 7/6/2023  2:26 PM        Failed - INR is within goal in the past 6 weeks     Confirm INR is within goal in the past 6 weeks.     Recent Labs   Lab Test 06/20/23  1010   INR 2.0*                       Passed - Recent (12 mo) or future (30 days) visit within the authorizing provider's specialty     Patient has had an office visit with the authorizing provider or a provider within the authorizing providers department within the previous 12 mos or has a future within next 30 days. See \"Patient Info\" tab in inbasket, or \"Choose Columns\" in Meds & Orders section of the refill encounter.              Passed - Medication is active on med list        Passed - Patient is 18 years of age or older        Passed - Patient is not pregnant        Passed - No positive pregnancy on file in past 12 months             Mindy Hernandez RN 07/06/23 9:13 PM  "

## 2023-07-14 ENCOUNTER — ANCILLARY PROCEDURE (OUTPATIENT)
Dept: CARDIOLOGY | Facility: CLINIC | Age: 73
End: 2023-07-14
Attending: INTERNAL MEDICINE
Payer: MEDICARE

## 2023-07-14 ENCOUNTER — OFFICE VISIT (OUTPATIENT)
Dept: CARDIOLOGY | Facility: CLINIC | Age: 73
End: 2023-07-14
Payer: MEDICARE

## 2023-07-14 VITALS
BODY MASS INDEX: 33.34 KG/M2 | WEIGHT: 187 LBS | DIASTOLIC BLOOD PRESSURE: 69 MMHG | OXYGEN SATURATION: 95 % | HEART RATE: 81 BPM | SYSTOLIC BLOOD PRESSURE: 137 MMHG | RESPIRATION RATE: 16 BRPM

## 2023-07-14 DIAGNOSIS — I49.5 SICK SINUS SYNDROME (H): ICD-10-CM

## 2023-07-14 DIAGNOSIS — Z95.0 PACEMAKER: Primary | ICD-10-CM

## 2023-07-14 DIAGNOSIS — Z95.2 S/P AVR: Primary | ICD-10-CM

## 2023-07-14 DIAGNOSIS — I47.29: ICD-10-CM

## 2023-07-14 DIAGNOSIS — I25.10 CORONARY ARTERY DISEASE INVOLVING NATIVE CORONARY ARTERY OF NATIVE HEART WITHOUT ANGINA PECTORIS: ICD-10-CM

## 2023-07-14 DIAGNOSIS — I44.2 AV BLOCK, COMPLETE (H): ICD-10-CM

## 2023-07-14 DIAGNOSIS — Z95.2 S/P AVR: ICD-10-CM

## 2023-07-14 DIAGNOSIS — Z95.0 PACEMAKER: ICD-10-CM

## 2023-07-14 PROCEDURE — 93280 PM DEVICE PROGR EVAL DUAL: CPT | Performed by: INTERNAL MEDICINE

## 2023-07-14 PROCEDURE — 99214 OFFICE O/P EST MOD 30 MIN: CPT | Performed by: INTERNAL MEDICINE

## 2023-07-14 NOTE — PROGRESS NOTES
Thank you, Dr. Maggi Muro, for asking the [facility] team to see Ms. Vandana Thao to follow-up on aortic valve replacement, permanent pacemaker insertion and fascicular tachycardia.    Assessment/Recommendations   Assessment:    1.  Aortic valve stenosis, status post mechanical aortic valve replacement in 2008.  Reports no issues although is somewhat concerned as they are recommending that her INR can be as low as 1.7 although she had been told by the surgeon 1.8.  I told her I would look into this for her.  In the meantime, continue warfarin anticoagulation as recommended.  Will obtain an echocardiogram as it has been 5 years since her last study.  2.  Intermittent complete heart block, status post dual-chamber pacemaker insertion in 2018.  Device check performed today demonstrates normal device operation with no evidence of atrial or ventricular arrhythmias.  3.  Fascicular tachycardia, well suppressed with current dose of verapamil.  4.  Mild coronary artery disease by preoperative angiography in 2008.  Patient denies any complaints of exertional chest discomfort.  Her last stress test was in 2018 which was negative for ischemia.    Plan:  1.  Continue current medications  2.  Echocardiogram to reevaluate aortic valve and LV function  3.  Tentative follow-up in 2 years if no issues       History of Present Illness    Ms. Vandana Thao is a 73 year old female with history of aortic valve stenosis status post mechanical aortic valve replacement in 2008 with finding of only mild coronary artery disease at that time, complete heart block in 2018 resulting in syncope status post dual-chamber pacemaker insertion and fascicular tachycardia suppressed with verapamil therapy who presents today for follow-up visit.  Since I last saw her 2.5 years ago, she states she has been feeling well.  Unfortunately her  was diagnosed with glioblastoma last November and has been undergoing  radiation and chemotherapy which just completed in June.  As a result, she has been very involved with his care and treatment.  She currently denies any symptoms of exertional chest discomfort or dyspnea.  Also reports no orthopnea, PND or lower extremity edema.  She had been told by her cardiac surgeon that her INR should run between 1.8 and 2.2 although the anticoagulation pool has said that her INR can go as low as 1.7.  She is somewhat concerned by this.  I told her I would look in to this with our current cardiac surgeons.    ECG (personally reviewed): No ECG today.  Device check was performed in conjunction with her visit demonstrating normal device operation.  No atrial or ventricular arrhythmias detected.  Lead and battery status stable.    Cardiac Imaging Studies (personally reviewed): No recent imaging     Physical Examination Review of Systems   /69 (BP Location: Right arm, Patient Position: Sitting, Cuff Size: Adult Regular)   Pulse 81   Resp 16   Wt 84.8 kg (187 lb)   SpO2 95%   BMI 33.34 kg/m    Body mass index is 33.34 kg/m .  Wt Readings from Last 3 Encounters:   07/14/23 84.8 kg (187 lb)   01/16/23 85 kg (187 lb 6.4 oz)   09/12/22 83.9 kg (185 lb)     General Appearance:   Awake, Alert, No acute distress.   HEENT:  No scleral icterus; the mucous membranes were pink and moist.   Neck: No cervical bruits or jugular venous distention    Chest: The spine was straight. The chest was symmetric.   Lungs:   Respirations unlabored; the lungs are clear to auscultation. No wheezing   Cardiovascular:    Regular rate and rhythm.  S1 normal, S2 crisp and mechanical.  No murmur or gallop   Abdomen:  No organomegaly, masses, bruits, or tenderness. Bowels sounds are present   Extremities:  No peripheral edema bilaterally   Skin: No xanthelasma. Warm, Dry.   Musculoskeletal: No tenderness.   Neurologic: Mood and affect are appropriate.    Enc Vitals  BP: 137/69  Pulse: 81  Resp: 16  SpO2: 95 %  Weight:  84.8 kg (187 lb)                                         Medical History  Surgical History Family History Social History   Past Medical History:   Diagnosis Date     History of aortic stenosis 2018     Hyperlipidemia      Hypothyroidism      Valvular disease     aortic stenosis secondary to bicuspid valve    Past Surgical History:   Procedure Laterality Date     CARDIAC VALVE REPLACEMENT  2009    AVR     COLONOSCOPY  2013     CYSTOSTOMY W/ BLADDER BIOPSY  2000     EP PACEMAKER INSERT N/A 2018    Procedure: EP Pacemaker Insertion;  Surgeon: Luis Urena MD;  Location: [facility];  Service:      EYE SURGERY  10/2022     OTHER SURGICAL HISTORY  2002    thyroidectomy     SPLENECTOMY  1979     ZZC REPLACE AORT VALV,PROSTH VALV      Description: Aortic Valve Replacement;  Proc Date: 2009;  Comments: [address] [facility] [place] Prosthesis    Family History   Problem Relation Age of Onset     Acute Myocardial Infarction Brother 43     Rheumatoid Arthritis Mother          in 40s     Coronary Artery Disease Father      Hypertension Father      No Known Problems Maternal Grandmother      No Known Problems Maternal Grandfather      No Known Problems Paternal Grandmother      No Known Problems Paternal Grandfather      Aortic stenosis Brother         s/p surgery     No Known Problems Sister      Breast Cancer No family hx of      Colon Cancer No family hx of      Cervical Cancer No family hx of      Other Cancer No family hx of     Social History     Socioeconomic History     Marital status:      Spouse name: Not on file     Number of children: Not on file     Years of education: Not on file     Highest education level: Not on file   Occupational History     Not on file   Tobacco Use     Smoking status: Former     Packs/day: 0.50     Years: 20.00     Pack years: 10.00     Types: Cigarettes     Quit date: 1979     Years since quittin.5     Smokeless  tobacco: Never   Substance and Sexual Activity     Alcohol use: Yes     Comment: 1-2 drinks per week or less     Drug use: No     Sexual activity: Yes     Partners: Male   Other Topics Concern     Parent/sibling w/ CABG, MI or angioplasty before 65F 55M? Yes     Comment: Brother 43 yrs   Social History Narrative     Not on file     Social Determinants of Health     Financial Resource Strain: Not on file   Food Insecurity: Not on file   Transportation Needs: Not on file   Physical Activity: Not on file   Stress: Not on file   Social Connections: Not on file   Intimate Partner Violence: Not on file   Housing Stability: Not on file          Medications  Allergies   Current Outpatient Medications   Medication Sig Dispense Refill     ASPIRIN NOT PRESCRIBED (INTENTIONAL) Please choose reason not prescribed from choices below.       cholecalciferol, vitamin D3, 1,000 unit tablet [CHOLECALCIFEROL, VITAMIN D3, 1,000 UNIT TABLET] Take 1,000 Units by mouth daily.              levothyroxine (SYNTHROID/LEVOTHROID) 100 MCG tablet TAKE 1 TABLET BY MOUTH EVERY DAY 90 tablet 3     pravastatin (PRAVACHOL) 40 MG tablet TAKE 1 TABLET BY MOUTH EVERY DAY 90 tablet 3     verapamil ER (VERELAN) 240 MG 24 hr capsule TAKE 1 CAPSULE BY MOUTH EVERY DAY 90 capsule 1     warfarin ANTICOAGULANT (COUMADIN) 5 MG tablet TAKE 1-1.5 TABLETS (5-7.5 MG) BY MOUTH DAILY ADJUST DOSE PER INR AS DIRECTED 110 tablet 1     amoxicillin (AMOXIL) 500 MG capsule TAKE 4 CAPSULES (2,000 MG) BY MOUTH ONCE FOR 1 DOSE PRIOR TO DENTAL PROCEDURE. 4 capsule 3      No Known Allergies      Lab Results    Chemistry/lipid CBC Cardiac Enzymes/BNP/TSH/INR   Recent Labs   Lab Test 01/16/23  0954   TRIG 101   *   BUN 16.1      CO2 22    Recent Labs   Lab Test 01/16/23  0954   WBC 8.4   HGB 13.6   HCT 40.7   MCV 89       Recent Labs   Lab Test 06/20/23  1010 02/27/23  1015 01/16/23  0954 06/21/18  0327 06/20/18 2129   TROPONINI  --   --   --   --  <0.01   BNP   --   --   --   --  41   TSH  --   --  0.89   < > 0.25*   INR 2.0*   < > 1.7*   < > 2.02*    < > = values in this interval not displayed.        A total of 40 minutes was spent reviewing patient's medical records, obtaining history and performing examination, as well as discussing diagnoses/ recommendations with patient and answering all questions.

## 2023-07-14 NOTE — LETTER
7/14/2023    Maggi Muro MD  [address]  [facility] [address]    RE: Vandana Thao       Dear Colleague,     I had the pleasure of seeing Vandana Thao in the [facility].      Thank you, Dr. Maggi Muro, for asking the [facility] team to see Ms. Vandana Thao to follow-up on aortic valve replacement, permanent pacemaker insertion and fascicular tachycardia.    Assessment/Recommendations   Assessment:    1.  Aortic valve stenosis, status post mechanical aortic valve replacement in 2008.  Reports no issues although is somewhat concerned as they are recommending that her INR can be as low as 1.7 although she had been told by the surgeon 1.8.  I told her I would look into this for her.  In the meantime, continue warfarin anticoagulation as recommended.  Will obtain an echocardiogram as it has been 5 years since her last study.  2.  Intermittent complete heart block, status post dual-chamber pacemaker insertion in 2018.  Device check performed today demonstrates normal device operation with no evidence of atrial or ventricular arrhythmias.  3.  Fascicular tachycardia, well suppressed with current dose of verapamil.  4.  Mild coronary artery disease by preoperative angiography in 2008.  Patient denies any complaints of exertional chest discomfort.  Her last stress test was in 2018 which was negative for ischemia.    Plan:  1.  Continue current medications  2.  Echocardiogram to reevaluate aortic valve and LV function  3.  Tentative follow-up in 2 years if no issues       History of Present Illness    Ms. Vandana Thao is a 73 year old female with history of aortic valve stenosis status post mechanical aortic valve replacement in 2008 with finding of only mild coronary artery disease at that time, complete heart block in 2018 resulting in syncope status post dual-chamber pacemaker insertion and fascicular tachycardia suppressed with verapamil therapy  who presents today for follow-up visit.  Since I last saw her 2.5 years ago, she states she has been feeling well.  Unfortunately her  was diagnosed with glioblastoma last November and has been undergoing radiation and chemotherapy which just completed in June.  As a result, she has been very involved with his care and treatment.  She currently denies any symptoms of exertional chest discomfort or dyspnea.  Also reports no orthopnea, PND or lower extremity edema.  She had been told by her cardiac surgeon that her INR should run between 1.8 and 2.2 although the anticoagulation pool has said that her INR can go as low as 1.7.  She is somewhat concerned by this.  I told her I would look in to this with our current cardiac surgeons.    ECG (personally reviewed): No ECG today.  Device check was performed in conjunction with her visit demonstrating normal device operation.  No atrial or ventricular arrhythmias detected.  Lead and battery status stable.    Cardiac Imaging Studies (personally reviewed): No recent imaging     Physical Examination Review of Systems   /69 (BP Location: Right arm, Patient Position: Sitting, Cuff Size: Adult Regular)   Pulse 81   Resp 16   Wt 84.8 kg (187 lb)   SpO2 95%   BMI 33.34 kg/m    Body mass index is 33.34 kg/m .  Wt Readings from Last 3 Encounters:   07/14/23 84.8 kg (187 lb)   01/16/23 85 kg (187 lb 6.4 oz)   09/12/22 83.9 kg (185 lb)     General Appearance:   Awake, Alert, No acute distress.   HEENT:  No scleral icterus; the mucous membranes were pink and moist.   Neck: No cervical bruits or jugular venous distention    Chest: The spine was straight. The chest was symmetric.   Lungs:   Respirations unlabored; the lungs are clear to auscultation. No wheezing   Cardiovascular:    Regular rate and rhythm.  S1 normal, S2 crisp and mechanical.  No murmur or gallop   Abdomen:  No organomegaly, masses, bruits, or tenderness. Bowels sounds are present   Extremities:  No  peripheral edema bilaterally   Skin: No xanthelasma. Warm, Dry.   Musculoskeletal: No tenderness.   Neurologic: Mood and affect are appropriate.    Enc Vitals  BP: 137/69  Pulse: 81  Resp: 16  SpO2: 95 %  Weight: 84.8 kg (187 lb)                                         Medical History  Surgical History Family History Social History   Past Medical History:   Diagnosis Date    History of aortic stenosis 2018    Hyperlipidemia     Hypothyroidism     Valvular disease     aortic stenosis secondary to bicuspid valve    Past Surgical History:   Procedure Laterality Date    CARDIAC VALVE REPLACEMENT  2009    AVR    COLONOSCOPY  2013    CYSTOSTOMY W/ BLADDER BIOPSY  2000    EP PACEMAKER INSERT N/A 2018    Procedure: EP Pacemaker Insertion;  Surgeon: Luis Urena MD;  Location: [facility];  Service:     EYE SURGERY  10/2022    OTHER SURGICAL HISTORY  2002    thyroidectomy    SPLENECTOMY  1979    ZZC REPLACE AORT VALV,PROSTH VALV      Description: Aortic Valve Replacement;  Proc Date: 2009;  Comments: #[address] [facility] Prosthesis    Family History   Problem Relation Age of Onset    Acute Myocardial Infarction Brother 43    Rheumatoid Arthritis Mother          in 40s    Coronary Artery Disease Father     Hypertension Father     No Known Problems Maternal Grandmother     No Known Problems Maternal Grandfather     No Known Problems Paternal Grandmother     No Known Problems Paternal Grandfather     Aortic stenosis Brother         s/p surgery    No Known Problems Sister     Breast Cancer No family hx of     Colon Cancer No family hx of     Cervical Cancer No family hx of     Other Cancer No family hx of     Social History     Socioeconomic History    Marital status:      Spouse name: Not on file    Number of children: Not on file    Years of education: Not on file    Highest education level: Not on file   Occupational History    Not on file   Tobacco Use     Smoking status: Former     Packs/day: 0.50     Years: 20.00     Pack years: 10.00     Types: Cigarettes     Quit date: 1979     Years since quittin.5    Smokeless tobacco: Never   Substance and Sexual Activity    Alcohol use: Yes     Comment: 1-2 drinks per week or less    Drug use: No    Sexual activity: Yes     Partners: Male   Other Topics Concern    Parent/sibling w/ CABG, MI or angioplasty before 65F 55M? Yes     Comment: Brother 43 yrs   Social History Narrative    Not on file     Social Determinants of Health     Financial Resource Strain: Not on file   Food Insecurity: Not on file   Transportation Needs: Not on file   Physical Activity: Not on file   Stress: Not on file   Social Connections: Not on file   Intimate Partner Violence: Not on file   Housing Stability: Not on file          Medications  Allergies   Current Outpatient Medications   Medication Sig Dispense Refill    ASPIRIN NOT PRESCRIBED (INTENTIONAL) Please choose reason not prescribed from choices below.      cholecalciferol, vitamin D3, 1,000 unit tablet [CHOLECALCIFEROL, VITAMIN D3, 1,000 UNIT TABLET] Take 1,000 Units by mouth daily.             levothyroxine (SYNTHROID/LEVOTHROID) 100 MCG tablet TAKE 1 TABLET BY MOUTH EVERY DAY 90 tablet 3    pravastatin (PRAVACHOL) 40 MG tablet TAKE 1 TABLET BY MOUTH EVERY DAY 90 tablet 3    verapamil ER (VERELAN) 240 MG 24 hr capsule TAKE 1 CAPSULE BY MOUTH EVERY DAY 90 capsule 1    warfarin ANTICOAGULANT (COUMADIN) 5 MG tablet TAKE 1-1.5 TABLETS (5-7.5 MG) BY MOUTH DAILY ADJUST DOSE PER INR AS DIRECTED 110 tablet 1    amoxicillin (AMOXIL) 500 MG capsule TAKE 4 CAPSULES (2,000 MG) BY MOUTH ONCE FOR 1 DOSE PRIOR TO DENTAL PROCEDURE. 4 capsule 3      No Known Allergies      Lab Results    Chemistry/lipid CBC Cardiac Enzymes/BNP/TSH/INR   Recent Labs   Lab Test 23  0954   TRIG 101   *   BUN 16.1      CO2 22    Recent Labs   Lab Test 23  0954   WBC 8.4   HGB 13.6   HCT 40.7    MCV 89       Recent Labs   Lab Test 06/20/23  1010 02/27/23  1015 01/16/23  0954 06/21/18  0327 06/20/18  9459   TROPONINI  --   --   --   --  <0.01   BNP  --   --   --   --  41   TSH  --   --  0.89   < > 0.25*   INR 2.0*   < > 1.7*   < > 2.02*    < > = values in this interval not displayed.        A total of 40 minutes was spent reviewing patient's medical records, obtaining history and performing examination, as well as discussing diagnoses/ recommendations with patient and answering all questions.                          Thank you for allowing me to participate in the care of your patient.      Sincerely,     Nel Zhang MD     [facility]  cc:   Nel Zhang MD  [address], [address]  [place] [address]

## 2023-07-17 LAB
MDC_IDC_LEAD_IMPLANT_DT: NORMAL
MDC_IDC_LEAD_IMPLANT_DT: NORMAL
MDC_IDC_LEAD_LOCATION: NORMAL
MDC_IDC_LEAD_LOCATION: NORMAL
MDC_IDC_LEAD_LOCATION_DETAIL_1: NORMAL
MDC_IDC_LEAD_LOCATION_DETAIL_1: NORMAL
MDC_IDC_LEAD_MFG: NORMAL
MDC_IDC_LEAD_MFG: NORMAL
MDC_IDC_LEAD_MODEL: NORMAL
MDC_IDC_LEAD_MODEL: NORMAL
MDC_IDC_LEAD_POLARITY_TYPE: NORMAL
MDC_IDC_LEAD_POLARITY_TYPE: NORMAL
MDC_IDC_LEAD_SERIAL: NORMAL
MDC_IDC_LEAD_SERIAL: NORMAL
MDC_IDC_LEAD_SPECIAL_FUNCTION: NORMAL
MDC_IDC_LEAD_SPECIAL_FUNCTION: NORMAL
MDC_IDC_MSMT_BATTERY_DTM: NORMAL
MDC_IDC_MSMT_BATTERY_REMAINING_LONGEVITY: 118 MO
MDC_IDC_MSMT_BATTERY_RRT_TRIGGER: 2.62
MDC_IDC_MSMT_BATTERY_STATUS: NORMAL
MDC_IDC_MSMT_BATTERY_VOLTAGE: 3 V
MDC_IDC_MSMT_LEADCHNL_RA_IMPEDANCE_VALUE: 342 OHM
MDC_IDC_MSMT_LEADCHNL_RA_IMPEDANCE_VALUE: 475 OHM
MDC_IDC_MSMT_LEADCHNL_RA_IMPEDANCE_VALUE: 475 OHM
MDC_IDC_MSMT_LEADCHNL_RA_PACING_THRESHOLD_AMPLITUDE: 0.75 V
MDC_IDC_MSMT_LEADCHNL_RA_PACING_THRESHOLD_AMPLITUDE: 0.75 V
MDC_IDC_MSMT_LEADCHNL_RA_PACING_THRESHOLD_PULSEWIDTH: 0.4 MS
MDC_IDC_MSMT_LEADCHNL_RA_PACING_THRESHOLD_PULSEWIDTH: 0.4 MS
MDC_IDC_MSMT_LEADCHNL_RA_SENSING_INTR_AMPL: 3.62 MV
MDC_IDC_MSMT_LEADCHNL_RA_SENSING_INTR_AMPL: 3.75 MV
MDC_IDC_MSMT_LEADCHNL_RA_SENSING_INTR_AMPL: 3.8 MV
MDC_IDC_MSMT_LEADCHNL_RV_IMPEDANCE_VALUE: 342 OHM
MDC_IDC_MSMT_LEADCHNL_RV_IMPEDANCE_VALUE: 456 OHM
MDC_IDC_MSMT_LEADCHNL_RV_IMPEDANCE_VALUE: 456 OHM
MDC_IDC_MSMT_LEADCHNL_RV_PACING_THRESHOLD_AMPLITUDE: 1.12 V
MDC_IDC_MSMT_LEADCHNL_RV_PACING_THRESHOLD_AMPLITUDE: 1.25 V
MDC_IDC_MSMT_LEADCHNL_RV_PACING_THRESHOLD_PULSEWIDTH: 0.4 MS
MDC_IDC_MSMT_LEADCHNL_RV_PACING_THRESHOLD_PULSEWIDTH: 0.4 MS
MDC_IDC_MSMT_LEADCHNL_RV_SENSING_INTR_AMPL: 7.75 MV
MDC_IDC_MSMT_LEADCHNL_RV_SENSING_INTR_AMPL: 9.25 MV
MDC_IDC_MSMT_LEADCHNL_RV_SENSING_INTR_AMPL: 9.3 MV
MDC_IDC_PG_IMPLANT_DTM: NORMAL
MDC_IDC_PG_MFG: NORMAL
MDC_IDC_PG_MODEL: NORMAL
MDC_IDC_PG_SERIAL: NORMAL
MDC_IDC_PG_TYPE: NORMAL
MDC_IDC_SESS_CLINIC_NAME: NORMAL
MDC_IDC_SESS_DTM: NORMAL
MDC_IDC_SESS_TYPE: NORMAL
MDC_IDC_SET_BRADY_AT_MODE_SWITCH_RATE: 171 {BEATS}/MIN
MDC_IDC_SET_BRADY_HYSTRATE: NORMAL
MDC_IDC_SET_BRADY_LOWRATE: 60 {BEATS}/MIN
MDC_IDC_SET_BRADY_MAX_SENSOR_RATE: 130 {BEATS}/MIN
MDC_IDC_SET_BRADY_MAX_TRACKING_RATE: 130 {BEATS}/MIN
MDC_IDC_SET_BRADY_MODE: NORMAL
MDC_IDC_SET_BRADY_PAV_DELAY_LOW: 270 MS
MDC_IDC_SET_BRADY_SAV_DELAY_LOW: 240 MS
MDC_IDC_SET_LEADCHNL_RA_PACING_AMPLITUDE: 1.5 V
MDC_IDC_SET_LEADCHNL_RA_PACING_ANODE_ELECTRODE_1: NORMAL
MDC_IDC_SET_LEADCHNL_RA_PACING_ANODE_LOCATION_1: NORMAL
MDC_IDC_SET_LEADCHNL_RA_PACING_CAPTURE_MODE: NORMAL
MDC_IDC_SET_LEADCHNL_RA_PACING_CATHODE_ELECTRODE_1: NORMAL
MDC_IDC_SET_LEADCHNL_RA_PACING_CATHODE_LOCATION_1: NORMAL
MDC_IDC_SET_LEADCHNL_RA_PACING_POLARITY: NORMAL
MDC_IDC_SET_LEADCHNL_RA_PACING_PULSEWIDTH: 0.4 MS
MDC_IDC_SET_LEADCHNL_RA_SENSING_ANODE_ELECTRODE_1: NORMAL
MDC_IDC_SET_LEADCHNL_RA_SENSING_ANODE_LOCATION_1: NORMAL
MDC_IDC_SET_LEADCHNL_RA_SENSING_CATHODE_ELECTRODE_1: NORMAL
MDC_IDC_SET_LEADCHNL_RA_SENSING_CATHODE_LOCATION_1: NORMAL
MDC_IDC_SET_LEADCHNL_RA_SENSING_POLARITY: NORMAL
MDC_IDC_SET_LEADCHNL_RA_SENSING_SENSITIVITY: 0.3 MV
MDC_IDC_SET_LEADCHNL_RV_PACING_AMPLITUDE: 1.75 V
MDC_IDC_SET_LEADCHNL_RV_PACING_ANODE_ELECTRODE_1: NORMAL
MDC_IDC_SET_LEADCHNL_RV_PACING_ANODE_LOCATION_1: NORMAL
MDC_IDC_SET_LEADCHNL_RV_PACING_CAPTURE_MODE: NORMAL
MDC_IDC_SET_LEADCHNL_RV_PACING_CATHODE_ELECTRODE_1: NORMAL
MDC_IDC_SET_LEADCHNL_RV_PACING_CATHODE_LOCATION_1: NORMAL
MDC_IDC_SET_LEADCHNL_RV_PACING_POLARITY: NORMAL
MDC_IDC_SET_LEADCHNL_RV_PACING_PULSEWIDTH: 0.4 MS
MDC_IDC_SET_LEADCHNL_RV_SENSING_ANODE_ELECTRODE_1: NORMAL
MDC_IDC_SET_LEADCHNL_RV_SENSING_ANODE_LOCATION_1: NORMAL
MDC_IDC_SET_LEADCHNL_RV_SENSING_CATHODE_ELECTRODE_1: NORMAL
MDC_IDC_SET_LEADCHNL_RV_SENSING_CATHODE_LOCATION_1: NORMAL
MDC_IDC_SET_LEADCHNL_RV_SENSING_POLARITY: NORMAL
MDC_IDC_SET_LEADCHNL_RV_SENSING_SENSITIVITY: 0.9 MV
MDC_IDC_SET_ZONE_DETECTION_INTERVAL: 350 MS
MDC_IDC_SET_ZONE_DETECTION_INTERVAL: 400 MS
MDC_IDC_SET_ZONE_TYPE: NORMAL
MDC_IDC_STAT_AT_BURDEN_PERCENT: 0 %
MDC_IDC_STAT_AT_DTM_END: NORMAL
MDC_IDC_STAT_AT_DTM_START: NORMAL
MDC_IDC_STAT_BRADY_AP_VP_PERCENT: 0.08 %
MDC_IDC_STAT_BRADY_AP_VS_PERCENT: 41.16 %
MDC_IDC_STAT_BRADY_AS_VP_PERCENT: 0.02 %
MDC_IDC_STAT_BRADY_AS_VS_PERCENT: 58.74 %
MDC_IDC_STAT_BRADY_DTM_END: NORMAL
MDC_IDC_STAT_BRADY_DTM_START: NORMAL
MDC_IDC_STAT_BRADY_RA_PERCENT_PACED: 41.23 %
MDC_IDC_STAT_BRADY_RV_PERCENT_PACED: 0.1 %
MDC_IDC_STAT_EPISODE_RECENT_COUNT: 0
MDC_IDC_STAT_EPISODE_RECENT_COUNT_DTM_END: NORMAL
MDC_IDC_STAT_EPISODE_RECENT_COUNT_DTM_START: NORMAL
MDC_IDC_STAT_EPISODE_TOTAL_COUNT: 0
MDC_IDC_STAT_EPISODE_TOTAL_COUNT: 554
MDC_IDC_STAT_EPISODE_TOTAL_COUNT: NORMAL
MDC_IDC_STAT_EPISODE_TOTAL_COUNT_DTM_END: NORMAL
MDC_IDC_STAT_EPISODE_TOTAL_COUNT_DTM_START: NORMAL
MDC_IDC_STAT_EPISODE_TYPE: NORMAL

## 2023-08-02 ENCOUNTER — HOSPITAL ENCOUNTER (OUTPATIENT)
Dept: CARDIOLOGY | Facility: CLINIC | Age: 73
Discharge: HOME | End: 2023-08-02
Attending: INTERNAL MEDICINE | Admitting: INTERNAL MEDICINE
Payer: MEDICARE

## 2023-08-02 DIAGNOSIS — Z95.2 S/P AVR: ICD-10-CM

## 2023-08-02 PROCEDURE — 93306 TTE W/DOPPLER COMPLETE: CPT | Mod: 26 | Performed by: INTERNAL MEDICINE

## 2023-08-02 PROCEDURE — 93306 TTE W/DOPPLER COMPLETE: CPT

## 2023-08-08 ENCOUNTER — ANTICOAGULATION THERAPY VISIT (OUTPATIENT)
Dept: ANTICOAGULATION | Facility: CLINIC | Age: 73
End: 2023-08-08

## 2023-08-08 ENCOUNTER — LAB (OUTPATIENT)
Dept: LAB | Facility: CLINIC | Age: 73
End: 2023-08-08
Payer: MEDICARE

## 2023-08-08 DIAGNOSIS — Z79.01 ANTICOAGULATED ON WARFARIN: ICD-10-CM

## 2023-08-08 DIAGNOSIS — Z87.74 H/O BICUSPID AORTIC VALVE: ICD-10-CM

## 2023-08-08 DIAGNOSIS — Z95.2 HX OF MECHANICAL AORTIC VALVE REPLACEMENT: ICD-10-CM

## 2023-08-08 DIAGNOSIS — Z79.01 ANTICOAGULATED ON WARFARIN: Primary | ICD-10-CM

## 2023-08-08 LAB — INR BLD: 1.7 (ref 0.9–1.1)

## 2023-08-08 PROCEDURE — 85610 PROTHROMBIN TIME: CPT

## 2023-08-08 PROCEDURE — 36416 COLLJ CAPILLARY BLOOD SPEC: CPT

## 2023-08-08 NOTE — PROGRESS NOTES
ANTICOAGULATION MANAGEMENT     Vandana Thao 73 year old female is on warfarin with therapeutic INR result. (Goal INR 1.5-2.0)    Recent labs: (last 7 days)     08/08/23  1011   INR 1.7*       ASSESSMENT     Warfarin Lab Questionnaire    Warfarin Doses Last 7 Days      8/8/2023    10:15 AM   Dose in Tablet or Mg   TAB or MG? milligram (mg)     Pt Rptd Dose SUNDAY MONDAY TUESDAY WED THURS FRIDAY SATURDAY 8/8/2023  10:15 AM 7.5 5 5 5 5 5 5         8/8/2023   Warfarin Lab Questionnaire   Missed doses within past 14 days? No   Changes in diet or alcohol within past 14 days? No   Medication changes since last result? No   Injuries or illness since last result? No   New shortness of breath, severe headaches or sudden changes in vision since last result? No   Abnormal bleeding since last result? No   Upcoming surgery, procedure? No   Best number to call with results? [phone]     Previous result: Therapeutic last 2(+) visits  Additional findings: None       PLAN     Recommended plan for no diet, medication or health factor changes affecting INR     Dosing Instructions: Continue your current warfarin dose with next INR in 8 weeks       Summary  As of 8/8/2023      Full warfarin instructions:  7.5 mg every Sun; 5 mg all other days   Next INR check:  10/3/2023               Telephone call with Vandana who verbalizes understanding and agrees to plan    Lab visit scheduled    Education provided:   Goal range and lab monitoring: goal range and significance of current result  Importance of notifying anticoagulation clinic for: changes in medications; a sooner lab recheck maybe needed  Contact [phone] with any changes, questions or concerns.     Plan made per [facility] anticoagulation protocol    Dorene Howard, RN  Anticoagulation Clinic  8/8/2023    _______________________________________________________________________     Anticoagulation Episode Summary       Current INR goal:  1.5-2.0   TTR:  100.0 % (1 y)   Target end  date:  Indefinite   Send INR reminders to:  ANDREAS JOHN    Indications    Anticoagulated on warfarin [Z79.01]  Hx of mechanical aortic valve replacement [Z95.2]  H/O bicuspid aortic valve [Z87.74]             Comments:  Approved for 8 week checks per -- see encounter from 01/16/23             Anticoagulation Care Providers       Provider Role Specialty Phone number    Lakeisha Marmolejo MD Referring Family Medicine [phone]    Maggi Muro MD Referring Family Medicine [phone]

## 2023-09-05 ENCOUNTER — NURSE TRIAGE (OUTPATIENT)
Dept: FAMILY MEDICINE | Facility: CLINIC | Age: 73
End: 2023-09-05
Payer: MEDICARE

## 2023-09-05 NOTE — TELEPHONE ENCOUNTER
On warfarin, most recent INR subtherapeutic.  Ideally would be seen today but at least pt has appointment tomorrow if she refuses to go to urgent care today

## 2023-09-05 NOTE — TELEPHONE ENCOUNTER
"Provider Recommendation Follow Up:   Reached patient/caregiver. Informed of provider's recommendations. Patient verbalized understanding and agrees with the plan.          See response from Dr. Marmolejo on 9/5/23 regarding nurse triage on 9/5/23:    \"On warfarin, most recent INR subtherapeutic.  Ideally would be seen today but at least pt has appointment tomorrow if she refuses to go to urgent care today\"    Writer called and relayed the above provider message to the patient.    Patient states she is not feeling symptomatic right now and is comfortable with waiting to be seen by a provider tomorrow, 9/6/23.    Writer recommended to the patient that if she starts to have any concerning symptoms, such as chest pain or SOB or trouble breathing, she should go directly to the ED to be evaluated.    Patient verbalizes understanding and agrees with plan.    Denies any other questions or concern at this time.    Dorene Enamorado RN, BSN  [facility]    "

## 2023-09-05 NOTE — TELEPHONE ENCOUNTER
Reason for Call:  Appointment Request    Patient requesting this type of appt:  acute right foot swelling on the bottom    Requested provider: Maggi Muro    Reason patient unable to be scheduled: Not within requested timeframe    When does patient want to be seen/preferred time: 1-2 weeks    Comments: Patient is scheduled on 10/12/23 but is wondering if she can get in earlier then 10/12?    Could we send this information to you in [place] or would you prefer to receive a phone call?:   Patient would prefer a phone call   Okay to leave a detailed message?: Yes at Cell number on file:    Telephone Information:   Mobile [phone]     Oscar Aly   [facility]  Central Scheduler  Call taken on 9/5/2023 at 8:10 AM by OSCAR ALY

## 2023-09-05 NOTE — TELEPHONE ENCOUNTER
"Nurse Triage SBAR    Is this a 2nd Level Triage? YES, LICENSED PRACTITIONER REVIEW IS REQUIRED    Situation:   Patient calling trying to be seen for right foot swelling     Background:   Is on a blood thinner currently   No hx of DVT     Assessment:   1. ONSET: \"When did the pain start?\"       Pain and swelling have been going on about a week   2. LOCATION: \"Where is the pain located?\"       Right foot - top of foot across top of where toes meet the foot and also on the bottom   3. PAIN: \"How bad is the pain?\"    (Scale 1-10; or mild, moderate, severe)   - MILD (1-3): doesn't interfere with normal activities.    - MODERATE (4-7): interferes with normal activities (e.g., work or school) or awakens from sleep, limping.    - SEVERE (8-10): excruciating pain, unable to do any normal activities, unable to walk.       8/10 - is able to walk on it - feels better when walking on it - worse when resting   6. OTHER SYMPTOMS: \"Do you have any other symptoms?\" (e.g., leg pain, rash, fever, numbness)      Denies fever   Denies any numbness/tingling   No change in skin temp to this area  Right foot does have some discoloration compared to other foot   When walking whole foot swells - shoe gets tight    Protocol Recommended Disposition:   See in Office Today    Recommendation:   Home care advice given  Recommend patient be seen today to have foot evaluated and WIC, patient declined and wants to be seen at clinic if possible  Scheduled patient with Dr. Walls for 9/6/23  Routing to provider to review and advise      Routed to provider    Does the patient meet one of the following criteria for ADS visit consideration? 16+ years old, with an [facility] PCP     TIP  Providers, please consider if this condition is appropriate for management at one of our Acute and Diagnostic Services sites.     If patient is a good candidate, please use dotphrase <dot>triageresponse and select Refer to ADS to document.        Reason for Disposition   " "Swollen foot  (Exceptions: Localized bump from bunions, calluses, insect bite, sting.)    Additional Information   Negative: Entire foot is cool or blue in comparison to other foot   Negative: Purple or black skin on foot or toe   Negative: Red area or streak and fever   Negative: Swollen foot and fever   Negative: Patient sounds very sick or weak to the triager   Negative: SEVERE pain (e.g., excruciating, unable to do any normal activities)     Able to do normal activities   Negative: Looks like a boil, infected sore, deep ulcer, or other infected rash (spreading redness, pus)    Answer Assessment - Initial Assessment Questions  1. ONSET: \"When did the pain start?\"       Pain and swelling have been going on about a week   2. LOCATION: \"Where is the pain located?\"       Right foot - top of foot across top of where toes meet the foot and also on the bottom   3. PAIN: \"How bad is the pain?\"    (Scale 1-10; or mild, moderate, severe)   - MILD (1-3): doesn't interfere with normal activities.    - MODERATE (4-7): interferes with normal activities (e.g., work or school) or awakens from sleep, limping.    - SEVERE (8-10): excruciating pain, unable to do any normal activities, unable to walk.       8/10 - is able to walk on it - feels better when walking on it - worse when resting   6. OTHER SYMPTOMS: \"Do you have any other symptoms?\" (e.g., leg pain, rash, fever, numbness)      Denies fever   Denies any numbness/tingling   No change in skin temp to this area  Right foot does have some discoloration compared to other foot   When walking whole foot swells - shoe gets tight    Protocols used: Foot Pain-A-[facility]    LAUREN WrenN, RN  [facility]    "

## 2023-09-06 ENCOUNTER — OFFICE VISIT (OUTPATIENT)
Dept: FAMILY MEDICINE | Facility: CLINIC | Age: 73
End: 2023-09-06
Payer: MEDICARE

## 2023-09-06 VITALS
OXYGEN SATURATION: 94 % | HEIGHT: 63 IN | DIASTOLIC BLOOD PRESSURE: 60 MMHG | SYSTOLIC BLOOD PRESSURE: 130 MMHG | WEIGHT: 187.61 LBS | BODY MASS INDEX: 33.24 KG/M2 | HEART RATE: 75 BPM | TEMPERATURE: 98.3 F

## 2023-09-06 DIAGNOSIS — M77.41 METATARSALGIA OF RIGHT FOOT: Primary | ICD-10-CM

## 2023-09-06 PROCEDURE — 99213 OFFICE O/P EST LOW 20 MIN: CPT | Performed by: STUDENT IN AN ORGANIZED HEALTH CARE EDUCATION/TRAINING PROGRAM

## 2023-09-06 ASSESSMENT — PAIN SCALES - GENERAL: PAINLEVEL: NO PAIN (0)

## 2023-09-06 NOTE — PATIENT INSTRUCTIONS
Tylenol 1,000 mg twice daily for 1 week  Icing of feet at night   Get the metatarsal pads and wear with walking  Can see Mode Sanchez at [facility] orthopedics- [phone]

## 2023-09-06 NOTE — PROGRESS NOTES
Assessment and Plan   73-year-old female who presents with metatarsal pain going on for the last week or so since [facility] and [facility].  I think this is most likely general metatarsalgia or a flare of arthritis in her metatarsals.  As she has a history of arthritis there previously cleaned out by a podiatrist.  I recommended trying conservative treatment for now as below.  If these do not improve symptoms I would recommend imaging and seeing specialty care through podiatry.    1. Metatarsalgia of right foot  Tylenol 1,000 mg twice daily for 1 week  Icing of feet at night   Get the metatarsal pads and wear with walking  Can see Mode Sanchez at [facility] orthopedics- [phone]    Follow up: PRN    Options for treatment and follow-up care were reviewed with the patient and/or guardian. Vandana ROSARIO Keesha and/or guardian engaged in the decision making process and verbalized understanding of the options discussed and agreed with the final plan.    Dr. Brett Gilbert         HPI:   Vandana J Keesha is a 73 year old  female who presents for:    Chief Complaint   Patient presents with    Foot Problems     Foot swelling and painful at night      Patient tells me that [facility] about a week ago and she started having toe and metatarsal pain that hurts at night. Not with walking. She used to have pain in the large tow of her right foot bu had surgery and helped. Some arthritis she states was there.          PMHX:     Patient Active Problem List   Diagnosis    Hypercholesterolemia    H/O bicuspid aortic valve    High degree atrioventricular block    Postoperative hypothyroidism    Cardiac pacemaker in situ    Non-sustained ventricular tachycardia (H)    Coronary artery disease involving native coronary artery of native heart without angina pectoris    Microscopic hematuria    S/P splenectomy    Other insomnia    Anticoagulated on warfarin    Hx of mechanical aortic valve  "replacement    Osteopenia, unspecified location       Social History     Tobacco Use    Smoking status: Former     Packs/day: 0.50     Years: 20.00     Pack years: 10.00     Types: Cigarettes     Quit date: 1979     Years since quittin.7    Smokeless tobacco: Never   Substance Use Topics    Alcohol use: Yes     Comment: 1-2 drinks per week or less    Drug use: No       Social History     Social History Narrative    Not on file       No Known Allergies    No results found for this or any previous visit (from the past 24 hour(s)).         Review of Systems:    ROS: 10 point ROS neg other than the symptoms noted above in the HPI.         Physical Exam:     Vitals:    23 0848   BP: 130/60   Pulse: 75   Temp: 98.3  F (36.8  C)   SpO2: 94%   Weight: 85.1 kg (187 lb 9.8 oz)   Height: 1.6 m (5' 3\")     Body mass index is 33.23 kg/m .    General appearance: Alert, cooperative, no distress, appears stated age  Head: Normocephalic, atraumatic, without obvious abnormality  Eyes: Pupils equal round, reactive.  Conjunctiva clear.  Nose: Nares normal, no drainage.  Throat: Lips, mucosa, tongue normal mucosa pink and moist  Neck: Supple, symmetric, trachea midline,  Extremities: Extremities normal, atraumatic.  No cyanosis or edema.    Right Foot/Ankle:   Inspection: Swelling - none; Bruising - none   Sensation: intact in peroneal, tibial, sural, and saphenous distribution   ROM: Dorsiflexion - normal; Plantarflexion - normal; Inversion -normal; Eversion - normal;    Strength: 5/5 in all motions; Pain w/ resisted none  Bony tenderness: 5th Metatarsal? - no; pain with squeezing of metatarsals                "

## 2023-09-23 ENCOUNTER — IMMUNIZATION (OUTPATIENT)
Dept: FAMILY MEDICINE | Facility: CLINIC | Age: 73
End: 2023-09-23
Payer: MEDICARE

## 2023-09-23 PROCEDURE — 90662 IIV NO PRSV INCREASED AG IM: CPT

## 2023-09-23 PROCEDURE — G0008 ADMIN INFLUENZA VIRUS VAC: HCPCS

## 2023-10-03 ENCOUNTER — LAB (OUTPATIENT)
Dept: LAB | Facility: CLINIC | Age: 73
End: 2023-10-03
Payer: MEDICARE

## 2023-10-03 ENCOUNTER — ANTICOAGULATION THERAPY VISIT (OUTPATIENT)
Dept: ANTICOAGULATION | Facility: CLINIC | Age: 73
End: 2023-10-03

## 2023-10-03 DIAGNOSIS — Z87.74 H/O BICUSPID AORTIC VALVE: ICD-10-CM

## 2023-10-03 DIAGNOSIS — Z79.01 ANTICOAGULATED ON WARFARIN: ICD-10-CM

## 2023-10-03 DIAGNOSIS — Z95.2 HX OF MECHANICAL AORTIC VALVE REPLACEMENT: ICD-10-CM

## 2023-10-03 DIAGNOSIS — Z79.01 ANTICOAGULATED ON WARFARIN: Primary | ICD-10-CM

## 2023-10-03 LAB — INR BLD: 1.9 (ref 0.9–1.1)

## 2023-10-03 PROCEDURE — 85610 PROTHROMBIN TIME: CPT

## 2023-10-03 PROCEDURE — 36415 COLL VENOUS BLD VENIPUNCTURE: CPT

## 2023-10-03 NOTE — PROGRESS NOTES
ANTICOAGULATION MANAGEMENT     Vandana Thao 73 year old female is on warfarin with therapeutic INR result. (Goal INR 1.5-2.0)    Recent labs: (last 7 days)     10/03/23  1012   INR 1.9*       ASSESSMENT     Warfarin Lab Questionnaire    Warfarin Doses Last 7 Days      10/3/2023    10:10 AM   Dose in Tablet or Mg   TAB or MG? milligram (mg)     Pt Rptd Dose CLYDE MONDAY TUESDAY WED THURS FRIDAY SATURDAY   10/3/2023  10:10 AM 7.5 5 5 5 5 5 5         10/3/2023   Warfarin Lab Questionnaire   Missed doses within past 14 days? No   Changes in diet or alcohol within past 14 days? No   Medication changes since last result? No   Injuries or illness since last result? No   New shortness of breath, severe headaches or sudden changes in vision since last result? No   Abnormal bleeding since last result? No   Upcoming surgery, procedure? No   Best number to call with results? [phone]     Previous result: Therapeutic last 2(+) visits  Additional findings: None       PLAN     Recommended plan for no diet, medication or health factor changes affecting INR     Dosing Instructions: Continue your current warfarin dose with next INR in 8 weeks       Summary  As of 10/3/2023      Full warfarin instructions:  7.5 mg every Sun; 5 mg all other days   Next INR check:  11/28/2023               Telephone call with Vandana who verbalizes understanding and agrees to plan    Lab visit scheduled    Education provided:   Goal range and lab monitoring: goal range and significance of current result  Importance of notifying anticoagulation clinic for: changes in medications; a sooner lab recheck maybe needed and upcoming surgeries and procedures 2 weeks in advance  Contact [phone] with any changes, questions or concerns.     Plan made per [facility] anticoagulation protocol    Dorene Howard RN  Anticoagulation Clinic  10/3/2023    _______________________________________________________________________     Anticoagulation Episode Summary        Current INR goal:  1.5-2.0   TTR:  100.0 % (1 y)   Target end date:  Indefinite   Send INR reminders to:  ANDREAS JOHN    Indications    Anticoagulated on warfarin [Z79.01]  Hx of mechanical aortic valve replacement [Z95.2]  H/O bicuspid aortic valve [Z87.74]             Comments:  Approved for 8 week checks per -- see encounter from 01/16/23             Anticoagulation Care Providers       Provider Role Specialty Phone number    Lakeisha Marmolejo MD Referring Family Medicine [phone]    Maggi Muro MD Referring Family Medicine [phone]

## 2023-10-12 ENCOUNTER — OFFICE VISIT (OUTPATIENT)
Dept: FAMILY MEDICINE | Facility: CLINIC | Age: 73
End: 2023-10-12
Payer: MEDICARE

## 2023-10-12 VITALS
SYSTOLIC BLOOD PRESSURE: 130 MMHG | DIASTOLIC BLOOD PRESSURE: 64 MMHG | HEART RATE: 65 BPM | OXYGEN SATURATION: 97 % | WEIGHT: 189.4 LBS | HEIGHT: 63 IN | TEMPERATURE: 98.6 F | RESPIRATION RATE: 16 BRPM | BODY MASS INDEX: 33.56 KG/M2

## 2023-10-12 DIAGNOSIS — F51.01 PRIMARY INSOMNIA: ICD-10-CM

## 2023-10-12 DIAGNOSIS — H93.12 TINNITUS, LEFT: Primary | ICD-10-CM

## 2023-10-12 DIAGNOSIS — L98.9 SKIN LESION OF FACE: ICD-10-CM

## 2023-10-12 DIAGNOSIS — L28.1 PRURIGO NODULARIS: ICD-10-CM

## 2023-10-12 PROCEDURE — 99214 OFFICE O/P EST MOD 30 MIN: CPT | Performed by: FAMILY MEDICINE

## 2023-10-12 PROCEDURE — 91320 SARSCV2 VAC 30MCG TRS-SUC IM: CPT | Performed by: FAMILY MEDICINE

## 2023-10-12 PROCEDURE — 90480 ADMN SARSCOV2 VAC 1/ONLY CMP: CPT | Performed by: FAMILY MEDICINE

## 2023-10-12 RX ORDER — TRIAMCINOLONE ACETONIDE 1 MG/G
CREAM TOPICAL 2 TIMES DAILY
Qty: 15 G | Refills: 0 | Status: SHIPPED | OUTPATIENT
Start: 2023-10-12 | End: 2023-10-26

## 2023-10-12 RX ORDER — TRAZODONE HYDROCHLORIDE 50 MG/1
25-50 TABLET, FILM COATED ORAL
Qty: 30 TABLET | Refills: 1 | Status: SHIPPED | OUTPATIENT
Start: 2023-10-12 | End: 2023-12-06

## 2023-10-12 ASSESSMENT — PAIN SCALES - GENERAL: PAINLEVEL: NO PAIN (0)

## 2023-10-12 NOTE — PROGRESS NOTES
"  Assessment & Plan     Tinnitus, left  Ear exam unremarkable.  I am most suspicious that this is mild eustachian tube dysfunction or something similar.  Reviewed symptomatic treatments.  Consider initiation of nasal corticosteroid.  ENT if persistent.  Reevaluation if new symptoms.    Primary insomnia  Encourage good sleep hygiene, handout provided.  Discussed options.  We will initiate trazodone to use as needed.  Notify with inadequate effect.  - traZODone (DESYREL) 50 MG tablet; Take 0.5-1 tablets (25-50 mg) by mouth nightly as needed for sleep    Prurigo nodularis  Advised avoidance of picking the area.  May try triamcinolone for up to 14 days to see if this helps with the itching portion.  Consider keeping it covered with a Band-Aid.  Follow-up with dermatology.  - triamcinolone (KENALOG) 0.1 % external cream; Apply topically 2 times daily for 14 days    Skin lesion of face  Appears benign but unable to fully characterize.  Referral to dermatology.  - Adult Dermatology  Referral; Future             BMI:   Estimated body mass index is 33.55 kg/m  as calculated from the following:    Height as of this encounter: 1.6 m (5' 3\").    Weight as of this encounter: 85.9 kg (189 lb 6.4 oz).           Maggi Muro MD  [facility] is a 73 year [facility], presenting for the following health issues:  Vibration sound left ear (Last week, sounds left ear, can hear ok), Referral for derm (Dx of rosacea , not going away. Symptoms for over a year.), and Sleeping issues      Describes a sound of vibration in her left ear, not a feeling of vibration, without disruption of her hearing.  It is not a ringing sensation.  Present for the past week.  Comes and goes but seems to be worse when there is air movement around her.  She has some allergies, takes a daily antihistamine for this.  She is also got an itchy spot on her left upper outer arm she would like me to look at.  Tends to " "pick at them.  Also has a scaly lesion on her right cheek she would like me to look at.  States she was given a prescription medication by her dermatologist but it did not seem to help.    Frustrated by poor sleep.  She goes to bed about 10:30 PM, falls asleep for 2 hours and then is often awake and has trouble falling back asleep.  She is tried over-the-counter medications without help.  Describes poor energy the next day.  She does not nap.  She sometimes skips her morning walk if she did not sleep well the night before.  Denies snoring or apnea.    History of Present Illness       Reason for visit:  Vibratiom in left ear and skin questions  Symptom onset:  3-7 days ago  Symptom intensity:  Moderate  Symptom progression:  Staying the same  Had these symptoms before:  No    She eats 2-3 servings of fruits and vegetables daily.She consumes 1 sweetened beverage(s) daily.She exercises with enough effort to increase her heart rate 30 to 60 minutes per day.  She exercises with enough effort to increase her heart rate 5 days per week.   She is taking medications regularly.                 Review of Systems         Objective    /64   Pulse 65   Temp 98.6  F (37  C) (Temporal)   Resp 16   Ht 1.6 m (5' 3\")   Wt 85.9 kg (189 lb 6.4 oz)   SpO2 97%   BMI 33.55 kg/m    Body mass index is 33.55 kg/m .  Physical Exam   Alert and pleasant.  Membranes pearly and translucent bilaterally, no effusions.  Knees mucosa is pink and moist.  Oropharynx clear.  She has a make-up on her face today but I can see there is a lesion with slight hyperpigmentation and slight flaking that 1.5 cm in diameter right cheek though difficult to characterize.  Left arm she has an area that looks like she is having some picking, mild erythema, raised, firm.  Some superficial scaling that looks more like excoriation.                      "

## 2023-10-16 ENCOUNTER — ANCILLARY PROCEDURE (OUTPATIENT)
Dept: CARDIOLOGY | Facility: CLINIC | Age: 73
End: 2023-10-16
Attending: INTERNAL MEDICINE
Payer: MEDICARE

## 2023-10-16 DIAGNOSIS — I49.5 SICK SINUS SYNDROME (H): ICD-10-CM

## 2023-10-16 DIAGNOSIS — Z95.0 PACEMAKER: ICD-10-CM

## 2023-10-17 LAB
MDC_IDC_LEAD_CONNECTION_STATUS: NORMAL
MDC_IDC_LEAD_CONNECTION_STATUS: NORMAL
MDC_IDC_LEAD_IMPLANT_DT: NORMAL
MDC_IDC_LEAD_IMPLANT_DT: NORMAL
MDC_IDC_LEAD_LOCATION: NORMAL
MDC_IDC_LEAD_LOCATION: NORMAL
MDC_IDC_LEAD_LOCATION_DETAIL_1: NORMAL
MDC_IDC_LEAD_LOCATION_DETAIL_1: NORMAL
MDC_IDC_LEAD_MFG: NORMAL
MDC_IDC_LEAD_MFG: NORMAL
MDC_IDC_LEAD_MODEL: NORMAL
MDC_IDC_LEAD_MODEL: NORMAL
MDC_IDC_LEAD_POLARITY_TYPE: NORMAL
MDC_IDC_LEAD_POLARITY_TYPE: NORMAL
MDC_IDC_LEAD_SERIAL: NORMAL
MDC_IDC_LEAD_SERIAL: NORMAL
MDC_IDC_LEAD_SPECIAL_FUNCTION: NORMAL
MDC_IDC_LEAD_SPECIAL_FUNCTION: NORMAL
MDC_IDC_MSMT_BATTERY_DTM: NORMAL
MDC_IDC_MSMT_BATTERY_REMAINING_LONGEVITY: 115 MO
MDC_IDC_MSMT_BATTERY_RRT_TRIGGER: 2.62
MDC_IDC_MSMT_BATTERY_STATUS: NORMAL
MDC_IDC_MSMT_BATTERY_VOLTAGE: 3 V
MDC_IDC_MSMT_LEADCHNL_RA_IMPEDANCE_VALUE: 323 OHM
MDC_IDC_MSMT_LEADCHNL_RA_IMPEDANCE_VALUE: 437 OHM
MDC_IDC_MSMT_LEADCHNL_RA_PACING_THRESHOLD_AMPLITUDE: 0.62 V
MDC_IDC_MSMT_LEADCHNL_RA_PACING_THRESHOLD_PULSEWIDTH: 0.4 MS
MDC_IDC_MSMT_LEADCHNL_RA_SENSING_INTR_AMPL: 4 MV
MDC_IDC_MSMT_LEADCHNL_RA_SENSING_INTR_AMPL: 4 MV
MDC_IDC_MSMT_LEADCHNL_RV_IMPEDANCE_VALUE: 342 OHM
MDC_IDC_MSMT_LEADCHNL_RV_IMPEDANCE_VALUE: 437 OHM
MDC_IDC_MSMT_LEADCHNL_RV_PACING_THRESHOLD_AMPLITUDE: 1.12 V
MDC_IDC_MSMT_LEADCHNL_RV_PACING_THRESHOLD_PULSEWIDTH: 0.4 MS
MDC_IDC_MSMT_LEADCHNL_RV_SENSING_INTR_AMPL: 8.25 MV
MDC_IDC_MSMT_LEADCHNL_RV_SENSING_INTR_AMPL: 8.25 MV
MDC_IDC_PG_IMPLANT_DTM: NORMAL
MDC_IDC_PG_MFG: NORMAL
MDC_IDC_PG_MODEL: NORMAL
MDC_IDC_PG_SERIAL: NORMAL
MDC_IDC_PG_TYPE: NORMAL
MDC_IDC_SESS_CLINIC_NAME: NORMAL
MDC_IDC_SESS_DTM: NORMAL
MDC_IDC_SESS_TYPE: NORMAL
MDC_IDC_SET_BRADY_AT_MODE_SWITCH_RATE: 171 {BEATS}/MIN
MDC_IDC_SET_BRADY_HYSTRATE: NORMAL
MDC_IDC_SET_BRADY_LOWRATE: 60 {BEATS}/MIN
MDC_IDC_SET_BRADY_MAX_SENSOR_RATE: 130 {BEATS}/MIN
MDC_IDC_SET_BRADY_MAX_TRACKING_RATE: 130 {BEATS}/MIN
MDC_IDC_SET_BRADY_MODE: NORMAL
MDC_IDC_SET_BRADY_PAV_DELAY_LOW: 270 MS
MDC_IDC_SET_BRADY_SAV_DELAY_LOW: 240 MS
MDC_IDC_SET_LEADCHNL_RA_PACING_AMPLITUDE: 1.5 V
MDC_IDC_SET_LEADCHNL_RA_PACING_ANODE_ELECTRODE_1: NORMAL
MDC_IDC_SET_LEADCHNL_RA_PACING_ANODE_LOCATION_1: NORMAL
MDC_IDC_SET_LEADCHNL_RA_PACING_CAPTURE_MODE: NORMAL
MDC_IDC_SET_LEADCHNL_RA_PACING_CATHODE_ELECTRODE_1: NORMAL
MDC_IDC_SET_LEADCHNL_RA_PACING_CATHODE_LOCATION_1: NORMAL
MDC_IDC_SET_LEADCHNL_RA_PACING_POLARITY: NORMAL
MDC_IDC_SET_LEADCHNL_RA_PACING_PULSEWIDTH: 0.4 MS
MDC_IDC_SET_LEADCHNL_RA_SENSING_ANODE_ELECTRODE_1: NORMAL
MDC_IDC_SET_LEADCHNL_RA_SENSING_ANODE_LOCATION_1: NORMAL
MDC_IDC_SET_LEADCHNL_RA_SENSING_CATHODE_ELECTRODE_1: NORMAL
MDC_IDC_SET_LEADCHNL_RA_SENSING_CATHODE_LOCATION_1: NORMAL
MDC_IDC_SET_LEADCHNL_RA_SENSING_POLARITY: NORMAL
MDC_IDC_SET_LEADCHNL_RA_SENSING_SENSITIVITY: 0.3 MV
MDC_IDC_SET_LEADCHNL_RV_PACING_AMPLITUDE: 1.75 V
MDC_IDC_SET_LEADCHNL_RV_PACING_ANODE_ELECTRODE_1: NORMAL
MDC_IDC_SET_LEADCHNL_RV_PACING_ANODE_LOCATION_1: NORMAL
MDC_IDC_SET_LEADCHNL_RV_PACING_CAPTURE_MODE: NORMAL
MDC_IDC_SET_LEADCHNL_RV_PACING_CATHODE_ELECTRODE_1: NORMAL
MDC_IDC_SET_LEADCHNL_RV_PACING_CATHODE_LOCATION_1: NORMAL
MDC_IDC_SET_LEADCHNL_RV_PACING_POLARITY: NORMAL
MDC_IDC_SET_LEADCHNL_RV_PACING_PULSEWIDTH: 0.4 MS
MDC_IDC_SET_LEADCHNL_RV_SENSING_ANODE_ELECTRODE_1: NORMAL
MDC_IDC_SET_LEADCHNL_RV_SENSING_ANODE_LOCATION_1: NORMAL
MDC_IDC_SET_LEADCHNL_RV_SENSING_CATHODE_ELECTRODE_1: NORMAL
MDC_IDC_SET_LEADCHNL_RV_SENSING_CATHODE_LOCATION_1: NORMAL
MDC_IDC_SET_LEADCHNL_RV_SENSING_POLARITY: NORMAL
MDC_IDC_SET_LEADCHNL_RV_SENSING_SENSITIVITY: 0.9 MV
MDC_IDC_SET_ZONE_DETECTION_INTERVAL: 350 MS
MDC_IDC_SET_ZONE_DETECTION_INTERVAL: 400 MS
MDC_IDC_SET_ZONE_STATUS: NORMAL
MDC_IDC_SET_ZONE_STATUS: NORMAL
MDC_IDC_SET_ZONE_TYPE: NORMAL
MDC_IDC_SET_ZONE_VENDOR_TYPE: NORMAL
MDC_IDC_STAT_AT_BURDEN_PERCENT: 0 %
MDC_IDC_STAT_AT_DTM_END: NORMAL
MDC_IDC_STAT_AT_DTM_START: NORMAL
MDC_IDC_STAT_BRADY_AP_VP_PERCENT: 0.06 %
MDC_IDC_STAT_BRADY_AP_VS_PERCENT: 46.28 %
MDC_IDC_STAT_BRADY_AS_VP_PERCENT: 0.02 %
MDC_IDC_STAT_BRADY_AS_VS_PERCENT: 53.64 %
MDC_IDC_STAT_BRADY_DTM_END: NORMAL
MDC_IDC_STAT_BRADY_DTM_START: NORMAL
MDC_IDC_STAT_BRADY_RA_PERCENT_PACED: 46.33 %
MDC_IDC_STAT_BRADY_RV_PERCENT_PACED: 0.08 %
MDC_IDC_STAT_EPISODE_RECENT_COUNT: 0
MDC_IDC_STAT_EPISODE_RECENT_COUNT_DTM_END: NORMAL
MDC_IDC_STAT_EPISODE_RECENT_COUNT_DTM_START: NORMAL
MDC_IDC_STAT_EPISODE_TOTAL_COUNT: 0
MDC_IDC_STAT_EPISODE_TOTAL_COUNT: 0
MDC_IDC_STAT_EPISODE_TOTAL_COUNT: 554
MDC_IDC_STAT_EPISODE_TOTAL_COUNT: 6
MDC_IDC_STAT_EPISODE_TOTAL_COUNT: NORMAL
MDC_IDC_STAT_EPISODE_TOTAL_COUNT_DTM_END: NORMAL
MDC_IDC_STAT_EPISODE_TOTAL_COUNT_DTM_START: NORMAL
MDC_IDC_STAT_EPISODE_TYPE: NORMAL
MDC_IDC_STAT_TACHYTHERAPY_RECENT_DTM_END: NORMAL
MDC_IDC_STAT_TACHYTHERAPY_RECENT_DTM_START: NORMAL
MDC_IDC_STAT_TACHYTHERAPY_TOTAL_DTM_END: NORMAL
MDC_IDC_STAT_TACHYTHERAPY_TOTAL_DTM_START: NORMAL

## 2023-10-17 PROCEDURE — 93296 REM INTERROG EVL PM/IDS: CPT | Performed by: INTERNAL MEDICINE

## 2023-10-17 PROCEDURE — 93294 REM INTERROG EVL PM/LDLS PM: CPT | Performed by: INTERNAL MEDICINE

## 2023-11-07 ENCOUNTER — TRANSFERRED RECORDS (OUTPATIENT)
Dept: HEALTH INFORMATION MANAGEMENT | Facility: CLINIC | Age: 73
End: 2023-11-07
Payer: MEDICARE

## 2023-11-08 DIAGNOSIS — I47.29 NON-SUSTAINED VENTRICULAR TACHYCARDIA (H): ICD-10-CM

## 2023-11-08 DIAGNOSIS — I44.39 HIGH DEGREE ATRIOVENTRICULAR BLOCK: ICD-10-CM

## 2023-11-08 DIAGNOSIS — Z79.2 PROPHYLACTIC ANTIBIOTIC: ICD-10-CM

## 2023-11-08 RX ORDER — AMOXICILLIN 500 MG/1
2000 CAPSULE ORAL ONCE
Qty: 4 CAPSULE | Refills: 3 | Status: SHIPPED | OUTPATIENT
Start: 2023-11-08 | End: 2024-03-14

## 2023-11-08 RX ORDER — VERAPAMIL HYDROCHLORIDE 240 MG/1
CAPSULE, EXTENDED RELEASE ORAL
Qty: 90 CAPSULE | Refills: 1 | Status: SHIPPED | OUTPATIENT
Start: 2023-11-08 | End: 2024-03-14

## 2023-11-28 ENCOUNTER — LAB (OUTPATIENT)
Dept: LAB | Facility: CLINIC | Age: 73
End: 2023-11-28
Payer: MEDICARE

## 2023-11-28 ENCOUNTER — ANTICOAGULATION THERAPY VISIT (OUTPATIENT)
Dept: ANTICOAGULATION | Facility: CLINIC | Age: 73
End: 2023-11-28

## 2023-11-28 DIAGNOSIS — Z95.2 HX OF MECHANICAL AORTIC VALVE REPLACEMENT: ICD-10-CM

## 2023-11-28 DIAGNOSIS — Z79.01 ANTICOAGULATED ON WARFARIN: ICD-10-CM

## 2023-11-28 DIAGNOSIS — Z87.74 H/O BICUSPID AORTIC VALVE: ICD-10-CM

## 2023-11-28 DIAGNOSIS — Z79.01 ANTICOAGULATED ON WARFARIN: Primary | ICD-10-CM

## 2023-11-28 LAB — INR BLD: 1.5 (ref 0.9–1.1)

## 2023-11-28 PROCEDURE — 36416 COLLJ CAPILLARY BLOOD SPEC: CPT

## 2023-11-28 PROCEDURE — 85610 PROTHROMBIN TIME: CPT

## 2023-11-28 NOTE — PROGRESS NOTES
ANTICOAGULATION MANAGEMENT     Vandana Thao 73 year old female is on warfarin with therapeutic INR result. (Goal INR 1.5-2.0)    Recent labs: (last 7 days)     11/28/23  1011   INR 1.5*       ASSESSMENT     Warfarin Lab Questionnaire    Warfarin Doses Last 7 Days      11/28/2023    10:12 AM   Dose in Tablet or Mg   TAB or MG? tablet (tab)     Pt Rptd Dose SUNDAY MONDAY TUESDAY WED THURS FRIDAY SATURDAY 11/28/2023  10:12 AM 7.5 5 5 5 5 5 5         11/28/2023   Warfarin Lab Questionnaire   Missed doses within past 14 days? No   Changes in diet or alcohol within past 14 days? No   Medication changes since last result? No-Trazodone prn for sleep per visit note on 10/12/23. Can cause increased risk of bleeding.   Injuries or illness since last result? No   New shortness of breath, severe headaches or sudden changes in vision since last result? No   Abnormal bleeding since last result? No   Upcoming surgery, procedure? No   Best number to call with results? [phone]     Previous result: Therapeutic last 2(+) visits  Additional findings:  Not using Trazodone much. Discussed possible increase in bruising/bleeding with it's use and she will keep this in mind.       PLAN     Recommended plan for no diet, medication or health factor changes affecting INR     Dosing Instructions: Continue your current warfarin dose with next INR in 8 weeks       Summary  As of 11/28/2023      Full warfarin instructions:  7.5 mg every Sun; 5 mg all other days   Next INR check:  1/23/2024               Telephone call with Vandana who verbalizes understanding and agrees to plan    Lab visit scheduled    Education provided:   Goal range and lab monitoring: goal range and significance of current result  Symptom monitoring: monitoring for bleeding signs and symptoms  Importance of notifying anticoagulation clinic for: changes in medications; a sooner lab recheck maybe needed  Contact [phone] with any changes, questions or concerns.      Plan made per [facility] anticoagulation protocol    Dorene Howard RN  Anticoagulation Clinic  11/28/2023    _______________________________________________________________________     Anticoagulation Episode Summary       Current INR goal:  1.5-2.0   TTR:  100.0% (1 y)   Target end date:  Indefinite   Send INR reminders to:  ANDREAS JOHN    Indications    Anticoagulated on warfarin [Z79.01]  Hx of mechanical aortic valve replacement [Z95.2]  H/O bicuspid aortic valve [Z87.74]             Comments:  Approved for 8 week checks per -- see encounter from 01/16/23             Anticoagulation Care Providers       Provider Role Specialty Phone number    Lakeisha Marmolejo MD Referring Family Medicine [phone]    Maggi Muro MD Referring Family Medicine [phone]

## 2023-12-05 ENCOUNTER — MYC REFILL (OUTPATIENT)
Dept: FAMILY MEDICINE | Facility: CLINIC | Age: 73
End: 2023-12-05
Payer: MEDICARE

## 2023-12-05 DIAGNOSIS — I44.39 HIGH DEGREE ATRIOVENTRICULAR BLOCK: ICD-10-CM

## 2023-12-05 DIAGNOSIS — I47.29 NON-SUSTAINED VENTRICULAR TACHYCARDIA (H): ICD-10-CM

## 2023-12-05 RX ORDER — VERAPAMIL HYDROCHLORIDE 240 MG/1
240 CAPSULE, EXTENDED RELEASE ORAL DAILY
Qty: 90 CAPSULE | Refills: 1 | Status: CANCELLED | OUTPATIENT
Start: 2023-12-05

## 2023-12-06 DIAGNOSIS — F51.01 PRIMARY INSOMNIA: ICD-10-CM

## 2023-12-06 RX ORDER — TRAZODONE HYDROCHLORIDE 50 MG/1
TABLET, FILM COATED ORAL
Qty: 90 TABLET | Refills: 2 | Status: SHIPPED | OUTPATIENT
Start: 2023-12-06 | End: 2024-03-14

## 2023-12-19 ENCOUNTER — TRANSFERRED RECORDS (OUTPATIENT)
Dept: HEALTH INFORMATION MANAGEMENT | Facility: CLINIC | Age: 73
End: 2023-12-19
Payer: MEDICARE

## 2024-01-04 ENCOUNTER — PATIENT OUTREACH (OUTPATIENT)
Dept: CARE COORDINATION | Facility: CLINIC | Age: 74
End: 2024-01-04
Payer: MEDICARE

## 2024-01-18 ENCOUNTER — PATIENT OUTREACH (OUTPATIENT)
Dept: CARE COORDINATION | Facility: CLINIC | Age: 74
End: 2024-01-18
Payer: MEDICARE

## 2024-01-22 DIAGNOSIS — I25.10 CORONARY ARTERY DISEASE INVOLVING NATIVE CORONARY ARTERY OF NATIVE HEART WITHOUT ANGINA PECTORIS: ICD-10-CM

## 2024-01-22 RX ORDER — PRAVASTATIN SODIUM 40 MG
TABLET ORAL
Qty: 90 TABLET | Refills: 3 | Status: SHIPPED | OUTPATIENT
Start: 2024-01-22 | End: 2024-03-14

## 2024-01-23 ENCOUNTER — ANTICOAGULATION THERAPY VISIT (OUTPATIENT)
Dept: ANTICOAGULATION | Facility: CLINIC | Age: 74
End: 2024-01-23

## 2024-01-23 ENCOUNTER — LAB (OUTPATIENT)
Dept: LAB | Facility: CLINIC | Age: 74
End: 2024-01-23
Payer: MEDICARE

## 2024-01-23 DIAGNOSIS — Z95.2 HX OF MECHANICAL AORTIC VALVE REPLACEMENT: ICD-10-CM

## 2024-01-23 DIAGNOSIS — Z87.74 H/O BICUSPID AORTIC VALVE: ICD-10-CM

## 2024-01-23 DIAGNOSIS — Z79.01 ANTICOAGULATED ON WARFARIN: Primary | ICD-10-CM

## 2024-01-23 DIAGNOSIS — Z79.01 ANTICOAGULATED ON WARFARIN: ICD-10-CM

## 2024-01-23 LAB — INR BLD: 1.5 (ref 0.9–1.1)

## 2024-01-23 PROCEDURE — 36416 COLLJ CAPILLARY BLOOD SPEC: CPT

## 2024-01-23 PROCEDURE — 85610 PROTHROMBIN TIME: CPT

## 2024-01-23 NOTE — PROGRESS NOTES
ANTICOAGULATION MANAGEMENT     Vandana Thao 73 year old female is on warfarin with therapeutic INR result. (Goal INR 1.5-2.0)    Recent labs: (last 7 days)     01/23/24  1016   INR 1.5*       ASSESSMENT     Warfarin Lab Questionnaire    Warfarin Doses Last 7 Days      1/22/2024    11:52 AM   Dose in Tablet or Mg   TAB or MG? milligram (mg)     Pt Rptd Dose SUNDAY MONDAY TUESDAY WED THURS FRIDAY SATURDAY 1/22/2024  11:52 AM 7.5 7 7 7 7 7 7   **dosing verified with patient; taking as instructed**        1/22/2024   Warfarin Lab Questionnaire   Missed doses within past 14 days? No   Changes in diet or alcohol within past 14 days? No   Medication changes since last result? No   Injuries or illness since last result? No   New shortness of breath, severe headaches or sudden changes in vision since last result? No   Abnormal bleeding since last result? No   Upcoming surgery, procedure? No   Best number to call with results? [phone]     Previous result: Therapeutic last 2(+) visits  Additional findings: None       PLAN     Recommended plan for no diet, medication or health factor changes affecting INR     Dosing Instructions: Continue your current warfarin dose with next INR in 8 weeks       Summary  As of 1/23/2024      Full warfarin instructions:  7.5 mg every Sun; 5 mg all other days   Next INR check:  3/14/2024               Telephone call with Vandana who verbalizes understanding and agrees to plan    Lab visit scheduled    Education provided:   Contact [phone] with any changes, questions or concerns.     Plan made per [facility] anticoagulation protocol    Becca Adames RN  Anticoagulation Clinic  1/23/2024    _______________________________________________________________________     Anticoagulation Episode Summary       Current INR goal:  1.5-2.0   TTR:  100.0% (1 y)   Target end date:  Indefinite   Send INR reminders to:  ANDREAS JOHN    Indications    Anticoagulated on warfarin [Z79.01]  Matthew of  mechanical aortic valve replacement [Z95.2]  H/O bicuspid aortic valve [Z87.74]             Comments:  Approved for 8 week checks per -- see encounter from 01/16/23             Anticoagulation Care Providers       Provider Role Specialty Phone number    Lakeisha Marmolejo MD Referring Family Medicine [phone]    Maggi Muro MD Referring Family Medicine [phone]

## 2024-01-24 ENCOUNTER — ANCILLARY PROCEDURE (OUTPATIENT)
Dept: CARDIOLOGY | Facility: CLINIC | Age: 74
End: 2024-01-24
Attending: INTERNAL MEDICINE
Payer: MEDICARE

## 2024-01-24 DIAGNOSIS — Z95.0 PACEMAKER: ICD-10-CM

## 2024-01-24 DIAGNOSIS — I49.5 SICK SINUS SYNDROME (H): ICD-10-CM

## 2024-01-24 LAB
MDC_IDC_LEAD_CONNECTION_STATUS: NORMAL
MDC_IDC_LEAD_CONNECTION_STATUS: NORMAL
MDC_IDC_LEAD_IMPLANT_DT: NORMAL
MDC_IDC_LEAD_IMPLANT_DT: NORMAL
MDC_IDC_LEAD_LOCATION: NORMAL
MDC_IDC_LEAD_LOCATION: NORMAL
MDC_IDC_LEAD_LOCATION_DETAIL_1: NORMAL
MDC_IDC_LEAD_LOCATION_DETAIL_1: NORMAL
MDC_IDC_LEAD_MFG: NORMAL
MDC_IDC_LEAD_MFG: NORMAL
MDC_IDC_LEAD_MODEL: NORMAL
MDC_IDC_LEAD_MODEL: NORMAL
MDC_IDC_LEAD_POLARITY_TYPE: NORMAL
MDC_IDC_LEAD_POLARITY_TYPE: NORMAL
MDC_IDC_LEAD_SERIAL: NORMAL
MDC_IDC_LEAD_SERIAL: NORMAL
MDC_IDC_LEAD_SPECIAL_FUNCTION: NORMAL
MDC_IDC_LEAD_SPECIAL_FUNCTION: NORMAL
MDC_IDC_MSMT_BATTERY_DTM: NORMAL
MDC_IDC_MSMT_BATTERY_REMAINING_LONGEVITY: 112 MO
MDC_IDC_MSMT_BATTERY_RRT_TRIGGER: 2.62
MDC_IDC_MSMT_BATTERY_STATUS: NORMAL
MDC_IDC_MSMT_BATTERY_VOLTAGE: 3 V
MDC_IDC_MSMT_LEADCHNL_RA_IMPEDANCE_VALUE: 323 OHM
MDC_IDC_MSMT_LEADCHNL_RA_IMPEDANCE_VALUE: 437 OHM
MDC_IDC_MSMT_LEADCHNL_RA_PACING_THRESHOLD_AMPLITUDE: 0.62 V
MDC_IDC_MSMT_LEADCHNL_RA_PACING_THRESHOLD_PULSEWIDTH: 0.4 MS
MDC_IDC_MSMT_LEADCHNL_RA_SENSING_INTR_AMPL: 3.38 MV
MDC_IDC_MSMT_LEADCHNL_RA_SENSING_INTR_AMPL: 3.38 MV
MDC_IDC_MSMT_LEADCHNL_RV_IMPEDANCE_VALUE: 342 OHM
MDC_IDC_MSMT_LEADCHNL_RV_IMPEDANCE_VALUE: 437 OHM
MDC_IDC_MSMT_LEADCHNL_RV_PACING_THRESHOLD_AMPLITUDE: 0.88 V
MDC_IDC_MSMT_LEADCHNL_RV_PACING_THRESHOLD_PULSEWIDTH: 0.4 MS
MDC_IDC_MSMT_LEADCHNL_RV_SENSING_INTR_AMPL: 8 MV
MDC_IDC_MSMT_LEADCHNL_RV_SENSING_INTR_AMPL: 8 MV
MDC_IDC_PG_IMPLANT_DTM: NORMAL
MDC_IDC_PG_MFG: NORMAL
MDC_IDC_PG_MODEL: NORMAL
MDC_IDC_PG_SERIAL: NORMAL
MDC_IDC_PG_TYPE: NORMAL
MDC_IDC_SESS_CLINIC_NAME: NORMAL
MDC_IDC_SESS_DTM: NORMAL
MDC_IDC_SESS_TYPE: NORMAL
MDC_IDC_SET_BRADY_AT_MODE_SWITCH_RATE: 171 {BEATS}/MIN
MDC_IDC_SET_BRADY_HYSTRATE: NORMAL
MDC_IDC_SET_BRADY_LOWRATE: 60 {BEATS}/MIN
MDC_IDC_SET_BRADY_MAX_SENSOR_RATE: 130 {BEATS}/MIN
MDC_IDC_SET_BRADY_MAX_TRACKING_RATE: 130 {BEATS}/MIN
MDC_IDC_SET_BRADY_MODE: NORMAL
MDC_IDC_SET_BRADY_PAV_DELAY_LOW: 270 MS
MDC_IDC_SET_BRADY_SAV_DELAY_LOW: 240 MS
MDC_IDC_SET_LEADCHNL_RA_PACING_AMPLITUDE: 1.5 V
MDC_IDC_SET_LEADCHNL_RA_PACING_ANODE_ELECTRODE_1: NORMAL
MDC_IDC_SET_LEADCHNL_RA_PACING_ANODE_LOCATION_1: NORMAL
MDC_IDC_SET_LEADCHNL_RA_PACING_CAPTURE_MODE: NORMAL
MDC_IDC_SET_LEADCHNL_RA_PACING_CATHODE_ELECTRODE_1: NORMAL
MDC_IDC_SET_LEADCHNL_RA_PACING_CATHODE_LOCATION_1: NORMAL
MDC_IDC_SET_LEADCHNL_RA_PACING_POLARITY: NORMAL
MDC_IDC_SET_LEADCHNL_RA_PACING_PULSEWIDTH: 0.4 MS
MDC_IDC_SET_LEADCHNL_RA_SENSING_ANODE_ELECTRODE_1: NORMAL
MDC_IDC_SET_LEADCHNL_RA_SENSING_ANODE_LOCATION_1: NORMAL
MDC_IDC_SET_LEADCHNL_RA_SENSING_CATHODE_ELECTRODE_1: NORMAL
MDC_IDC_SET_LEADCHNL_RA_SENSING_CATHODE_LOCATION_1: NORMAL
MDC_IDC_SET_LEADCHNL_RA_SENSING_POLARITY: NORMAL
MDC_IDC_SET_LEADCHNL_RA_SENSING_SENSITIVITY: 0.3 MV
MDC_IDC_SET_LEADCHNL_RV_PACING_AMPLITUDE: 1.5 V
MDC_IDC_SET_LEADCHNL_RV_PACING_ANODE_ELECTRODE_1: NORMAL
MDC_IDC_SET_LEADCHNL_RV_PACING_ANODE_LOCATION_1: NORMAL
MDC_IDC_SET_LEADCHNL_RV_PACING_CAPTURE_MODE: NORMAL
MDC_IDC_SET_LEADCHNL_RV_PACING_CATHODE_ELECTRODE_1: NORMAL
MDC_IDC_SET_LEADCHNL_RV_PACING_CATHODE_LOCATION_1: NORMAL
MDC_IDC_SET_LEADCHNL_RV_PACING_POLARITY: NORMAL
MDC_IDC_SET_LEADCHNL_RV_PACING_PULSEWIDTH: 0.4 MS
MDC_IDC_SET_LEADCHNL_RV_SENSING_ANODE_ELECTRODE_1: NORMAL
MDC_IDC_SET_LEADCHNL_RV_SENSING_ANODE_LOCATION_1: NORMAL
MDC_IDC_SET_LEADCHNL_RV_SENSING_CATHODE_ELECTRODE_1: NORMAL
MDC_IDC_SET_LEADCHNL_RV_SENSING_CATHODE_LOCATION_1: NORMAL
MDC_IDC_SET_LEADCHNL_RV_SENSING_POLARITY: NORMAL
MDC_IDC_SET_LEADCHNL_RV_SENSING_SENSITIVITY: 0.9 MV
MDC_IDC_SET_ZONE_DETECTION_INTERVAL: 350 MS
MDC_IDC_SET_ZONE_DETECTION_INTERVAL: 400 MS
MDC_IDC_SET_ZONE_STATUS: NORMAL
MDC_IDC_SET_ZONE_STATUS: NORMAL
MDC_IDC_SET_ZONE_TYPE: NORMAL
MDC_IDC_SET_ZONE_VENDOR_TYPE: NORMAL
MDC_IDC_STAT_AT_BURDEN_PERCENT: 0 %
MDC_IDC_STAT_AT_DTM_END: NORMAL
MDC_IDC_STAT_AT_DTM_START: NORMAL
MDC_IDC_STAT_BRADY_AP_VP_PERCENT: 0.13 %
MDC_IDC_STAT_BRADY_AP_VS_PERCENT: 53.03 %
MDC_IDC_STAT_BRADY_AS_VP_PERCENT: 0.02 %
MDC_IDC_STAT_BRADY_AS_VS_PERCENT: 46.82 %
MDC_IDC_STAT_BRADY_DTM_END: NORMAL
MDC_IDC_STAT_BRADY_DTM_START: NORMAL
MDC_IDC_STAT_BRADY_RA_PERCENT_PACED: 53.19 %
MDC_IDC_STAT_BRADY_RV_PERCENT_PACED: 0.15 %
MDC_IDC_STAT_EPISODE_RECENT_COUNT: 0
MDC_IDC_STAT_EPISODE_RECENT_COUNT_DTM_END: NORMAL
MDC_IDC_STAT_EPISODE_RECENT_COUNT_DTM_START: NORMAL
MDC_IDC_STAT_EPISODE_TOTAL_COUNT: 0
MDC_IDC_STAT_EPISODE_TOTAL_COUNT: 0
MDC_IDC_STAT_EPISODE_TOTAL_COUNT: 554
MDC_IDC_STAT_EPISODE_TOTAL_COUNT: 6
MDC_IDC_STAT_EPISODE_TOTAL_COUNT: NORMAL
MDC_IDC_STAT_EPISODE_TOTAL_COUNT_DTM_END: NORMAL
MDC_IDC_STAT_EPISODE_TOTAL_COUNT_DTM_START: NORMAL
MDC_IDC_STAT_EPISODE_TYPE: NORMAL
MDC_IDC_STAT_TACHYTHERAPY_RECENT_DTM_END: NORMAL
MDC_IDC_STAT_TACHYTHERAPY_RECENT_DTM_START: NORMAL
MDC_IDC_STAT_TACHYTHERAPY_TOTAL_DTM_END: NORMAL
MDC_IDC_STAT_TACHYTHERAPY_TOTAL_DTM_START: NORMAL

## 2024-01-24 PROCEDURE — 93296 REM INTERROG EVL PM/IDS: CPT | Performed by: INTERNAL MEDICINE

## 2024-01-24 PROCEDURE — 93294 REM INTERROG EVL PM/LDLS PM: CPT | Performed by: INTERNAL MEDICINE

## 2024-02-03 DIAGNOSIS — Z79.01 ANTICOAGULATED ON WARFARIN: ICD-10-CM

## 2024-02-05 RX ORDER — WARFARIN SODIUM 5 MG/1
5-7.5 TABLET ORAL DAILY
Qty: 135 TABLET | Refills: 1 | Status: SHIPPED | OUTPATIENT
Start: 2024-02-05 | End: 2024-09-06

## 2024-02-05 NOTE — TELEPHONE ENCOUNTER
ANTICOAGULATION MANAGEMENT:  Medication Refill    Anticoagulation Summary  As of 1/23/2024      Warfarin maintenance plan:  7.5 mg (5 mg x 1.5) every Sun; 5 mg (5 mg x 1) all other days   Next INR check:  3/14/2024   Target end date:  Indefinite    Indications    Anticoagulated on warfarin [Z79.01]  Hx of mechanical aortic valve replacement [Z95.2]  H/O bicuspid aortic valve [Z87.74]                 Anticoagulation Care Providers       Provider Role Specialty Phone number    Lakeisha Marmolejo MD Referring Family Medicine [phone]    Maggi Muro MD Referring Family Medicine [phone]            Refill Criteria    Visit with referring provider/group: Meets criteria: office visit within referring provider group in the last 1 year on 10/12/23    [facility] referral signed last signed: 02/21/2023; within last year: Yes    Lab monitoring not exceeding 2 weeks overdue: Yes    Vandana meets all criteria for refill. Rx instructions and quantity supplied updated to match patient's current dosing plan. Warfarin 90 day supply with 1 refill granted per [facility] protocol     Becca Adames RN  Anticoagulation Clinic

## 2024-02-20 DIAGNOSIS — E03.9 ACQUIRED HYPOTHYROIDISM: ICD-10-CM

## 2024-02-20 RX ORDER — LEVOTHYROXINE SODIUM 100 UG/1
TABLET ORAL
Qty: 90 TABLET | Refills: 0 | Status: SHIPPED | OUTPATIENT
Start: 2024-02-20 | End: 2024-03-14

## 2024-02-20 NOTE — TELEPHONE ENCOUNTER
Please call patient.  Last office visit for thyroid was greater than 1 year ago, needs follow-up for thyroid.  Physical would be appropriate.

## 2024-03-03 ENCOUNTER — HEALTH MAINTENANCE LETTER (OUTPATIENT)
Age: 74
End: 2024-03-03

## 2024-03-06 ENCOUNTER — DOCUMENTATION ONLY (OUTPATIENT)
Dept: ANTICOAGULATION | Facility: CLINIC | Age: 74
End: 2024-03-06
Payer: MEDICARE

## 2024-03-06 DIAGNOSIS — Z87.74 H/O BICUSPID AORTIC VALVE: ICD-10-CM

## 2024-03-06 DIAGNOSIS — Z79.01 ANTICOAGULATED ON WARFARIN: ICD-10-CM

## 2024-03-06 DIAGNOSIS — Z95.2 HX OF MECHANICAL AORTIC VALVE REPLACEMENT: Primary | ICD-10-CM

## 2024-03-06 NOTE — PROGRESS NOTES
ANTICOAGULATION CLINIC REFERRAL RENEWAL REQUEST       An annual renewal order is required for all patients referred to [facility].?  Please review and sign the pended referral order for Vandana Thao.       ANTICOAGULATION SUMMARY      Warfarin indication(s)   Mechanical AVR    Mechanical heart valve present?  Mechanical  AVR-Bileaflet       Current goal range   1.5-2.0     Goal appropriate for indication? Indicated by Dr. Marmolejo at referral renewal  2/16/22. Previous goal range was 1.8-2.2 (not a protocol range).     Time in Therapeutic Range (TTR)  (Goal > 60%) 100%       Office visit with referring provider's group within last year yes on 10/12/23       Dorene Howard RN  [facility]

## 2024-03-06 NOTE — TELEPHONE ENCOUNTER
Hi Dr. Zhang,    It looks like patient historically had an INR goal of 1.8-2.2, then more recently 1.5-2.0.  Reason for this is not clear to me (I have assumed her care within the last 1-2 years).  Your note in July 2023 suggested you were reviewing this, but I did not see a resolution.  Do you have thoughts/a recommendation on this?    Dr. Maggi Muro

## 2024-03-12 SDOH — HEALTH STABILITY: PHYSICAL HEALTH: ON AVERAGE, HOW MANY DAYS PER WEEK DO YOU ENGAGE IN MODERATE TO STRENUOUS EXERCISE (LIKE A BRISK WALK)?: 3 DAYS

## 2024-03-12 SDOH — HEALTH STABILITY: PHYSICAL HEALTH: ON AVERAGE, HOW MANY MINUTES DO YOU ENGAGE IN EXERCISE AT THIS LEVEL?: 40 MIN

## 2024-03-12 ASSESSMENT — SOCIAL DETERMINANTS OF HEALTH (SDOH): HOW OFTEN DO YOU GET TOGETHER WITH FRIENDS OR RELATIVES?: ONCE A WEEK

## 2024-03-13 ENCOUNTER — HOSPITAL ENCOUNTER (OUTPATIENT)
Dept: MAMMOGRAPHY | Facility: CLINIC | Age: 74
Discharge: HOME | End: 2024-03-13
Attending: FAMILY MEDICINE | Admitting: FAMILY MEDICINE
Payer: MEDICARE

## 2024-03-13 DIAGNOSIS — Z12.31 VISIT FOR SCREENING MAMMOGRAM: ICD-10-CM

## 2024-03-13 PROCEDURE — 77067 SCR MAMMO BI INCL CAD: CPT

## 2024-03-14 ENCOUNTER — ANTICOAGULATION THERAPY VISIT (OUTPATIENT)
Dept: ANTICOAGULATION | Facility: CLINIC | Age: 74
End: 2024-03-14

## 2024-03-14 ENCOUNTER — ANCILLARY PROCEDURE (OUTPATIENT)
Dept: GENERAL RADIOLOGY | Facility: CLINIC | Age: 74
End: 2024-03-14
Attending: FAMILY MEDICINE
Payer: MEDICARE

## 2024-03-14 ENCOUNTER — OFFICE VISIT (OUTPATIENT)
Dept: FAMILY MEDICINE | Facility: CLINIC | Age: 74
End: 2024-03-14
Attending: FAMILY MEDICINE
Payer: MEDICARE

## 2024-03-14 VITALS
WEIGHT: 186 LBS | TEMPERATURE: 98.1 F | RESPIRATION RATE: 18 BRPM | HEIGHT: 62 IN | OXYGEN SATURATION: 98 % | DIASTOLIC BLOOD PRESSURE: 64 MMHG | SYSTOLIC BLOOD PRESSURE: 120 MMHG | HEART RATE: 65 BPM | BODY MASS INDEX: 34.23 KG/M2

## 2024-03-14 DIAGNOSIS — Z87.74 H/O BICUSPID AORTIC VALVE: ICD-10-CM

## 2024-03-14 DIAGNOSIS — F51.01 PRIMARY INSOMNIA: ICD-10-CM

## 2024-03-14 DIAGNOSIS — Z78.0 ASYMPTOMATIC POSTMENOPAUSAL STATUS: ICD-10-CM

## 2024-03-14 DIAGNOSIS — Z79.01 ANTICOAGULATED ON WARFARIN: ICD-10-CM

## 2024-03-14 DIAGNOSIS — Z00.00 MEDICARE ANNUAL WELLNESS VISIT, SUBSEQUENT: Primary | ICD-10-CM

## 2024-03-14 DIAGNOSIS — I47.29 NON-SUSTAINED VENTRICULAR TACHYCARDIA (H): ICD-10-CM

## 2024-03-14 DIAGNOSIS — E03.9 ACQUIRED HYPOTHYROIDISM: ICD-10-CM

## 2024-03-14 DIAGNOSIS — Z79.2 PROPHYLACTIC ANTIBIOTIC: ICD-10-CM

## 2024-03-14 DIAGNOSIS — M79.661 PAIN IN RIGHT SHIN: ICD-10-CM

## 2024-03-14 DIAGNOSIS — E89.0 POSTOPERATIVE HYPOTHYROIDISM: ICD-10-CM

## 2024-03-14 DIAGNOSIS — Z95.2 HX OF MECHANICAL AORTIC VALVE REPLACEMENT: ICD-10-CM

## 2024-03-14 DIAGNOSIS — I25.10 CORONARY ARTERY DISEASE INVOLVING NATIVE CORONARY ARTERY OF NATIVE HEART WITHOUT ANGINA PECTORIS: ICD-10-CM

## 2024-03-14 DIAGNOSIS — Z90.81 S/P SPLENECTOMY: ICD-10-CM

## 2024-03-14 DIAGNOSIS — Z95.0 CARDIAC PACEMAKER IN SITU: ICD-10-CM

## 2024-03-14 DIAGNOSIS — Z79.01 ANTICOAGULATED ON WARFARIN: Primary | ICD-10-CM

## 2024-03-14 DIAGNOSIS — E78.00 HYPERCHOLESTEROLEMIA: ICD-10-CM

## 2024-03-14 DIAGNOSIS — Z95.2 HX OF MECHANICAL AORTIC VALVE REPLACEMENT: Primary | ICD-10-CM

## 2024-03-14 DIAGNOSIS — M85.80 OSTEOPENIA, UNSPECIFIED LOCATION: ICD-10-CM

## 2024-03-14 DIAGNOSIS — I44.39 HIGH DEGREE ATRIOVENTRICULAR BLOCK: ICD-10-CM

## 2024-03-14 LAB
HOLD SPECIMEN: NORMAL
INR BLD: 2.2 (ref 0.9–1.1)

## 2024-03-14 PROCEDURE — 82306 VITAMIN D 25 HYDROXY: CPT | Performed by: FAMILY MEDICINE

## 2024-03-14 PROCEDURE — 84443 ASSAY THYROID STIM HORMONE: CPT | Performed by: FAMILY MEDICINE

## 2024-03-14 PROCEDURE — 36415 COLL VENOUS BLD VENIPUNCTURE: CPT | Performed by: FAMILY MEDICINE

## 2024-03-14 PROCEDURE — 80053 COMPREHEN METABOLIC PANEL: CPT | Performed by: FAMILY MEDICINE

## 2024-03-14 PROCEDURE — 73590 X-RAY EXAM OF LOWER LEG: CPT | Mod: TC | Performed by: RADIOLOGY

## 2024-03-14 PROCEDURE — 99214 OFFICE O/P EST MOD 30 MIN: CPT | Mod: 25 | Performed by: FAMILY MEDICINE

## 2024-03-14 PROCEDURE — 85610 PROTHROMBIN TIME: CPT | Performed by: FAMILY MEDICINE

## 2024-03-14 PROCEDURE — 80061 LIPID PANEL: CPT | Performed by: FAMILY MEDICINE

## 2024-03-14 PROCEDURE — 36416 COLLJ CAPILLARY BLOOD SPEC: CPT | Performed by: FAMILY MEDICINE

## 2024-03-14 PROCEDURE — G0439 PPPS, SUBSEQ VISIT: HCPCS | Performed by: FAMILY MEDICINE

## 2024-03-14 RX ORDER — PRAVASTATIN SODIUM 40 MG
40 TABLET ORAL DAILY
Qty: 90 TABLET | Refills: 4 | Status: SHIPPED | OUTPATIENT
Start: 2024-03-14

## 2024-03-14 RX ORDER — LEVOTHYROXINE SODIUM 100 UG/1
100 TABLET ORAL DAILY
Qty: 90 TABLET | Refills: 4 | Status: SHIPPED | OUTPATIENT
Start: 2024-03-14

## 2024-03-14 RX ORDER — VERAPAMIL HYDROCHLORIDE 240 MG/1
240 CAPSULE, EXTENDED RELEASE ORAL DAILY
Qty: 90 CAPSULE | Refills: 4 | Status: SHIPPED | OUTPATIENT
Start: 2024-03-14

## 2024-03-14 RX ORDER — AMOXICILLIN 500 MG/1
2000 CAPSULE ORAL ONCE
Qty: 4 CAPSULE | Refills: 3 | Status: SHIPPED | OUTPATIENT
Start: 2024-03-14 | End: 2024-03-14

## 2024-03-14 RX ORDER — TRAZODONE HYDROCHLORIDE 50 MG/1
TABLET, FILM COATED ORAL
Qty: 90 TABLET | Refills: 4 | Status: SHIPPED | OUTPATIENT
Start: 2024-03-14

## 2024-03-14 ASSESSMENT — PAIN SCALES - GENERAL: PAINLEVEL: NO PAIN (0)

## 2024-03-14 NOTE — PROGRESS NOTES
ANTICOAGULATION MANAGEMENT     Vandana ABRAHAM Thao 73 year old female is on warfarin with supratherapeutic INR result. (Goal INR 1.5-2.0)    Recent labs: (last 7 days)     03/14/24  1303   INR 2.2*       ASSESSMENT     Source(s): Chart Review and Patient/Caregiver Call     Warfarin doses taken: Warfarin taken as instructed  Diet: No new diet changes identified  Medication/supplement changes: None noted  New illness, injury, or hospitalization: No  Signs or symptoms of bleeding or clotting: No  Previous result: Therapeutic last 2(+) visits  Additional findings: None       PLAN     Recommended plan for no diet, medication or health factor changes affecting INR     Dosing Instructions: Continue your current warfarin dose with next INR in 2 weeks       Summary  As of 3/14/2024      Full warfarin instructions:  7.5 mg every Sun; 5 mg all other days   Next INR check:  3/28/2024               Telephone call with Vandana who verbalizes understanding and agrees to plan    Lab visit scheduled    Education provided:   Contact [phone] with any changes, questions or concerns.     Plan made per [facility] anticoagulation protocol    Becca Adames RN  Anticoagulation Clinic  3/14/2024    _______________________________________________________________________     Anticoagulation Episode Summary       Current INR goal:  1.5-2.0   TTR:  96.0% (1 y)   Target end date:  Indefinite   Send INR reminders to:  ANDREAS JOHN    Indications    Anticoagulated on warfarin [Z79.01]  Hx of mechanical aortic valve replacement [Z95.2]  H/O bicuspid aortic valve [Z87.74]             Comments:  Approved for 8 week checks per -- see encounter from 01/16/23             Anticoagulation Care Providers       Provider Role Specialty Phone number    Lakeisha Marmolejo MD Referring Family Medicine [phone]    Maggi Muro MD Referring Family Medicine [phone]

## 2024-03-14 NOTE — PROGRESS NOTES
Preventive Care Visit  [facility]  Maggi Muro MD, Family Medicine  Mar 14, 2024      Assessment & Plan     Medicare annual wellness visit, subsequent  At today's visit, we discussed lifestyle interventions to promote self-management and wellness, including maintenance of a healthy weight, healthy diet, regular physical activity and exercise, and falls prevention.  Up-to-date with mammogram screening, colon cancer screening, order placed for DEXA as she is overdue for bone density screening due to history of osteopenia.  Discussed RSV vaccine.  Will check fasting lipids, fasting glucose.  - Extra Tube; Future  - Extra Tube    Hypercholesterolemia  Encouraged healthy lifestyle habits.  Will check fasting lipids.  She is not currently requiring medication for this.  - Comprehensive metabolic panel; Future  - Comprehensive metabolic panel    Postoperative hypothyroidism  Clinically euthyroid.  Continue levothyroxine.  We will check TSH cascade today.  - TSH WITH FREE T4 REFLEX; Future  - TSH WITH FREE T4 REFLEX    Coronary artery disease involving native coronary artery of native heart without angina pectoris  This remains asymptomatic.  She remains on pravastatin.  - pravastatin (PRAVACHOL) 40 MG tablet; Take 1 tablet (40 mg) by mouth daily  - Lipid panel reflex to direct LDL Non-fasting; Future  - Lipid panel reflex to direct LDL Non-fasting    High degree atrioventricular block  Cardiac pacemaker in situ  Controlled with pacemaker, followed by cardiology.    Non-sustained ventricular tachycardia (H)  Stable and suppressed on verapamil.  - verapamil ER (VERELAN) 240 MG 24 hr capsule; Take 1 capsule (240 mg) by mouth daily    Prophylactic antibiotic  Refill provided of amoxicillin to take prior to procedures due to previous heart valve replacement.  - amoxicillin (AMOXIL) 500 MG capsule; Take 4 capsules (2,000 mg) by mouth once for 1 dose Prior to dental procedure.    Primary  "insomnia  Continue trazodone as needed, stable.  - traZODone (DESYREL) 50 MG tablet; TAKE 1/2 TO 1 TABLET BY MOUTH ONCE DAILY AT BEDTIME AS NEEDED FOR SLEEP    Osteopenia, unspecified location  Encourage weightbearing activities, measures to reduce risk of falls, adequate calcium and vitamin D intake.  Order placed for follow-up bone density scan.  Will check vitamin D level today.  - DEXA HIP/PELVIS/SPINE - Future; Future  - Vitamin D Deficiency; Future  - Vitamin D Deficiency    Pain in right shin  Nonspecific.  X-ray unremarkable which is reassuring.  Reviewed symptomatic treatments.  Consider physical therapy or orthopedics if persisting.  - XR Tibia and Fibula Right 2 Views; Future    Asymptomatic postmenopausal status  - DEXA HIP/PELVIS/SPINE - Future; Future    Hx of mechanical aortic valve replacement  She remains anticoagulated with warfarin.  - INR point of care    Anticoagulated on warfarin    S/P splenectomy  Advised consideration of meningitis vaccines including quad vaccine and meningitis B, also haemophilus influenza vaccines as it does not appear she has received these recently.  She will consider but declines today.              BMI  Estimated body mass index is 34.02 kg/m  as calculated from the following:    Height as of this encounter: 1.575 m (5' 2\").    Weight as of this encounter: 84.4 kg (186 lb).       Counseling  Appropriate preventive services were discussed with this patient, including applicable screening as appropriate for fall prevention, nutrition, physical activity, Tobacco-use cessation, weight loss and cognition.  Checklist reviewing preventive services available has been given to the patient.  Reviewed patient's diet, addressing concerns and/or questions.   She is at risk for lack of exercise and has been provided with information to increase physical activity for the benefit of her well-being.   The patient was provided with written information regarding signs of hearing loss. "           Juan Carlos Ross is a 73 year old, presenting for the following:  Wellness Visit (fasting), INR, Right knee pain (Painful at night, not every night. Sx started at [facility]), and Lips are always chapped (Is their a reason for this)          Health Care Directive  Patient does not have a Health Care Directive or Living Will: Discussed advance care planning with patient; information given to patient to review.    Seen today for routine preventive care visit.  She is concerned about some pain right anterior medial shin primarily at night though not every night, aching will sometimes awaken her from sleep.  She will massage with lotion, sometimes take Tylenol.  She never has symptoms with activity.  Also noting that her lips are always chapped or peeling, using Vaseline here and there especially at bedtime.  History of AV blockade, status post pacemaker placement, remains on verapamil due to history of NSVT, has not had any heart rhythm difficulties.  History of hyperlipidemia due for follow-up.  She has postoperative hypothyroidism, remains on levothyroxine for that.  History of nonobstructive heart disease, remains on pravastatin.  History of osteopenia, last bone density scan 2 years ago, due for follow-up.  Taking trazodone since October to help with sleep, tolerating well.  Remains anticoagulated with warfarin due to previous aortic valve replacement.  She is status post splenectomy.  She is looking forward to traveling to [place] in the spring.  She has a cabin she travels to in the summer.  Her  is struggling with her brain tumor so she has had less help from him but overall is doing okay.          3/12/2024   General Health   How would you rate your overall physical health? Good   Feel stress (tense, anxious, or unable to sleep) To some extent   (!) STRESS CONCERN      3/12/2024   Nutrition   Diet: Carbohydrate counting         3/12/2024   Exercise   Days per week of moderate/strenous  exercise 3 days   Average minutes spent exercising at this level 40 min         3/12/2024   Social Factors   Frequency of gathering with friends or relatives Once a week   Worry food won't last until get money to buy more No   Food not last or not have enough money for food? No   Do you have housing?  Yes   Are you worried about losing your housing? No   Lack of transportation? No   Unable to get utilities (heat,electricity)? No         3/12/2024   Activities of Daily Living- Home Safety   Needs help with the following daily activites None of the above   Safety concerns in the home None of the above         3/12/2024   Dental   Dentist two times every year? Yes         3/12/2024   Hearing Screening   Hearing concerns? (!) IT'S HARD TO FOLLOW A CONVERSATION IN A NOISY RESTAURANT OR CROWDED ROOM.         3/12/2024   Driving Risk Screening   Patient/family members have concerns about driving No         3/12/2024   General Alertness/Fatigue Screening   Have you been more tired than usual lately? (!) DECLINE         3/12/2024   Urinary Incontinence Screening   Bothered by leaking urine in past 6 months No         3/12/2024   TB Screening   Were you born outside of [place]?  No         Today's PHQ-2 Score:       3/13/2024     4:36 PM   PHQ-2 ( 1999 [employer])   Q1: Little interest or pleasure in doing things 0   Q2: Feeling down, depressed or hopeless 0   PHQ-2 Score 0   Q1: Little interest or pleasure in doing things Not at all   Q2: Feeling down, depressed or hopeless Not at all   PHQ-2 Score 0           3/12/2024   Substance Use   Alcohol more than 3/day or more than 7/wk No   Do you have a current opioid prescription? No   How severe/bad is pain from 1 to 10? 0/10 (No Pain)   Do you use any other substances recreationally? (!) ALCOHOL     Social History     Tobacco Use    Smoking status: Former     Packs/day: 0.50     Years: 20.00     Additional pack years: 0.00     Total pack years: 10.00     Types: Cigarettes     Quit date:  1979     Years since quittin.2    Smokeless tobacco: Never   Vaping Use    Vaping Use: Never used   Substance Use Topics    Alcohol use: Yes     Comment: 1-2 drinks per week or less    Drug use: No           3/13/2024   LAST FHS-7 RESULTS   1st degree relative breast or ovarian cancer No   Any relative bilateral breast cancer No   Any male have breast cancer No   Any ONE woman have BOTH breast AND ovarian cancer No   Any woman with breast cancer before 50yrs No   2 or more relatives with breast AND/OR ovarian cancer No   2 or more relatives with breast AND/OR bowel cancer No        Mammogram Screening - Mammogram every 1-2 years updated in Health Maintenance based on mutual decision making    ASCVD Risk   The 10-year ASCVD risk score (Adrienne TOMPKINS, et al., 2019) is: 9.3%    Values used to calculate the score:      Age: 73 years      Sex: Female      Is Non- : No      Diabetic: No      Tobacco smoker: No      Systolic Blood Pressure: 120 mmHg      Is BP treated: No      HDL Cholesterol: 71 mg/dL      Total Cholesterol: 225 mg/dL            Reviewed and updated as needed this visit by Provider                    Past Medical History:   Diagnosis Date    History of aortic stenosis 2018    Hyperlipidemia     Hypothyroidism     Valvular disease     aortic stenosis secondary to bicuspid valve     Past Surgical History:   Procedure Laterality Date    CARDIAC VALVE REPLACEMENT  2009    AVR    COLONOSCOPY  2013    CYSTOSTOMY W/ BLADDER BIOPSY  2000    EP PACEMAKER INSERT N/A 2018    Procedure: EP Pacemaker Insertion;  Surgeon: Luis Urena MD;  Location: [facility];  Service:     EYE SURGERY  10/2022    OTHER SURGICAL HISTORY  2002    thyroidectomy    SPLENECTOMY  1979    ZZC REPLACE AORT VALV,PROSTH VALV      Description: Aortic Valve Replacement;  Proc Date: 2009;  Comments: [facility] [facility] [place] Prosthesis     OB History     Para Term  AB Living   2 2 2 0 0 0   SAB IAB Ectopic Multiple Live Births   0 0 0 0 0      # Outcome Date GA Lbr Michael/2nd Weight Sex Delivery Anes PTL Lv   2 Term            1 Term              Lab work is in process  Labs reviewed in EPIC  BP Readings from Last 3 Encounters:   24 120/64   10/12/23 130/64   23 130/60    Wt Readings from Last 3 Encounters:   24 84.4 kg (186 lb)   10/12/23 85.9 kg (189 lb 6.4 oz)   23 85.1 kg (187 lb 9.8 oz)                  Patient Active Problem List   Diagnosis    Hypercholesterolemia    H/O bicuspid aortic valve    High degree atrioventricular block    Postoperative hypothyroidism    Cardiac pacemaker in situ    Non-sustained ventricular tachycardia (H)    Coronary artery disease involving native coronary artery of native heart without angina pectoris    Microscopic hematuria    S/P splenectomy    Other insomnia    Anticoagulated on warfarin    Hx of mechanical aortic valve replacement    Osteopenia, unspecified location     Past Surgical History:   Procedure Laterality Date    CARDIAC VALVE REPLACEMENT  2009    AVR    COLONOSCOPY  2013    CYSTOSTOMY W/ BLADDER BIOPSY  2000    EP PACEMAKER INSERT N/A 2018    Procedure: EP Pacemaker Insertion;  Surgeon: Luis Urena MD;  Location: [facility];  Service:     EYE SURGERY  10/2022    OTHER SURGICAL HISTORY  2002    thyroidectomy    SPLENECTOMY  1979    ZZC REPLACE AORT VALV,PROSTH VALV      Description: Aortic Valve Replacement;  Proc Date: 2009;  Comments: #[address] [facility] [place] Prosthesis       Social History     Tobacco Use    Smoking status: Former     Packs/day: 0.50     Years: 20.00     Additional pack years: 0.00     Total pack years: 10.00     Types: Cigarettes     Quit date: 1979     Years since quittin.2    Smokeless tobacco: Never   Substance Use Topics    Alcohol use: Yes     Comment: 1-2 drinks per week or less      Family History   Problem Relation Age of Onset    Acute Myocardial Infarction Brother 43    Rheumatoid Arthritis Mother          in 40s    Coronary Artery Disease Father     Hypertension Father     No Known Problems Maternal Grandmother     No Known Problems Maternal Grandfather     No Known Problems Paternal Grandmother     No Known Problems Paternal Grandfather     Aortic stenosis Brother         s/p surgery    No Known Problems Sister     Breast Cancer No family hx of     Colon Cancer No family hx of     Cervical Cancer No family hx of     Other Cancer No family hx of          Current Outpatient Medications   Medication Sig Dispense Refill    amoxicillin (AMOXIL) 500 MG capsule Take 4 capsules (2,000 mg) by mouth once for 1 dose Prior to dental procedure. 4 capsule 3    cholecalciferol, vitamin D3, 1,000 unit tablet [CHOLECALCIFEROL, VITAMIN D3, 1,000 UNIT TABLET] Take 1,000 Units by mouth daily.             levothyroxine (SYNTHROID/LEVOTHROID) 100 MCG tablet Take 1 tablet (100 mcg) by mouth daily 90 tablet 4    pravastatin (PRAVACHOL) 40 MG tablet Take 1 tablet (40 mg) by mouth daily 90 tablet 4    traZODone (DESYREL) 50 MG tablet TAKE 1/2 TO 1 TABLET BY MOUTH ONCE DAILY AT BEDTIME AS NEEDED FOR SLEEP 90 tablet 4    verapamil ER (VERELAN) 240 MG 24 hr capsule Take 1 capsule (240 mg) by mouth daily 90 capsule 4    warfarin ANTICOAGULANT (COUMADIN) 5 MG tablet Take 1-1.5 tablets (5-7.5 mg) by mouth daily Adjust dose per INR as directed by  tablet 1     No Known Allergies  Recent Labs   Lab Test 24  1259 23  0954 22  1232 22  0900 09/10/21  1322 20  1052 20  1145 19  0929   A1C  --   --   --  5.3  --  5.4  --   --    * 134*  --  138*  --  145* 127 115   HDL 71 71  --  72  --  78 69 65   TRIG 96 101  --  100  --  126 157* 144   ALT 28 31  --   --   --   --   --  16   CR 0.86 0.88  --   --    < > 0.89 0.91 0.80   GFRESTIMATED 72 70  --   --    < > >60 >60  ">60   GFRESTBLACK  --   --   --   --   --  >60 >60 >60   POTASSIUM 4.9 4.6  --   --    < > 4.8 4.4 4.4   TSH 0.37 0.89   < >  --    < > 4.03 1.72 0.73    < > = values in this interval not displayed.      Current providers sharing in care for this patient include:  Patient Care Team:  Maggi Muro MD as PCP - General (Family Medicine)  Maggi Muro MD as Assigned PCP  Nel Zhang MD as Assigned Heart and Vascular Provider    The following health maintenance items are reviewed in Epic and correct as of today:  Health Maintenance   Topic Date Due    RSV VACCINE (Pregnancy & 60+) (1 - 1-dose 60+ series) Never done    MEDICARE ANNUAL WELLNESS VISIT  01/16/2024    LIPID  01/16/2024    TSH W/FREE T4 REFLEX  01/16/2024    ANNUAL REVIEW OF HM ORDERS  01/16/2024    DEXA  01/20/2024    FALL RISK ASSESSMENT  03/14/2025    GLUCOSE  01/16/2026    COLORECTAL CANCER SCREENING  01/24/2026    MAMMO SCREENING  03/13/2026    ADVANCE CARE PLANNING  01/16/2028    DTAP/TDAP/TD IMMUNIZATION (4 - Td or Tdap) 02/27/2030    HEPATITIS C SCREENING  Completed    PHQ-2 (once per calendar year)  Completed    INFLUENZA VACCINE  Completed    Pneumococcal Vaccine: 65+ Years  Completed    ZOSTER IMMUNIZATION  Completed    COVID-19 Vaccine  Completed    IPV IMMUNIZATION  Aged Out    HPV IMMUNIZATION  Aged Out    MENINGITIS IMMUNIZATION  Aged Out    RSV MONOCLONAL ANTIBODY  Aged Out         Review of Systems  Constitutional, neuro, ENT, endocrine, pulmonary, cardiac, gastrointestinal, genitourinary, musculoskeletal, integument and psychiatric systems are negative, except as otherwise noted.     Objective    Exam  /64   Pulse 65   Temp 98.1  F (36.7  C) (Temporal)   Resp 18   Ht 1.575 m (5' 2\")   Wt 84.4 kg (186 lb)   SpO2 98%   BMI 34.02 kg/m     Estimated body mass index is 34.02 kg/m  as calculated from the following:    Height as of this encounter: 1.575 m (5' 2\").    Weight as of this encounter: 84.4 kg (186 lb).    Physical " Exam  GENERAL: alert and no distress  EYES: Eyes grossly normal to inspection, PERRL and conjunctivae and sclerae normal  HENT: ear canals and TM's normal, nose and mouth without ulcers or lesions  NECK: no adenopathy, no asymmetry, masses, or scars  RESP: lungs clear to auscultation - no rales, rhonchi or wheezes  BREAST: normal without masses, tenderness or nipple discharge and no palpable axillary masses or adenopathy  CV: regular rate and rhythm, normal S1 S2, no S3 or S4, no murmur, click or rub, no peripheral edema  ABDOMEN: soft, nontender, no hepatosplenomegaly, no masses and bowel sounds normal  MS: no gross musculoskeletal defects noted, no edema; mild tenderness to palpation muscle body immediately medial to the proximal right tibia, no palpable abnormalities.  Good range of motion of knee, MCL, LCL, and ACL intact.  SKIN: no suspicious lesions or rashes  NEURO: Normal strength and tone, mentation intact and speech normal  PSYCH: mentation appears normal, affect normal/bright    I ordered and personally reviewed 2 view x-rays of the right tibia-fibula, no bony abnormalities, fractures, lesions identified.        3/14/2024   Mini Cog   Clock Draw Score 2 Normal   3 Item Recall 3 objects recalled   Mini Cog Total Score 5              Signed Electronically by: Maggi Muro MD

## 2024-03-14 NOTE — PATIENT INSTRUCTIONS
Let me know if you would like physical therapy for the pain in your right leg.    With your history of splenectomy, you could consider the meningitis vaccine series and haemophilus influenza B vaccine series.  You have already received the pneumonia shots.  Let me know if you would like to pursue this.  Preventive Care Advice   This is general advice given by our system to help you stay healthy. However, your care team may have specific advice just for you. Please talk to your care team about your preventive care needs.  Nutrition  Eat 5 or more servings of fruits and vegetables each day.  Try wheat bread, brown rice and whole grain pasta (instead of white bread, rice, and pasta).  Get enough calcium and vitamin D. Check the label on foods and aim for 100% of the RDA (recommended daily allowance).  Lifestyle  Exercise at least 150 minutes each week   (30 minutes a day, 5 days a week).  Do muscle strengthening activities 2 days a week. These help control your weight and prevent disease.  No smoking.  Wear sunscreen to prevent skin cancer.  Have a dental exam and cleaning every 6 months.  Yearly exams  See your health care team every year to talk about:  Any changes in your health.  Any medicines your care team has prescribed.  Preventive care, family planning, and ways to prevent chronic diseases.  Shots (vaccines)   HPV shots (up to age 26), if you've never had them before.  Hepatitis B shots (up to age 59), if you've never had them before.  COVID-19 shot: Get this shot when it's due.  Flu shot: Get a flu shot every year.  Tetanus shot: Get a tetanus shot every 10 years.  Pneumococcal, hepatitis A, and RSV shots: Ask your care team if you need these based on your risk.  Shingles shot (for age 50 and up).  General health tests  Diabetes screening:  Starting at age 35, Get screened for diabetes at least every 3 years.  If you are younger than age 35, ask your care team if you should be screened for  diabetes.  Cholesterol test: At age 39, start having a cholesterol test every 5 years, or more often if advised.  Bone density scan (DEXA): At age 50, ask your care team if you should have this scan for osteoporosis (brittle bones).  Hepatitis C: Get tested at least once in your life.  STIs (sexually transmitted infections)  Before age 24: Ask your care team if you should be screened for STIs.  After age 24: Get screened for STIs if you're at risk. You are at risk for STIs (including HIV) if:  You are sexually active with more than one person.  You don't use condoms every time.  You or a partner was diagnosed with a sexually transmitted infection.  If you are at risk for HIV, ask about PrEP medicine to prevent HIV.  Get tested for HIV at least once in your life, whether you are at risk for HIV or not.  Cancer screening tests  Cervical cancer screening: If you have a cervix, begin getting regular cervical cancer screening tests at age 21. Most people who have regular screenings with normal results can stop after age 65. Talk about this with your provider.  Breast cancer scan (mammogram): If you've ever had breasts, begin having regular mammograms starting at age 40. This is a scan to check for breast cancer.  Colon cancer screening: It is important to start screening for colon cancer at age 45.  Have a colonoscopy test every 10 years (or more often if you're at risk) Or, ask your provider about stool tests like a FIT test every year or Cologuard test every 3 years.  To learn more about your testing options, visit: https://www.[employer]/358656.pdf.  For help making a decision, visit: https://bit.ly/be37319.  Prostate cancer screening test: If you have a prostate and are age 55 to 69, ask your provider if you would benefit from a yearly prostate cancer screening test.  Lung cancer screening: If you are a current or former smoker age 50 to 80, ask your care team if ongoing lung cancer screenings are right for  you.  For informational purposes only. Not to replace the advice of your health care provider. Copyright   2023 [facility]. All rights reserved. Clinically reviewed by the  [facility] [place] [facility]. IOCS 218446 - REV 01/24.    Hearing Loss: Care Instructions  Overview     Hearing loss is a sudden or slow decrease in how well you hear. It can range from slight to profound. Permanent hearing loss can occur with aging. It also can happen when you are exposed long-term to loud noise. Examples include listening to loud music, riding motorcycles, or being around other loud machines.  Hearing loss can affect your work and home life. It can make you feel lonely or depressed. You may feel that you have lost your independence. But hearing aids and other devices can help you hear better and feel connected to others.  Follow-up care is a key part of your treatment and safety. Be sure to make and go to all appointments, and call your doctor if you are having problems. It's also a good idea to know your test results and keep a list of the medicines you take.  How can you care for yourself at home?  Avoid loud noises whenever possible. This helps keep your hearing from getting worse.  Always wear hearing protection around loud noises.  Wear a hearing aid as directed.  A professional can help you pick a hearing aid that will work best for you.  You can also get hearing aids over the counter for mild to moderate hearing loss.  Have hearing tests as your doctor suggests. They can show whether your hearing has changed. Your hearing aid may need to be adjusted.  Use other devices as needed. These may include:  Telephone amplifiers and hearing aids that can connect to a television, stereo, radio, or microphone.  Devices that use lights or vibrations. These alert you to the doorbell, a ringing telephone, or a baby monitor.  Television closed-captioning. This shows the words at the bottom of the screen.  "Most new TVs can do this.  TTY (text telephone). This lets you type messages back and forth on the telephone instead of talking or listening. These devices are also called TDD. When messages are typed on the keyboard, they are sent over the phone line to a receiving TTY. The message is shown on a monitor.  Use text messaging, social media, and email if it is hard for you to communicate by telephone.  Try to learn a listening technique called speechreading. It is not lipreading. You pay attention to people's gestures, expressions, posture, and tone of voice. These clues can help you understand what a person is saying. Face the person you are talking to, and have them face you. Make sure the lighting is good. You need to see the other person's face clearly.  Think about counseling if you need help to adjust to your hearing loss.  When should you call for help?  Watch closely for changes in your health, and be sure to contact your doctor if:    You think your hearing is getting worse.     You have new symptoms, such as dizziness or nausea.   Where can you learn more?  Go to https://www.[employer].net/patiented  Enter R798 in the search box to learn more about \"Hearing Loss: Care Instructions.\"  Current as of: September 27, 2023               Content Version: 14.0    7574-7189 [employer].   Care instructions adapted under license by your healthcare professional. If you have questions about a medical condition or this instruction, always ask your healthcare professional. [employer] disclaims any warranty or liability for your use of this information.      Learning About Stress  What is stress?     Stress is your body's response to a hard situation. Your body can have a physical, emotional, or mental response. Stress is a fact of life for most people, and it affects everyone differently. What causes stress for you may not be stressful for someone else.  A lot of things can cause stress. You may " feel stress when you go on a job interview, take a test, or run a race. This kind of short-term stress is normal and even useful. It can help you if you need to work hard or react quickly. For example, stress can help you finish an important job on time.  Long-term stress is caused by ongoing stressful situations or events. Examples of long-term stress include long-term health problems, ongoing problems at work, or conflicts in your family. Long-term stress can harm your health.  How does stress affect your health?  When you are stressed, your body responds as though you are in danger. It makes hormones that speed up your heart, make you breathe faster, and give you a burst of energy. This is called the fight-or-flight stress response. If the stress is over quickly, your body goes back to normal and no harm is done.  But if stress happens too often or lasts too long, it can have bad effects. Long-term stress can make you more likely to get sick, and it can make symptoms of some diseases worse. If you tense up when you are stressed, you may develop neck, shoulder, or low back pain. Stress is linked to high blood pressure and heart disease.  Stress also harms your emotional health. It can make you welsh, tense, or depressed. Your relationships may suffer, and you may not do well at work or school.  What can you do to manage stress?  You can try these things to help manage stress:   Do something active. Exercise or activity can help reduce stress. Walking is a great way to get started. Even everyday activities such as housecleaning or yard work can help.  Try yoga or aditya chi. These techniques combine exercise and meditation. You may need some training at first to learn them.  Do something you enjoy. For example, listen to music or go to a movie. Practice your hobby or do volunteer work.  Meditate. This can help you relax, because you are not worrying about what happened before or what may happen in the future.  Do  "guided imagery. Imagine yourself in any setting that helps you feel calm. You can use online videos, books, or a teacher to guide you.  Do breathing exercises. For example:  From a standing position, bend forward from the waist with your knees slightly bent. Let your arms dangle close to the floor.  Breathe in slowly and deeply as you return to a standing position. Roll up slowly and lift your head last.  Hold your breath for just a few seconds in the standing position.  Breathe out slowly and bend forward from the waist.  Let your feelings out. Talk, laugh, cry, and express anger when you need to. Talking with supportive friends or family, a counselor, or a ervin leader about your feelings is a healthy way to relieve stress. Avoid discussing your feelings with people who make you feel worse.  Write. It may help to write about things that are bothering you. This helps you find out how much stress you feel and what is causing it. When you know this, you can find better ways to cope.  What can you do to prevent stress?  You might try some of these things to help prevent stress:  Manage your time. This helps you find time to do the things you want and need to do.  Get enough sleep. Your body recovers from the stresses of the day while you are sleeping.  Get support. Your family, friends, and community can make a difference in how you experience stress.  Limit your news feed. Avoid or limit time on social media or news that may make you feel stressed.  Do something active. Exercise or activity can help reduce stress. Walking is a great way to get started.  Where can you learn more?  Go to https://www.[employer].net/patiented  Enter N032 in the search box to learn more about \"Learning About Stress.\"  Current as of: October 24, 2023               Content Version: 14.0    8590-3849 [employer].   Care instructions adapted under license by your healthcare professional. If you have questions about a medical " condition or this instruction, always ask your healthcare professional. Healthwise, [facility] disclaims any warranty or liability for your use of this information.      Substance Use Disorder: Care Instructions  Overview     You can improve your life and health by stopping your use of alcohol or drugs. When you don't drink or use drugs, you may feel and sleep better. You may get along better with your family, friends, and coworkers. There are medicines and programs that can help with substance use disorder.  How can you care for yourself at home?  Here are some ways to help you stay sober and prevent relapse.  If you have been given medicine to help keep you sober or reduce your cravings, be sure to take it exactly as prescribed.  Talk to your doctor about programs that can help you stop using drugs or drinking alcohol.  Do not keep alcohol or drugs in your home.  Plan ahead. Think about what you'll say if other people ask you to drink or use drugs. Try not to spend time with people who drink or use drugs.  Use the time and money spent on drinking or drugs to do something that's important to you.  Preventing a relapse  Have a plan to deal with relapse. Learn to recognize changes in your thinking that lead you to drink or use drugs. Get help before you start to drink or use drugs again.  Try to stay away from situations, friends, or places that may lead you to drink or use drugs.  If you feel the need to drink alcohol or use drugs again, seek help right away. Call a trusted friend or family member. Some people get support from organizations such as Narcotics Anonymous or [employer] or from treatment facilities.  If you relapse, get help as soon as you can. Some people make a plan with another person that outlines what they want that person to do for them if they relapse. The plan usually includes how to handle the relapse and who to notify in case of relapse.  Don't give up. Remember that a relapse doesn't  "mean that you have failed. Use the experience to learn the triggers that lead you to drink or use drugs. Then quit again. Recovery is a lifelong process. Many people have several relapses before they are able to quit for good.  Follow-up care is a key part of your treatment and safety. Be sure to make and go to all appointments, and call your doctor if you are having problems. It's also a good idea to know your test results and keep a list of the medicines you take.  When should you call for help?   Call 911  anytime you think you may need emergency care. For example, call if you or someone else:    Has overdosed or has withdrawal signs. Be sure to tell the emergency workers that you are or someone else is using or trying to quit using drugs. Overdose or withdrawal signs may include:  Losing consciousness.  Seizure.  Seeing or hearing things that aren't there (hallucinations).     Is thinking or talking about suicide or harming others.   Where to get help 24 hours a day, 7 days a week   If you or someone you know talks about suicide, self-harm, a mental health crisis, a substance use crisis, or any other kind of emotional distress, get help right away. You can:    Call the Suicide and Crisis Lifeline at 907.     Call 6-506-081-TALK (1-649.989.1728).     Text HOME to 746672 to access the Crisis Text Line.   Consider saving these numbers in your phone.  Go to [email or web address] for more information or to chat online.  Call your doctor now or seek immediate medical care if:    You are having withdrawal symptoms. These may include nausea or vomiting, sweating, shakiness, and anxiety.   Watch closely for changes in your health, and be sure to contact your doctor if:    You have a relapse.     You need more help or support to stop.   Where can you learn more?  Go to https://www.healthwise.net/patiented  Enter H573 in the search box to learn more about \"Substance Use Disorder: Care Instructions.\"  Current as of: November 15, " 2023               Content Version: 14.0    3954-6326 [employer].   Care instructions adapted under license by your healthcare professional. If you have questions about a medical condition or this instruction, always ask your healthcare professional. [employer] disclaims any warranty or liability for your use of this information.

## 2024-03-15 LAB
ALBUMIN SERPL BCG-MCNC: 4.5 G/DL (ref 3.5–5.2)
ALP SERPL-CCNC: 67 U/L (ref 40–150)
ALT SERPL W P-5'-P-CCNC: 28 U/L (ref 0–50)
ANION GAP SERPL CALCULATED.3IONS-SCNC: 10 MMOL/L (ref 7–15)
AST SERPL W P-5'-P-CCNC: 40 U/L (ref 0–45)
BILIRUB SERPL-MCNC: 0.5 MG/DL
BUN SERPL-MCNC: 16.3 MG/DL (ref 8–23)
CALCIUM SERPL-MCNC: 9.6 MG/DL (ref 8.8–10.2)
CHLORIDE SERPL-SCNC: 105 MMOL/L (ref 98–107)
CHOLEST SERPL-MCNC: 219 MG/DL
CREAT SERPL-MCNC: 0.86 MG/DL (ref 0.51–0.95)
DEPRECATED HCO3 PLAS-SCNC: 25 MMOL/L (ref 22–29)
EGFRCR SERPLBLD CKD-EPI 2021: 72 ML/MIN/1.73M2
FASTING STATUS PATIENT QL REPORTED: YES
GLUCOSE SERPL-MCNC: 93 MG/DL (ref 70–99)
HDLC SERPL-MCNC: 71 MG/DL
LDLC SERPL CALC-MCNC: 129 MG/DL
NONHDLC SERPL-MCNC: 148 MG/DL
POTASSIUM SERPL-SCNC: 4.9 MMOL/L (ref 3.4–5.3)
PROT SERPL-MCNC: 7.4 G/DL (ref 6.4–8.3)
SODIUM SERPL-SCNC: 140 MMOL/L (ref 135–145)
TRIGL SERPL-MCNC: 96 MG/DL
TSH SERPL DL<=0.005 MIU/L-ACNC: 0.37 UIU/ML (ref 0.3–4.2)
VIT D+METAB SERPL-MCNC: 38 NG/ML (ref 20–50)

## 2024-03-25 ENCOUNTER — LAB (OUTPATIENT)
Dept: LAB | Facility: CLINIC | Age: 74
End: 2024-03-25
Payer: MEDICARE

## 2024-03-25 ENCOUNTER — ANTICOAGULATION THERAPY VISIT (OUTPATIENT)
Dept: ANTICOAGULATION | Facility: CLINIC | Age: 74
End: 2024-03-25

## 2024-03-25 DIAGNOSIS — Z95.2 HX OF MECHANICAL AORTIC VALVE REPLACEMENT: ICD-10-CM

## 2024-03-25 DIAGNOSIS — Z79.01 ANTICOAGULATED ON WARFARIN: ICD-10-CM

## 2024-03-25 DIAGNOSIS — Z79.01 ANTICOAGULATED ON WARFARIN: Primary | ICD-10-CM

## 2024-03-25 DIAGNOSIS — Z87.74 H/O BICUSPID AORTIC VALVE: ICD-10-CM

## 2024-03-25 LAB — INR BLD: 1.9 (ref 0.9–1.1)

## 2024-03-25 PROCEDURE — 85610 PROTHROMBIN TIME: CPT

## 2024-03-25 PROCEDURE — 36416 COLLJ CAPILLARY BLOOD SPEC: CPT

## 2024-03-25 NOTE — PROGRESS NOTES
ANTICOAGULATION MANAGEMENT     Vandana Thao 73 year old female is on warfarin with therapeutic INR result. (Goal INR  1.8-2.5 )    Recent labs: (last 7 days)     03/25/24  1019   INR 1.9*       ASSESSMENT     Warfarin Lab Questionnaire    Warfarin Doses Last 7 Days      3/25/2024    10:17 AM   Dose in Tablet or Mg   TAB or MG? milligram (mg)     Pt Rptd Dose CLYDE MONDAY TUESDAY WED THURS FRIDAY SATURDAY   3/25/2024  10:17 AM 7.5 7 7 7 7 7 7   **verbally confirmed dosing with patient-- warfarin taken as instructed**        3/25/2024   Warfarin Lab Questionnaire   Missed doses within past 14 days? No   Changes in diet or alcohol within past 14 days? No   Medication changes since last result? No   Injuries or illness since last result? No   New shortness of breath, severe headaches or sudden changes in vision since last result? No   Abnormal bleeding since last result? No   Upcoming surgery, procedure? No   Best number to call with results? [phone]     Previous result: Therapeutic last visit; previously outside of goal range  Additional findings: None       PLAN     Recommended plan for no diet, medication or health factor changes affecting INR     Dosing Instructions: Continue your current warfarin dose with next INR in 3 weeks       Summary  As of 3/25/2024      Full warfarin instructions:  7.5 mg every Sun; 5 mg all other days   Next INR check:  4/15/2024               Telephone call with Vandana who verbalizes understanding and agrees to plan    Lab visit scheduled    Education provided:   Contact [phone] with any changes, questions or concerns.     Plan made per [facility] anticoagulation protocol    Becca Adames RN  Anticoagulation Clinic  3/25/2024    _______________________________________________________________________     Anticoagulation Episode Summary       Current INR goal:  Other - see comment   TTR:  93.9% (1 y)   Target end date:  Indefinite   Send INR reminders to:  ANDREAS JOHN     Indications    Anticoagulated on warfarin [Z79.01]  Hx of mechanical aortic valve replacement [Z95.2]  H/O bicuspid aortic valve [Z87.74]             Comments:  Approved for 8 week checks per -- see encounter from 01/16/23             Anticoagulation Care Providers       Provider Role Specialty Phone number    Lakeisha Marmolejo MD Referring Family Medicine [phone]    Maggi Muro MD Referring Family Medicine [phone]

## 2024-04-15 ENCOUNTER — TRANSFERRED RECORDS (OUTPATIENT)
Dept: HEALTH INFORMATION MANAGEMENT | Facility: CLINIC | Age: 74
End: 2024-04-15
Payer: MEDICARE

## 2024-04-16 ENCOUNTER — ANTICOAGULATION THERAPY VISIT (OUTPATIENT)
Dept: ANTICOAGULATION | Facility: CLINIC | Age: 74
End: 2024-04-16

## 2024-04-16 ENCOUNTER — LAB (OUTPATIENT)
Dept: LAB | Facility: CLINIC | Age: 74
End: 2024-04-16
Payer: MEDICARE

## 2024-04-16 DIAGNOSIS — Z87.74 H/O BICUSPID AORTIC VALVE: ICD-10-CM

## 2024-04-16 DIAGNOSIS — Z95.2 HX OF MECHANICAL AORTIC VALVE REPLACEMENT: ICD-10-CM

## 2024-04-16 DIAGNOSIS — Z79.01 ANTICOAGULATED ON WARFARIN: Primary | ICD-10-CM

## 2024-04-16 DIAGNOSIS — Z79.01 ANTICOAGULATED ON WARFARIN: ICD-10-CM

## 2024-04-16 LAB — INR BLD: 2 (ref 0.9–1.1)

## 2024-04-16 PROCEDURE — 36416 COLLJ CAPILLARY BLOOD SPEC: CPT

## 2024-04-16 PROCEDURE — 85610 PROTHROMBIN TIME: CPT

## 2024-04-16 NOTE — PROGRESS NOTES
ANTICOAGULATION MANAGEMENT     Vandana Thao 73 year old female is on warfarin with therapeutic INR result. (Goal INR  1.8-2.5 )    Recent labs: (last 7 days)     04/16/24  1025   INR 2.0*       ASSESSMENT     Warfarin Lab Questionnaire    Warfarin Doses Last 7 Days      4/15/2024    12:36 PM   Dose in Tablet or Mg   TAB or MG? milligram (mg)     Pt Rptd Dose CLYDE MONDAY TUESDAY WED THURS FRIDAY SATURDAY   4/15/2024  12:36 PM 7.5 5 5 5 5 5 5         4/15/2024   Warfarin Lab Questionnaire   Missed doses within past 14 days? No   Changes in diet or alcohol within past 14 days? No   Medication changes since last result? No   Injuries or illness since last result? No   New shortness of breath, severe headaches or sudden changes in vision since last result? No   Abnormal bleeding since last result? No   Upcoming surgery, procedure? No   Best number to call with results? [phone]     Previous result: Therapeutic last 2(+) visits  Additional findings: None       PLAN     Recommended plan for no diet, medication or health factor changes affecting INR     Dosing Instructions: Continue your current warfarin dose with next INR in 4 weeks       Summary  As of 4/16/2024      Full warfarin instructions:  7.5 mg every Sun; 5 mg all other days   Next INR check:  5/14/2024               Telephone call with Vandana who verbalizes understanding and agrees to plan    Lab visit scheduled    Education provided:   Goal range and lab monitoring: goal range and significance of current result  Contact [phone] with any changes, questions or concerns.     Plan made per [facility] anticoagulation protocol    Dorene Howard, RN  Anticoagulation Clinic  4/16/2024    _______________________________________________________________________     Anticoagulation Episode Summary       Current INR goal:  Other - see comment   TTR:  87.9% (1 y)   Target end date:  Indefinite   Send INR reminders to:  ANDREAS JOHN    Indications     Anticoagulated on warfarin [Z79.01]  Hx of mechanical aortic valve replacement [Z95.2]  H/O bicuspid aortic valve [Z87.74]             Comments:  Approved for 8 week checks per -- see encounter from 01/16/23             Anticoagulation Care Providers       Provider Role Specialty Phone number    Lakeisha Marmolejo MD Referring Family Medicine [phone]    Maggi Muro MD Referring Family Medicine [phone]

## 2024-04-29 ENCOUNTER — ANCILLARY PROCEDURE (OUTPATIENT)
Dept: CARDIOLOGY | Facility: CLINIC | Age: 74
End: 2024-04-29
Attending: INTERNAL MEDICINE
Payer: MEDICARE

## 2024-04-29 ENCOUNTER — ANCILLARY PROCEDURE (OUTPATIENT)
Dept: BONE DENSITY | Facility: CLINIC | Age: 74
End: 2024-04-29
Attending: FAMILY MEDICINE
Payer: MEDICARE

## 2024-04-29 DIAGNOSIS — I49.5 SICK SINUS SYNDROME (H): ICD-10-CM

## 2024-04-29 DIAGNOSIS — M85.80 OSTEOPENIA, UNSPECIFIED LOCATION: ICD-10-CM

## 2024-04-29 DIAGNOSIS — Z78.0 ASYMPTOMATIC POSTMENOPAUSAL STATUS: ICD-10-CM

## 2024-04-29 DIAGNOSIS — Z95.0 PACEMAKER: ICD-10-CM

## 2024-04-29 LAB
MDC_IDC_LEAD_CONNECTION_STATUS: NORMAL
MDC_IDC_LEAD_CONNECTION_STATUS: NORMAL
MDC_IDC_LEAD_IMPLANT_DT: NORMAL
MDC_IDC_LEAD_IMPLANT_DT: NORMAL
MDC_IDC_LEAD_LOCATION: NORMAL
MDC_IDC_LEAD_LOCATION: NORMAL
MDC_IDC_LEAD_LOCATION_DETAIL_1: NORMAL
MDC_IDC_LEAD_LOCATION_DETAIL_1: NORMAL
MDC_IDC_LEAD_MFG: NORMAL
MDC_IDC_LEAD_MFG: NORMAL
MDC_IDC_LEAD_MODEL: NORMAL
MDC_IDC_LEAD_MODEL: NORMAL
MDC_IDC_LEAD_POLARITY_TYPE: NORMAL
MDC_IDC_LEAD_POLARITY_TYPE: NORMAL
MDC_IDC_LEAD_SERIAL: NORMAL
MDC_IDC_LEAD_SERIAL: NORMAL
MDC_IDC_LEAD_SPECIAL_FUNCTION: NORMAL
MDC_IDC_LEAD_SPECIAL_FUNCTION: NORMAL
MDC_IDC_MSMT_BATTERY_DTM: NORMAL
MDC_IDC_MSMT_BATTERY_REMAINING_LONGEVITY: 109 MO
MDC_IDC_MSMT_BATTERY_RRT_TRIGGER: 2.62
MDC_IDC_MSMT_BATTERY_STATUS: NORMAL
MDC_IDC_MSMT_BATTERY_VOLTAGE: 3 V
MDC_IDC_MSMT_LEADCHNL_RA_IMPEDANCE_VALUE: 323 OHM
MDC_IDC_MSMT_LEADCHNL_RA_IMPEDANCE_VALUE: 399 OHM
MDC_IDC_MSMT_LEADCHNL_RA_PACING_THRESHOLD_AMPLITUDE: 0.62 V
MDC_IDC_MSMT_LEADCHNL_RA_PACING_THRESHOLD_PULSEWIDTH: 0.4 MS
MDC_IDC_MSMT_LEADCHNL_RA_SENSING_INTR_AMPL: 3.38 MV
MDC_IDC_MSMT_LEADCHNL_RA_SENSING_INTR_AMPL: 3.38 MV
MDC_IDC_MSMT_LEADCHNL_RV_IMPEDANCE_VALUE: 323 OHM
MDC_IDC_MSMT_LEADCHNL_RV_IMPEDANCE_VALUE: 437 OHM
MDC_IDC_MSMT_LEADCHNL_RV_PACING_THRESHOLD_AMPLITUDE: 1.25 V
MDC_IDC_MSMT_LEADCHNL_RV_PACING_THRESHOLD_PULSEWIDTH: 0.4 MS
MDC_IDC_MSMT_LEADCHNL_RV_SENSING_INTR_AMPL: 10.25 MV
MDC_IDC_MSMT_LEADCHNL_RV_SENSING_INTR_AMPL: 10.25 MV
MDC_IDC_PG_IMPLANT_DTM: NORMAL
MDC_IDC_PG_MFG: NORMAL
MDC_IDC_PG_MODEL: NORMAL
MDC_IDC_PG_SERIAL: NORMAL
MDC_IDC_PG_TYPE: NORMAL
MDC_IDC_SESS_CLINIC_NAME: NORMAL
MDC_IDC_SESS_DTM: NORMAL
MDC_IDC_SESS_TYPE: NORMAL
MDC_IDC_SET_BRADY_AT_MODE_SWITCH_RATE: 171 {BEATS}/MIN
MDC_IDC_SET_BRADY_HYSTRATE: NORMAL
MDC_IDC_SET_BRADY_LOWRATE: 60 {BEATS}/MIN
MDC_IDC_SET_BRADY_MAX_SENSOR_RATE: 130 {BEATS}/MIN
MDC_IDC_SET_BRADY_MAX_TRACKING_RATE: 130 {BEATS}/MIN
MDC_IDC_SET_BRADY_MODE: NORMAL
MDC_IDC_SET_BRADY_PAV_DELAY_LOW: 270 MS
MDC_IDC_SET_BRADY_SAV_DELAY_LOW: 240 MS
MDC_IDC_SET_LEADCHNL_RA_PACING_AMPLITUDE: 1.5 V
MDC_IDC_SET_LEADCHNL_RA_PACING_ANODE_ELECTRODE_1: NORMAL
MDC_IDC_SET_LEADCHNL_RA_PACING_ANODE_LOCATION_1: NORMAL
MDC_IDC_SET_LEADCHNL_RA_PACING_CAPTURE_MODE: NORMAL
MDC_IDC_SET_LEADCHNL_RA_PACING_CATHODE_ELECTRODE_1: NORMAL
MDC_IDC_SET_LEADCHNL_RA_PACING_CATHODE_LOCATION_1: NORMAL
MDC_IDC_SET_LEADCHNL_RA_PACING_POLARITY: NORMAL
MDC_IDC_SET_LEADCHNL_RA_PACING_PULSEWIDTH: 0.4 MS
MDC_IDC_SET_LEADCHNL_RA_SENSING_ANODE_ELECTRODE_1: NORMAL
MDC_IDC_SET_LEADCHNL_RA_SENSING_ANODE_LOCATION_1: NORMAL
MDC_IDC_SET_LEADCHNL_RA_SENSING_CATHODE_ELECTRODE_1: NORMAL
MDC_IDC_SET_LEADCHNL_RA_SENSING_CATHODE_LOCATION_1: NORMAL
MDC_IDC_SET_LEADCHNL_RA_SENSING_POLARITY: NORMAL
MDC_IDC_SET_LEADCHNL_RA_SENSING_SENSITIVITY: 0.3 MV
MDC_IDC_SET_LEADCHNL_RV_PACING_AMPLITUDE: 2 V
MDC_IDC_SET_LEADCHNL_RV_PACING_ANODE_ELECTRODE_1: NORMAL
MDC_IDC_SET_LEADCHNL_RV_PACING_ANODE_LOCATION_1: NORMAL
MDC_IDC_SET_LEADCHNL_RV_PACING_CAPTURE_MODE: NORMAL
MDC_IDC_SET_LEADCHNL_RV_PACING_CATHODE_ELECTRODE_1: NORMAL
MDC_IDC_SET_LEADCHNL_RV_PACING_CATHODE_LOCATION_1: NORMAL
MDC_IDC_SET_LEADCHNL_RV_PACING_POLARITY: NORMAL
MDC_IDC_SET_LEADCHNL_RV_PACING_PULSEWIDTH: 0.4 MS
MDC_IDC_SET_LEADCHNL_RV_SENSING_ANODE_ELECTRODE_1: NORMAL
MDC_IDC_SET_LEADCHNL_RV_SENSING_ANODE_LOCATION_1: NORMAL
MDC_IDC_SET_LEADCHNL_RV_SENSING_CATHODE_ELECTRODE_1: NORMAL
MDC_IDC_SET_LEADCHNL_RV_SENSING_CATHODE_LOCATION_1: NORMAL
MDC_IDC_SET_LEADCHNL_RV_SENSING_POLARITY: NORMAL
MDC_IDC_SET_LEADCHNL_RV_SENSING_SENSITIVITY: 0.9 MV
MDC_IDC_SET_ZONE_DETECTION_INTERVAL: 350 MS
MDC_IDC_SET_ZONE_DETECTION_INTERVAL: 400 MS
MDC_IDC_SET_ZONE_STATUS: NORMAL
MDC_IDC_SET_ZONE_STATUS: NORMAL
MDC_IDC_SET_ZONE_TYPE: NORMAL
MDC_IDC_SET_ZONE_VENDOR_TYPE: NORMAL
MDC_IDC_STAT_AT_BURDEN_PERCENT: 0 %
MDC_IDC_STAT_AT_DTM_END: NORMAL
MDC_IDC_STAT_AT_DTM_START: NORMAL
MDC_IDC_STAT_BRADY_AP_VP_PERCENT: 0.72 %
MDC_IDC_STAT_BRADY_AP_VS_PERCENT: 52.62 %
MDC_IDC_STAT_BRADY_AS_VP_PERCENT: 0.04 %
MDC_IDC_STAT_BRADY_AS_VS_PERCENT: 46.62 %
MDC_IDC_STAT_BRADY_DTM_END: NORMAL
MDC_IDC_STAT_BRADY_DTM_START: NORMAL
MDC_IDC_STAT_BRADY_RA_PERCENT_PACED: 54.45 %
MDC_IDC_STAT_BRADY_RV_PERCENT_PACED: 0.76 %
MDC_IDC_STAT_EPISODE_RECENT_COUNT: 0
MDC_IDC_STAT_EPISODE_RECENT_COUNT_DTM_END: NORMAL
MDC_IDC_STAT_EPISODE_RECENT_COUNT_DTM_START: NORMAL
MDC_IDC_STAT_EPISODE_TOTAL_COUNT: 0
MDC_IDC_STAT_EPISODE_TOTAL_COUNT: 0
MDC_IDC_STAT_EPISODE_TOTAL_COUNT: 554
MDC_IDC_STAT_EPISODE_TOTAL_COUNT: 6
MDC_IDC_STAT_EPISODE_TOTAL_COUNT: NORMAL
MDC_IDC_STAT_EPISODE_TOTAL_COUNT_DTM_END: NORMAL
MDC_IDC_STAT_EPISODE_TOTAL_COUNT_DTM_START: NORMAL
MDC_IDC_STAT_EPISODE_TYPE: NORMAL
MDC_IDC_STAT_TACHYTHERAPY_RECENT_DTM_END: NORMAL
MDC_IDC_STAT_TACHYTHERAPY_RECENT_DTM_START: NORMAL
MDC_IDC_STAT_TACHYTHERAPY_TOTAL_DTM_END: NORMAL
MDC_IDC_STAT_TACHYTHERAPY_TOTAL_DTM_START: NORMAL

## 2024-04-29 PROCEDURE — 77080 DXA BONE DENSITY AXIAL: CPT | Mod: TC | Performed by: RADIOLOGY

## 2024-04-29 PROCEDURE — 93296 REM INTERROG EVL PM/IDS: CPT | Performed by: INTERNAL MEDICINE

## 2024-04-29 PROCEDURE — 93294 REM INTERROG EVL PM/LDLS PM: CPT | Performed by: INTERNAL MEDICINE

## 2024-04-29 PROCEDURE — 77091 TBS TECHL CALCULATION ONLY: CPT | Performed by: RADIOLOGY

## 2024-05-14 ENCOUNTER — LAB (OUTPATIENT)
Dept: LAB | Facility: CLINIC | Age: 74
End: 2024-05-14
Payer: MEDICARE

## 2024-05-14 ENCOUNTER — ANTICOAGULATION THERAPY VISIT (OUTPATIENT)
Dept: ANTICOAGULATION | Facility: CLINIC | Age: 74
End: 2024-05-14

## 2024-05-14 DIAGNOSIS — Z87.74 H/O BICUSPID AORTIC VALVE: ICD-10-CM

## 2024-05-14 DIAGNOSIS — Z95.2 HX OF MECHANICAL AORTIC VALVE REPLACEMENT: ICD-10-CM

## 2024-05-14 DIAGNOSIS — Z79.01 ANTICOAGULATED ON WARFARIN: Primary | ICD-10-CM

## 2024-05-14 DIAGNOSIS — Z79.01 ANTICOAGULATED ON WARFARIN: ICD-10-CM

## 2024-05-14 LAB — INR BLD: 1.9 (ref 0.9–1.1)

## 2024-05-14 PROCEDURE — 36416 COLLJ CAPILLARY BLOOD SPEC: CPT

## 2024-05-14 PROCEDURE — 85610 PROTHROMBIN TIME: CPT

## 2024-05-14 NOTE — PROGRESS NOTES
ANTICOAGULATION MANAGEMENT     Vandana Thao 73 year old female is on warfarin with therapeutic INR result. (Goal INR  1.8-2.5 )    Recent labs: (last 7 days)     05/14/24  1023   INR 1.9*       ASSESSMENT     Warfarin Lab Questionnaire    Warfarin Doses Last 7 Days      5/13/2024     2:53 PM   Dose in Tablet or Mg   TAB or MG? milligram (mg)     Pt Rptd Dose SUNDAY MONDAY TUESDAY WED THURS FRIDAY SATURDAY 5/13/2024   2:53 PM 7.5 5 5 5 5 5 5         5/13/2024   Warfarin Lab Questionnaire   Missed doses within past 14 days? No   Changes in diet or alcohol within past 14 days? No   Medication changes since last result? No   Injuries or illness since last result? No   New shortness of breath, severe headaches or sudden changes in vision since last result? No   Abnormal bleeding since last result? No   Upcoming surgery, procedure? No   Best number to call with results? [phone]     Previous result: Therapeutic last 2(+) visits  Additional findings: None       PLAN     Recommended plan for no diet, medication or health factor changes affecting INR     Dosing Instructions: Continue your current warfarin dose with next INR in 5 weeks       Summary  As of 5/14/2024      Full warfarin instructions:  7.5 mg every Sun; 5 mg all other days   Next INR check:  6/18/2024               Telephone call with Vandana who verbalizes understanding and agrees to plan    Lab visit scheduled    Education provided:   Contact [phone] with any changes, questions or concerns.     Plan made per [facility] anticoagulation protocol    Becca Adames RN  Anticoagulation Clinic  5/14/2024    _______________________________________________________________________     Anticoagulation Episode Summary       Current INR goal:  Other - see comment   TTR:  80.2% (1 y)   Target end date:  Indefinite   Send INR reminders to:  ANDREAS JOHN    Indications    Anticoagulated on warfarin [Z79.01]  Hx of mechanical aortic valve replacement  [Z95.2]  H/O bicuspid aortic valve [Z87.74]             Comments:  Approved for 8 week checks per -- see encounter from 01/16/23             Anticoagulation Care Providers       Provider Role Specialty Phone number    Lakeisha Marmolejo MD Referring Family Medicine [phone]    Maggi Muro MD Referring Family Medicine [phone]

## 2024-06-18 ENCOUNTER — ANTICOAGULATION THERAPY VISIT (OUTPATIENT)
Dept: ANTICOAGULATION | Facility: CLINIC | Age: 74
End: 2024-06-18

## 2024-06-18 ENCOUNTER — LAB (OUTPATIENT)
Dept: LAB | Facility: CLINIC | Age: 74
End: 2024-06-18
Payer: MEDICARE

## 2024-06-18 DIAGNOSIS — Z79.01 ANTICOAGULATED ON WARFARIN: ICD-10-CM

## 2024-06-18 DIAGNOSIS — Z87.74 H/O BICUSPID AORTIC VALVE: ICD-10-CM

## 2024-06-18 DIAGNOSIS — Z95.2 HX OF MECHANICAL AORTIC VALVE REPLACEMENT: ICD-10-CM

## 2024-06-18 DIAGNOSIS — Z79.01 ANTICOAGULATED ON WARFARIN: Primary | ICD-10-CM

## 2024-06-18 LAB — INR BLD: 2.2 (ref 0.9–1.1)

## 2024-06-18 PROCEDURE — 85610 PROTHROMBIN TIME: CPT

## 2024-06-18 PROCEDURE — 36416 COLLJ CAPILLARY BLOOD SPEC: CPT

## 2024-06-18 NOTE — PROGRESS NOTES
ANTICOAGULATION MANAGEMENT     Vandana Thao 73 year old female is on warfarin with therapeutic INR result. (Goal INR Other - see comment)    Recent labs: (last 7 days)     06/18/24  1010   INR 2.2*       ASSESSMENT     Warfarin Lab Questionnaire    Warfarin Doses Last 7 Days      6/17/2024    10:48 AM   Dose in Tablet or Mg   TAB or MG? milligram (mg)     Pt Rptd Dose SUNDAY MONDAY TUESDAY WED THURS FRIDAY SATURDAY 6/17/2024  10:48 AM 7.5 5 5 5 5 5 5         6/17/2024   Warfarin Lab Questionnaire   Missed doses within past 14 days? No   Changes in diet or alcohol within past 14 days? No   Medication changes since last result? No   Injuries or illness since last result? No   New shortness of breath, severe headaches or sudden changes in vision since last result? No   Abnormal bleeding since last result? No   Upcoming surgery, procedure? No     Previous result: Therapeutic last 2(+) visits  Additional findings: None       PLAN     Recommended plan for no diet, medication or health factor changes affecting INR     Dosing Instructions: Continue your current warfarin dose with next INR in 6 weeks       Summary  As of 6/18/2024      Full warfarin instructions:  7.5 mg every Sun; 5 mg all other days   Next INR check:  7/30/2024               Telephone call with Vandana who verbalizes understanding and agrees to plan    Lab visit scheduled    Education provided:   Goal range and lab monitoring: goal range and significance of current result  Contact [phone]  with any changes, questions or concerns.     Plan made per [facility] anticoagulation protocol    Cindy Castillo, RN  Anticoagulation Clinic  6/18/2024    _______________________________________________________________________     Anticoagulation Episode Summary       Current INR goal:  Other - see comment   TTR:  77.0% (1 y)   Target end date:  Indefinite   Send INR reminders to:  ANDREAS JOHN    Indications    Anticoagulated on warfarin [Z79.01]  Matthew of  mechanical aortic valve replacement [Z95.2]  H/O bicuspid aortic valve [Z87.74]             Comments:  Approved for 8 week checks per -- see encounter from 01/16/23             Anticoagulation Care Providers       Provider Role Specialty Phone number    Lakeisha Marmolejo MD Referring Family Medicine [phone]    Maggi Muro MD Referring Family Medicine [phone]

## 2024-07-22 ENCOUNTER — OFFICE VISIT (OUTPATIENT)
Dept: FAMILY MEDICINE | Facility: CLINIC | Age: 74
End: 2024-07-22
Payer: MEDICARE

## 2024-07-22 ENCOUNTER — ANCILLARY PROCEDURE (OUTPATIENT)
Dept: GENERAL RADIOLOGY | Facility: CLINIC | Age: 74
End: 2024-07-22
Attending: FAMILY MEDICINE
Payer: MEDICARE

## 2024-07-22 VITALS
BODY MASS INDEX: 34.41 KG/M2 | HEIGHT: 62 IN | HEART RATE: 75 BPM | WEIGHT: 187 LBS | OXYGEN SATURATION: 98 % | SYSTOLIC BLOOD PRESSURE: 122 MMHG | RESPIRATION RATE: 18 BRPM | TEMPERATURE: 98.2 F | DIASTOLIC BLOOD PRESSURE: 68 MMHG

## 2024-07-22 DIAGNOSIS — M25.812 IMPINGEMENT OF LEFT SHOULDER: Primary | ICD-10-CM

## 2024-07-22 DIAGNOSIS — M25.512 ACUTE PAIN OF LEFT SHOULDER: ICD-10-CM

## 2024-07-22 PROCEDURE — 99213 OFFICE O/P EST LOW 20 MIN: CPT | Performed by: FAMILY MEDICINE

## 2024-07-22 PROCEDURE — 73030 X-RAY EXAM OF SHOULDER: CPT | Mod: TC | Performed by: RADIOLOGY

## 2024-07-22 ASSESSMENT — PAIN SCALES - GENERAL: PAINLEVEL: EXTREME PAIN (8)

## 2024-07-22 NOTE — PATIENT INSTRUCTIONS
Continue acetaminophen 1000 mg 3 times daily with food.  Use ice for 20 minutes at least 3 times daily, more often as needed.  Avoid overhead activities.  Consider a trial of Voltaren gel 4 times daily.  I placed referral to physical therapy.  Consider orthopedics if worsening or if not improving with the above measures.

## 2024-07-22 NOTE — PROGRESS NOTES
"  Assessment & Plan     Impingement of left shoulder  Reviewed nature of condition, advised ice, relative rest, scheduled Tylenol, avoid NSAIDs due to intake of warfarin but may use topical Voltaren gel, instructed in stretches at home.  Referral made to physical therapy.  Orthopedics assessment if severe pain, if not improving with the above measures.  - XR Shoulder Left G/E 3 Views; Future  - Physical Therapy  Referral; Future          BMI  Estimated body mass index is 34.2 kg/m  as calculated from the following:    Height as of this encounter: 1.575 m (5' 2\").    Weight as of this encounter: 84.8 kg (187 lb).             Juan Carlos Ross is a 74 year old, presenting for the following health issues:  Shoulder Pain (Left shoulder pain, started yesterday evening. No known cause. 0-10 rates a 8)    Acute onset of left shoulder pain without injury yesterday, constant but worse with movement of the shoulder.  It is not exertional, no associated cardiac symptoms.  No known injury or previous injury.  Taking warfarin due to previous history of aortic valve replacement therefore warning NSAIDs.  Took Tylenol with minimal effect.  Took a single dose of oxycodone leftover from prior surgery, not helpful.    History of Present Illness       Reason for visit:  Left shoulder pain  Symptom onset:  1-3 days ago  Symptom intensity:  Severe  Symptom progression:  Worsening  Had these symptoms before:  No  What makes it worse:  Using let arm from elbow up  What makes it better:  Not really    She eats 2-3 servings of fruits and vegetables daily.She consumes 1 sweetened beverage(s) daily.She exercises with enough effort to increase her heart rate 30 to 60 minutes per day.  She exercises with enough effort to increase her heart rate 5 days per week.   She is taking medications regularly.                     Objective    /68   Pulse 75   Temp 98.2  F (36.8  C) (Temporal)   Resp 18   Ht 1.575 m (5' 2\")   Wt 84.8 " kg (187 lb)   SpO2 98%   BMI 34.20 kg/m    Body mass index is 34.2 kg/m .  Physical Exam   Tenderness palpation just distal to the acromion, no focal bony tenderness.  Forward flexion limited to about 75 degrees, abduction limited to about 70 degrees.  She is not able to relax her shoulder for passive range of motion testing.  Gaines and Neer's tests are positive.  She has 5 of 5 strength throughout range of motion.  Sensation intact.    I ordered and personally reviewed 2 view x-rays of the left shoulder, no acute bony abnormalities including fracture, mass or lytic lesion.  Etiology interpretation pending.            Signed Electronically by: Maggi Muro MD

## 2024-07-30 ENCOUNTER — LAB (OUTPATIENT)
Dept: LAB | Facility: CLINIC | Age: 74
End: 2024-07-30
Payer: MEDICARE

## 2024-07-30 ENCOUNTER — ANTICOAGULATION THERAPY VISIT (OUTPATIENT)
Dept: ANTICOAGULATION | Facility: CLINIC | Age: 74
End: 2024-07-30

## 2024-07-30 DIAGNOSIS — Z95.2 HX OF MECHANICAL AORTIC VALVE REPLACEMENT: ICD-10-CM

## 2024-07-30 DIAGNOSIS — Z79.01 ANTICOAGULATED ON WARFARIN: ICD-10-CM

## 2024-07-30 DIAGNOSIS — Z79.01 ANTICOAGULATED ON WARFARIN: Primary | ICD-10-CM

## 2024-07-30 DIAGNOSIS — Z87.74 H/O BICUSPID AORTIC VALVE: ICD-10-CM

## 2024-07-30 LAB — INR BLD: 2.1 (ref 0.9–1.1)

## 2024-07-30 PROCEDURE — 85610 PROTHROMBIN TIME: CPT

## 2024-07-30 PROCEDURE — 36416 COLLJ CAPILLARY BLOOD SPEC: CPT

## 2024-07-30 NOTE — PROGRESS NOTES
ANTICOAGULATION MANAGEMENT     Vandana Thao 74 year old female is on warfarin with therapeutic INR result. (Goal INR  1.8-2.5 )    Recent labs: (last 7 days)     07/30/24  1010   INR 2.1*       ASSESSMENT     Warfarin Lab Questionnaire    Warfarin Doses Last 7 Days      7/29/2024    12:21 PM   Dose in Tablet or Mg   TAB or MG? milligram (mg)     Pt Rptd Dose SUNDAY MONDAY TUESDAY WED THURS FRIDAY SATURDAY 7/29/2024  12:21 PM 7.5 5 5 5 5 5 5         7/29/2024   Warfarin Lab Questionnaire   Missed doses within past 14 days? No   Changes in diet or alcohol within past 14 days? No   Medication changes since last result? No   Injuries or illness since last result? No   New shortness of breath, severe headaches or sudden changes in vision since last result? No   Abnormal bleeding since last result? No   Upcoming surgery, procedure? No        Previous result: Therapeutic last 2(+) visits  Additional findings: None       PLAN     Recommended plan for no diet, medication or health factor changes affecting INR     Dosing Instructions: Continue your current warfarin dose with next INR in 8 weeks       Summary  As of 7/30/2024      Full warfarin instructions:  7.5 mg every Sun; 5 mg all other days   Next INR check:  9/24/2024               Telephone call with Vandana who verbalizes understanding and agrees to plan    Lab visit scheduled    Education provided: Please call back if any changes to your diet, medications or how you've been taking warfarin  Goal range and lab monitoring: goal range and significance of current result  Importance of notifying anticoagulation clinic for: changes in medications; a sooner lab recheck maybe needed  Contact [phone] with any changes, questions or concerns.     Plan made per [facility] anticoagulation protocol    Dorene Howard, RN  Anticoagulation Clinic  7/30/2024    _______________________________________________________________________     Anticoagulation Episode Summary       Current  INR goal:  Other - see comment   TTR:  77.0% (1 y)   Target end date:  Indefinite   Send INR reminders to:  ANDREAS JOHN    Indications    Anticoagulated on warfarin [Z79.01]  Hx of mechanical aortic valve replacement [Z95.2]  H/O bicuspid aortic valve [Z87.74]             Comments:  Approved for 8 week checks per -- see encounter from 01/16/23  3/20/24: INR goal range changed to 1.8-2.5             Anticoagulation Care Providers       Provider Role Specialty Phone number    Lakeisha Marmolejo MD Referring Family Medicine [phone]    Maggi Muro MD Referring Family Medicine [phone]             [Dear  ___] : Dear  [unfilled], [Consult Letter:] : I had the pleasure of evaluating your patient, [unfilled]. [Please see my note below.] : Please see my note below.

## 2024-08-28 ENCOUNTER — OFFICE VISIT (OUTPATIENT)
Dept: CARDIOLOGY | Facility: CLINIC | Age: 74
End: 2024-08-28
Attending: INTERNAL MEDICINE
Payer: MEDICARE

## 2024-08-28 VITALS
BODY MASS INDEX: 33.65 KG/M2 | SYSTOLIC BLOOD PRESSURE: 136 MMHG | OXYGEN SATURATION: 95 % | WEIGHT: 184 LBS | DIASTOLIC BLOOD PRESSURE: 70 MMHG | HEART RATE: 80 BPM | RESPIRATION RATE: 20 BRPM

## 2024-08-28 DIAGNOSIS — Z95.0 PACEMAKER: ICD-10-CM

## 2024-08-28 DIAGNOSIS — I44.2 AV BLOCK, COMPLETE (H): ICD-10-CM

## 2024-08-28 DIAGNOSIS — I47.29: ICD-10-CM

## 2024-08-28 DIAGNOSIS — Z95.2 S/P AVR: Primary | ICD-10-CM

## 2024-08-28 DIAGNOSIS — I25.10 CORONARY ARTERY DISEASE INVOLVING NATIVE CORONARY ARTERY OF NATIVE HEART WITHOUT ANGINA PECTORIS: ICD-10-CM

## 2024-08-28 DIAGNOSIS — I49.5 SICK SINUS SYNDROME (H): ICD-10-CM

## 2024-08-28 DIAGNOSIS — E78.00 HYPERCHOLESTEROLEMIA: ICD-10-CM

## 2024-08-28 LAB
MDC_IDC_EPISODE_DTM: NORMAL
MDC_IDC_EPISODE_DURATION: 1 S
MDC_IDC_EPISODE_ID: NORMAL
MDC_IDC_EPISODE_TYPE: NORMAL
MDC_IDC_EPISODE_TYPE_INDUCED: NO
MDC_IDC_LEAD_CONNECTION_STATUS: NORMAL
MDC_IDC_LEAD_CONNECTION_STATUS: NORMAL
MDC_IDC_LEAD_IMPLANT_DT: NORMAL
MDC_IDC_LEAD_IMPLANT_DT: NORMAL
MDC_IDC_LEAD_LOCATION: NORMAL
MDC_IDC_LEAD_LOCATION: NORMAL
MDC_IDC_LEAD_LOCATION_DETAIL_1: NORMAL
MDC_IDC_LEAD_LOCATION_DETAIL_1: NORMAL
MDC_IDC_LEAD_MFG: NORMAL
MDC_IDC_LEAD_MFG: NORMAL
MDC_IDC_LEAD_MODEL: NORMAL
MDC_IDC_LEAD_MODEL: NORMAL
MDC_IDC_LEAD_POLARITY_TYPE: NORMAL
MDC_IDC_LEAD_POLARITY_TYPE: NORMAL
MDC_IDC_LEAD_SERIAL: NORMAL
MDC_IDC_LEAD_SERIAL: NORMAL
MDC_IDC_LEAD_SPECIAL_FUNCTION: NORMAL
MDC_IDC_LEAD_SPECIAL_FUNCTION: NORMAL
MDC_IDC_MSMT_BATTERY_DTM: NORMAL
MDC_IDC_MSMT_BATTERY_REMAINING_LONGEVITY: 108 MO
MDC_IDC_MSMT_BATTERY_RRT_TRIGGER: 2.62
MDC_IDC_MSMT_BATTERY_STATUS: NORMAL
MDC_IDC_MSMT_BATTERY_VOLTAGE: 2.99 V
MDC_IDC_MSMT_LEADCHNL_RA_IMPEDANCE_VALUE: 361 OHM
MDC_IDC_MSMT_LEADCHNL_RA_IMPEDANCE_VALUE: 456 OHM
MDC_IDC_MSMT_LEADCHNL_RA_PACING_THRESHOLD_AMPLITUDE: 0.75 V
MDC_IDC_MSMT_LEADCHNL_RA_PACING_THRESHOLD_PULSEWIDTH: 0.4 MS
MDC_IDC_MSMT_LEADCHNL_RA_SENSING_INTR_AMPL: 3.9 MV
MDC_IDC_MSMT_LEADCHNL_RV_IMPEDANCE_VALUE: 342 OHM
MDC_IDC_MSMT_LEADCHNL_RV_IMPEDANCE_VALUE: 456 OHM
MDC_IDC_MSMT_LEADCHNL_RV_PACING_THRESHOLD_AMPLITUDE: 1.5 V
MDC_IDC_MSMT_LEADCHNL_RV_PACING_THRESHOLD_PULSEWIDTH: 0.4 MS
MDC_IDC_MSMT_LEADCHNL_RV_SENSING_INTR_AMPL: 7.6 MV
MDC_IDC_PG_IMPLANT_DTM: NORMAL
MDC_IDC_PG_MFG: NORMAL
MDC_IDC_PG_MODEL: NORMAL
MDC_IDC_PG_SERIAL: NORMAL
MDC_IDC_PG_TYPE: NORMAL
MDC_IDC_SESS_CLINIC_NAME: NORMAL
MDC_IDC_SESS_DTM: NORMAL
MDC_IDC_SESS_TYPE: NORMAL
MDC_IDC_SET_BRADY_AT_MODE_SWITCH_RATE: 171 {BEATS}/MIN
MDC_IDC_SET_BRADY_HYSTRATE: NORMAL
MDC_IDC_SET_BRADY_LOWRATE: 60 {BEATS}/MIN
MDC_IDC_SET_BRADY_MAX_SENSOR_RATE: 130 {BEATS}/MIN
MDC_IDC_SET_BRADY_MAX_TRACKING_RATE: 130 {BEATS}/MIN
MDC_IDC_SET_BRADY_MODE: NORMAL
MDC_IDC_SET_BRADY_PAV_DELAY_LOW: 270 MS
MDC_IDC_SET_BRADY_SAV_DELAY_LOW: 240 MS
MDC_IDC_SET_LEADCHNL_RA_PACING_AMPLITUDE: NORMAL
MDC_IDC_SET_LEADCHNL_RA_PACING_ANODE_ELECTRODE_1: NORMAL
MDC_IDC_SET_LEADCHNL_RA_PACING_ANODE_LOCATION_1: NORMAL
MDC_IDC_SET_LEADCHNL_RA_PACING_CAPTURE_MODE: NORMAL
MDC_IDC_SET_LEADCHNL_RA_PACING_CATHODE_ELECTRODE_1: NORMAL
MDC_IDC_SET_LEADCHNL_RA_PACING_CATHODE_LOCATION_1: NORMAL
MDC_IDC_SET_LEADCHNL_RA_PACING_POLARITY: NORMAL
MDC_IDC_SET_LEADCHNL_RA_PACING_PULSEWIDTH: 0.4 MS
MDC_IDC_SET_LEADCHNL_RA_SENSING_ANODE_ELECTRODE_1: NORMAL
MDC_IDC_SET_LEADCHNL_RA_SENSING_ANODE_LOCATION_1: NORMAL
MDC_IDC_SET_LEADCHNL_RA_SENSING_CATHODE_ELECTRODE_1: NORMAL
MDC_IDC_SET_LEADCHNL_RA_SENSING_CATHODE_LOCATION_1: NORMAL
MDC_IDC_SET_LEADCHNL_RA_SENSING_POLARITY: NORMAL
MDC_IDC_SET_LEADCHNL_RA_SENSING_SENSITIVITY: 0.3 MV
MDC_IDC_SET_LEADCHNL_RV_PACING_AMPLITUDE: NORMAL
MDC_IDC_SET_LEADCHNL_RV_PACING_ANODE_ELECTRODE_1: NORMAL
MDC_IDC_SET_LEADCHNL_RV_PACING_ANODE_LOCATION_1: NORMAL
MDC_IDC_SET_LEADCHNL_RV_PACING_CAPTURE_MODE: NORMAL
MDC_IDC_SET_LEADCHNL_RV_PACING_CATHODE_ELECTRODE_1: NORMAL
MDC_IDC_SET_LEADCHNL_RV_PACING_CATHODE_LOCATION_1: NORMAL
MDC_IDC_SET_LEADCHNL_RV_PACING_POLARITY: NORMAL
MDC_IDC_SET_LEADCHNL_RV_PACING_PULSEWIDTH: 0.4 MS
MDC_IDC_SET_LEADCHNL_RV_SENSING_ANODE_ELECTRODE_1: NORMAL
MDC_IDC_SET_LEADCHNL_RV_SENSING_ANODE_LOCATION_1: NORMAL
MDC_IDC_SET_LEADCHNL_RV_SENSING_CATHODE_ELECTRODE_1: NORMAL
MDC_IDC_SET_LEADCHNL_RV_SENSING_CATHODE_LOCATION_1: NORMAL
MDC_IDC_SET_LEADCHNL_RV_SENSING_POLARITY: NORMAL
MDC_IDC_SET_LEADCHNL_RV_SENSING_SENSITIVITY: 0.9 MV
MDC_IDC_SET_ZONE_DETECTION_INTERVAL: 350 MS
MDC_IDC_SET_ZONE_DETECTION_INTERVAL: 400 MS
MDC_IDC_SET_ZONE_STATUS: NORMAL
MDC_IDC_SET_ZONE_STATUS: NORMAL
MDC_IDC_SET_ZONE_TYPE: NORMAL
MDC_IDC_SET_ZONE_VENDOR_TYPE: NORMAL
MDC_IDC_STAT_AT_BURDEN_PERCENT: 0 %
MDC_IDC_STAT_AT_DTM_END: NORMAL
MDC_IDC_STAT_AT_DTM_START: NORMAL
MDC_IDC_STAT_AT_MODE_SW_COUNT: 0
MDC_IDC_STAT_BRADY_AP_VP_PERCENT: 0.48 %
MDC_IDC_STAT_BRADY_AP_VS_PERCENT: 49.5 %
MDC_IDC_STAT_BRADY_AS_VP_PERCENT: 0.04 %
MDC_IDC_STAT_BRADY_AS_VS_PERCENT: 49.98 %
MDC_IDC_STAT_BRADY_DTM_END: NORMAL
MDC_IDC_STAT_BRADY_DTM_START: NORMAL
MDC_IDC_STAT_BRADY_RA_PERCENT_PACED: 50.75 %
MDC_IDC_STAT_BRADY_RV_PERCENT_PACED: 0.52 %
MDC_IDC_STAT_EPISODE_RECENT_COUNT: 0
MDC_IDC_STAT_EPISODE_RECENT_COUNT: 1
MDC_IDC_STAT_EPISODE_RECENT_COUNT_DTM_END: NORMAL
MDC_IDC_STAT_EPISODE_RECENT_COUNT_DTM_START: NORMAL
MDC_IDC_STAT_EPISODE_TOTAL_COUNT: 0
MDC_IDC_STAT_EPISODE_TOTAL_COUNT: 0
MDC_IDC_STAT_EPISODE_TOTAL_COUNT: 554
MDC_IDC_STAT_EPISODE_TOTAL_COUNT: 6
MDC_IDC_STAT_EPISODE_TOTAL_COUNT: NORMAL
MDC_IDC_STAT_EPISODE_TOTAL_COUNT_DTM_END: NORMAL
MDC_IDC_STAT_EPISODE_TOTAL_COUNT_DTM_START: NORMAL
MDC_IDC_STAT_EPISODE_TYPE: NORMAL
MDC_IDC_STAT_TACHYTHERAPY_RECENT_DTM_END: NORMAL
MDC_IDC_STAT_TACHYTHERAPY_RECENT_DTM_START: NORMAL
MDC_IDC_STAT_TACHYTHERAPY_TOTAL_DTM_END: NORMAL
MDC_IDC_STAT_TACHYTHERAPY_TOTAL_DTM_START: NORMAL

## 2024-08-28 PROCEDURE — 99214 OFFICE O/P EST MOD 30 MIN: CPT | Performed by: INTERNAL MEDICINE

## 2024-08-28 PROCEDURE — 93280 PM DEVICE PROGR EVAL DUAL: CPT | Performed by: INTERNAL MEDICINE

## 2024-08-28 NOTE — LETTER
8/28/2024    Maggi Muro MD  [address]  [facility] [address]    RE: Vandana Thao       Dear Colleague,     I had the pleasure of seeing Vandana Thao in the [facility].      Thank you, Dr. Maggi Muro, for asking the [facility] team to see Ms. Vandana Thao to follow-up on aortic valve disease, permanent pacemaker for intermittent heart block, coronary artery disease and fascicular tachycardia.    Assessment/Recommendations   Assessment:    1.  Aortic valve disease, status post mechanical aortic valve replacement in 2008 for severe stenosis.  Patient continues to do well with no concerns.  Her echocardiogram last year demonstrated normal mechanical valve function without any evidence of insufficiency.  INR's remained stable.  At this point would continue to monitor.  2.  Intermittent complete heart block status post dual-chamber pacemaker insertion in 2018.  Device check today demonstrates normal device operation with no evidence of atrial fibrillation.  She did have 1 episode of SVT which may have been her fascicular tachycardia but otherwise no other arrhythmias.  3.  Fascicular tachycardia, well suppressed with current dose of verapamil.  4.  Mild coronary artery disease by preoperative angiography in 2008.  Patient continues to deny any symptoms of exertional chest discomfort or dyspnea.  Last nuclear stress test in 2018 was negative.  5.  Hypercholesterolemia, currently treated with pravastatin.  Her most recent lipid profile 5 months ago demonstrated an LDL of 129.  Would consider switch to a stronger statin given the presence of mild coronary artery disease in 2008 with a goal LDL less than 70.    Plan:  1.  Continue current medications for now but consider stronger statin agent given documentation of mild coronary artery disease with goal LDL less than 70  2.  Follow-up with me in 1 year for in-clinic pacemaker check and follow-up visit        History of Present Illness    Ms. Vandana Thao is a 74 year old female with history of aortic valve stenosis, status post mechanical aortic valve replacement in 2008, intermittent complete heart block status post dual-chamber pacemaker insertion in 2018 and fascicular tachycardia on verapamil who presents to the clinic today for follow-up visit.  Tells me she has been feeling great over the year with no complaints of exertional chest discomfort or dyspnea.  Continues to exercise on a regular basis with no decline in her ability to do so.  Denies any palpitations, lightheadedness or near syncope.    ECG (personally reviewed): No ECG today.  Pacer check was performed in conjunction with her visit and is reported separately    Cardiac Imaging Studies (personally reviewed): No new imaging     Physical Examination Review of Systems   /70 (BP Location: Right arm, Patient Position: Sitting, Cuff Size: Adult Regular)   Pulse 80   Resp 20   Wt 83.5 kg (184 lb)   SpO2 95%   BMI 33.65 kg/m    Body mass index is 33.65 kg/m .  Wt Readings from Last 3 Encounters:   08/28/24 83.5 kg (184 lb)   07/22/24 84.8 kg (187 lb)   03/14/24 84.4 kg (186 lb)     General Appearance:   Awake, Alert, No acute distress.   HEENT:  No scleral icterus; the mucous membranes were pink and moist.   Neck: No cervical bruits or jugular venous distention    Chest: The spine was straight. The chest was symmetric.   Lungs:   Respirations unlabored; the lungs are clear to auscultation. No wheezing   Cardiovascular:   Regular rate and rhythm.  S1 normal S2 crisp and mechanical.  No murmur   Abdomen:  No organomegaly, masses, bruits, or tenderness. Bowels sounds are present   Extremities: No peripheral edema   Skin: No xanthelasma. Warm, Dry.   Musculoskeletal: No tenderness.   Neurologic: Mood and affect are appropriate.    Enc Vitals  BP: 136/70  Pulse: 80  Resp: 20  SpO2: 95 %  Weight: 83.5 kg (184 lb)                                          Medical History  Surgical History Family History Social History   Past Medical History:   Diagnosis Date     History of aortic stenosis 2018     Hyperlipidemia      Hypothyroidism      Valvular disease     aortic stenosis secondary to bicuspid valve    Past Surgical History:   Procedure Laterality Date     CARDIAC VALVE REPLACEMENT  2009    AVR     COLONOSCOPY  2013     CYSTOSTOMY W/ BLADDER BIOPSY  2000     EP PACEMAKER INSERT N/A 2018    Procedure: EP Pacemaker Insertion;  Surgeon: Luis Urena MD;  Location: [facility];  Service:      EYE SURGERY  10/2022     OTHER SURGICAL HISTORY  2002    thyroidectomy     SPLENECTOMY  1979     ZZC REPLACE AORT VALV,PROSTH VALV      Description: Aortic Valve Replacement;  Proc Date: 2009;  Comments: #[facility] [place] Prosthesis    Family History   Problem Relation Age of Onset     Acute Myocardial Infarction Brother 43     Rheumatoid Arthritis Mother          in 40s     Coronary Artery Disease Father      Hypertension Father      No Known Problems Maternal Grandmother      No Known Problems Maternal Grandfather      No Known Problems Paternal Grandmother      No Known Problems Paternal Grandfather      Aortic stenosis Brother         s/p surgery     No Known Problems Sister      Breast Cancer No family hx of      Colon Cancer No family hx of      Cervical Cancer No family hx of      Other Cancer No family hx of     Social History     Socioeconomic History     Marital status:      Spouse name: Not on file     Number of children: Not on file     Years of education: Not on file     Highest education level: Not on file   Occupational History     Not on file   Tobacco Use     Smoking status: Former     Current packs/day: 0.00     Average packs/day: 0.5 packs/day for 20.0 years (10.0 ttl pk-yrs)     Types: Cigarettes     Start date: 1959     Quit date: 1979     Years since quittin.6      Smokeless tobacco: Never   Vaping Use     Vaping status: Never Used   Substance and Sexual Activity     Alcohol use: Yes     Comment: 1-2 drinks per week or less     Drug use: No     Sexual activity: Yes     Partners: Male   Other Topics Concern     Parent/sibling w/ CABG, MI or angioplasty before 65F 55M? Yes     Comment: Brother 43 yrs   Social History Narrative     Not on file     Social Determinants of Health     Financial Resource Strain: Low Risk  (3/12/2024)    Financial Resource Strain      Within the past 12 months, have you or your family members you live with been unable to get utilities (heat, electricity) when it was really needed?: No   Food Insecurity: Low Risk  (3/12/2024)    Food Insecurity      Within the past 12 months, did you worry that your food would run out before you got money to buy more?: No      Within the past 12 months, did the food you bought just not last and you didn t have money to get more?: No   Transportation Needs: Low Risk  (3/12/2024)    Transportation Needs      Within the past 12 months, has lack of transportation kept you from medical appointments, getting your medicines, non-medical meetings or appointments, work, or from getting things that you need?: No   Physical Activity: Insufficiently Active (3/12/2024)    Exercise Vital Sign      Days of Exercise per Week: 3 days      Minutes of Exercise per Session: 40 min   Stress: Stress Concern Present (3/12/2024)    [employer] - Occupational Stress Questionnaire      Feeling of Stress : To some extent   Social Connections: Unknown (3/12/2024)    Social Connection and Isolation Panel [NHANES]      Frequency of Communication with Friends and Family: Not on file      Frequency of Social Gatherings with Friends and Family: Once a week      Attends [employer] Services: Not on file      Active Member of Clubs or Organizations: Not on file      Attends Club or Organization Meetings: Not on file       Marital Status: Not on file   Interpersonal Safety: Low Risk  (3/14/2024)    Interpersonal Safety      Do you feel physically and emotionally safe where you currently live?: Yes      Within the past 12 months, have you been hit, slapped, kicked or otherwise physically hurt by someone?: No      Within the past 12 months, have you been humiliated or emotionally abused in other ways by your partner or ex-partner?: No   Housing Stability: Low Risk  (3/12/2024)    Housing Stability      Do you have housing? : Yes      Are you worried about losing your housing?: No          Medications  Allergies   Current Outpatient Medications   Medication Sig Dispense Refill     cholecalciferol, vitamin D3, 1,000 unit tablet [CHOLECALCIFEROL, VITAMIN D3, 1,000 UNIT TABLET] Take 1,000 Units by mouth daily.              levothyroxine (SYNTHROID/LEVOTHROID) 100 MCG tablet Take 1 tablet (100 mcg) by mouth daily 90 tablet 4     pravastatin (PRAVACHOL) 40 MG tablet Take 1 tablet (40 mg) by mouth daily 90 tablet 4     verapamil ER (VERELAN) 240 MG 24 hr capsule Take 1 capsule (240 mg) by mouth daily 90 capsule 4     warfarin ANTICOAGULANT (COUMADIN) 5 MG tablet Take 1-1.5 tablets (5-7.5 mg) by mouth daily Adjust dose per INR as directed by  tablet 1     amoxicillin (AMOXIL) 500 MG capsule Take 4 capsules (2,000 mg) by mouth once for 1 dose Prior to dental procedure. 4 capsule 3     traZODone (DESYREL) 50 MG tablet TAKE 1/2 TO 1 TABLET BY MOUTH ONCE DAILY AT BEDTIME AS NEEDED FOR SLEEP (Patient not taking: Reported on 8/28/2024) 90 tablet 4      No Known Allergies      Lab Results    Chemistry/lipid CBC Cardiac Enzymes/BNP/TSH/INR   Recent Labs   Lab Test 03/14/24  1259   TRIG 96   *   BUN 16.3      CO2 25    Recent Labs   Lab Test 01/16/23  0954   WBC 8.4   HGB 13.6   HCT 40.7   MCV 89       Recent Labs   Lab Test 07/30/24  1010 03/14/24  1303 03/14/24  1259 06/21/18  0327 06/20/18  2129   TROPONINI  --   --   --    --  <0.01   BNP  --   --   --   --  41   TSH  --   --  0.37   < > 0.25*   INR 2.1*   < >  --    < > 2.02*    < > = values in this interval not displayed.        A total of 32 minutes was spent reviewing patient's medical records, obtaining history and performing examination, as well as discussing diagnoses/ recommendations with patient and answering all questions.                        Thank you for allowing me to participate in the care of your patient.      Sincerely,     Nel Zhang MD     [facility]  cc:   Wendy Foster MD  [id number]  [place] [id number]

## 2024-08-28 NOTE — PROGRESS NOTES
Thank you, Dr. Maggi Muro, for asking the [facility] team to see Ms. Vandana Thao to follow-up on aortic valve disease, permanent pacemaker for intermittent heart block, coronary artery disease and fascicular tachycardia.    Assessment/Recommendations   Assessment:    1.  Aortic valve disease, status post mechanical aortic valve replacement in 2008 for severe stenosis.  Patient continues to do well with no concerns.  Her echocardiogram last year demonstrated normal mechanical valve function without any evidence of insufficiency.  INR's remained stable.  At this point would continue to monitor.  2.  Intermittent complete heart block status post dual-chamber pacemaker insertion in 2018.  Device check today demonstrates normal device operation with no evidence of atrial fibrillation.  She did have 1 episode of SVT which may have been her fascicular tachycardia but otherwise no other arrhythmias.  3.  Fascicular tachycardia, well suppressed with current dose of verapamil.  4.  Mild coronary artery disease by preoperative angiography in 2008.  Patient continues to deny any symptoms of exertional chest discomfort or dyspnea.  Last nuclear stress test in 2018 was negative.  5.  Hypercholesterolemia, currently treated with pravastatin.  Her most recent lipid profile 5 months ago demonstrated an LDL of 129.  Would consider switch to a stronger statin given the presence of mild coronary artery disease in 2008 with a goal LDL less than 70.    Plan:  1.  Continue current medications for now but consider stronger statin agent given documentation of mild coronary artery disease with goal LDL less than 70  2.  Follow-up with me in 1 year for in-clinic pacemaker check and follow-up visit       History of Present Illness    Ms. Vandana Thao is a 74 year old female with history of aortic valve stenosis, status post mechanical aortic valve replacement in 2008, intermittent complete heart block  status post dual-chamber pacemaker insertion in 2018 and fascicular tachycardia on verapamil who presents to the clinic today for follow-up visit.  Tells me she has been feeling great over the year with no complaints of exertional chest discomfort or dyspnea.  Continues to exercise on a regular basis with no decline in her ability to do so.  Denies any palpitations, lightheadedness or near syncope.    ECG (personally reviewed): No ECG today.  Pacer check was performed in conjunction with her visit and is reported separately    Cardiac Imaging Studies (personally reviewed): No new imaging     Physical Examination Review of Systems   /70 (BP Location: Right arm, Patient Position: Sitting, Cuff Size: Adult Regular)   Pulse 80   Resp 20   Wt 83.5 kg (184 lb)   SpO2 95%   BMI 33.65 kg/m    Body mass index is 33.65 kg/m .  Wt Readings from Last 3 Encounters:   08/28/24 83.5 kg (184 lb)   07/22/24 84.8 kg (187 lb)   03/14/24 84.4 kg (186 lb)     General Appearance:   Awake, Alert, No acute distress.   HEENT:  No scleral icterus; the mucous membranes were pink and moist.   Neck: No cervical bruits or jugular venous distention    Chest: The spine was straight. The chest was symmetric.   Lungs:   Respirations unlabored; the lungs are clear to auscultation. No wheezing   Cardiovascular:   Regular rate and rhythm.  S1 normal S2 crisp and mechanical.  No murmur   Abdomen:  No organomegaly, masses, bruits, or tenderness. Bowels sounds are present   Extremities: No peripheral edema   Skin: No xanthelasma. Warm, Dry.   Musculoskeletal: No tenderness.   Neurologic: Mood and affect are appropriate.    Enc Vitals  BP: 136/70  Pulse: 80  Resp: 20  SpO2: 95 %  Weight: 83.5 kg (184 lb)                                         Medical History  Surgical History Family History Social History   Past Medical History:   Diagnosis Date    History of aortic stenosis 06/21/2018    Hyperlipidemia     Hypothyroidism     Valvular disease      aortic stenosis secondary to bicuspid valve    Past Surgical History:   Procedure Laterality Date    CARDIAC VALVE REPLACEMENT  2009    AVR    COLONOSCOPY  2013    CYSTOSTOMY W/ BLADDER BIOPSY  2000    EP PACEMAKER INSERT N/A 2018    Procedure: EP Pacemaker Insertion;  Surgeon: Luis Urena MD;  Location: [facility];  Service:     EYE SURGERY  10/2022    OTHER SURGICAL HISTORY  2002    thyroidectomy    SPLENECTOMY  1979    ZZC REPLACE AORT VALV,PROSTH VALV      Description: Aortic Valve Replacement;  Proc Date: 2009;  Comments: #[address] [place] Prosthesis    Family History   Problem Relation Age of Onset    Acute Myocardial Infarction Brother 43    Rheumatoid Arthritis Mother          in 40s    Coronary Artery Disease Father     Hypertension Father     No Known Problems Maternal Grandmother     No Known Problems Maternal Grandfather     No Known Problems Paternal Grandmother     No Known Problems Paternal Grandfather     Aortic stenosis Brother         s/p surgery    No Known Problems Sister     Breast Cancer No family hx of     Colon Cancer No family hx of     Cervical Cancer No family hx of     Other Cancer No family hx of     Social History     Socioeconomic History    Marital status:      Spouse name: Not on file    Number of children: Not on file    Years of education: Not on file    Highest education level: Not on file   Occupational History    Not on file   Tobacco Use    Smoking status: Former     Current packs/day: 0.00     Average packs/day: 0.5 packs/day for 20.0 years (10.0 ttl pk-yrs)     Types: Cigarettes     Start date: 1959     Quit date: 1979     Years since quittin.6    Smokeless tobacco: Never   Vaping Use    Vaping status: Never Used   Substance and Sexual Activity    Alcohol use: Yes     Comment: 1-2 drinks per week or less    Drug use: No    Sexual activity: Yes     Partners: Male   Other Topics Concern     Parent/sibling w/ CABG, MI or angioplasty before 65F 55M? Yes     Comment: Brother 43 yrs   Social History Narrative    Not on file     Social Determinants of Health     Financial Resource Strain: Low Risk  (3/12/2024)    Financial Resource Strain     Within the past 12 months, have you or your family members you live with been unable to get utilities (heat, electricity) when it was really needed?: No   Food Insecurity: Low Risk  (3/12/2024)    Food Insecurity     Within the past 12 months, did you worry that your food would run out before you got money to buy more?: No     Within the past 12 months, did the food you bought just not last and you didn t have money to get more?: No   Transportation Needs: Low Risk  (3/12/2024)    Transportation Needs     Within the past 12 months, has lack of transportation kept you from medical appointments, getting your medicines, non-medical meetings or appointments, work, or from getting things that you need?: No   Physical Activity: Insufficiently Active (3/12/2024)    Exercise Vital Sign     Days of Exercise per Week: 3 days     Minutes of Exercise per Session: 40 min   Stress: Stress Concern Present (3/12/2024)    [employer] - Occupational Stress Questionnaire     Feeling of Stress : To some extent   Social Connections: Unknown (3/12/2024)    Social Connection and Isolation Panel [NHANES]     Frequency of Communication with Friends and Family: Not on file     Frequency of Social Gatherings with Friends and Family: Once a week     Attends Zoroastrian Services: Not on file     Active Member of Clubs or Organizations: Not on file     Attends Club or Organization Meetings: Not on file     Marital Status: Not on file   Interpersonal Safety: Low Risk  (3/14/2024)    Interpersonal Safety     Do you feel physically and emotionally safe where you currently live?: Yes     Within the past 12 months, have you been hit, slapped, kicked or otherwise physically  hurt by someone?: No     Within the past 12 months, have you been humiliated or emotionally abused in other ways by your partner or ex-partner?: No   Housing Stability: Low Risk  (3/12/2024)    Housing Stability     Do you have housing? : Yes     Are you worried about losing your housing?: No          Medications  Allergies   Current Outpatient Medications   Medication Sig Dispense Refill    cholecalciferol, vitamin D3, 1,000 unit tablet [CHOLECALCIFEROL, VITAMIN D3, 1,000 UNIT TABLET] Take 1,000 Units by mouth daily.             levothyroxine (SYNTHROID/LEVOTHROID) 100 MCG tablet Take 1 tablet (100 mcg) by mouth daily 90 tablet 4    pravastatin (PRAVACHOL) 40 MG tablet Take 1 tablet (40 mg) by mouth daily 90 tablet 4    verapamil ER (VERELAN) 240 MG 24 hr capsule Take 1 capsule (240 mg) by mouth daily 90 capsule 4    warfarin ANTICOAGULANT (COUMADIN) 5 MG tablet Take 1-1.5 tablets (5-7.5 mg) by mouth daily Adjust dose per INR as directed by  tablet 1    amoxicillin (AMOXIL) 500 MG capsule Take 4 capsules (2,000 mg) by mouth once for 1 dose Prior to dental procedure. 4 capsule 3    traZODone (DESYREL) 50 MG tablet TAKE 1/2 TO 1 TABLET BY MOUTH ONCE DAILY AT BEDTIME AS NEEDED FOR SLEEP (Patient not taking: Reported on 8/28/2024) 90 tablet 4      No Known Allergies      Lab Results    Chemistry/lipid CBC Cardiac Enzymes/BNP/TSH/INR   Recent Labs   Lab Test 03/14/24  1259   TRIG 96   *   BUN 16.3      CO2 25    Recent Labs   Lab Test 01/16/23  0954   WBC 8.4   HGB 13.6   HCT 40.7   MCV 89       Recent Labs   Lab Test 07/30/24  1010 03/14/24  1303 03/14/24  1259 06/21/18  0327 06/20/18  2129   TROPONINI  --   --   --   --  <0.01   BNP  --   --   --   --  41   TSH  --   --  0.37   < > 0.25*   INR 2.1*   < >  --    < > 2.02*    < > = values in this interval not displayed.        A total of 32 minutes was spent reviewing patient's medical records, obtaining history and performing examination, as  well as discussing diagnoses/ recommendations with patient and answering all questions.

## 2024-09-06 DIAGNOSIS — Z79.01 ANTICOAGULATED ON WARFARIN: ICD-10-CM

## 2024-09-06 RX ORDER — WARFARIN SODIUM 5 MG/1
5-7.5 TABLET ORAL DAILY
Qty: 135 TABLET | Refills: 1 | Status: SHIPPED | OUTPATIENT
Start: 2024-09-06

## 2024-09-06 NOTE — TELEPHONE ENCOUNTER
ANTICOAGULATION MANAGEMENT:  Medication Refill    Anticoagulation Summary  As of 7/30/2024      Warfarin maintenance plan:  7.5 mg (5 mg x 1.5) every Sun; 5 mg (5 mg x 1) all other days   Next INR check:  9/24/2024   Target end date:  Indefinite    Indications    Anticoagulated on warfarin [Z79.01]  Hx of mechanical aortic valve replacement [Z95.2]  H/O bicuspid aortic valve [Z87.74]                 Anticoagulation Care Providers       Provider Role Specialty Phone number    Lakeisha Marmolejo MD Referring Family Medicine [phone]    Maggi Muro MD Referring Family Medicine [phone]            Refill Criteria    Visit with referring provider/group: Meets criteria: visit within referring provider group in the last 15 months on 7/22/24    [facility] referral last signed: 03/20/2024; within last year: Yes    Lab monitoring not exceeding 2 weeks overdue: Yes    Vandana meets all criteria for refill. Rx instructions and quantity match patient's current dosing plan. Warfarin 90 day supply with 1 refill granted per [facility] protocol     Becca Adames RN  Anticoagulation Clinic

## 2024-09-24 ENCOUNTER — ANTICOAGULATION THERAPY VISIT (OUTPATIENT)
Dept: ANTICOAGULATION | Facility: CLINIC | Age: 74
End: 2024-09-24

## 2024-09-24 ENCOUNTER — LAB (OUTPATIENT)
Dept: LAB | Facility: CLINIC | Age: 74
End: 2024-09-24
Payer: MEDICARE

## 2024-09-24 ENCOUNTER — IMMUNIZATION (OUTPATIENT)
Dept: FAMILY MEDICINE | Facility: CLINIC | Age: 74
End: 2024-09-24
Payer: MEDICARE

## 2024-09-24 DIAGNOSIS — Z95.2 HX OF MECHANICAL AORTIC VALVE REPLACEMENT: ICD-10-CM

## 2024-09-24 DIAGNOSIS — Z79.01 ANTICOAGULATED ON WARFARIN: Primary | ICD-10-CM

## 2024-09-24 DIAGNOSIS — Z79.01 ANTICOAGULATED ON WARFARIN: ICD-10-CM

## 2024-09-24 DIAGNOSIS — Z87.74 H/O BICUSPID AORTIC VALVE: ICD-10-CM

## 2024-09-24 LAB — INR BLD: 2.2 (ref 0.9–1.1)

## 2024-09-24 PROCEDURE — 91320 SARSCV2 VAC 30MCG TRS-SUC IM: CPT

## 2024-09-24 PROCEDURE — 90480 ADMN SARSCOV2 VAC 1/ONLY CMP: CPT

## 2024-09-24 PROCEDURE — 90471 IMMUNIZATION ADMIN: CPT

## 2024-09-24 PROCEDURE — 99207 PR NO CHARGE NURSE ONLY: CPT

## 2024-09-24 PROCEDURE — 36416 COLLJ CAPILLARY BLOOD SPEC: CPT

## 2024-09-24 PROCEDURE — 85610 PROTHROMBIN TIME: CPT

## 2024-09-24 PROCEDURE — 90662 IIV NO PRSV INCREASED AG IM: CPT

## 2024-09-24 NOTE — PROGRESS NOTES
ANTICOAGULATION MANAGEMENT     Vandana Thao 74 year old female is on warfarin with therapeutic INR result. (Goal INR  1.8-2.5 )    Recent labs: (last 7 days)     09/24/24  1015   INR 2.2*       ASSESSMENT     Warfarin Lab Questionnaire    Warfarin Doses Last 7 Days      9/24/2024     7:45 AM   Dose in Tablet or Mg   TAB or MG? milligram (mg)     Pt Rptd Dose SUNDAY MONDAY TUESDAY WED THURS FRIDAY SATURDAY 9/24/2024   7:45 AM 7.5 5 5 5 5 5 5         9/24/2024   Warfarin Lab Questionnaire   Missed doses within past 14 days? No   Changes in diet or alcohol within past 14 days? No   Medication changes since last result? No   Injuries or illness since last result? No   New shortness of breath, severe headaches or sudden changes in vision since last result? No   Abnormal bleeding since last result? No   Upcoming surgery, procedure? No        Previous result: Therapeutic last 2(+) visits  Additional findings: None       PLAN     Recommended plan for no diet, medication or health factor changes affecting INR     Dosing Instructions: Continue your current warfarin dose with next INR in 8 weeks       Summary  As of 9/24/2024      Full warfarin instructions:  7.5 mg every Sun; 5 mg all other days   Next INR check:  11/19/2024               Telephone call with Vandana who verbalizes understanding and agrees to plan    Lab visit scheduled    Education provided: Goal range and lab monitoring: goal range and significance of current result  Contact [phone] with any changes, questions or concerns.     Plan made per [facility] anticoagulation protocol    Dorene Howard RN  9/24/2024  Anticoagulation Clinic  [employer] for routing messages: ralph JOHN  [facility] patient phone line: [phone]        _______________________________________________________________________     Anticoagulation Episode Summary       Current INR goal:  Other - see comment   TTR:  77.0% (1 y)   Target end date:  Indefinite   [facility]    Indications    Anticoagulated on warfarin [Z79.01]  Hx of mechanical aortic valve replacement [Z95.2]  H/O bicuspid aortic valve [Z87.74]             Comments:  Approved for 8 week checks per -- see encounter from 01/16/23  3/20/24: INR goal range changed to 1.8-2.5             Anticoagulation Care Providers       Provider Role Specialty Phone number    Lakeisha Marmolejo MD Referring Family Medicine [phone]    Maggi Muro MD Referring Family Medicine [phone]

## 2024-09-24 NOTE — PROGRESS NOTES
Prior to immunization administration, verified patients identity using patient s name and date of birth. Please see Immunization Activity for additional information.     Screening Questionnaire for Adult Immunization    Are you sick today?   No   Do you have allergies to medications, food, a vaccine component or latex?   No   Have you ever had a serious reaction after receiving a vaccination?   No   Do you have a long-term health problem with heart, lung, kidney, or metabolic disease (e.g., diabetes), asthma, a blood disorder, no spleen, complement component deficiency, a cochlear implant, or a spinal fluid leak?  Are you on long-term aspirin therapy?   No   Do you have cancer, leukemia, HIV/AIDS, or any other immune system problem?   No   Do you have a parent, brother, or sister with an immune system problem?   No   In the past 3 months, have you taken medications that affect  your immune system, such as prednisone, other steroids, or anticancer drugs; drugs for the treatment of rheumatoid arthritis, Crohn s disease, or psoriasis; or have you had radiation treatments?   No   Have you had a seizure, or a brain or other nervous system problem?   No   During the past year, have you received a transfusion of blood or blood    products, or been given immune (gamma) globulin or antiviral drug?   No   For women: Are you pregnant or is there a chance you could become       pregnant during the next month?   No   Have you received any vaccinations in the past 4 weeks?   No     Immunization questionnaire answers were all negative.    I have reviewed the following standing orders:   This patient is due and qualifies for the Covid-19 vaccine.     Click here for COVID-19 Standing Order    I have reviewed the vaccines inclusion and exclusion criteria; No concerns regarding eligibility.     This patient is due and qualifies for the Influenza vaccine.    Click here for Influenza Vaccine Standing Order    I have reviewed the  vaccines inclusion and exclusion criteria; No concerns regarding eligibility.     Patient instructed to remain in clinic for 15 minutes afterwards, and to report any adverse reactions.     Screening performed by Adore Glaser CMA on 9/24/2024 at 9:56 AM.

## 2024-11-14 ENCOUNTER — MYC REFILL (OUTPATIENT)
Dept: FAMILY MEDICINE | Facility: CLINIC | Age: 74
End: 2024-11-14
Payer: MEDICARE

## 2024-11-14 DIAGNOSIS — Z79.01 ANTICOAGULATED ON WARFARIN: ICD-10-CM

## 2024-11-14 RX ORDER — WARFARIN SODIUM 5 MG/1
5-7.5 TABLET ORAL DAILY
Qty: 135 TABLET | Refills: 1 | Status: SHIPPED | OUTPATIENT
Start: 2024-11-14

## 2024-11-14 NOTE — TELEPHONE ENCOUNTER
ANTICOAGULATION MANAGEMENT:  Medication Refill    Anticoagulation Summary  As of 9/24/2024      Warfarin maintenance plan:  7.5 mg (5 mg x 1.5) every Sun; 5 mg (5 mg x 1) all other days   Next INR check:  11/19/2024   Target end date:  Indefinite    Indications    Anticoagulated on warfarin [Z79.01]  Hx of mechanical aortic valve replacement [Z95.2]  H/O bicuspid aortic valve [Z87.74]                 Anticoagulation Care Providers       Provider Role Specialty Phone number    Lakeisha Marmolejo MD Referring Family Medicine [phone]    Maggi Muro MD Referring Family Medicine [phone]            Refill Criteria    Visit with referring provider/group: Meets criteria: visit within referring provider group in the last 15 months on 7/22/24    [facility] referral last signed: 03/20/2024; within last year: Yes    Lab monitoring not exceeding 2 weeks overdue: Yes    Vandana meets all criteria for refill. Rx instructions and quantity match patient's current dosing plan. Warfarin 90 day supply with 1 refill granted per [facility] protocol     Becca Adames RN  Anticoagulation Clinic

## 2024-11-19 ENCOUNTER — LAB (OUTPATIENT)
Dept: LAB | Facility: CLINIC | Age: 74
End: 2024-11-19
Payer: COMMERCIAL

## 2024-11-19 ENCOUNTER — ANTICOAGULATION THERAPY VISIT (OUTPATIENT)
Dept: ANTICOAGULATION | Facility: CLINIC | Age: 74
End: 2024-11-19

## 2024-11-19 DIAGNOSIS — Z87.74 H/O BICUSPID AORTIC VALVE: ICD-10-CM

## 2024-11-19 DIAGNOSIS — Z79.01 ANTICOAGULATED ON WARFARIN: Primary | ICD-10-CM

## 2024-11-19 DIAGNOSIS — Z95.2 HX OF MECHANICAL AORTIC VALVE REPLACEMENT: ICD-10-CM

## 2024-11-19 DIAGNOSIS — Z79.01 ANTICOAGULATED ON WARFARIN: ICD-10-CM

## 2024-11-19 LAB — INR BLD: 2 (ref 0.9–1.1)

## 2024-11-19 PROCEDURE — 85610 PROTHROMBIN TIME: CPT

## 2024-11-19 PROCEDURE — 36416 COLLJ CAPILLARY BLOOD SPEC: CPT

## 2024-11-19 NOTE — PROGRESS NOTES
ANTICOAGULATION MANAGEMENT     Vandana Thao 74 year old female is on warfarin with therapeutic INR result. (Goal INR  1.8-2.5 )    Recent labs: (last 7 days)     11/19/24  1010   INR 2.0*       ASSESSMENT     Warfarin Lab Questionnaire    Warfarin Doses Last 7 Days      11/18/2024    10:52 AM   Dose in Tablet or Mg   TAB or MG? milligram (mg)     Pt Rptd Dose SUNDAY MONDAY TUESDAY WED THURS FRIDAY SATURDAY 11/18/2024  10:52 AM 7.5 5 5 5 5 5 5         11/18/2024   Warfarin Lab Questionnaire   Missed doses within past 14 days? No   Changes in diet or alcohol within past 14 days? No   Medication changes since last result? No   Injuries or illness since last result? No   New shortness of breath, severe headaches or sudden changes in vision since last result? No   Abnormal bleeding since last result? No   Upcoming surgery, procedure? No   Best number to call with results? [phone]        Previous result: Therapeutic last 2(+) visits  Additional findings: None       PLAN     Recommended plan for no diet, medication or health factor changes affecting INR     Dosing Instructions: Continue your current warfarin dose with next INR in 8 weeks       Summary  As of 11/19/2024      Full warfarin instructions:  7.5 mg every Sun; 5 mg all other days   Next INR check:  1/14/2025               Telephone call with Vandana who verbalizes understanding and agrees to plan    Lab visit scheduled    Education provided: Contact [phone] with any changes, questions or concerns.     Plan made per [facility] anticoagulation protocol    Becca Adames RN  11/19/2024  Anticoagulation Clinic  [facility] for routing messages: ralph JOHN  [facility] patient phone line: [phone]        _______________________________________________________________________     Anticoagulation Episode Summary       Current INR goal:  Other - see comment   TTR:  77.0% (1 y)   Target end date:  Indefinite   Send INR reminders to:  ANDREAS JOHN     Indications    Anticoagulated on warfarin [Z79.01]  Hx of mechanical aortic valve replacement [Z95.2]  H/O bicuspid aortic valve [Z87.74]             Comments:  Approved for 8 week checks per -- see encounter from 01/16/23  3/20/24: INR goal range changed to 1.8-2.5             Anticoagulation Care Providers       Provider Role Specialty Phone number    Lakeisha Marmolejo MD Referring Family Medicine [phone]    Maggi Muro MD Referring Family Medicine [phone]

## 2024-11-30 ENCOUNTER — ANCILLARY PROCEDURE (OUTPATIENT)
Dept: CARDIOLOGY | Facility: CLINIC | Age: 74
End: 2024-11-30
Attending: INTERNAL MEDICINE
Payer: COMMERCIAL

## 2024-11-30 DIAGNOSIS — I49.5 SICK SINUS SYNDROME (H): ICD-10-CM

## 2024-11-30 DIAGNOSIS — Z95.0 PACEMAKER: ICD-10-CM

## 2024-12-02 LAB
MDC_IDC_LEAD_CONNECTION_STATUS: NORMAL
MDC_IDC_LEAD_CONNECTION_STATUS: NORMAL
MDC_IDC_LEAD_IMPLANT_DT: NORMAL
MDC_IDC_LEAD_IMPLANT_DT: NORMAL
MDC_IDC_LEAD_LOCATION: NORMAL
MDC_IDC_LEAD_LOCATION: NORMAL
MDC_IDC_LEAD_LOCATION_DETAIL_1: NORMAL
MDC_IDC_LEAD_LOCATION_DETAIL_1: NORMAL
MDC_IDC_LEAD_MFG: NORMAL
MDC_IDC_LEAD_MFG: NORMAL
MDC_IDC_LEAD_MODEL: NORMAL
MDC_IDC_LEAD_MODEL: NORMAL
MDC_IDC_LEAD_POLARITY_TYPE: NORMAL
MDC_IDC_LEAD_POLARITY_TYPE: NORMAL
MDC_IDC_LEAD_SERIAL: NORMAL
MDC_IDC_LEAD_SERIAL: NORMAL
MDC_IDC_LEAD_SPECIAL_FUNCTION: NORMAL
MDC_IDC_LEAD_SPECIAL_FUNCTION: NORMAL
MDC_IDC_MSMT_BATTERY_DTM: NORMAL
MDC_IDC_MSMT_BATTERY_REMAINING_LONGEVITY: 103 MO
MDC_IDC_MSMT_BATTERY_RRT_TRIGGER: 2.62
MDC_IDC_MSMT_BATTERY_STATUS: NORMAL
MDC_IDC_MSMT_BATTERY_VOLTAGE: 2.99 V
MDC_IDC_MSMT_LEADCHNL_RA_IMPEDANCE_VALUE: 323 OHM
MDC_IDC_MSMT_LEADCHNL_RA_IMPEDANCE_VALUE: 437 OHM
MDC_IDC_MSMT_LEADCHNL_RA_PACING_THRESHOLD_AMPLITUDE: 0.75 V
MDC_IDC_MSMT_LEADCHNL_RA_PACING_THRESHOLD_PULSEWIDTH: 0.4 MS
MDC_IDC_MSMT_LEADCHNL_RA_SENSING_INTR_AMPL: 3.75 MV
MDC_IDC_MSMT_LEADCHNL_RA_SENSING_INTR_AMPL: 3.75 MV
MDC_IDC_MSMT_LEADCHNL_RV_IMPEDANCE_VALUE: 342 OHM
MDC_IDC_MSMT_LEADCHNL_RV_IMPEDANCE_VALUE: 456 OHM
MDC_IDC_MSMT_LEADCHNL_RV_PACING_THRESHOLD_AMPLITUDE: 1.25 V
MDC_IDC_MSMT_LEADCHNL_RV_PACING_THRESHOLD_PULSEWIDTH: 0.4 MS
MDC_IDC_MSMT_LEADCHNL_RV_SENSING_INTR_AMPL: 7.12 MV
MDC_IDC_MSMT_LEADCHNL_RV_SENSING_INTR_AMPL: 7.12 MV
MDC_IDC_PG_IMPLANT_DTM: NORMAL
MDC_IDC_PG_MFG: NORMAL
MDC_IDC_PG_MODEL: NORMAL
MDC_IDC_PG_SERIAL: NORMAL
MDC_IDC_PG_TYPE: NORMAL
MDC_IDC_SESS_CLINIC_NAME: NORMAL
MDC_IDC_SESS_DTM: NORMAL
MDC_IDC_SESS_TYPE: NORMAL
MDC_IDC_SET_BRADY_AT_MODE_SWITCH_RATE: 171 {BEATS}/MIN
MDC_IDC_SET_BRADY_HYSTRATE: NORMAL
MDC_IDC_SET_BRADY_LOWRATE: 60 {BEATS}/MIN
MDC_IDC_SET_BRADY_MAX_SENSOR_RATE: 130 {BEATS}/MIN
MDC_IDC_SET_BRADY_MAX_TRACKING_RATE: 130 {BEATS}/MIN
MDC_IDC_SET_BRADY_MODE: NORMAL
MDC_IDC_SET_BRADY_PAV_DELAY_LOW: 270 MS
MDC_IDC_SET_BRADY_SAV_DELAY_LOW: 240 MS
MDC_IDC_SET_LEADCHNL_RA_PACING_AMPLITUDE: 1.5 V
MDC_IDC_SET_LEADCHNL_RA_PACING_ANODE_ELECTRODE_1: NORMAL
MDC_IDC_SET_LEADCHNL_RA_PACING_ANODE_LOCATION_1: NORMAL
MDC_IDC_SET_LEADCHNL_RA_PACING_CAPTURE_MODE: NORMAL
MDC_IDC_SET_LEADCHNL_RA_PACING_CATHODE_ELECTRODE_1: NORMAL
MDC_IDC_SET_LEADCHNL_RA_PACING_CATHODE_LOCATION_1: NORMAL
MDC_IDC_SET_LEADCHNL_RA_PACING_POLARITY: NORMAL
MDC_IDC_SET_LEADCHNL_RA_PACING_PULSEWIDTH: 0.4 MS
MDC_IDC_SET_LEADCHNL_RA_SENSING_ANODE_ELECTRODE_1: NORMAL
MDC_IDC_SET_LEADCHNL_RA_SENSING_ANODE_LOCATION_1: NORMAL
MDC_IDC_SET_LEADCHNL_RA_SENSING_CATHODE_ELECTRODE_1: NORMAL
MDC_IDC_SET_LEADCHNL_RA_SENSING_CATHODE_LOCATION_1: NORMAL
MDC_IDC_SET_LEADCHNL_RA_SENSING_POLARITY: NORMAL
MDC_IDC_SET_LEADCHNL_RA_SENSING_SENSITIVITY: 0.3 MV
MDC_IDC_SET_LEADCHNL_RV_PACING_AMPLITUDE: 2 V
MDC_IDC_SET_LEADCHNL_RV_PACING_ANODE_ELECTRODE_1: NORMAL
MDC_IDC_SET_LEADCHNL_RV_PACING_ANODE_LOCATION_1: NORMAL
MDC_IDC_SET_LEADCHNL_RV_PACING_CAPTURE_MODE: NORMAL
MDC_IDC_SET_LEADCHNL_RV_PACING_CATHODE_ELECTRODE_1: NORMAL
MDC_IDC_SET_LEADCHNL_RV_PACING_CATHODE_LOCATION_1: NORMAL
MDC_IDC_SET_LEADCHNL_RV_PACING_POLARITY: NORMAL
MDC_IDC_SET_LEADCHNL_RV_PACING_PULSEWIDTH: 0.4 MS
MDC_IDC_SET_LEADCHNL_RV_SENSING_ANODE_ELECTRODE_1: NORMAL
MDC_IDC_SET_LEADCHNL_RV_SENSING_ANODE_LOCATION_1: NORMAL
MDC_IDC_SET_LEADCHNL_RV_SENSING_CATHODE_ELECTRODE_1: NORMAL
MDC_IDC_SET_LEADCHNL_RV_SENSING_CATHODE_LOCATION_1: NORMAL
MDC_IDC_SET_LEADCHNL_RV_SENSING_POLARITY: NORMAL
MDC_IDC_SET_LEADCHNL_RV_SENSING_SENSITIVITY: 0.9 MV
MDC_IDC_SET_ZONE_DETECTION_INTERVAL: 350 MS
MDC_IDC_SET_ZONE_DETECTION_INTERVAL: 400 MS
MDC_IDC_SET_ZONE_STATUS: NORMAL
MDC_IDC_SET_ZONE_STATUS: NORMAL
MDC_IDC_SET_ZONE_TYPE: NORMAL
MDC_IDC_SET_ZONE_VENDOR_TYPE: NORMAL
MDC_IDC_STAT_AT_BURDEN_PERCENT: 0 %
MDC_IDC_STAT_AT_DTM_END: NORMAL
MDC_IDC_STAT_AT_DTM_START: NORMAL
MDC_IDC_STAT_BRADY_AP_VP_PERCENT: 1.53 %
MDC_IDC_STAT_BRADY_AP_VS_PERCENT: 52.47 %
MDC_IDC_STAT_BRADY_AS_VP_PERCENT: 0.09 %
MDC_IDC_STAT_BRADY_AS_VS_PERCENT: 45.91 %
MDC_IDC_STAT_BRADY_DTM_END: NORMAL
MDC_IDC_STAT_BRADY_DTM_START: NORMAL
MDC_IDC_STAT_BRADY_RA_PERCENT_PACED: 56 %
MDC_IDC_STAT_BRADY_RV_PERCENT_PACED: 1.62 %
MDC_IDC_STAT_EPISODE_RECENT_COUNT: 0
MDC_IDC_STAT_EPISODE_RECENT_COUNT_DTM_END: NORMAL
MDC_IDC_STAT_EPISODE_RECENT_COUNT_DTM_START: NORMAL
MDC_IDC_STAT_EPISODE_TOTAL_COUNT: 0
MDC_IDC_STAT_EPISODE_TOTAL_COUNT: 0
MDC_IDC_STAT_EPISODE_TOTAL_COUNT: 554
MDC_IDC_STAT_EPISODE_TOTAL_COUNT: 6
MDC_IDC_STAT_EPISODE_TOTAL_COUNT: NORMAL
MDC_IDC_STAT_EPISODE_TOTAL_COUNT_DTM_END: NORMAL
MDC_IDC_STAT_EPISODE_TOTAL_COUNT_DTM_START: NORMAL
MDC_IDC_STAT_EPISODE_TYPE: NORMAL
MDC_IDC_STAT_TACHYTHERAPY_RECENT_DTM_END: NORMAL
MDC_IDC_STAT_TACHYTHERAPY_RECENT_DTM_START: NORMAL
MDC_IDC_STAT_TACHYTHERAPY_TOTAL_DTM_END: NORMAL
MDC_IDC_STAT_TACHYTHERAPY_TOTAL_DTM_START: NORMAL

## 2024-12-02 PROCEDURE — 93296 REM INTERROG EVL PM/IDS: CPT | Performed by: INTERNAL MEDICINE

## 2024-12-02 PROCEDURE — 93294 REM INTERROG EVL PM/LDLS PM: CPT | Performed by: INTERNAL MEDICINE

## 2025-01-08 ENCOUNTER — TELEPHONE (OUTPATIENT)
Dept: FAMILY MEDICINE | Facility: CLINIC | Age: 75
End: 2025-01-08
Payer: MEDICARE

## 2025-01-08 NOTE — TELEPHONE ENCOUNTER
Reason for Call:  Appointment Request    Patient requesting this type of appt:  Office Visit--In Person    Requested provider: Maggi Muro or anyone    Reason patient unable to be scheduled: Not within requested timeframe    When does patient want to be seen/preferred time: 1-2 days    Comments: Pt states starting Jan 2nd she has had some swelling on her neck that is tender, it is affecting her sleep. No known injury. Pt looking for something today or within this week, please contact pt.     Could we send this information to you in [employer] or would you prefer to receive a phone call?:   Patient would prefer a phone call   Okay to leave a detailed message?: Yes at Cell number on file:    Telephone Information:   Mobile [phone]       Call taken on 1/8/2025 at 7:42 AM by Serena Zuniga

## 2025-01-08 NOTE — TELEPHONE ENCOUNTER
First availability at [facility] would be 1/13/25 with Joseline Pablo. Informed patient if they would like to see urgent care or looking at another [facility] facility.

## 2025-01-08 NOTE — TELEPHONE ENCOUNTER
Unfortunately I do not have the ability to work patient into my schedule in the next 2 days.  I welcome her to see an available provider at [facility], another location, or urgent care.  CHANDAN

## 2025-01-10 ENCOUNTER — TRANSFERRED RECORDS (OUTPATIENT)
Dept: HEALTH INFORMATION MANAGEMENT | Facility: CLINIC | Age: 75
End: 2025-01-10
Payer: MEDICARE

## 2025-01-14 ENCOUNTER — LAB (OUTPATIENT)
Dept: LAB | Facility: CLINIC | Age: 75
End: 2025-01-14
Payer: COMMERCIAL

## 2025-01-14 ENCOUNTER — OFFICE VISIT (OUTPATIENT)
Dept: FAMILY MEDICINE | Facility: CLINIC | Age: 75
End: 2025-01-14
Payer: COMMERCIAL

## 2025-01-14 ENCOUNTER — ANCILLARY PROCEDURE (OUTPATIENT)
Dept: GENERAL RADIOLOGY | Facility: CLINIC | Age: 75
End: 2025-01-14
Attending: NURSE PRACTITIONER
Payer: COMMERCIAL

## 2025-01-14 ENCOUNTER — ANTICOAGULATION THERAPY VISIT (OUTPATIENT)
Dept: ANTICOAGULATION | Facility: CLINIC | Age: 75
End: 2025-01-14

## 2025-01-14 VITALS
OXYGEN SATURATION: 97 % | WEIGHT: 190 LBS | RESPIRATION RATE: 18 BRPM | HEIGHT: 62 IN | DIASTOLIC BLOOD PRESSURE: 70 MMHG | SYSTOLIC BLOOD PRESSURE: 136 MMHG | TEMPERATURE: 98.2 F | BODY MASS INDEX: 34.96 KG/M2 | HEART RATE: 72 BPM

## 2025-01-14 DIAGNOSIS — Z79.01 ANTICOAGULATED ON WARFARIN: ICD-10-CM

## 2025-01-14 DIAGNOSIS — Z95.2 HX OF MECHANICAL AORTIC VALVE REPLACEMENT: ICD-10-CM

## 2025-01-14 DIAGNOSIS — M54.2 NECK PAIN ON LEFT SIDE: ICD-10-CM

## 2025-01-14 DIAGNOSIS — Z79.01 ANTICOAGULATED ON WARFARIN: Primary | ICD-10-CM

## 2025-01-14 DIAGNOSIS — M54.2 NECK PAIN ON LEFT SIDE: Primary | ICD-10-CM

## 2025-01-14 DIAGNOSIS — Z87.74 H/O BICUSPID AORTIC VALVE: ICD-10-CM

## 2025-01-14 LAB
ERYTHROCYTE [DISTWIDTH] IN BLOOD BY AUTOMATED COUNT: 13.9 % (ref 10–15)
HCT VFR BLD AUTO: 41.9 % (ref 35–47)
HGB BLD-MCNC: 14.2 G/DL (ref 11.7–15.7)
INR BLD: 1.9 (ref 0.9–1.1)
MCH RBC QN AUTO: 30.5 PG (ref 26.5–33)
MCHC RBC AUTO-ENTMCNC: 33.9 G/DL (ref 31.5–36.5)
MCV RBC AUTO: 90 FL (ref 78–100)
PLATELET # BLD AUTO: 267 10E3/UL (ref 150–450)
RBC # BLD AUTO: 4.66 10E6/UL (ref 3.8–5.2)
WBC # BLD AUTO: 8.8 10E3/UL (ref 4–11)

## 2025-01-14 PROCEDURE — 36416 COLLJ CAPILLARY BLOOD SPEC: CPT

## 2025-01-14 PROCEDURE — 99214 OFFICE O/P EST MOD 30 MIN: CPT | Performed by: NURSE PRACTITIONER

## 2025-01-14 PROCEDURE — 36415 COLL VENOUS BLD VENIPUNCTURE: CPT | Performed by: NURSE PRACTITIONER

## 2025-01-14 PROCEDURE — 85027 COMPLETE CBC AUTOMATED: CPT | Performed by: NURSE PRACTITIONER

## 2025-01-14 PROCEDURE — 85610 PROTHROMBIN TIME: CPT

## 2025-01-14 PROCEDURE — 73000 X-RAY EXAM OF COLLAR BONE: CPT | Mod: TC | Performed by: RADIOLOGY

## 2025-01-14 RX ORDER — TIZANIDINE 2 MG/1
2 TABLET ORAL 3 TIMES DAILY PRN
Qty: 30 TABLET | Refills: 0 | Status: SHIPPED | OUTPATIENT
Start: 2025-01-14

## 2025-01-14 ASSESSMENT — PATIENT HEALTH QUESTIONNAIRE - PHQ9
SUM OF ALL RESPONSES TO PHQ QUESTIONS 1-9: 12
SUM OF ALL RESPONSES TO PHQ QUESTIONS 1-9: 12
10. IF YOU CHECKED OFF ANY PROBLEMS, HOW DIFFICULT HAVE THESE PROBLEMS MADE IT FOR YOU TO DO YOUR WORK, TAKE CARE OF THINGS AT HOME, OR GET ALONG WITH OTHER PEOPLE: VERY DIFFICULT

## 2025-01-14 ASSESSMENT — PAIN SCALES - GENERAL: PAINLEVEL_OUTOF10: SEVERE PAIN (7)

## 2025-01-14 NOTE — PROGRESS NOTES
ANTICOAGULATION MANAGEMENT     Vandana Thao 74 year old female is on warfarin with therapeutic INR result. (Goal INR Other - see comment)    Recent labs: (last 7 days)     01/14/25  1011   INR 1.9*       ASSESSMENT     Warfarin Lab Questionnaire    Warfarin Doses Last 7 Days      1/14/2025     9:35 AM   Dose in Tablet or Mg   TAB or MG? milligram (mg)     Pt Rptd Dose SUNDAY MONDAY TUESDAY WED THURS FRIDAY SATURDAY 1/14/2025   9:35 AM 7.5 5 5 5 5 5 5         1/14/2025   Warfarin Lab Questionnaire   Missed doses within past 14 days? No   Changes in diet or alcohol within past 14 days? No   Medication changes since last result? Yes: Tylenol at bedtime   Injuries or illness since last result? Yes   If yes, please explain: Did something to my neck, starting 1/2/25 neck pain, swollen spot, office visit today to review   New shortness of breath, severe headaches or sudden changes in vision since last result? No   Abnormal bleeding since last result? No   Upcoming surgery, procedure? No     Previous result: Therapeutic last 2(+) visits  Additional findings: None       PLAN     Recommended plan for temporary change(s) affecting INR     Dosing Instructions: Continue your current warfarin dose with next INR in 2 weeks       Summary  As of 1/14/2025      Full warfarin instructions:  7.5 mg every Sun; 5 mg all other days   Next INR check:  1/28/2025               Telephone call with Vandana who verbalizes understanding and agrees to plan    Lab visit scheduled    Education provided: Goal range and lab monitoring: goal range and significance of current result  Contact [phone] with any changes, questions or concerns.     Plan made per [facility] anticoagulation protocol    Cindy Castillo, RN  1/14/2025  Anticoagulation Clinic  [employer] for routing messages: ralph JOHN  [facility] patient phone line: [phone]        _______________________________________________________________________     Anticoagulation  Episode Summary       Current INR goal:  Other - see comment   TTR:  61.7% (1 y)   Target end date:  Indefinite   Send INR reminders to:  ANDREAS JOHN    Indications    Anticoagulated on warfarin [Z79.01]  Hx of mechanical aortic valve replacement [Z95.2]  H/O bicuspid aortic valve [Z87.74]             Comments:  Approved for 8 week checks per -- see encounter from 01/16/23  3/20/24: INR goal range changed to 1.8-2.5             Anticoagulation Care Providers       Provider Role Specialty Phone number    Lakeisha Marmolejo MD Referring Family Medicine [phone]    Maggi Muro MD Referring Family Medicine [phone]

## 2025-01-14 NOTE — PROGRESS NOTES
Assessment and Plan:     Neck pain on left side  Differentials include myofascial pain and neck strain.  Concerns regarding lymphadenopathy are present.  Will check hemogram and order ultrasound to rule out soft tissue mass and lymphadenopathy, especially in the supraclavicular region.  X-ray of the left clavicle ordered showing no fracture.  Will have radiology review.  Further plans pending the results.  Will treat pain with tizanidine as needed.  She is to avoid taking this with other sedatives.  No signs of carotid bruit, JVD, or carotid dissection are present.  I encourage ER evaluation with worsening symptoms.  She is content with the plan.  - CBC with platelets  - US Head Neck Soft Tissue  - XR Clavicle Left  - tiZANidine (ZANAFLEX) 2 MG tablet  Dispense: 30 tablet; Refill: 0        Subjective:     Vandana is a 74 year old female presenting to the clinic for concerns for swelling within her left anterior neck.  Patient states she went ice-skating with her grandchildren on New Year's Day.  She slipped on ice and caught herself on the car the same day.  She did not fall.  She does not remember any trauma to the neck.  Patient also carried a WIB tree the next day.  Patient has had left anterior neck pain since.  She complains of a constant ache which is exacerbated with movement.  She has applied heat and ice.  She has been taking Tylenol at bedtime.  Area is tender to touch.  Patient states the area was significantly swollen.  She describes the affected area as the size of an egg.  She denies any recent cold symptoms or fever.  She has not had any night sweats or unintentional weight loss.  She has had a good appetite.    Reviewof Systems: A complete 14 point review of systems was obtained and is negative or as stated in the history of present illness.    Social History     Socioeconomic History    Marital status:      Spouse name: Not on file    Number of children: Not on file    Years of  education: Not on file    Highest education level: Not on file   Occupational History    Not on file   Tobacco Use    Smoking status: Former     Current packs/day: 0.00     Average packs/day: 0.5 packs/day for 20.0 years (10.0 ttl pk-yrs)     Types: Cigarettes     Start date: 1959     Quit date: 1979     Years since quittin.0     Passive exposure: Past    Smokeless tobacco: Never   Vaping Use    Vaping status: Never Used   Substance and Sexual Activity    Alcohol use: Yes     Comment: 1-2 drinks per week or less    Drug use: No    Sexual activity: Yes     Partners: Male   Other Topics Concern    Parent/sibling w/ CABG, MI or angioplasty before 65F 55M? Yes     Comment: Brother 43 yrs   Social History Narrative    Not on file     Social Drivers of Health     Financial Resource Strain: Low Risk  (3/12/2024)    Financial Resource Strain     Within the past 12 months, have you or your family members you live with been unable to get utilities (heat, electricity) when it was really needed?: No   Food Insecurity: Low Risk  (3/12/2024)    Food Insecurity     Within the past 12 months, did you worry that your food would run out before you got money to buy more?: No     Within the past 12 months, did the food you bought just not last and you didn t have money to get more?: No   Transportation Needs: Low Risk  (3/12/2024)    Transportation Needs     Within the past 12 months, has lack of transportation kept you from medical appointments, getting your medicines, non-medical meetings or appointments, work, or from getting things that you need?: No   Physical Activity: Insufficiently Active (3/12/2024)    Exercise Vital Sign     Days of Exercise per Week: 3 days     Minutes of Exercise per Session: 40 min   Stress: Stress Concern Present (3/12/2024)    [place] [facility] [employer] - Occupational Stress Questionnaire     Feeling of Stress : To some extent   Social Connections: Unknown (3/12/2024)    Social  Connection and Isolation Panel [NHANES]     Frequency of Communication with Friends and Family: Not on file     Frequency of Social Gatherings with Friends and Family: Once a week     Attends [employer] Services: Not on file     Active Member of Clubs or Organizations: Not on file     Attends Club or Organization Meetings: Not on file     Marital Status: Not on file   Interpersonal Safety: Low Risk  (1/14/2025)    Interpersonal Safety     Do you feel physically and emotionally safe where you currently live?: Yes     Within the past 12 months, have you been hit, slapped, kicked or otherwise physically hurt by someone?: No     Within the past 12 months, have you been humiliated or emotionally abused in other ways by your partner or ex-partner?: No   Housing Stability: Low Risk  (3/12/2024)    Housing Stability     Do you have housing? : Yes     Are you worried about losing your housing?: No       Active Ambulatory Problems     Diagnosis Date Noted    Hypercholesterolemia     H/O bicuspid aortic valve 06/21/2018    High degree atrioventricular block 06/21/2018    Postoperative hypothyroidism 06/20/2018    Cardiac pacemaker in situ 06/29/2018    Non-sustained ventricular tachycardia (H) 07/02/2019    Coronary artery disease involving native coronary artery of native heart without angina pectoris 07/17/2019    Microscopic hematuria 02/27/2020    S/P splenectomy 02/27/2020    Other insomnia 02/27/2020    Anticoagulated on warfarin 02/27/2020    Hx of mechanical aortic valve replacement 02/27/2020    Osteopenia, unspecified location 01/16/2023     Resolved Ambulatory Problems     Diagnosis Date Noted    Fleming-Monte syncope     Junctional premature beats (H) 07/02/2019    Third degree AV block (H) 06/20/2018     Past Medical History:   Diagnosis Date    History of aortic stenosis 06/21/2018    Hyperlipidemia     Hypothyroidism     Valvular disease        Family History   Problem Relation Age of Onset    Acute Myocardial  "Infarction Brother 43    Rheumatoid Arthritis Mother          in 40s    Coronary Artery Disease Father     Hypertension Father     No Known Problems Maternal Grandmother     No Known Problems Maternal Grandfather     No Known Problems Paternal Grandmother     No Known Problems Paternal Grandfather     Aortic stenosis Brother         s/p surgery    No Known Problems Sister     Breast Cancer No family hx of     Colon Cancer No family hx of     Cervical Cancer No family hx of     Other Cancer No family hx of        Objective:     /70   Pulse 72   Temp 98.2  F (36.8  C)   Resp 18   Ht 1.575 m (5' 2\")   Wt 86.2 kg (190 lb)   SpO2 97%   BMI 34.75 kg/m      Patient is alert, in no obvious distress.   Skin: Warm, dry.  No lesions or rashes.  Skin turgor rapid return.   HEENT:  Head normocephalic, atraumatic.  Eyes normal. Ears normal.  Nose patent, mucosa pink.  Oropharynx mucosa pink.  No lesions or tonsillar enlargement.   Neck: Supple, enlarged right submandibular lymph node.  She is tender to palpation of her left mid supraclavicular region.  No palpable lymph node is present.  Mild soft tissue swelling noted in the area. No bruit or JVD noted.   Lungs:  Clear to auscultation. Respirations even and unlabored.  No wheezing or rales noted.   Heart:  Regular rate and rhythm.    Musculoskeletal:  Full ROM of the neck.  No nuchal rigidity.       I ordered and personally reviewed left clavicular x-rays showing no obvious fracture.  Will have radiology review.          Answers submitted by the patient for this visit:  Patient Health Questionnaire (Submitted on 2025)  If you checked off any problems, how difficult have these problems made it for you to do your work, take care of things at home, or get along with other people?: Very difficult  PHQ9 TOTAL SCORE: 12  General Questionnaire (Submitted on 2025)  Chief Complaint: Chronic problems general questions HPI Form  How many days per week do you miss " taking your medication?: 0  General Concern (Submitted on 1/11/2025)  Chief Complaint: Chronic problems general questions HPI Form  What is the reason for your visit today?: Swelling, tenderness and pain on left side of neck.  When did your symptoms begin?: 3-7 days ago  How would you describe these symptoms?: Moderate  Are your symptoms:: Staying the same  Have you had these symptoms before?: No  Is there anything that makes you feel better?: Heat and ice  Questionnaire about: Chronic problems general questions HPI Form (Submitted on 1/11/2025)  Chief Complaint: Chronic problems general questions HPI Form

## 2025-01-20 DIAGNOSIS — M54.2 NECK PAIN ON LEFT SIDE: Primary | ICD-10-CM

## 2025-01-21 ENCOUNTER — PATIENT OUTREACH (OUTPATIENT)
Dept: CARE COORDINATION | Facility: CLINIC | Age: 75
End: 2025-01-21
Payer: MEDICARE

## 2025-01-27 NOTE — PROGRESS NOTES
ASSESSMENT: Vandana Thao is a 74 year old female presents for consultation at the request of PCP Maggi Muro, with past medical history significant for osteopenia, hypercholesterolemia, hypothyroidism, CAD, history aortic valve replacement, cardiac pacemaker on warfarin anticoagulation, insomnia,, who presents today for new patient evaluation of :    -Left anterior neck pain localizing to the sternoclavicular joint with localized swelling, ongoing for last 4 weeks without    Patient is neurologically intact on exam. No myelopathic or red flag symptoms.           No data to display                     Diagnoses and all orders for this visit:  Neck pain on left side  -     Spine  Referral  -     Physical Therapy  Referral; Future  -     CT Soft Tissue Neck w/o Contrast; Future  -     methylPREDNISolone (MEDROL DOSEPAK) 4 MG tablet therapy pack; Follow Package Directions  Sprain of left sternoclavicular joint, initial encounter  -     Physical Therapy  Referral; Future  -     CT Soft Tissue Neck w/o Contrast; Future  -     methylPREDNISolone (MEDROL DOSEPAK) 4 MG tablet therapy pack; Follow Package Directions  Neck swelling  -     CT Soft Tissue Neck w/o Contrast; Future  -     methylPREDNISolone (MEDROL DOSEPAK) 4 MG tablet therapy pack; Follow Package Directions     PLAN:  Reviewed spine anatomy and disease process. Discussed diagnosis and treatment options with the patient today. A shared decision making model was used. The patient's values and choices were respected. The following represents what was discussed and decided upon by the provider and the patient.     -DIAGNOSTIC TESTS:  Images were personally reviewed and interpreted and explained to patient today using spine model.   --Ordered Cervical/Neck Soft Tissue CT scan. Consider Cervical MRI pending results.   --Left clavicle x-ray 1/14/2025 with mild acromioclavicular joint osteoarthritis.  --Left shoulder x-ray 7/22/2024  with normal joint space and alignment.  Minimal calcification greater tuberosity    -PHYSICAL THERAPY:  Referral to Physical therapy    Discussed the importance of core strengthening, ROM, stretching exercises with the patient and how each of these entities is important in decreasing pain.  Explained to the patient that the purpose of physical therapy is to teach the patient a home exercise program.  These exercises need to be performed every day in order to decrease pain and prevent future occurrences of pain.  Likened it to brushing one's teeth.      -MEDICATIONS:  Prescribed Prednisone dosepak   Discussed multiple medication options today with patient. Discussed risks, side effects, and proper use of medications. Patient verbalized understanding.    -INTERVENTIONS:  No injection recommendations at this time.   Discussed risks and benefits of injections with patient today.  **Coumadin anticoagulation medication**    -PATIENT EDUCATION: Total time of 46 minutes, on the day of service, spent with the patient, reviewing the chart, placing orders, and documenting.     -FOLLOW-UP:   Follow up my chart messages with results    Advised patient to call the [facility] if symptoms worsen or you have problems controlling bladder and bowel function.   ______________________________________________________________________    SUBJECTIVE:   Vandana Thao  is a 74 year old female who presents today for new patient evaluation of neck pain Left anterior neck onset 4 weeks ago with lifting, however onset was hours after lifting. She noticed swelling to the front of the neck and clavicle area within the first couple of days that has improved but is still quite bothersome. Her pain has improved a little but her pain is still a 7/10.  Denies arm pain, denies numbness and tingling, denies weakness. Denies right arm symptoms.     -Treatment to Date: No prior spinal surgery or spinal injection  No prior  PT    -Medications:  Acetaminophen   Tizanidine - no benefit and side effects     Current Outpatient Medications   Medication Sig Dispense Refill    methylPREDNISolone (MEDROL DOSEPAK) 4 MG tablet therapy pack Follow Package Directions 21 tablet 0    warfarin ANTICOAGULANT (COUMADIN) 5 MG tablet Take 1-1.5 tablets (5-7.5 mg) by mouth daily. Adjust dose per INR as directed by  tablet 1    amoxicillin (AMOXIL) 500 MG capsule Take 4 capsules (2,000 mg) by mouth once for 1 dose Prior to dental procedure. 4 capsule 3    cholecalciferol, vitamin D3, 1,000 unit tablet [CHOLECALCIFEROL, VITAMIN D3, 1,000 UNIT TABLET] Take 1,000 Units by mouth daily.             levothyroxine (SYNTHROID/LEVOTHROID) 100 MCG tablet Take 1 tablet (100 mcg) by mouth daily 90 tablet 4    pravastatin (PRAVACHOL) 40 MG tablet Take 1 tablet (40 mg) by mouth daily 90 tablet 4    tiZANidine (ZANAFLEX) 2 MG tablet Take 1 tablet (2 mg) by mouth 3 times daily as needed for muscle spasms. 30 tablet 0    verapamil ER (VERELAN) 240 MG 24 hr capsule Take 1 capsule (240 mg) by mouth daily 90 capsule 4     No current facility-administered medications for this visit.       No Known Allergies    Past Medical History:   Diagnosis Date    History of aortic stenosis 06/21/2018    Hyperlipidemia     Hypothyroidism     Valvular disease     aortic stenosis secondary to bicuspid valve        Patient Active Problem List   Diagnosis    Hypercholesterolemia    H/O bicuspid aortic valve    High degree atrioventricular block    Postoperative hypothyroidism    Cardiac pacemaker in situ    Non-sustained ventricular tachycardia (H)    Coronary artery disease involving native coronary artery of native heart without angina pectoris    Microscopic hematuria    S/P splenectomy    Other insomnia    Anticoagulated on warfarin    Hx of mechanical aortic valve replacement    Osteopenia, unspecified location       Past Surgical History:   Procedure Laterality Date    CARDIAC  "VALVE REPLACEMENT  2009    AVR    COLONOSCOPY  2013    CYSTOSTOMY W/ BLADDER BIOPSY  2000    EP PACEMAKER INSERT N/A 2018    Procedure: EP Pacemaker Insertion;  Surgeon: Luis Urena MD;  Location: [facility];  Service:     EYE SURGERY  10/2022    OTHER SURGICAL HISTORY  2002    thyroidectomy    SPLENECTOMY  1979    ZZC REPLACE AORT VALV,PROSTH VALV      Description: Aortic Valve Replacement;  Proc Date: 2009;  Comments: #21 [facility] [place] Prosthesis       Family History   Problem Relation Age of Onset    Acute Myocardial Infarction Brother 43    Rheumatoid Arthritis Mother          in 40s    Coronary Artery Disease Father     Hypertension Father     No Known Problems Maternal Grandmother     No Known Problems Maternal Grandfather     No Known Problems Paternal Grandmother     No Known Problems Paternal Grandfather     Aortic stenosis Brother         s/p surgery    No Known Problems Sister     Breast Cancer No family hx of     Colon Cancer No family hx of     Cervical Cancer No family hx of     Other Cancer No family hx of        Reviewed past medical, surgical, and family history with patient found on new patient intake packet located in EMR Media tab.     SOCIAL HX: Patient is  and retired. Patient denies smoking and alcohol use     ROS: Specifically negative for bowel/bladder dysfunction, balance changes, headache, dizziness, foot drop, fevers, chills, appetite changes, nausea/vomiting, unexplained weight loss. Otherwise 13 systems reviewed are negative. Please see the patient's intake questionnaire from today for details.    OBJECTIVE:  BP (!) 161/63 (BP Location: Right arm, Patient Position: Sitting, Cuff Size: Adult Regular)   Pulse 65   Ht 5' 2\" (1.575 m)   Wt 188 lb (85.3 kg)   BMI 34.39 kg/m      PHYSICAL EXAMINATION:  --CONSTITUTIONAL: Vital signs as above. No acute distress. The patient is well nourished and well " groomed.  --PSYCHIATRIC: The patient is awake, alert, oriented to person, place, time and answering questions appropriately with clear speech. Appropriate mood and affect   --HEENT: Sclera are non-injected. Extraocular muscles are intact. Thyroid moves easily upon swallowing.  Moist oral mucosa.  --SKIN: Skin over the face, bilateral upper extremities, and posterior torso is clean, dry, intact without rashes.  --LYMPH NODES: No palpable or tender anterior/posterior cervical, submandibular, or supraclavicular lymph nodes.    --RESPIRATORY: Normal rhythm and effort. No abnormal accessory muscle breathing patterns noted.   --GROSS MOTOR: Easily arises from a seated position. Toe walking and heel walking are normal.    --CERVICAL SPINE: Inspection reveals no evidence of deformity. Range of motion is not limited in cervical flexion, extension, lateral rotation. No tenderness to palpation cervical spine or the AC joint. Tenderness over the left Sternoclavicular Joint with edema noted over the anterior neck more nonspecific.  Spurling maneuver negative bilaterally.  --SHOULDERS: Full range of motion bilaterally: abduction, flexion, cross chest movement internal/external rotation. Negative empty can.  --UPPER EXTREMITY MOTOR TESTING:  Wrist flexion left 5/5, right 5/5  Wrist extension left 5/5, right 5/5  Pronators left 5/5, right 5/5  Biceps left 5/5, right 5/5   Triceps left 5/5, right 5/5   Shoulder abduction left 5/5, right 5/5   left 5/5, right 5/5  --NEUROLOGIC: CN III-XII are grossly intact. 2/4 symmetric biceps, brachioradialis, triceps reflexes bilaterally. Sensation to upper extremities is intact.  Negative Hawley's bilaterally.    --VASCULAR: 2/4 radial pulses bilaterally. Warm upper limbs bilaterally. Capillary refill in the upper extremities is less than 1 second.    RESULTS: [facility] medical records from [facility] and [facility]where were reviewed today.     Imaging:  Spine imaging was personally  reviewed and interpreted today. The images were shown to the patient and the findings were explained using a spine model.       US Head Neck Soft Tissue    Result Date: 1/16/2025  EXAM: US HEAD NECK SOFT TISSUE LOCATION: [facility] DATE: 1/16/2025 INDICATION: soft tissue swelling left supraclavicular r o lymphadenopathy COMPARISON: None. TECHNIQUE: Routine. FINDINGS: Ultrasound was performed of the supraclavicular regions bilaterally. No masses, adenopathy, or fluid collections. An etiology for the patient's symptoms is not identified.     IMPRESSION: 1.  No sonographic abnormalities are identified in the supraclavicular regions bilaterally.     XR Clavicle Left    Result Date: 1/14/2025  EXAM: XR CLAVICLE LEFT 2 VIEWS LOCATION: [facility] DATE: 1/14/2025 INDICATION: left clavicular pain for 2 weeks COMPARISON: None.     IMPRESSION: No acute fracture or malalignment. Mild degenerative arthritis acromioclavicular joint. Left chest wall pacemaker. Sternotomy.

## 2025-01-28 ENCOUNTER — OFFICE VISIT (OUTPATIENT)
Dept: PHYSICAL MEDICINE AND REHAB | Facility: CLINIC | Age: 75
End: 2025-01-28
Attending: NURSE PRACTITIONER
Payer: COMMERCIAL

## 2025-01-28 VITALS
HEIGHT: 62 IN | HEART RATE: 65 BPM | SYSTOLIC BLOOD PRESSURE: 161 MMHG | BODY MASS INDEX: 34.6 KG/M2 | DIASTOLIC BLOOD PRESSURE: 63 MMHG | WEIGHT: 188 LBS

## 2025-01-28 DIAGNOSIS — M54.2 NECK PAIN ON LEFT SIDE: Primary | ICD-10-CM

## 2025-01-28 DIAGNOSIS — S43.62XA SPRAIN OF LEFT STERNOCLAVICULAR JOINT, INITIAL ENCOUNTER: ICD-10-CM

## 2025-01-28 DIAGNOSIS — R22.1 NECK SWELLING: ICD-10-CM

## 2025-01-28 PROCEDURE — 99204 OFFICE O/P NEW MOD 45 MIN: CPT | Performed by: NURSE PRACTITIONER

## 2025-01-28 RX ORDER — METHYLPREDNISOLONE 4 MG/1
TABLET ORAL
Qty: 21 TABLET | Refills: 0 | Status: SHIPPED | OUTPATIENT
Start: 2025-01-28

## 2025-01-28 ASSESSMENT — PAIN SCALES - GENERAL: PAINLEVEL_OUTOF10: SEVERE PAIN (7)

## 2025-01-28 NOTE — LETTER
1/28/2025      Vandana Thao  [address] [place] [id number]      Dear Colleague,    Thank you for referring your patient, Vandana Thao, to the [facility]. Please see a copy of my visit note below.    ASSESSMENT: Vandana Thao is a 74 year old female presents for consultation at the request of PCP Maggi Muro, with past medical history significant for osteopenia, hypercholesterolemia, hypothyroidism, CAD, history aortic valve replacement, cardiac pacemaker on warfarin anticoagulation, insomnia,, who presents today for new patient evaluation of :    -Left anterior neck pain localizing to the sternoclavicular joint with localized swelling, ongoing for last 4 weeks without    Patient is neurologically intact on exam. No myelopathic or red flag symptoms.           No data to display                     Diagnoses and all orders for this visit:  Neck pain on left side  -     Spine  Referral  -     Physical Therapy  Referral; Future  -     CT Soft Tissue Neck w/o Contrast; Future  -     methylPREDNISolone (MEDROL DOSEPAK) 4 MG tablet therapy pack; Follow Package Directions  Sprain of left sternoclavicular joint, initial encounter  -     Physical Therapy  Referral; Future  -     CT Soft Tissue Neck w/o Contrast; Future  -     methylPREDNISolone (MEDROL DOSEPAK) 4 MG tablet therapy pack; Follow Package Directions  Neck swelling  -     CT Soft Tissue Neck w/o Contrast; Future  -     methylPREDNISolone (MEDROL DOSEPAK) 4 MG tablet therapy pack; Follow Package Directions     PLAN:  Reviewed spine anatomy and disease process. Discussed diagnosis and treatment options with the patient today. A shared decision making model was used. The patient's values and choices were respected. The following represents what was discussed and decided upon by the provider and the patient.     -DIAGNOSTIC TESTS:  Images were personally reviewed and interpreted  and explained to patient today using spine model.   --Ordered Cervical/Neck Soft Tissue CT scan. Consider Cervical MRI pending results.   --Left clavicle x-ray 1/14/2025 with mild acromioclavicular joint osteoarthritis.  --Left shoulder x-ray 7/22/2024 with normal joint space and alignment.  Minimal calcification greater tuberosity    -PHYSICAL THERAPY:  Referral to Physical therapy    Discussed the importance of core strengthening, ROM, stretching exercises with the patient and how each of these entities is important in decreasing pain.  Explained to the patient that the purpose of physical therapy is to teach the patient a home exercise program.  These exercises need to be performed every day in order to decrease pain and prevent future occurrences of pain.  Likened it to brushing one's teeth.      -MEDICATIONS:  Prescribed Prednisone dosepak   Discussed multiple medication options today with patient. Discussed risks, side effects, and proper use of medications. Patient verbalized understanding.    -INTERVENTIONS:  No injection recommendations at this time.   Discussed risks and benefits of injections with patient today.  **Coumadin anticoagulation medication**    -PATIENT EDUCATION: Total time of 46 minutes, on the day of service, spent with the patient, reviewing the chart, placing orders, and documenting.     -FOLLOW-UP:   Follow up my chart messages with results    Advised patient to call the Spine Center if symptoms worsen or you have problems controlling bladder and bowel function.   ______________________________________________________________________    SUBJECTIVE:   Vandana Thao  is a 74 year old female who presents today for new patient evaluation of neck pain Left anterior neck onset 4 weeks ago with lifting, however onset was hours after lifting. She noticed swelling to the front of the neck and clavicle area within the first couple of days that has improved but is still quite bothersome. Her  pain has improved a little but her pain is still a 7/10.  Denies arm pain, denies numbness and tingling, denies weakness. Denies right arm symptoms.     -Treatment to Date: No prior spinal surgery or spinal injection  No prior PT    -Medications:  Acetaminophen   Tizanidine - no benefit and side effects     Current Outpatient Medications   Medication Sig Dispense Refill     methylPREDNISolone (MEDROL DOSEPAK) 4 MG tablet therapy pack Follow Package Directions 21 tablet 0     warfarin ANTICOAGULANT (COUMADIN) 5 MG tablet Take 1-1.5 tablets (5-7.5 mg) by mouth daily. Adjust dose per INR as directed by  tablet 1     amoxicillin (AMOXIL) 500 MG capsule Take 4 capsules (2,000 mg) by mouth once for 1 dose Prior to dental procedure. 4 capsule 3     cholecalciferol, vitamin D3, 1,000 unit tablet [CHOLECALCIFEROL, VITAMIN D3, 1,000 UNIT TABLET] Take 1,000 Units by mouth daily.              levothyroxine (SYNTHROID/LEVOTHROID) 100 MCG tablet Take 1 tablet (100 mcg) by mouth daily 90 tablet 4     pravastatin (PRAVACHOL) 40 MG tablet Take 1 tablet (40 mg) by mouth daily 90 tablet 4     tiZANidine (ZANAFLEX) 2 MG tablet Take 1 tablet (2 mg) by mouth 3 times daily as needed for muscle spasms. 30 tablet 0     verapamil ER (VERELAN) 240 MG 24 hr capsule Take 1 capsule (240 mg) by mouth daily 90 capsule 4     No current facility-administered medications for this visit.       No Known Allergies    Past Medical History:   Diagnosis Date     History of aortic stenosis 06/21/2018     Hyperlipidemia      Hypothyroidism      Valvular disease     aortic stenosis secondary to bicuspid valve        Patient Active Problem List   Diagnosis     Hypercholesterolemia     H/O bicuspid aortic valve     High degree atrioventricular block     Postoperative hypothyroidism     Cardiac pacemaker in situ     Non-sustained ventricular tachycardia (H)     Coronary artery disease involving native coronary artery of native heart without angina  pectoris     Microscopic hematuria     S/P splenectomy     Other insomnia     Anticoagulated on warfarin     Hx of mechanical aortic valve replacement     Osteopenia, unspecified location       Past Surgical History:   Procedure Laterality Date     CARDIAC VALVE REPLACEMENT  2009    AVR     COLONOSCOPY  2013     CYSTOSTOMY W/ BLADDER BIOPSY  2000     EP PACEMAKER INSERT N/A 2018    Procedure: EP Pacemaker Insertion;  Surgeon: Luis Urena MD;  Location: [facility];  Service:      EYE SURGERY  10/2022     OTHER SURGICAL HISTORY  2002    thyroidectomy     SPLENECTOMY  1979     ZZC REPLACE AORT VALV,PROSTH VALV      Description: Aortic Valve Replacement;  Proc Date: 2009;  Comments: #[address] [facility] [place] Prosthesis       Family History   Problem Relation Age of Onset     Acute Myocardial Infarction Brother 43     Rheumatoid Arthritis Mother          in 40s     Coronary Artery Disease Father      Hypertension Father      No Known Problems Maternal Grandmother      No Known Problems Maternal Grandfather      No Known Problems Paternal Grandmother      No Known Problems Paternal Grandfather      Aortic stenosis Brother         s/p surgery     No Known Problems Sister      Breast Cancer No family hx of      Colon Cancer No family hx of      Cervical Cancer No family hx of      Other Cancer No family hx of        Reviewed past medical, surgical, and family history with patient found on new patient intake packet located in EMR Media tab.     SOCIAL HX: Patient is  and retired. Patient denies smoking and alcohol use     ROS: Specifically negative for bowel/bladder dysfunction, balance changes, headache, dizziness, foot drop, fevers, chills, appetite changes, nausea/vomiting, unexplained weight loss. Otherwise 13 systems reviewed are negative. Please see the patient's intake questionnaire from today for details.    OBJECTIVE:  BP (!) 161/63 (BP Location: Right  "arm, Patient Position: Sitting, Cuff Size: Adult Regular)   Pulse 65   Ht 5' 2\" (1.575 m)   Wt 188 lb (85.3 kg)   BMI 34.39 kg/m      PHYSICAL EXAMINATION:  --CONSTITUTIONAL: Vital signs as above. No acute distress. The patient is well nourished and well groomed.  --PSYCHIATRIC: The patient is awake, alert, oriented to person, place, time and answering questions appropriately with clear speech. Appropriate mood and affect   --HEENT: Sclera are non-injected. Extraocular muscles are intact. Thyroid moves easily upon swallowing.  Moist oral mucosa.  --SKIN: Skin over the face, bilateral upper extremities, and posterior torso is clean, dry, intact without rashes.  --LYMPH NODES: No palpable or tender anterior/posterior cervical, submandibular, or supraclavicular lymph nodes.    --RESPIRATORY: Normal rhythm and effort. No abnormal accessory muscle breathing patterns noted.   --GROSS MOTOR: Easily arises from a seated position. Toe walking and heel walking are normal.    --CERVICAL SPINE: Inspection reveals no evidence of deformity. Range of motion is not limited in cervical flexion, extension, lateral rotation. No tenderness to palpation cervical spine or the AC joint. Tenderness over the left Sternoclavicular Joint with edema noted over the anterior neck more nonspecific.  Spurling maneuver negative bilaterally.  --SHOULDERS: Full range of motion bilaterally: abduction, flexion, cross chest movement internal/external rotation. Negative empty can.  --UPPER EXTREMITY MOTOR TESTING:  Wrist flexion left 5/5, right 5/5  Wrist extension left 5/5, right 5/5  Pronators left 5/5, right 5/5  Biceps left 5/5, right 5/5   Triceps left 5/5, right 5/5   Shoulder abduction left 5/5, right 5/5   left 5/5, right 5/5  --NEUROLOGIC: CN III-XII are grossly intact. 2/4 symmetric biceps, brachioradialis, triceps reflexes bilaterally. Sensation to upper extremities is intact.  Negative Hawley's bilaterally.    --VASCULAR: 2/4 radial " pulses bilaterally. Warm upper limbs bilaterally. Capillary refill in the upper extremities is less than 1 second.    RESULTS: Prior medical records from [facility] Care Everywhere were reviewed today.     Imaging:  Spine imaging was personally reviewed and interpreted today. The images were shown to the patient and the findings were explained using a spine model.       US Head Neck Soft Tissue    Result Date: 1/16/2025  EXAM: US HEAD NECK SOFT TISSUE LOCATION: [facility] DATE: 1/16/2025 INDICATION: soft tissue swelling left supraclavicular r o lymphadenopathy COMPARISON: None. TECHNIQUE: Routine. FINDINGS: Ultrasound was performed of the supraclavicular regions bilaterally. No masses, adenopathy, or fluid collections. An etiology for the patient's symptoms is not identified.     IMPRESSION: 1.  No sonographic abnormalities are identified in the supraclavicular regions bilaterally.     XR Clavicle Left    Result Date: 1/14/2025  EXAM: XR CLAVICLE LEFT 2 VIEWS LOCATION: [facility] DATE: 1/14/2025 INDICATION: left clavicular pain for 2 weeks COMPARISON: None.     IMPRESSION: No acute fracture or malalignment. Mild degenerative arthritis acromioclavicular joint. Left chest wall pacemaker. Sternotomy.         Again, thank you for allowing me to participate in the care of your patient.        Sincerely,        Melvi Berg CNP    Electronically signed

## 2025-01-28 NOTE — PATIENT INSTRUCTIONS
~Spine Center Scheduling #[phone].  ~Please call our [facility] Spine Nurse Navigation #[phone] with any questions or concerns about your treatment plan, if symptoms worsen and you would like to be seen urgently, or if you have problems controlling bladder and bowel function.  ~For any future flareups or new symptoms, recommend follow-up in clinic or contact the nurse navigator line.  ~Please note that any My Chart messages may take multiple days for a response due to the high volume of patients seen in clinic.  Anything sent Thursday night or after will be answered the following week when able, as Melvi Berg CNP does not work in clinic on Fridays.   ~Melvi Berg CNP is at the [facility] on Tuesdays, otherwise primarily at the [place] [facility].       ~You have been referred for Physical Therapy to [facility]. They will call you to schedule an appointment.       Scheduling phone number is [phone] for [facility] [place], [place], or [place] location.  If you have not heard from the scheduling office within 2 business days, please call [phone] for ALL other locations.     Discussed the importance of core strengthening, ROM, stretching exercises and how each of these entities is important in decreasing pain and improving long term spine health.  The purpose of physical therapy is to teach you an individualized home exercise program.  These exercises need to be performed every day in order to decrease pain and prevent future occurrences of pain.            Imaging has been ordered. Radiology will call you to schedule. Please call below if you do not hear from them in the next couple of days.     [facility] Radiology Scheduling    Please call [phone] to schedule your image(s) (select option #1). There are 3 different locations near, see below.   There are imaging options for other locations as well, the imaging  schedulers can help with other locations within [place].     [facility]  [address] [address]    [facility]  [address], [address]   [place] [id number]    [place] [id number] [place] [id number]       A Medrol Dose Pack (Prednisone Taper) was prescribed today for your acute pain. Please follow the dosing instruction on prescription bottle.     POSSIBLE STEROID SIDE EFFECTS   -If you experience these, it should only last for a short period-   Swelling   Increased appetite   Skin redness (flushness)   Skin rash   Mouth (oral) irritation   Blood sugar (glucose) levels   Sweats   Mood changes

## 2025-01-29 ENCOUNTER — LAB (OUTPATIENT)
Dept: LAB | Facility: CLINIC | Age: 75
End: 2025-01-29
Payer: COMMERCIAL

## 2025-01-29 ENCOUNTER — ANTICOAGULATION THERAPY VISIT (OUTPATIENT)
Dept: ANTICOAGULATION | Facility: CLINIC | Age: 75
End: 2025-01-29

## 2025-01-29 ENCOUNTER — DOCUMENTATION ONLY (OUTPATIENT)
Dept: ANTICOAGULATION | Facility: CLINIC | Age: 75
End: 2025-01-29

## 2025-01-29 DIAGNOSIS — Z87.74 H/O BICUSPID AORTIC VALVE: ICD-10-CM

## 2025-01-29 DIAGNOSIS — Z95.2 HX OF MECHANICAL AORTIC VALVE REPLACEMENT: ICD-10-CM

## 2025-01-29 DIAGNOSIS — Z87.74 H/O BICUSPID AORTIC VALVE: Primary | ICD-10-CM

## 2025-01-29 DIAGNOSIS — Z79.01 ANTICOAGULATED ON WARFARIN: Primary | ICD-10-CM

## 2025-01-29 DIAGNOSIS — Z79.01 ANTICOAGULATED ON WARFARIN: ICD-10-CM

## 2025-01-29 LAB — INR BLD: 1.6 (ref 0.9–1.1)

## 2025-01-29 PROCEDURE — 85610 PROTHROMBIN TIME: CPT

## 2025-01-29 PROCEDURE — 36416 COLLJ CAPILLARY BLOOD SPEC: CPT

## 2025-01-29 NOTE — PROGRESS NOTES
ANTICOAGULATION CLINIC REFERRAL RENEWAL REQUEST       An annual renewal order is required for all patients referred to [facility].?  Please review and sign the pended referral order for Vandana Thao.       ANTICOAGULATION SUMMARY      Warfarin indication(s)   Mechanical AVR    Mechanical heart valve present?  Mechanical  AVR-Bileaflet       Current goal range   INR: 1.8-2.5     Goal appropriate for indication? Goal of 1.8-2.5 verified via note from  in 3/6/24 doc only encounter   Time in Therapeutic Range (TTR)  (Goal > 60%) Unable to calculate due to non-standard goal range        Office visit with referring provider's group within last year yes on 1/14/25       Becca Adames RN  [facility]

## 2025-01-29 NOTE — PROGRESS NOTES
ANTICOAGULATION MANAGEMENT     Vandanaradha Thao 74 year old female is on warfarin with subtherapeutic INR result. (Goal INR Other - 1.8- 2.5)    Recent labs: (last 7 days)     01/29/25  1010   INR 1.6*       ASSESSMENT     Warfarin Lab Questionnaire    Warfarin Doses Last 7 Days      1/28/2025     3:59 PM   Dose in Tablet or Mg   TAB or MG? milligram (mg)     Pt Rptd Dose SUNDAY MONDAY TUESDAY WED THURS FRIDAY SATURDAY 1/28/2025   3:59 PM 7.5 5 5 5 5 5 5         1/28/2025   Warfarin Lab Questionnaire   Missed doses within past 14 days? No   Changes in diet or alcohol within past 14 days? No   Medication changes since last result? No   Injuries or illness since last result? No   New shortness of breath, severe headaches or sudden changes in vision since last result? No   Abnormal bleeding since last result? No   Upcoming surgery, procedure? No   Best number to call with results? [phone]     Previous result: Therapeutic last 2(+) visits  Additional findings: Patient seen for neck issues on 1/28 and Medrol dose pack sent to pharmacy.  Per patient she is starting Rx tomorrow, 1/30 x 6 days.        PLAN     Recommended plan for temporary change(s) affecting INR     Dosing Instructions: Continue your current warfarin dose with next INR in 5 days       Summary  As of 1/29/2025      Full warfarin instructions:  7.5 mg every Sun; 5 mg all other days   Next INR check:  2/4/2025               Telephone call with Vandana who verbalizes understanding and agrees to plan    Lab visit scheduled    Education provided: Goal range and lab monitoring: goal range and significance of current result and Importance of therapeutic range  Interaction IS anticipated between warfarin and Medrol dose pack  Importance of notifying anticoagulation clinic for: changes in medications; a sooner lab recheck maybe needed    Plan made with [facility] Pharmacist Leah Ruiz, RN  1/29/2025  Anticoagulation Clinic  [facility] for routing  messages: ralph JOHN  [facility] patient phone line: [phone]        _______________________________________________________________________     Anticoagulation Episode Summary       Current INR goal:  Other - see comment   TTR:  57.6% (1 y)   Target end date:  Indefinite   Send INR reminders to:  ANDREAS JOHN    Indications    Anticoagulated on warfarin [Z79.01]  Hx of mechanical aortic valve replacement [Z95.2]  H/O bicuspid aortic valve [Z87.74]             Comments:  Approved for 8 week checks per -- see encounter from 01/16/23  3/20/24: INR goal range changed to 1.8-2.5             Anticoagulation Care Providers       Provider Role Specialty Phone number    Lakeisha Marmolejo MD Referring Family Medicine [phone]    Maggi Muro MD Referring Family Medicine [phone]

## 2025-02-03 ENCOUNTER — LAB (OUTPATIENT)
Dept: LAB | Facility: CLINIC | Age: 75
End: 2025-02-03
Payer: COMMERCIAL

## 2025-02-03 ENCOUNTER — ANTICOAGULATION THERAPY VISIT (OUTPATIENT)
Dept: ANTICOAGULATION | Facility: CLINIC | Age: 75
End: 2025-02-03

## 2025-02-03 DIAGNOSIS — Z95.2 HX OF MECHANICAL AORTIC VALVE REPLACEMENT: ICD-10-CM

## 2025-02-03 DIAGNOSIS — Z79.01 ANTICOAGULATED ON WARFARIN: Primary | ICD-10-CM

## 2025-02-03 DIAGNOSIS — Z87.74 H/O BICUSPID AORTIC VALVE: ICD-10-CM

## 2025-02-03 LAB — INR BLD: 2.6 (ref 0.9–1.1)

## 2025-02-03 PROCEDURE — 85610 PROTHROMBIN TIME: CPT

## 2025-02-03 PROCEDURE — 36416 COLLJ CAPILLARY BLOOD SPEC: CPT

## 2025-02-03 NOTE — PROGRESS NOTES
ANTICOAGULATION MANAGEMENT     Vandana Thao 74 year old female is on warfarin with therapeutic INR result. (Goal INR Other - see comment)    Recent labs: (last 7 days)     02/03/25  1028   INR 2.6*       ASSESSMENT     Warfarin Lab Questionnaire    Warfarin Doses Last 7 Days      2/2/2025    11:28 AM   Dose in Tablet or Mg   TAB or MG? milligram (mg)     Pt Rptd Dose SUNDAY MONDAY TUESDAY WED THURS FRIDAY SATURDAY 2/2/2025  11:28 AM 7.5 5 5 5 5 5 5         2/2/2025   Warfarin Lab Questionnaire   Missed doses within past 14 days? No   Changes in diet or alcohol within past 14 days? No   Medication changes since last result? Yes   Please list: Methlyprednus-Dosepk   Injuries or illness since last result? No   New shortness of breath, severe headaches or sudden changes in vision since last result? No   Abnormal bleeding since last result? No   Upcoming surgery, procedure? No   Best number to call with results? [phone]     Previous result: Subtherapeutic  Additional findings:  finishes medrol dose pack tomorrow       PLAN     Recommended plan for no diet, medication or health factor changes affecting INR     Dosing Instructions: Continue your current warfarin dose with next INR in 2 weeks       Summary  As of 2/3/2025      Full warfarin instructions:  7.5 mg every Sun; 5 mg all other days   Next INR check:  2/17/2025               Telephone call with Vandana who verbalizes understanding and agrees to plan    Lab visit scheduled    Education provided: Please call back if any changes to your diet, medications or how you've been taking warfarin    Plan made per [facility] anticoagulation protocol    Johan Preston, RN  2/3/2025  Anticoagulation Clinic  [facility] [place] for routing messages: ralph JOHN  [facility] patient phone line: [phone]        _______________________________________________________________________     Anticoagulation Episode Summary       Current INR goal:  Other - see comment   TTR:  57.0% (1 y)    Target end date:  Indefinite   Send INR reminders to:  ANDREAS JOHN    Indications    Anticoagulated on warfarin [Z79.01]  Hx of mechanical aortic valve replacement [Z95.2]  H/O bicuspid aortic valve [Z87.74]             Comments:  Approved for 8 week checks per -- see encounter from 01/16/23  3/20/24: INR goal range changed to 1.8-2.5             Anticoagulation Care Providers       Provider Role Specialty Phone number    Lakeisha Marmolejo MD Referring Family Medicine [phone]    Maggi Muro MD Referring Family Medicine [phone]

## 2025-02-12 ENCOUNTER — HOSPITAL ENCOUNTER (OUTPATIENT)
Dept: CT IMAGING | Facility: HOSPITAL | Age: 75
Discharge: HOME | End: 2025-02-12
Attending: NURSE PRACTITIONER
Payer: MEDICARE

## 2025-02-12 DIAGNOSIS — S43.62XA SPRAIN OF LEFT STERNOCLAVICULAR JOINT, INITIAL ENCOUNTER: ICD-10-CM

## 2025-02-12 DIAGNOSIS — R22.1 NECK SWELLING: ICD-10-CM

## 2025-02-12 DIAGNOSIS — M54.2 NECK PAIN ON LEFT SIDE: ICD-10-CM

## 2025-02-12 PROCEDURE — 70490 CT SOFT TISSUE NECK W/O DYE: CPT

## 2025-02-17 ENCOUNTER — ANTICOAGULATION THERAPY VISIT (OUTPATIENT)
Dept: ANTICOAGULATION | Facility: CLINIC | Age: 75
End: 2025-02-17

## 2025-02-17 ENCOUNTER — LAB (OUTPATIENT)
Dept: LAB | Facility: CLINIC | Age: 75
End: 2025-02-17
Payer: COMMERCIAL

## 2025-02-17 DIAGNOSIS — Z95.2 HX OF MECHANICAL AORTIC VALVE REPLACEMENT: ICD-10-CM

## 2025-02-17 DIAGNOSIS — Z79.01 ANTICOAGULATED ON WARFARIN: Primary | ICD-10-CM

## 2025-02-17 DIAGNOSIS — Z87.74 H/O BICUSPID AORTIC VALVE: ICD-10-CM

## 2025-02-17 LAB — INR BLD: 2.2 (ref 0.9–1.1)

## 2025-02-17 PROCEDURE — 85610 PROTHROMBIN TIME: CPT

## 2025-02-17 PROCEDURE — 36416 COLLJ CAPILLARY BLOOD SPEC: CPT

## 2025-02-17 NOTE — PROGRESS NOTES
ANTICOAGULATION MANAGEMENT     Vandana Thao 74 year old female is on warfarin with therapeutic INR result. (Goal INR  1.8-2.5 )    Recent labs: (last 7 days)     02/17/25  1042   INR 2.2*       ASSESSMENT     Warfarin Lab Questionnaire    Warfarin Doses Last 7 Days      2/16/2025    11:24 AM   Dose in Tablet or Mg   TAB or MG? milligram (mg)     Pt Rptd Dose SUNDAY MONDAY TUESDAY WED THURS FRIDAY SATURDAY 2/16/2025  11:24 AM 7.5 5 5 5 5 5 5         2/16/2025   Warfarin Lab Questionnaire   Missed doses within past 14 days? No   Changes in diet or alcohol within past 14 days? No   Medication changes since last result? No   Injuries or illness since last result? No   New shortness of breath, severe headaches or sudden changes in vision since last result? No   Abnormal bleeding since last result? No   Upcoming surgery, procedure? No   Best number to call with results? [phone]     Previous result: Supratherapeutic  Additional findings: Wanted to come back in 8 weeks but agreed to have INR checked at annual visit on 3/18/25. I told her if that INR is therapeutic, we can stretch out INR interval at that time.       PLAN     Recommended plan for no diet, medication or health factor changes affecting INR     Dosing Instructions: Continue your current warfarin dose with next INR in 4 weeks       Summary  As of 2/17/2025      Full warfarin instructions:  7.5 mg every Sun; 5 mg all other days   Next INR check:  3/18/2025               Telephone call with Vandana who verbalizes understanding and agrees to plan    Check at provider office visit    Education provided: Please call back if any changes to your diet, medications or how you've been taking warfarin  Goal range and lab monitoring: goal range and significance of current result  Contact [phone] with any changes, questions or concerns.     Plan made per [facility] anticoagulation protocol and patient preference to check at office visit coming up.    Dorene Howard  RN  2/17/2025  Anticoagulation Clinic  [facility] for routing messages: ralph JOHN  [facility] patient phone line: [phone]        _______________________________________________________________________     Anticoagulation Episode Summary       Current INR goal:  Other - see comment   TTR:  57.0% (1 y)   Target end date:  Indefinite   Send INR reminders to:  ANDREAS JOHN    Indications    Anticoagulated on warfarin [Z79.01]  Hx of mechanical aortic valve replacement [Z95.2]  H/O bicuspid aortic valve [Z87.74]             Comments:  Approved for 8 week checks per -- see encounter from 01/16/23  3/20/24: INR goal range changed to 1.8-2.5             Anticoagulation Care Providers       Provider Role Specialty Phone number    Lakeisha Marmolejo MD Referring Family Medicine [phone]    Maggi Muro MD Referring Family Medicine [phone]

## 2025-02-18 ENCOUNTER — THERAPY VISIT (OUTPATIENT)
Dept: PHYSICAL THERAPY | Facility: REHABILITATION | Age: 75
End: 2025-02-18
Attending: NURSE PRACTITIONER
Payer: MEDICARE

## 2025-02-18 DIAGNOSIS — M54.2 NECK PAIN ON LEFT SIDE: ICD-10-CM

## 2025-02-18 DIAGNOSIS — S43.62XA SPRAIN OF LEFT STERNOCLAVICULAR JOINT, INITIAL ENCOUNTER: ICD-10-CM

## 2025-02-18 PROCEDURE — 97140 MANUAL THERAPY 1/> REGIONS: CPT | Mod: GP | Performed by: PHYSICAL THERAPIST

## 2025-02-18 PROCEDURE — 97110 THERAPEUTIC EXERCISES: CPT | Mod: GP | Performed by: PHYSICAL THERAPIST

## 2025-02-18 PROCEDURE — 97161 PT EVAL LOW COMPLEX 20 MIN: CPT | Mod: GP | Performed by: PHYSICAL THERAPIST

## 2025-02-18 NOTE — PROGRESS NOTES
"PHYSICAL THERAPY EVALUATION  Type of Visit: Evaluation        Fall Risk Screen:  Fall screen completed by: PT  Have you fallen 2 or more times in the past year?: No  Have you fallen and had an injury in the past year?: No  Is patient a fall risk?: No    Subjective         Presenting condition or subjective complaint: neck on left side    The pain is quite a bit better than it used to be. If she moves too quickly or if she lifts things it is bothersome. Her  has brain cancer and she ends up doing a lot of the household items. Initially feels like she maybe pulled a muscle and for 2 weeks the pain in the neck was not allowing her to sleep. Sleeping is now going a bit better. Did some ice and heat and did feel that the heat helped.     Per recent MD visit:   \"past medical history significant for osteopenia, hypercholesterolemia, hypothyroidism, CAD, history aortic valve replacement, cardiac pacemaker on warfarin anticoagulation, insomnia. -Left anterior neck pain localizing to the sternoclavicular joint with localized swelling, ongoing for last 4 weeks without incident. onset was hours after lifting. She noticed swelling to the front of the neck and clavicle area within the first couple of days that has improved but is still quite bothersome. Her pain has improved a little but her pain is still a 7/10. \" \"she went ice-skating with her grandchildren on New Year's Day.  She slipped on ice and caught herself on the car the same day.  She did not fall.  She does not remember any trauma to the neck.  Patient also carried a [employer] tree the next day.  Patient has had left anterior neck pain since. \"     Patient is neurologically intact on exam. No myelopathic or red flag symptoms.    CT scan, US and xrays performed  CT negative, US negative  Xray: mild AC joint degeneration     Date of onset: 01/28/25 (MD visit)    Relevant medical history:     Dates & types of surgery:      Prior diagnostic imaging/testing results: " CT scan; X-ray; Other     Prior therapy history for the same diagnosis, illness or injury: No        Living Environment  Social support: With a significant other or spouse   Type of home: House; 2-story   Stairs to enter the home: Yes 3 Is there a railing: No     Ramp:     Stairs inside the home: Yes 12 Is there a railing: Yes     Help at home: None  Equipment owned:       Employment: No    Hobbies/Interests: read [employer] walking exercise bike    Patient goals for therapy: move neck without pain    Pain assessment: See objective evaluation for additional pain details     Objective     CERVICAL SPINE EVALUATION  PAIN: Pain Level at Rest: 1/10  Pain Level with Use: 4/10  Pain Location: cervical spine  INTEGUMENTARY (edema, incisions):  mild swelling noted L anterior/lateral neck   POSTURE: Sitting Posture: Rounded shoulders, Forward head, Thoracic kyphosis increased  ROM:   (Degrees) Left AROM Right AROM   Cervical Flexion WNL painfree   Cervical Extension Min limited stiffness    Cervical Side bend Mod limited stiffness Mod limited stiffness   Cervical Rotation Mod limited stiffness Mod limited stiffness   Cervical Protrusion    Cervical Retraction    Thoracic Flexion    Thoracic Extension    Thoracic Rotation      Left AROM Right AROM   Shoulder Flexion 150 deg P+     Shoulder Extension     Shoulder Abduction 140 deg P+     Shoulder Adduction     Shoulder IR     Shoulder ER     Shoulder Horiz Abduction     Shoulder Horiz Adduction       STRENGTH (MYOTOMES):  */5 Left Right   Cervical Flexion (C1-2) 5 5   Cervical Sidebending (C3) 5 5   Shoulder Elevation (C4) 5 5   Shoulder Abduction (C5) 5 5   Shoulder Internal Rotation     Shoulder External Rotation     Elbow Flexion (C6) 5 5   Elbow Extension (C7) 5 5   Wrist Flexion (C7) 5 5   Wrist Extension (C6) 5 5   Thumb abduction (C8) 5 5   Finger Abduction (T1) 5 5     PALPATION: tender to L UT. Scalenes and SCM     Assessment & Plan   CLINICAL IMPRESSIONS  Medical  Diagnosis: Neck pain on left side  Sprain of left sternoclavicular joint, initial encounter    Treatment Diagnosis: Left Neck Pain   Impression/Assessment: Patient is a 74 year old female with neck complaints.  The following significant findings have been identified: Pain, Decreased ROM/flexibility, Decreased joint mobility, Decreased strength, Inflammation, Impaired muscle performance, Decreased activity tolerance, Impaired posture, and Instability. These impairments interfere with their ability to perform self care tasks, work tasks, recreational activities, household chores, driving , household mobility, and community mobility as compared to previous level of function.     Clinical Decision Making (Complexity):  Clinical Presentation: Stable/Uncomplicated  Clinical Presentation Rationale: based on medical and personal factors listed in PT evaluation  Clinical Decision Making (Complexity): Low complexity    PLAN OF CARE  Treatment Interventions:  Modalities: Cupping mechanical traction   Interventions: Gait Training, Manual Therapy, Neuromuscular Re-education, Therapeutic Activity, Therapeutic Exercise, Self-Care/Home Management    Long Term Goals     PT Goal 1  Goal Identifier: Prolonged Positioning  Goal Description: The patient will be able to sustain a position, either sitting or standing x30 minutes with appropriate posture and pain <3/10.  Rationale: to maximize safety and independence with performance of ADLs and functional tasks;to maximize safety and independence with self cares      Frequency of Treatment: 1x/week tapering to 1x every 2 weeks  Duration of Treatment: 90 days    Recommended Referrals to Other Professionals:   Education Assessment:   Learner/Method: Patient  Education Comments: Eager to participate in therapy. Patient demonstrates understanding of plan of care and consents to treatment.    Risks and benefits of evaluation/treatment have been explained.   Patient/Family/caregiver agrees with  Plan of Care.     Evaluation Time:             Signing Clinician: Lisa Pacheco, PT        [facility]                                                                                   OUTPATIENT PHYSICAL THERAPY      PLAN OF TREATMENT FOR OUTPATIENT REHABILITATION   Patient's Last Name, First Name, Vandana Hurd YOB: 1950   Provider's Name   [facility]   Medical Record No.  [id number]     Onset Date: 01/28/25 (MD visit)  Start of Care Date: 02/18/25     Medical Diagnosis:  Neck pain on left side  Sprain of left sternoclavicular joint, initial encounter      PT Treatment Diagnosis:  Left Neck Pain Plan of Treatment  Frequency/Duration: 1x/week tapering to 1x every 2 weeks/ 90 days    Certification date from 02/18/25 to 05/19/25         See note for plan of treatment details and functional goals     Lisa Pacheco, PT                         I CERTIFY THE NEED FOR THESE SERVICES FURNISHED UNDER        THIS PLAN OF TREATMENT AND WHILE UNDER MY CARE     (Physician attestation of this document indicates review and certification of the therapy plan).              Referring Provider:  Melvi Berg    Initial Assessment  See Epic Evaluation- Start of Care Date: 02/18/25

## 2025-02-26 ENCOUNTER — THERAPY VISIT (OUTPATIENT)
Dept: PHYSICAL THERAPY | Facility: REHABILITATION | Age: 75
End: 2025-02-26
Attending: NURSE PRACTITIONER
Payer: COMMERCIAL

## 2025-02-26 DIAGNOSIS — M54.2 NECK PAIN ON LEFT SIDE: Primary | ICD-10-CM

## 2025-02-26 PROCEDURE — 97110 THERAPEUTIC EXERCISES: CPT | Mod: GP | Performed by: PHYSICAL THERAPIST

## 2025-02-26 PROCEDURE — 97140 MANUAL THERAPY 1/> REGIONS: CPT | Mod: GP | Performed by: PHYSICAL THERAPIST

## 2025-03-04 ENCOUNTER — THERAPY VISIT (OUTPATIENT)
Dept: PHYSICAL THERAPY | Facility: REHABILITATION | Age: 75
End: 2025-03-04
Attending: NURSE PRACTITIONER
Payer: COMMERCIAL

## 2025-03-04 DIAGNOSIS — M54.2 NECK PAIN ON LEFT SIDE: Primary | ICD-10-CM

## 2025-03-04 NOTE — PROGRESS NOTES
03/04/25 0500   Appointment Info   Signing clinician's name / credentials Lisa Pacheco, PT DPT   Total/Authorized Visits E&T   Visits Used 3   Medical Diagnosis Neck pain on left side  Sprain of left sternoclavicular joint, initial encounter   PT Tx Diagnosis Left Neck Pain   Precautions/Limitations Osteopenia, pacemaker, caregiver for  with brain cancer   Progress Note/Certification   Start of Care Date 02/18/25   Onset of illness/injury or Date of Surgery 01/28/25  (MD visit)   Therapy Frequency 1x/week tapering to 1x every 2 weeks   Predicted Duration 90 days   Certification date from 02/18/25   Certification date to 05/19/25   Progress Note Completed Date 02/18/25   GOALS   PT Goals 2   PT Goal 1   Goal Identifier Reaching   Goal Description The patient will demonstrate unrestricted reaching with pain <3/10.   Rationale to maximize safety and independence with performance of ADLs and functional tasks;to maximize safety and independence with self cares;to maximize safety and independence within the home   Goal Progress goal met   Target Date 05/19/25   Date Met 03/04/25   PT Goal 2   Goal Identifier Sleeping   Goal Description The patient will be able to sleep through the night, not waking due to pain.   Rationale to maximize safety and independence with self cares;to maximize safety and independence with performance of ADLs and functional tasks;to maximize safety and independence within the home   Goal Progress goal met   Target Date 05/19/25   Date Met 03/04/25   Subjective Report   Subjective Report The patient reports that things are much improved. She is feeling 90% better overall Feels comfortable discontinuing PT at this time.   Objective Measures   Objective Measures Objective Measure 1;Objective Measure 2   Objective Measure 1   Objective Measure Pain Rating   Details 0/10   Treatment Interventions (PT)   Interventions Therapeutic Procedure/Exercise;Manual Therapy   Therapeutic  "Procedure/Exercise   Therapeutic Procedures: strength, endurance, ROM, flexibility minutes (43128) 12   Ther Proc 1 Rowing   Ther Proc 1 - Details RTB x20 cueing for scap retraction   PTRx Ther Proc 1 Upper Trapezius Stretch   PTRx Ther Proc 1 - Details 3x10\" holds    PTRx Ther Proc 2 Levator Scapulae Stretch   PTRx Ther Proc 2 - Details 3x10\" holds   PTRx Ther Proc 3 Scalene Stretch   PTRx Ther Proc 3 - Details 3x10\" holds    PTRx Ther Proc 4 Shoulder Rolls   PTRx Ther Proc 4 - Details x10   PTRx Ther Proc 5 Wall Climb   PTRx Ther Proc 5 - Details x10   PTRx Ther Proc 6 Wall Slides Abduction   PTRx Ther Proc 6 - Details x10   PTRx Ther Proc 7 Pec Stretch Doorway   PTRx Ther Proc 7 - Details x10-30\" holds x2   Skilled Intervention reviewed and progressed exercises   Patient Response/Progress great tolerance, painfree   Manual Therapy   Manual Therapy: Mobilization, MFR, MLD, friction massage minutes (91527) 15   Manual Therapy 1 MFR layer 1-2 to L UT in sitting   Skilled Intervention to improve pain and mobility   Patient Response/Progress tender but tolerated well   Education   Learner/Method Patient   Education Comments Eager to participate in therapy. Patient demonstrates understanding of plan of care and consents to treatment.   Plan   Home program see PTRX printed   Plan for next session d/c   Comments   Comments The patient has attended PT consistently and is demonstrating improvements in pain, mobility and function. At this point goals have been met and they feel comfortable continuing with independent management of their condition. Advised the patient to return to therapy as needed in the future and to continue with prescribed HEP. The patient will be discharged at this time.   Total Session Time   Timed Code Treatment Minutes 27   Total Treatment Time (sum of timed and untimed services) 27           DISCHARGE  Reason for Discharge: Patient has met all goals.      Discharge Plan: Patient to continue home " program.    Referring Provider:  Melvi Berg

## 2025-03-13 SDOH — HEALTH STABILITY: PHYSICAL HEALTH: ON AVERAGE, HOW MANY DAYS PER WEEK DO YOU ENGAGE IN MODERATE TO STRENUOUS EXERCISE (LIKE A BRISK WALK)?: 5 DAYS

## 2025-03-13 SDOH — HEALTH STABILITY: PHYSICAL HEALTH: ON AVERAGE, HOW MANY MINUTES DO YOU ENGAGE IN EXERCISE AT THIS LEVEL?: 40 MIN

## 2025-03-13 ASSESSMENT — SOCIAL DETERMINANTS OF HEALTH (SDOH): HOW OFTEN DO YOU GET TOGETHER WITH FRIENDS OR RELATIVES?: ONCE A WEEK

## 2025-03-17 ENCOUNTER — ANCILLARY PROCEDURE (OUTPATIENT)
Dept: CARDIOLOGY | Facility: CLINIC | Age: 75
End: 2025-03-17
Attending: INTERNAL MEDICINE
Payer: MEDICARE

## 2025-03-17 DIAGNOSIS — I49.5 SICK SINUS SYNDROME (H): ICD-10-CM

## 2025-03-17 DIAGNOSIS — Z95.0 PACEMAKER: ICD-10-CM

## 2025-03-17 LAB
MDC_IDC_LEAD_CONNECTION_STATUS: NORMAL
MDC_IDC_LEAD_CONNECTION_STATUS: NORMAL
MDC_IDC_LEAD_IMPLANT_DT: NORMAL
MDC_IDC_LEAD_IMPLANT_DT: NORMAL
MDC_IDC_LEAD_LOCATION: NORMAL
MDC_IDC_LEAD_LOCATION: NORMAL
MDC_IDC_LEAD_LOCATION_DETAIL_1: NORMAL
MDC_IDC_LEAD_LOCATION_DETAIL_1: NORMAL
MDC_IDC_LEAD_MFG: NORMAL
MDC_IDC_LEAD_MFG: NORMAL
MDC_IDC_LEAD_MODEL: NORMAL
MDC_IDC_LEAD_MODEL: NORMAL
MDC_IDC_LEAD_POLARITY_TYPE: NORMAL
MDC_IDC_LEAD_POLARITY_TYPE: NORMAL
MDC_IDC_LEAD_SERIAL: NORMAL
MDC_IDC_LEAD_SERIAL: NORMAL
MDC_IDC_LEAD_SPECIAL_FUNCTION: NORMAL
MDC_IDC_LEAD_SPECIAL_FUNCTION: NORMAL
MDC_IDC_MSMT_BATTERY_DTM: NORMAL
MDC_IDC_MSMT_BATTERY_REMAINING_LONGEVITY: 99 MO
MDC_IDC_MSMT_BATTERY_RRT_TRIGGER: 2.62
MDC_IDC_MSMT_BATTERY_STATUS: NORMAL
MDC_IDC_MSMT_BATTERY_VOLTAGE: 2.99 V
MDC_IDC_MSMT_LEADCHNL_RA_IMPEDANCE_VALUE: 323 OHM
MDC_IDC_MSMT_LEADCHNL_RA_IMPEDANCE_VALUE: 437 OHM
MDC_IDC_MSMT_LEADCHNL_RA_PACING_THRESHOLD_AMPLITUDE: 0.62 V
MDC_IDC_MSMT_LEADCHNL_RA_PACING_THRESHOLD_PULSEWIDTH: 0.4 MS
MDC_IDC_MSMT_LEADCHNL_RA_SENSING_INTR_AMPL: 3.38 MV
MDC_IDC_MSMT_LEADCHNL_RA_SENSING_INTR_AMPL: 3.38 MV
MDC_IDC_MSMT_LEADCHNL_RV_IMPEDANCE_VALUE: 342 OHM
MDC_IDC_MSMT_LEADCHNL_RV_IMPEDANCE_VALUE: 437 OHM
MDC_IDC_MSMT_LEADCHNL_RV_PACING_THRESHOLD_AMPLITUDE: 1.25 V
MDC_IDC_MSMT_LEADCHNL_RV_PACING_THRESHOLD_PULSEWIDTH: 0.4 MS
MDC_IDC_MSMT_LEADCHNL_RV_SENSING_INTR_AMPL: 7 MV
MDC_IDC_MSMT_LEADCHNL_RV_SENSING_INTR_AMPL: 7 MV
MDC_IDC_PG_IMPLANT_DTM: NORMAL
MDC_IDC_PG_MFG: NORMAL
MDC_IDC_PG_MODEL: NORMAL
MDC_IDC_PG_SERIAL: NORMAL
MDC_IDC_PG_TYPE: NORMAL
MDC_IDC_SESS_CLINIC_NAME: NORMAL
MDC_IDC_SESS_DTM: NORMAL
MDC_IDC_SESS_TYPE: NORMAL
MDC_IDC_SET_BRADY_AT_MODE_SWITCH_RATE: 171 {BEATS}/MIN
MDC_IDC_SET_BRADY_HYSTRATE: NORMAL
MDC_IDC_SET_BRADY_LOWRATE: 60 {BEATS}/MIN
MDC_IDC_SET_BRADY_MAX_SENSOR_RATE: 130 {BEATS}/MIN
MDC_IDC_SET_BRADY_MAX_TRACKING_RATE: 130 {BEATS}/MIN
MDC_IDC_SET_BRADY_MODE: NORMAL
MDC_IDC_SET_BRADY_PAV_DELAY_LOW: 270 MS
MDC_IDC_SET_BRADY_SAV_DELAY_LOW: 240 MS
MDC_IDC_SET_LEADCHNL_RA_PACING_AMPLITUDE: 1.5 V
MDC_IDC_SET_LEADCHNL_RA_PACING_ANODE_ELECTRODE_1: NORMAL
MDC_IDC_SET_LEADCHNL_RA_PACING_ANODE_LOCATION_1: NORMAL
MDC_IDC_SET_LEADCHNL_RA_PACING_CAPTURE_MODE: NORMAL
MDC_IDC_SET_LEADCHNL_RA_PACING_CATHODE_ELECTRODE_1: NORMAL
MDC_IDC_SET_LEADCHNL_RA_PACING_CATHODE_LOCATION_1: NORMAL
MDC_IDC_SET_LEADCHNL_RA_PACING_POLARITY: NORMAL
MDC_IDC_SET_LEADCHNL_RA_PACING_PULSEWIDTH: 0.4 MS
MDC_IDC_SET_LEADCHNL_RA_SENSING_ANODE_ELECTRODE_1: NORMAL
MDC_IDC_SET_LEADCHNL_RA_SENSING_ANODE_LOCATION_1: NORMAL
MDC_IDC_SET_LEADCHNL_RA_SENSING_CATHODE_ELECTRODE_1: NORMAL
MDC_IDC_SET_LEADCHNL_RA_SENSING_CATHODE_LOCATION_1: NORMAL
MDC_IDC_SET_LEADCHNL_RA_SENSING_POLARITY: NORMAL
MDC_IDC_SET_LEADCHNL_RA_SENSING_SENSITIVITY: 0.3 MV
MDC_IDC_SET_LEADCHNL_RV_PACING_AMPLITUDE: 2 V
MDC_IDC_SET_LEADCHNL_RV_PACING_ANODE_ELECTRODE_1: NORMAL
MDC_IDC_SET_LEADCHNL_RV_PACING_ANODE_LOCATION_1: NORMAL
MDC_IDC_SET_LEADCHNL_RV_PACING_CAPTURE_MODE: NORMAL
MDC_IDC_SET_LEADCHNL_RV_PACING_CATHODE_ELECTRODE_1: NORMAL
MDC_IDC_SET_LEADCHNL_RV_PACING_CATHODE_LOCATION_1: NORMAL
MDC_IDC_SET_LEADCHNL_RV_PACING_POLARITY: NORMAL
MDC_IDC_SET_LEADCHNL_RV_PACING_PULSEWIDTH: 0.4 MS
MDC_IDC_SET_LEADCHNL_RV_SENSING_ANODE_ELECTRODE_1: NORMAL
MDC_IDC_SET_LEADCHNL_RV_SENSING_ANODE_LOCATION_1: NORMAL
MDC_IDC_SET_LEADCHNL_RV_SENSING_CATHODE_ELECTRODE_1: NORMAL
MDC_IDC_SET_LEADCHNL_RV_SENSING_CATHODE_LOCATION_1: NORMAL
MDC_IDC_SET_LEADCHNL_RV_SENSING_POLARITY: NORMAL
MDC_IDC_SET_LEADCHNL_RV_SENSING_SENSITIVITY: 0.9 MV
MDC_IDC_SET_ZONE_DETECTION_INTERVAL: 350 MS
MDC_IDC_SET_ZONE_DETECTION_INTERVAL: 400 MS
MDC_IDC_SET_ZONE_STATUS: NORMAL
MDC_IDC_SET_ZONE_STATUS: NORMAL
MDC_IDC_SET_ZONE_TYPE: NORMAL
MDC_IDC_SET_ZONE_VENDOR_TYPE: NORMAL
MDC_IDC_STAT_AT_BURDEN_PERCENT: 0 %
MDC_IDC_STAT_AT_DTM_END: NORMAL
MDC_IDC_STAT_AT_DTM_START: NORMAL
MDC_IDC_STAT_BRADY_AP_VP_PERCENT: 2.07 %
MDC_IDC_STAT_BRADY_AP_VS_PERCENT: 55.49 %
MDC_IDC_STAT_BRADY_AS_VP_PERCENT: 0.11 %
MDC_IDC_STAT_BRADY_AS_VS_PERCENT: 42.34 %
MDC_IDC_STAT_BRADY_DTM_END: NORMAL
MDC_IDC_STAT_BRADY_DTM_START: NORMAL
MDC_IDC_STAT_BRADY_RA_PERCENT_PACED: 60.04 %
MDC_IDC_STAT_BRADY_RV_PERCENT_PACED: 2.18 %
MDC_IDC_STAT_EPISODE_RECENT_COUNT: 0
MDC_IDC_STAT_EPISODE_RECENT_COUNT_DTM_END: NORMAL
MDC_IDC_STAT_EPISODE_RECENT_COUNT_DTM_START: NORMAL
MDC_IDC_STAT_EPISODE_TOTAL_COUNT: 0
MDC_IDC_STAT_EPISODE_TOTAL_COUNT: 0
MDC_IDC_STAT_EPISODE_TOTAL_COUNT: 554
MDC_IDC_STAT_EPISODE_TOTAL_COUNT: 6
MDC_IDC_STAT_EPISODE_TOTAL_COUNT: NORMAL
MDC_IDC_STAT_EPISODE_TOTAL_COUNT_DTM_END: NORMAL
MDC_IDC_STAT_EPISODE_TOTAL_COUNT_DTM_START: NORMAL
MDC_IDC_STAT_EPISODE_TYPE: NORMAL
MDC_IDC_STAT_TACHYTHERAPY_RECENT_DTM_END: NORMAL
MDC_IDC_STAT_TACHYTHERAPY_RECENT_DTM_START: NORMAL
MDC_IDC_STAT_TACHYTHERAPY_TOTAL_DTM_END: NORMAL
MDC_IDC_STAT_TACHYTHERAPY_TOTAL_DTM_START: NORMAL

## 2025-03-18 ENCOUNTER — ANTICOAGULATION THERAPY VISIT (OUTPATIENT)
Dept: ANTICOAGULATION | Facility: CLINIC | Age: 75
End: 2025-03-18

## 2025-03-18 ENCOUNTER — OFFICE VISIT (OUTPATIENT)
Dept: FAMILY MEDICINE | Facility: CLINIC | Age: 75
End: 2025-03-18
Attending: FAMILY MEDICINE
Payer: COMMERCIAL

## 2025-03-18 VITALS
BODY MASS INDEX: 35.15 KG/M2 | RESPIRATION RATE: 20 BRPM | SYSTOLIC BLOOD PRESSURE: 128 MMHG | HEART RATE: 65 BPM | DIASTOLIC BLOOD PRESSURE: 64 MMHG | HEIGHT: 62 IN | TEMPERATURE: 98 F | OXYGEN SATURATION: 97 % | WEIGHT: 191 LBS

## 2025-03-18 DIAGNOSIS — I47.29 NON-SUSTAINED VENTRICULAR TACHYCARDIA (H): ICD-10-CM

## 2025-03-18 DIAGNOSIS — I44.39 HIGH DEGREE ATRIOVENTRICULAR BLOCK: ICD-10-CM

## 2025-03-18 DIAGNOSIS — M85.80 OSTEOPENIA, UNSPECIFIED LOCATION: ICD-10-CM

## 2025-03-18 DIAGNOSIS — Z00.00 MEDICARE ANNUAL WELLNESS VISIT, SUBSEQUENT: Primary | ICD-10-CM

## 2025-03-18 DIAGNOSIS — Z95.0 CARDIAC PACEMAKER IN SITU: ICD-10-CM

## 2025-03-18 DIAGNOSIS — I25.10 CORONARY ARTERY DISEASE INVOLVING NATIVE CORONARY ARTERY OF NATIVE HEART WITHOUT ANGINA PECTORIS: ICD-10-CM

## 2025-03-18 DIAGNOSIS — Z87.74 H/O BICUSPID AORTIC VALVE: ICD-10-CM

## 2025-03-18 DIAGNOSIS — E78.00 HYPERCHOLESTEROLEMIA: ICD-10-CM

## 2025-03-18 DIAGNOSIS — Z90.81 S/P SPLENECTOMY: ICD-10-CM

## 2025-03-18 DIAGNOSIS — Z95.2 HX OF MECHANICAL AORTIC VALVE REPLACEMENT: ICD-10-CM

## 2025-03-18 DIAGNOSIS — E89.0 POSTOPERATIVE HYPOTHYROIDISM: ICD-10-CM

## 2025-03-18 DIAGNOSIS — Z79.01 ANTICOAGULATED ON WARFARIN: Primary | ICD-10-CM

## 2025-03-18 LAB
ERYTHROCYTE [DISTWIDTH] IN BLOOD BY AUTOMATED COUNT: 14.3 % (ref 10–15)
HCT VFR BLD AUTO: 42.4 % (ref 35–47)
HGB BLD-MCNC: 14.4 G/DL (ref 11.7–15.7)
INR BLD: 1.7 (ref 0.9–1.1)
MCH RBC QN AUTO: 30.6 PG (ref 26.5–33)
MCHC RBC AUTO-ENTMCNC: 34 G/DL (ref 31.5–36.5)
MCV RBC AUTO: 90 FL (ref 78–100)
PLATELET # BLD AUTO: 259 10E3/UL (ref 150–450)
RBC # BLD AUTO: 4.7 10E6/UL (ref 3.8–5.2)
WBC # BLD AUTO: 9 10E3/UL (ref 4–11)

## 2025-03-18 PROCEDURE — 85027 COMPLETE CBC AUTOMATED: CPT | Performed by: FAMILY MEDICINE

## 2025-03-18 PROCEDURE — 36416 COLLJ CAPILLARY BLOOD SPEC: CPT | Performed by: FAMILY MEDICINE

## 2025-03-18 PROCEDURE — 36415 COLL VENOUS BLD VENIPUNCTURE: CPT | Performed by: FAMILY MEDICINE

## 2025-03-18 PROCEDURE — 85610 PROTHROMBIN TIME: CPT | Performed by: FAMILY MEDICINE

## 2025-03-18 RX ORDER — VERAPAMIL HYDROCHLORIDE 240 MG/1
240 CAPSULE, DELAYED RELEASE ORAL DAILY
Qty: 90 CAPSULE | Refills: 4 | Status: SHIPPED | OUTPATIENT
Start: 2025-03-18

## 2025-03-18 RX ORDER — AMOXICILLIN 500 MG/1
2000 CAPSULE ORAL ONCE
Qty: 4 CAPSULE | Refills: 3 | Status: SHIPPED | OUTPATIENT
Start: 2025-03-18 | End: 2025-03-18

## 2025-03-18 RX ORDER — PRAVASTATIN SODIUM 40 MG
40 TABLET ORAL DAILY
Qty: 90 TABLET | Refills: 4 | Status: SHIPPED | OUTPATIENT
Start: 2025-03-18

## 2025-03-18 RX ORDER — WARFARIN SODIUM 5 MG/1
5-7.5 TABLET ORAL DAILY
Qty: 135 TABLET | Refills: 4 | Status: SHIPPED | OUTPATIENT
Start: 2025-03-18

## 2025-03-18 RX ORDER — LEVOTHYROXINE SODIUM 100 UG/1
100 TABLET ORAL DAILY
Qty: 90 TABLET | Refills: 4 | Status: SHIPPED | OUTPATIENT
Start: 2025-03-18

## 2025-03-18 RX ORDER — CALCIUM CARBONATE 300MG(750)
400 TABLET,CHEWABLE ORAL DAILY
COMMUNITY

## 2025-03-18 ASSESSMENT — PAIN SCALES - GENERAL: PAINLEVEL_OUTOF10: NO PAIN (0)

## 2025-03-18 NOTE — PROGRESS NOTES
Preventive Care Visit  [facility]  Maggi Muro MD, Family Medicine  Mar 18, 2025      Assessment & Plan     Medicare annual wellness visit, subsequent  Advised consideration of RSV vaccine, COVID booster 6 months after last.  Remainder vaccines up-to-date.  Up-to-date with colon cancer screening, bone density screening.  May consider mammogram screening as it has been a year.At today's visit, we discussed lifestyle interventions to promote self-management and wellness, including maintenance of a healthy weight, healthy diet, regular physical activity and exercise, and falls prevention.     Non-sustained ventricular tachycardia (H)  Asymptomatic and suppressed on verapamil at current dose, continue.  - verapamil ER (VERELAN) 240 MG 24 hr capsule; Take 1 capsule (240 mg) by mouth daily.    Coronary artery disease involving native coronary artery of native heart without angina pectoris  Continue pravastatin.  No aspirin due to warfarin intake, no prior history of heart disease symptoms.  - pravastatin (PRAVACHOL) 40 MG tablet; Take 1 tablet (40 mg) by mouth daily.  - Lipid panel reflex to direct LDL Non-fasting    High degree atrioventricular block  Cardiac pacemaker in situ  Stable.  - verapamil ER (VERELAN) 240 MG 24 hr capsule; Take 1 capsule (240 mg) by mouth daily.    Hypercholesterolemia  Doing well on pravastatin.  Continue.  Encouraged healthy lifestyle habits.  We will check fasting lipids, liver function.  - Lipid panel reflex to direct LDL Fasting; Future  - Comprehensive metabolic panel; Future  - Comprehensive metabolic panel    Postoperative hypothyroidism  Continue levothyroxine.  We will check thyroid cascade.  - TSH with free T4 reflex; Future  - TSH WITH FREE T4 REFLEX    Osteopenia, unspecified location  Encourage weightbearing to videos, measures to reduce risk of falls, adequate calcium vitamin D intake.  Will check vitamin D level.  Bone density scan will be due again  "next year.  - Vitamin D Deficiency; Future  - Vitamin D Deficiency    Hx of mechanical aortic valve replacement  Continue warfarin for anticoagulation, amoxicillin prior to dental procedures for SBE prophylaxis.  - warfarin ANTICOAGULANT (COUMADIN) 5 MG tablet; Take 1-1.5 tablets (5-7.5 mg) by mouth daily. Adjust dose per INR as directed by [facility]  - amoxicillin (AMOXIL) 500 MG capsule; Take 4 capsules (2,000 mg) by mouth once for 1 dose. Prior to dental procedure.  - INR point of care    S/P splenectomy  Will check CBC.  - CBC with platelets; Future  - CBC with platelets    H/O bicuspid aortic valve  Status post replacement.  - INR point of care        The longitudinal plan of care for the diagnosis(es)/condition(s) as documented were addressed during this visit. Due to the added complexity in care, I will continue to support Vandana in the subsequent management and with ongoing continuity of care.    BMI  Estimated body mass index is 34.93 kg/m  as calculated from the following:    Height as of this encounter: 1.575 m (5' 2\").    Weight as of this encounter: 86.6 kg (191 lb).       Counseling  Appropriate preventive services were addressed with this patient via screening, questionnaire, or discussion as appropriate for fall prevention, nutrition, physical activity, Tobacco-use cessation, social engagement, weight loss and cognition.  Checklist reviewing preventive services available has been given to the patient.  Reviewed patient's diet, addressing concerns and/or questions.   She is at risk for psychosocial distress and has been provided with information to reduce risk.   Discussed possible causes of fatigue. Patient reported safety concerns were addressed today.The patient was provided with written information regarding signs of hearing loss.           Subjective   Vandana is a 74 year old, presenting for the following:  Wellness Visit (fasting) and Neck issue (Had CT scan  in January has question about " result)            HPI  History of Present Illness-  Vandana Thao, 74 years old, female    - Neck issue: Neck issue in January, CT scans performed with no abnormalities found. Compromise noted on the right vocal cord, no voice changes reported. Previous thyroid surgery. Occasional cough, not constant.  - Heart health: Heart valve replacement, taking warfarin. No palpitations, chest pain, or shortness of breath during activity.  - Restless legs and sleep: Restless legs previously, improved with magnesium supplementation. Started magnesium 400 mg, improved sleep quality and restless legs.   Results  - CT scans in January showed potential compromise on the right vocal cord suggestive of vocal cord paralysis of right side.  She has not had any vocal changes..  - Last mammogram was performed on March 13, 2024.  - Cologuard test was conducted just over two years ago.  - Bone density screening was done last year.  - Routine blood work, including cholesterol, kidney function, electrolytes, blood sugar, vitamin D level, thyroid function, and blood counts, to be checked today.          Advance Care Planning  Patient does not have a Health Care Directive: Discussed advance care planning with patient; information given to patient to review.      3/13/2025   General Health   How would you rate your overall physical health? Good   Feel stress (tense, anxious, or unable to sleep) To some extent   (!) STRESS CONCERN      3/13/2025   Nutrition   Diet: Regular (no restrictions)         3/13/2025   Exercise   Days per week of moderate/strenous exercise 5 days   Average minutes spent exercising at this level 40 min         3/13/2025   Social Factors   Frequency of gathering with friends or relatives Once a week   Worry food won't last until get money to buy more No   Food not last or not have enough money for food? No   Do you have housing? (Housing is defined as stable permanent housing and does not include staying ouside in a  car, in a tent, in an abandoned building, in an overnight shelter, or couch-surfing.) Yes   Are you worried about losing your housing? No   Lack of transportation? No   Unable to get utilities (heat,electricity)? No         3/13/2025   Fall Risk   Fallen 2 or more times in the past year? No   Trouble with walking or balance? No          3/13/2025   Activities of Daily Living- Home Safety   Needs help with the following daily activites None of the above   Safety concerns in the home Throw rugs in the hallway    No grab bars in the bathroom       Multiple values from one day are sorted in reverse-chronological order         3/13/2025   Dental   Dentist two times every year? Yes         3/13/2025   Hearing Screening   Hearing concerns? (!) IT'S HARD TO FOLLOW A CONVERSATION IN A NOISY RESTAURANT OR CROWDED ROOM.         3/13/2025   Driving Risk Screening   Patient/family members have concerns about driving No         3/13/2025   General Alertness/Fatigue Screening   Have you been more tired than usual lately? (!) YES         3/13/2025   Urinary Incontinence Screening   Bothered by leaking urine in past 6 months No           3/12/2024   TB Screening   Were you born outside of the [place]? No           Today's PHQ-2 Score:       3/17/2025     8:35 AM   PHQ-2 ( 1999 [employer])   Q1: Little interest or pleasure in doing things 0   Q2: Feeling down, depressed or hopeless 0   PHQ-2 Score 0    Q1: Little interest or pleasure in doing things Not at all   Q2: Feeling down, depressed or hopeless Not at all   PHQ-2 Score 0       Patient-reported           3/13/2025   Substance Use   Alcohol more than 3/day or more than 7/wk No   Do you have a current opioid prescription? No   How severe/bad is pain from 1 to 10? 0/10 (No Pain)   Do you use any other substances recreationally? No     Social History     Tobacco Use    Smoking status: Former     Current packs/day: 0.00     Average packs/day: 0.5 packs/day for 20.0 years (10.0 ttl pk-yrs)      Types: Cigarettes     Start date: 1959     Quit date: 1979     Years since quittin.2     Passive exposure: Past    Smokeless tobacco: Never   Vaping Use    Vaping status: Never Used   Substance Use Topics    Alcohol use: Yes     Comment: 1-2 drinks per week or less    Drug use: No           3/13/2024   LAST FHS-7 RESULTS   1st degree relative breast or ovarian cancer No   Any relative bilateral breast cancer No   Any male have breast cancer No   Any ONE woman have BOTH breast AND ovarian cancer No   Any woman with breast cancer before 50yrs No   2 or more relatives with breast AND/OR ovarian cancer No   2 or more relatives with breast AND/OR bowel cancer No        Mammogram Screening - Mammogram every 1-2 years updated in Health Maintenance based on mutual decision making    ASCVD Risk   The 10-year ASCVD risk score (Adrienne TOMPKINS, et al., 2019) is: 11.6%    Values used to calculate the score:      Age: 74 years      Sex: Female      Is Non- : No      Diabetic: No      Tobacco smoker: No      Systolic Blood Pressure: 128 mmHg      Is BP treated: No      HDL Cholesterol: 71 mg/dL      Total Cholesterol: 219 mg/dL            Reviewed and updated as needed this visit by Provider                    Past Medical History:   Diagnosis Date    History of aortic stenosis 2018    Hyperlipidemia     Hypothyroidism     Valvular disease     aortic stenosis secondary to bicuspid valve     Past Surgical History:   Procedure Laterality Date    CARDIAC VALVE REPLACEMENT  2009    AVR    COLONOSCOPY  2013    CYSTOSTOMY W/ BLADDER BIOPSY  2000    EP PACEMAKER INSERT N/A 2018    Procedure: EP Pacemaker Insertion;  Surgeon: Luis Urena MD;  Location: [facility];  Service:     EYE SURGERY  10/2022    OTHER SURGICAL HISTORY  2002    thyroidectomy    SPLENECTOMY  1979    ZZC REPLACE AORT VALV,PROSTH VALV      Description: Aortic Valve  Replacement;  Proc Date: 2009;  Comments: #[address] [employer] [place] Prosthesis     OB History    Para Term  AB Living   2 2 2 0 0 0   SAB IAB Ectopic Multiple Live Births   0 0 0 0 0      # Outcome Date GA Lbr Michael/2nd Weight Sex Type Anes PTL Lv   2 Term            1 Term              Lab work is in process  Labs reviewed in EPIC  BP Readings from Last 3 Encounters:   25 128/64   25 (!) 161/63   25 136/70    Wt Readings from Last 3 Encounters:   25 86.6 kg (191 lb)   25 85.3 kg (188 lb)   25 86.2 kg (190 lb)                  Patient Active Problem List   Diagnosis    Hypercholesterolemia    H/O bicuspid aortic valve    High degree atrioventricular block    Postoperative hypothyroidism    Cardiac pacemaker in situ    Non-sustained ventricular tachycardia (H)    Coronary artery disease involving native coronary artery of native heart without angina pectoris    Microscopic hematuria    S/P splenectomy    Other insomnia    Anticoagulated on warfarin    Hx of mechanical aortic valve replacement    Osteopenia, unspecified location    Neck pain on left side     Past Surgical History:   Procedure Laterality Date    CARDIAC VALVE REPLACEMENT  2009    AVR    COLONOSCOPY  2013    CYSTOSTOMY W/ BLADDER BIOPSY  2000    EP PACEMAKER INSERT N/A 2018    Procedure: EP Pacemaker Insertion;  Surgeon: Luis Urena MD;  Location[facility];  Service:     EYE SURGERY  10/2022    OTHER SURGICAL HISTORY  2002    thyroidectomy    SPLENECTOMY  1979    ZZC REPLACE AORT VALV,PROSTH VALV      Description: Aortic Valve Replacement;  Proc Date: 2009;  Comments: #[address] [employer] [place] Prosthesis       Social History     Tobacco Use    Smoking status: Former     Current packs/day: 0.00     Average packs/day: 0.5 packs/day for 20.0 years (10.0 ttl pk-yrs)     Types: Cigarettes     Start date: 1959     Quit date: 1979     Years since  quittin.2     Passive exposure: Past    Smokeless tobacco: Never   Substance Use Topics    Alcohol use: Yes     Comment: 1-2 drinks per week or less     Family History   Problem Relation Age of Onset    Acute Myocardial Infarction Brother 43    Rheumatoid Arthritis Mother          in 40s    Coronary Artery Disease Father     Hypertension Father     No Known Problems Maternal Grandmother     No Known Problems Maternal Grandfather     No Known Problems Paternal Grandmother     No Known Problems Paternal Grandfather     Aortic stenosis Brother         s/p surgery    No Known Problems Sister     Breast Cancer No family hx of     Colon Cancer No family hx of     Cervical Cancer No family hx of     Other Cancer No family hx of          Current Outpatient Medications   Medication Sig Dispense Refill    amoxicillin (AMOXIL) 500 MG capsule Take 4 capsules (2,000 mg) by mouth once for 1 dose. Prior to dental procedure. 4 capsule 3    cholecalciferol, vitamin D3, 1,000 unit tablet [CHOLECALCIFEROL, VITAMIN D3, 1,000 UNIT TABLET] Take 1,000 Units by mouth daily.             levothyroxine (SYNTHROID/LEVOTHROID) 100 MCG tablet Take 1 tablet (100 mcg) by mouth daily. 90 tablet 4    Magnesium 400 MG TABS Take 400 mg by mouth daily.      pravastatin (PRAVACHOL) 40 MG tablet Take 1 tablet (40 mg) by mouth daily. 90 tablet 4    verapamil ER (VERELAN) 240 MG 24 hr capsule Take 1 capsule (240 mg) by mouth daily. 90 capsule 4    warfarin ANTICOAGULANT (COUMADIN) 5 MG tablet Take 1-1.5 tablets (5-7.5 mg) by mouth daily. Adjust dose per INR as directed by  tablet 4     No Known Allergies  Recent Labs   Lab Test 24  1259 23  0954 22  1232 22  0900 09/10/21  1322 20  1052 20  1145 19  0929   A1C  --   --   --  5.3  --  5.4  --   --    * 134*  --  138*  --  145* 127 115   HDL 71 71  --  72  --  78 69 65   TRIG 96 101  --  100  --  126 157* 144   ALT 28 31  --   --   --   --   --  " 16   CR 0.86 0.88  --   --    < > 0.89 0.91 0.80   GFRESTIMATED 72 70  --   --    < > >60 >60 >60   GFRESTBLACK  --   --   --   --   --  >60 >60 >60   POTASSIUM 4.9 4.6  --   --    < > 4.8 4.4 4.4   TSH 0.37 0.89   < >  --    < > 4.03 1.72 0.73    < > = values in this interval not displayed.      Current providers sharing in care for this patient include:  Patient Care Team:  Maggi Muro MD as PCP - General (Family Medicine)  Maggi Muro MD as Assigned PCP  Nel Zhang MD as Assigned Heart and Vascular Provider  Melvi Berg CNP as Assigned Neuroscience Provider    The following health maintenance items are reviewed in Epic and correct as of today:  Health Maintenance   Topic Date Due    RSV VACCINE (1 - Risk 60-74 years 1-dose series) Never done    LIPID  03/14/2025    ANNUAL REVIEW OF HM ORDERS  03/14/2025    MEDICARE ANNUAL WELLNESS VISIT  03/14/2025    TSH W/FREE T4 REFLEX  03/14/2025    COVID-19 Vaccine (7 - 2024-25 season) 03/24/2025    COLORECTAL CANCER SCREENING  01/24/2026    MAMMO SCREENING  03/13/2026    FALL RISK ASSESSMENT  03/18/2026    DEXA  05/01/2026    GLUCOSE  03/14/2027    ADVANCE CARE PLANNING  03/14/2029    DTAP/TDAP/TD IMMUNIZATION (4 - Td or Tdap) 02/27/2030    HEPATITIS C SCREENING  Completed    PHQ-2 (once per calendar year)  Completed    INFLUENZA VACCINE  Completed    Pneumococcal Vaccine: 50+ Years  Completed    ZOSTER IMMUNIZATION  Completed    HPV IMMUNIZATION  Aged Out    MENINGITIS IMMUNIZATION  Aged Out         Review of Systems  Constitutional, neuro, ENT, endocrine, pulmonary, cardiac, gastrointestinal, genitourinary, musculoskeletal, integument and psychiatric systems are negative, except as otherwise noted.     Objective    Exam  /64   Pulse 65   Temp 98  F (36.7  C) (Oral)   Resp 20   Ht 1.575 m (5' 2\")   Wt 86.6 kg (191 lb)   SpO2 97%   BMI 34.93 kg/m     Estimated body mass index is 34.93 kg/m  as calculated from the following:    Height as " "of this encounter: 1.575 m (5' 2\").    Weight as of this encounter: 86.6 kg (191 lb).    Physical Exam  Physical Examination: General appearance - alert, well appearing, and in no distress, oriented to person, place, and time and normal appearing weight  Mental status - alert, oriented to person, place, and time, normal mood, behavior, speech, dress, motor activity, and thought processes  Eyes - pupils equal and reactive, extraocular eye movements intact  Ears - bilateral TM's and external ear canals normal  Nose - normal and patent, no erythema, discharge or polyps  Mouth - mucous membranes moist, pharynx normal without lesions  Neck - supple, no significant adenopathy; scar consistent with previous thyroidectomy  Lymphatics - no palpable lymphadenopathy, no hepatosplenomegaly  Chest - clear to auscultation, no wheezes, rales or rhonchi, symmetric air entry  Heart - normal rate, regular rhythm, normal S1, S2, no murmurs, rubs, clicks or gallops  Abdomen - soft, nontender, nondistended, no masses or organomegaly  Breasts - declines  Neurological - alert, oriented, normal speech, no focal findings or movement disorder noted  Musculoskeletal - no joint tenderness, deformity or swelling  Extremities - peripheral pulses normal, no pedal edema, no clubbing or cyanosis  Skin - normal coloration and turgor, no rashes, no suspicious skin lesions noted          3/18/2025   Mini Cog   Clock Draw Score 2 Normal   3 Item Recall 3 objects recalled   Mini Cog Total Score 5              Signed Electronically by: Maggi Muro MD    "

## 2025-03-18 NOTE — PATIENT INSTRUCTIONS
Patient Education   Preventive Care Advice   This is general advice given by our system to help you stay healthy. However, your care team may have specific advice just for you. Please talk to your care team about your preventive care needs.  Nutrition  Eat 5 or more servings of fruits and vegetables each day.  Try wheat bread, brown rice and whole grain pasta (instead of white bread, rice, and pasta).  Get enough calcium and vitamin D. Check the label on foods and aim for 100% of the RDA (recommended daily allowance).  Lifestyle  Exercise at least 150 minutes each week  (30 minutes a day, 5 days a week).  Do muscle strengthening activities 2 days a week. These help control your weight and prevent disease.  No smoking.  Wear sunscreen to prevent skin cancer.  Have a dental exam and cleaning every 6 months.  Yearly exams  See your health care team every year to talk about:  Any changes in your health.  Any medicines your care team has prescribed.  Preventive care, family planning, and ways to prevent chronic diseases.  Shots (vaccines)   HPV shots (up to age 26), if you've never had them before.  Hepatitis B shots (up to age 59), if you've never had them before.  COVID-19 shot: Get this shot when it's due.  Flu shot: Get a flu shot every year.  Tetanus shot: Get a tetanus shot every 10 years.  Pneumococcal, hepatitis A, and RSV shots: Ask your care team if you need these based on your risk.  Shingles shot (for age 50 and up)  General health tests  Diabetes screening:  Starting at age 35, Get screened for diabetes at least every 3 years.  If you are younger than age 35, ask your care team if you should be screened for diabetes.  Cholesterol test: At age 39, start having a cholesterol test every 5 years, or more often if advised.  Bone density scan (DEXA): At age 50, ask your care team if you should have this scan for osteoporosis (brittle bones).  Hepatitis C: Get tested at least once in your life.  STIs (sexually  Patient wife called looking for xray  results done on 11/13/18  af/rma transmitted infections)  Before age 24: Ask your care team if you should be screened for STIs.  After age 24: Get screened for STIs if you're at risk. You are at risk for STIs (including HIV) if:  You are sexually active with more than one person.  You don't use condoms every time.  You or a partner was diagnosed with a sexually transmitted infection.  If you are at risk for HIV, ask about PrEP medicine to prevent HIV.  Get tested for HIV at least once in your life, whether you are at risk for HIV or not.  Cancer screening tests  Cervical cancer screening: If you have a cervix, begin getting regular cervical cancer screening tests starting at age 21.  Breast cancer scan (mammogram): If you've ever had breasts, begin having regular mammograms starting at age 40. This is a scan to check for breast cancer.  Colon cancer screening: It is important to start screening for colon cancer at age 45.  Have a colonoscopy test every 10 years (or more often if you're at risk) Or, ask your provider about stool tests like a FIT test every year or Cologuard test every 3 years.  To learn more about your testing options, visit:   .  For help making a decision, visit:   https://bit.ly/ko14566.  Prostate cancer screening test: If you have a prostate, ask your care team if a prostate cancer screening test (PSA) at age 55 is right for you.  Lung cancer screening: If you are a current or former smoker ages 50 to 80, ask your care team if ongoing lung cancer screenings are right for you.  For informational purposes only. Not to replace the advice of your health care provider. Copyright   2023 [facility]. All rights reserved. Clinically reviewed by the [facility]. [employer] 390417 - REV 01/24.  Learning About Activities of Daily Living  What are activities of daily living?     Activities of daily living (ADLs) are the basic self-care tasks you do every day. These include eating, bathing, dressing,  and moving around.  As you age, and if you have health problems, you may find that it's harder to do some of these tasks. If so, your doctor can suggest ideas that may help.  To measure what kind of help you may need, your doctor will ask how well you are able to do ADLs. Let your doctor know if there are any tasks that you are having trouble doing. This is an important first step to getting help. And when you have the help you need, you can stay as independent as possible.  How will a doctor assess your ADLs?  Asking about ADLs is part of a routine health checkup your doctor will likely do as you age. Your health check might be done in a doctor's office, in your home, or at a hospital. The goal is to find out if you are having any problems that could make it hard to care for yourself or that make it unsafe for you to be on your own.  To measure your ADLs, your doctor will ask how hard it is for you to do routine tasks. Your doctor may also want to know if you have changed the way you do a task because of a health problem. Your doctor may watch how you:  Walk back and forth.  Keep your balance while you stand or walk.  Move from sitting to standing or from a bed to a chair.  Button or unbutton a shirt or sweater.  Remove and put on your shoes.  It's common to feel a little worried or anxious if you find you can't do all the things you used to be able to do. Talking with your doctor about ADLs is a way to make sure you're as safe as possible and able to care for yourself as well as you can. You may want to bring a caregiver, friend, or family member to your checkup. They can help you talk to your doctor.  Follow-up care is a key part of your treatment and safety. Be sure to make and go to all appointments, and call your doctor if you are having problems. It's also a good idea to know your test results and keep a list of the medicines you take.  Current as of: October 24, 2024  Content Version: 14.4    6829-9617  [employer].   Care instructions adapted under license by your healthcare professional. If you have questions about a medical condition or this instruction, always ask your healthcare professional. [employer] disclaims any warranty or liability for your use of this information.    Hearing Loss: Care Instructions  Overview     Hearing loss is a sudden or slow decrease in how well you hear. It can range from slight to profound. Permanent hearing loss can occur with aging. It also can happen when you are exposed long-term to loud noise. Examples include listening to loud music, riding motorcycles, or being around other loud machines.  Hearing loss can affect your work and home life. It can make you feel lonely or depressed. You may feel that you have lost your independence. But hearing aids and other devices can help you hear better and feel connected to others.  Follow-up care is a key part of your treatment and safety. Be sure to make and go to all appointments, and call your doctor if you are having problems. It's also a good idea to know your test results and keep a list of the medicines you take.  How can you care for yourself at home?  Avoid loud noises whenever possible. This helps keep your hearing from getting worse.  Always wear hearing protection around loud noises.  Wear a hearing aid as directed.  A professional can help you pick a hearing aid that will work best for you.  You can also get hearing aids over the counter for mild to moderate hearing loss.  Have hearing tests as your doctor suggests. They can show whether your hearing has changed. Your hearing aid may need to be adjusted.  Use other devices as needed. These may include:  Telephone amplifiers and hearing aids that can connect to a television, stereo, radio, or microphone.  Devices that use lights or vibrations. These alert you to the doorbell, a ringing telephone, or a baby monitor.  Television closed-captioning. This  "shows the words at the bottom of the screen. Most new TVs can do this.  TTY (text telephone). This lets you type messages back and forth on the telephone instead of talking or listening. These devices are also called TDD. When messages are typed on the keyboard, they are sent over the phone line to a receiving TTY. The message is shown on a monitor.  Use text messaging, social media, and email if it is hard for you to communicate by telephone.  Try to learn a listening technique called speechreading. It is not lipreading. You pay attention to people's gestures, expressions, posture, and tone of voice. These clues can help you understand what a person is saying. Face the person you are talking to, and have them face you. Make sure the lighting is good. You need to see the other person's face clearly.  Think about counseling if you need help to adjust to your hearing loss.  When should you call for help?  Watch closely for changes in your health, and be sure to contact your doctor if:    You think your hearing is getting worse.     You have new symptoms, such as dizziness or nausea.   Where can you learn more?  Go to https://www.[employer].net/patiented  Enter R798 in the search box to learn more about \"Hearing Loss: Care Instructions.\"  Current as of: October 27, 2024  Content Version: 14.4    9442-5846 [employer].   Care instructions adapted under license by your healthcare professional. If you have questions about a medical condition or this instruction, always ask your healthcare professional. [employer] disclaims any warranty or liability for your use of this information.    Learning About Stress  What is stress?     Stress is your body's response to a hard situation. Your body can have a physical, emotional, or mental response. Stress is a fact of life for most people, and it affects everyone differently. What causes stress for you may not be stressful for someone else.  A lot of things " can cause stress. You may feel stress when you go on a job interview, take a test, or run a race. This kind of short-term stress is normal and even useful. It can help you if you need to work hard or react quickly. For example, stress can help you finish an important job on time.  Long-term stress is caused by ongoing stressful situations or events. Examples of long-term stress include long-term health problems, ongoing problems at work, or conflicts in your family. Long-term stress can harm your health.  How does stress affect your health?  When you are stressed, your body responds as though you are in danger. It makes hormones that speed up your heart, make you breathe faster, and give you a burst of energy. This is called the fight-or-flight stress response. If the stress is over quickly, your body goes back to normal and no harm is done.  But if stress happens too often or lasts too long, it can have bad effects. Long-term stress can make you more likely to get sick, and it can make symptoms of some diseases worse. If you tense up when you are stressed, you may develop neck, shoulder, or low back pain. Stress is linked to high blood pressure and heart disease.  Stress also harms your emotional health. It can make you welsh, tense, or depressed. Your relationships may suffer, and you may not do well at work or school.  What can you do to manage stress?  You can try these things to help manage stress:   Do something active. Exercise or activity can help reduce stress. Walking is a great way to get started. Even everyday activities such as housecleaning or yard work can help.  Try yoga or aditya chi. These techniques combine exercise and meditation. You may need some training at first to learn them.  Do something you enjoy. For example, listen to music or go to a movie. Practice your hobby or do volunteer work.  Meditate. This can help you relax, because you are not worrying about what happened before or what may  "happen in the future.  Do guided imagery. Imagine yourself in any setting that helps you feel calm. You can use online videos, books, or a teacher to guide you.  Do breathing exercises. For example:  From a standing position, bend forward from the waist with your knees slightly bent. Let your arms dangle close to the floor.  Breathe in slowly and deeply as you return to a standing position. Roll up slowly and lift your head last.  Hold your breath for just a few seconds in the standing position.  Breathe out slowly and bend forward from the waist.  Let your feelings out. Talk, laugh, cry, and express anger when you need to. Talking with supportive friends or family, a counselor, or a ervin leader about your feelings is a healthy way to relieve stress. Avoid discussing your feelings with people who make you feel worse.  Write. It may help to write about things that are bothering you. This helps you find out how much stress you feel and what is causing it. When you know this, you can find better ways to cope.  What can you do to prevent stress?  You might try some of these things to help prevent stress:  Manage your time. This helps you find time to do the things you want and need to do.  Get enough sleep. Your body recovers from the stresses of the day while you are sleeping.  Get support. Your family, friends, and community can make a difference in how you experience stress.  Limit your news feed. Avoid or limit time on social media or news that may make you feel stressed.  Do something active. Exercise or activity can help reduce stress. Walking is a great way to get started.  Where can you learn more?  Go to https://www.[employer].net/patiented  Enter N032 in the search box to learn more about \"Learning About Stress.\"  Current as of: October 24, 2024  Content Version: 14.4    1199-3335 [facility] [employer].   Care instructions adapted under license by your healthcare professional. If you have questions about a " medical condition or this instruction, always ask your healthcare professional. [employer] disclaims any warranty or liability for your use of this information.    Learning About Sleeping Well  What does sleeping well mean?     Sleeping well means getting enough sleep to feel good and stay healthy. How much sleep is enough varies among people.  The number of hours you sleep and how you feel when you wake up are both important. If you do not feel refreshed, you probably need more sleep. Another sign of not getting enough sleep is feeling tired during the day.  Experts recommend that adults get at least 7 or more hours of sleep per day. Children and older adults need more sleep.  Why is getting enough sleep important?  Getting enough quality sleep is a basic part of good health. When your sleep suffers, your physical health, mood, and your thoughts can suffer too. You may find yourself feeling more grumpy or stressed. Not getting enough sleep also can lead to serious problems, including injury, accidents, anxiety, and depression.  What might cause poor sleeping?  Many things can cause sleep problems, including:  Changes to your sleep schedule.  Stress. Stress can be caused by fear about a single event, such as giving a speech. Or you may have ongoing stress, such as worry about work or school.  Depression, anxiety, and other mental or emotional conditions.  Changes in your sleep habits or surroundings. This includes changes that happen where you sleep, such as noise, light, or sleeping in a different bed. It also includes changes in your sleep pattern, such as having jet lag or working a late shift.  Health problems, such as pain, breathing problems, and restless legs syndrome.  Lack of regular exercise.  Using alcohol, nicotine, or caffeine before bed.  How can you help yourself?  Here are some tips that may help you sleep more soundly and wake up feeling more refreshed.  Your sleeping area   Use your  "bedroom only for sleeping and sex. A bit of light reading may help you fall asleep. But if it doesn't, do your reading elsewhere in the house. Try not to use your TV, computer, smartphone, or tablet while you are in bed.  Be sure your bed is big enough to stretch out comfortably, especially if you have a sleep partner.  Keep your bedroom quiet, dark, and cool. Use curtains, blinds, or a sleep mask to block out light. To block out noise, use earplugs, soothing music, or a \"white noise\" machine.  Your evening and bedtime routine   Create a relaxing bedtime routine. You might want to take a warm shower or bath, or listen to soothing music.  Go to bed at the same time every night. And get up at the same time every morning, even if you feel tired.  What to avoid   Limit caffeine (coffee, tea, caffeinated sodas) during the day, and don't have any for at least 6 hours before bedtime.  Avoid drinking alcohol before bedtime. Alcohol can cause you to wake up more often during the night.  Try not to smoke or use tobacco, especially in the evening. Nicotine can keep you awake.  Limit naps during the day, especially close to bedtime.  Avoid lying in bed awake for too long. If you can't fall asleep or if you wake up in the middle of the night and can't get back to sleep within about 20 minutes, get out of bed and go to another room until you feel sleepy.  Avoid taking medicine right before bed that may keep you awake or make you feel hyper or energized. Your doctor can tell you if your medicine may do this and if you can take it earlier in the day.  If you can't sleep   Imagine yourself in a peaceful, pleasant scene. Focus on the details and feelings of being in a place that is relaxing.  Get up and do a quiet or boring activity until you feel sleepy.  Avoid drinking any liquids before going to bed to help prevent waking up often to use the bathroom.  Where can you learn more?  Go to https://www.healthwise.net/patiented  Enter " "J942 in the search box to learn more about \"Learning About Sleeping Well.\"  Current as of: July 31, 2024  Content Version: 14.4 2024-2025 [employer].   Care instructions adapted under license by your healthcare professional. If you have questions about a medical condition or this instruction, always ask your healthcare professional. [employer] disclaims any warranty or liability for your use of this information.       "

## 2025-03-18 NOTE — PROGRESS NOTES
ANTICOAGULATION MANAGEMENT     Vandana Thao 74 year old female is on warfarin with subtherapeutic INR result. (Goal INR Other - see comment)    Recent labs: (last 7 days)     03/18/25  1049   INR 1.7*       ASSESSMENT     Warfarin Lab Questionnaire    Warfarin Doses Last 7 Days      3/17/2025     8:38 AM   Dose in Tablet or Mg   TAB or MG? milligram (mg)     Pt Rptd Dose CLYDE MONDAY TUESDAY WED THURS FRIDAY SATURDAY   3/17/2025   8:38 AM 7.5 5 5 5 5 5 5         3/17/2025   Warfarin Lab Questionnaire   Missed doses within past 14 days? No   Changes in diet or alcohol within past 14 days? No   Medication changes since last result? Yes   Please list: Magnesium 400mg   Injuries or illness since last result? No   New shortness of breath, severe headaches or sudden changes in vision since last result? No   Abnormal bleeding since last result? No   Upcoming surgery, procedure? No   Best number to call with results? [phone]     Previous result: Therapeutic last 2(+) visits  Additional findings: None       PLAN     Recommended plan for no diet, medication or health factor changes affecting INR     Dosing Instructions: Increase your warfarin dose (6.7% change) with next INR in 2 weeks       Summary  As of 3/18/2025      Full warfarin instructions:  7.5 mg every Sun, Wed; 5 mg all other days   Next INR check:  4/1/2025               Detailed voice message left for Vandana with dosing instructions and follow up date.     Lab visit scheduled    Education provided: Please call back if any changes to your diet, medications or how you've been taking warfarin    Plan made with [facility] Pharmacist Leah Preston, CHARITY  3/18/2025  Anticoagulation Clinic  [facility] for routing messages: ralph JOHN  [facility] patient phone line: [phone]        _______________________________________________________________________     Anticoagulation Episode Summary       Current INR goal:  Other - see comment   TTR:  57.2%  (1 y)   Target end date:  Indefinite   Send INR reminders to:  ANDREAS JOHN    Indications    Anticoagulated on warfarin [Z79.01]  Hx of mechanical aortic valve replacement [Z95.2]  H/O bicuspid aortic valve [Z87.74]             Comments:  Approved for 8 week checks per -- see encounter from 01/16/23  3/20/24: INR goal range changed to 1.8-2.5             Anticoagulation Care Providers       Provider Role Specialty Phone number    Lakeisha Marmolejo MD Referring Family Medicine [phone]    Maggi Muro MD Referring Family Medicine [phone]

## 2025-03-19 LAB
ALBUMIN SERPL BCG-MCNC: 4.4 G/DL (ref 3.5–5.2)
ALP SERPL-CCNC: 73 U/L (ref 40–150)
ALT SERPL W P-5'-P-CCNC: 33 U/L (ref 0–50)
ANION GAP SERPL CALCULATED.3IONS-SCNC: 11 MMOL/L (ref 7–15)
AST SERPL W P-5'-P-CCNC: 47 U/L (ref 0–45)
BILIRUB SERPL-MCNC: 0.5 MG/DL
BUN SERPL-MCNC: 16.9 MG/DL (ref 8–23)
CALCIUM SERPL-MCNC: 9.7 MG/DL (ref 8.8–10.4)
CHLORIDE SERPL-SCNC: 104 MMOL/L (ref 98–107)
CHOLEST SERPL-MCNC: 230 MG/DL
CREAT SERPL-MCNC: 0.85 MG/DL (ref 0.51–0.95)
EGFRCR SERPLBLD CKD-EPI 2021: 72 ML/MIN/1.73M2
FASTING STATUS PATIENT QL REPORTED: YES
FASTING STATUS PATIENT QL REPORTED: YES
GLUCOSE SERPL-MCNC: 93 MG/DL (ref 70–99)
HCO3 SERPL-SCNC: 24 MMOL/L (ref 22–29)
HDLC SERPL-MCNC: 73 MG/DL
LDLC SERPL CALC-MCNC: 130 MG/DL
NONHDLC SERPL-MCNC: 157 MG/DL
POTASSIUM SERPL-SCNC: 4.7 MMOL/L (ref 3.4–5.3)
PROT SERPL-MCNC: 7.6 G/DL (ref 6.4–8.3)
SODIUM SERPL-SCNC: 139 MMOL/L (ref 135–145)
TRIGL SERPL-MCNC: 134 MG/DL
TSH SERPL DL<=0.005 MIU/L-ACNC: 1.29 UIU/ML (ref 0.3–4.2)
VIT D+METAB SERPL-MCNC: 44 NG/ML (ref 20–50)

## 2025-03-21 PROCEDURE — 93296 REM INTERROG EVL PM/IDS: CPT | Performed by: INTERNAL MEDICINE

## 2025-03-21 PROCEDURE — 93294 REM INTERROG EVL PM/LDLS PM: CPT | Performed by: INTERNAL MEDICINE

## 2025-03-28 ENCOUNTER — HOSPITAL ENCOUNTER (OUTPATIENT)
Dept: MAMMOGRAPHY | Facility: CLINIC | Age: 75
Discharge: HOME | End: 2025-03-28
Attending: FAMILY MEDICINE | Admitting: FAMILY MEDICINE
Payer: MEDICARE

## 2025-03-28 DIAGNOSIS — Z12.31 VISIT FOR SCREENING MAMMOGRAM: ICD-10-CM

## 2025-03-28 PROCEDURE — 77063 BREAST TOMOSYNTHESIS BI: CPT

## 2025-04-01 ENCOUNTER — ANTICOAGULATION THERAPY VISIT (OUTPATIENT)
Dept: ANTICOAGULATION | Facility: CLINIC | Age: 75
End: 2025-04-01

## 2025-04-01 ENCOUNTER — LAB (OUTPATIENT)
Dept: LAB | Facility: CLINIC | Age: 75
End: 2025-04-01
Payer: MEDICARE

## 2025-04-01 DIAGNOSIS — Z79.01 ANTICOAGULATED ON WARFARIN: Primary | ICD-10-CM

## 2025-04-01 DIAGNOSIS — Z95.2 HX OF MECHANICAL AORTIC VALVE REPLACEMENT: ICD-10-CM

## 2025-04-01 DIAGNOSIS — Z87.74 H/O BICUSPID AORTIC VALVE: ICD-10-CM

## 2025-04-01 LAB — INR BLD: 1.9 (ref 0.9–1.1)

## 2025-04-01 PROCEDURE — 36416 COLLJ CAPILLARY BLOOD SPEC: CPT

## 2025-04-01 PROCEDURE — 85610 PROTHROMBIN TIME: CPT

## 2025-04-01 NOTE — PROGRESS NOTES
ANTICOAGULATION MANAGEMENT     Vandana Thao 74 year old female is on warfarin with therapeutic INR result. (Goal INR  1.8-2.5 )    Recent labs: (last 7 days)     04/01/25  1015   INR 1.9*       ASSESSMENT     Source(s): Chart Review and Patient/Caregiver Call     Warfarin doses taken: Warfarin taken as instructed  Diet: No new diet changes identified  Medication/supplement changes: None noted  New illness, injury, or hospitalization: No  Signs or symptoms of bleeding or clotting: No  Previous result: Subtherapeutic - 6.7% increase  Additional findings: None       PLAN     Recommended plan for no diet, medication or health factor changes affecting INR     Dosing Instructions: Continue your current warfarin dose with next INR in 3 weeks       Summary  As of 4/1/2025      Full warfarin instructions:  7.5 mg every Sun, Wed; 5 mg all other days   Next INR check:  4/29/2025               Telephone call with Vandana who verbalizes understanding and agrees to plan    Patient elected to schedule next visit 4 weeks out    Education provided: Please call back if any changes to your diet, medications or how you've been taking warfarin  Goal range and lab monitoring: goal range and significance of current result  Contact [phone] with any changes, questions or concerns.     Plan made per [facility] anticoagulation protocol    Dorene Howard RN  4/1/2025  Anticoagulation Clinic  [employer] for routing messages: ralph JOHN  [facility] patient phone line: [phone]        _______________________________________________________________________     Anticoagulation Episode Summary       Current INR goal:  Other - see comment   TTR:  56.4% (1 y)   Target end date:  Indefinite   Send INR reminders to:  ANDREAS JOHN    Indications    Anticoagulated on warfarin [Z79.01]  Hx of mechanical aortic valve replacement [Z95.2]  H/O bicuspid aortic valve [Z87.74]             Comments:  Approved for 8 week checks per -- see  encounter from 01/16/23  3/20/24: INR goal range changed to 1.8-2.5             Anticoagulation Care Providers       Provider Role Specialty Phone number    Lakeisha Marmolejo MD Referring Family Medicine [phone]    Maggi Muro MD Referring Family Medicine [phone]

## 2025-04-28 ENCOUNTER — LAB (OUTPATIENT)
Dept: LAB | Facility: CLINIC | Age: 75
End: 2025-04-28
Payer: MEDICARE

## 2025-04-28 ENCOUNTER — ANTICOAGULATION THERAPY VISIT (OUTPATIENT)
Dept: ANTICOAGULATION | Facility: CLINIC | Age: 75
End: 2025-04-28

## 2025-04-28 DIAGNOSIS — Z79.01 ANTICOAGULATED ON WARFARIN: Primary | ICD-10-CM

## 2025-04-28 DIAGNOSIS — Z87.74 H/O BICUSPID AORTIC VALVE: ICD-10-CM

## 2025-04-28 DIAGNOSIS — Z95.2 HX OF MECHANICAL AORTIC VALVE REPLACEMENT: ICD-10-CM

## 2025-04-28 LAB — INR BLD: 1.9 (ref 0.9–1.1)

## 2025-04-28 PROCEDURE — 85610 PROTHROMBIN TIME: CPT

## 2025-04-28 PROCEDURE — 36416 COLLJ CAPILLARY BLOOD SPEC: CPT

## 2025-04-28 NOTE — PROGRESS NOTES
ANTICOAGULATION MANAGEMENT     Vandana Thao 74 year old female is on warfarin with therapeutic INR result. (Goal INR Other - 1.8-2.5)    Recent labs: (last 7 days)     04/28/25  1010   INR 1.9*       ASSESSMENT     Warfarin Lab Questionnaire    Warfarin Doses Last 7 Days      4/27/2025    11:27 AM   Dose in Tablet or Mg   TAB or MG? milligram (mg)     Pt Rptd Dose SUNDAY MONDAY TUESDAY WED THURS FRIDAY SATURDAY 4/27/2025  11:27 AM 7.5 5 5 7.5 5 5 5         4/27/2025   Warfarin Lab Questionnaire   Missed doses within past 14 days? No   Changes in diet or alcohol within past 14 days? No   Medication changes since last result? No   Injuries or illness since last result? No   New shortness of breath, severe headaches or sudden changes in vision since last result? No   Abnormal bleeding since last result? No   Upcoming surgery, procedure? No   Best number to call with results? [phone]     Previous result: Therapeutic last visit; previously outside of goal range  Additional findings: None       PLAN     Recommended plan for no diet, medication or health factor changes affecting INR     Dosing Instructions: Continue your current warfarin dose with next INR in 4 weeks       Summary  As of 4/28/2025      Full warfarin instructions:  7.5 mg every Sun, Wed; 5 mg all other days   Next INR check:  5/28/2025               Telephone call with Vandana who verbalizes understanding and agrees to plan    Lab visit scheduled    Education provided: Please call back if any changes to your diet, medications or how you've been taking warfarin  Symptom monitoring: monitoring for bleeding signs and symptoms and monitoring for clotting signs and symptoms    Plan made per [facility] anticoagulation protocol    Duyen Sahni RN  4/28/2025  Anticoagulation Clinic  [facility] for routing messages: ralph JOHN  [facility] patient phone line: [phone]        _______________________________________________________________________      Anticoagulation Episode Summary       Current INR goal:  Other - see comment   TTR:  56.4% (1 y)   Target end date:  Indefinite   Send INR reminders to:  ANDREAS JOHN    Indications    Anticoagulated on warfarin [Z79.01]  Hx of mechanical aortic valve replacement [Z95.2]  H/O bicuspid aortic valve [Z87.74]             Comments:  Approved for 8 week checks per -- see encounter from 01/16/23  3/20/24: INR goal range changed to 1.8-2.5             Anticoagulation Care Providers       Provider Role Specialty Phone number    Lakeisha Marmolejo MD Referring Family Medicine [phone]    Maggi Muro MD Referring Family Medicine [phone]               defer to primary team as patient is not an acute risk for SI and HI, but may be more agitated due to ams during his stay

## 2025-05-28 ENCOUNTER — ANTICOAGULATION THERAPY VISIT (OUTPATIENT)
Dept: ANTICOAGULATION | Facility: CLINIC | Age: 75
End: 2025-05-28

## 2025-05-28 ENCOUNTER — LAB (OUTPATIENT)
Dept: LAB | Facility: CLINIC | Age: 75
End: 2025-05-28
Payer: MEDICARE

## 2025-05-28 DIAGNOSIS — Z95.2 HX OF MECHANICAL AORTIC VALVE REPLACEMENT: ICD-10-CM

## 2025-05-28 DIAGNOSIS — Z79.01 ANTICOAGULATED ON WARFARIN: Primary | ICD-10-CM

## 2025-05-28 DIAGNOSIS — Z87.74 H/O BICUSPID AORTIC VALVE: ICD-10-CM

## 2025-05-28 LAB — INR BLD: 2.1 (ref 0.9–1.1)

## 2025-05-28 PROCEDURE — 36416 COLLJ CAPILLARY BLOOD SPEC: CPT

## 2025-05-28 PROCEDURE — 85610 PROTHROMBIN TIME: CPT

## 2025-05-28 NOTE — PROGRESS NOTES
ANTICOAGULATION MANAGEMENT     Vandana Thao 74 year old female is on warfarin with therapeutic INR result. (Goal INR 1.8-2.5)    Recent labs: (last 7 days)     05/28/25  1045   INR 2.1*       ASSESSMENT     Warfarin Lab Questionnaire    Warfarin Doses Last 7 Days      5/27/2025    10:56 AM   Dose in Tablet or Mg   TAB or MG? milligram (mg)     Pt Rptd Dose SUNDAY MONDAY TUESDAY WED THURS FRIDAY SATURDAY 5/27/2025  10:56 AM 7.5 5 5 7.5 5 5 5         5/27/2025   Warfarin Lab Questionnaire   Missed doses within past 14 days? No   Changes in diet or alcohol within past 14 days? No   Medication changes since last result? No   Injuries or illness since last result? No   New shortness of breath, severe headaches or sudden changes in vision since last result? No   Abnormal bleeding since last result? No   Upcoming surgery, procedure? No   Best number to call with results? [phone]     Previous result: Therapeutic last 2(+) visits  Additional findings: None       PLAN     Recommended plan for no diet, medication or health factor changes affecting INR     Dosing Instructions: Continue your current warfarin dose with next INR in up to 5 weeks       Summary  As of 5/28/2025      Full warfarin instructions:  7.5 mg every Sun, Wed; 5 mg all other days   Next INR check:  7/2/2025               Telephone call with Vandana who verbalizes understanding and agrees to plan    Lab visit scheduled    Education provided: Please call back if any changes to your diet, medications or how you've been taking warfarin  Goal range and lab monitoring: goal range and significance of current result  Contact [phone] with any changes, questions or concerns.     Plan made per [facility] anticoagulation protocol    Dorene Howard, RN  5/28/2025  Anticoagulation Clinic  [employer] for routing messages: ralph JOHN  [facility] patient phone line: [phone]        _______________________________________________________________________      Anticoagulation Episode Summary       Current INR goal:  Other - see comment   TTR:  59.9% (1 y)   Target end date:  Indefinite   Send INR reminders to:  ANDREAS JOHN    Indications    Anticoagulated on warfarin [Z79.01]  Hx of mechanical aortic valve replacement [Z95.2]  H/O bicuspid aortic valve [Z87.74]             Comments:  Approved for 8 week checks per -- see encounter from 01/16/23  3/20/24: INR goal range changed to 1.8-2.5             Anticoagulation Care Providers       Provider Role Specialty Phone number    Lakeisha Marmolejo MD Referring Family Medicine [phone]    Maggi Muro MD Referring Family Medicine [phone]

## 2025-06-20 PROBLEM — M54.2 NECK PAIN ON LEFT SIDE: Status: RESOLVED | Noted: 2025-02-18 | Resolved: 2025-06-20

## 2025-07-02 ENCOUNTER — LAB (OUTPATIENT)
Dept: LAB | Facility: CLINIC | Age: 75
End: 2025-07-02
Payer: MEDICARE

## 2025-07-02 ENCOUNTER — ANTICOAGULATION THERAPY VISIT (OUTPATIENT)
Dept: ANTICOAGULATION | Facility: CLINIC | Age: 75
End: 2025-07-02

## 2025-07-02 DIAGNOSIS — Z87.74 H/O BICUSPID AORTIC VALVE: ICD-10-CM

## 2025-07-02 DIAGNOSIS — Z95.2 HX OF MECHANICAL AORTIC VALVE REPLACEMENT: ICD-10-CM

## 2025-07-02 DIAGNOSIS — Z79.01 ANTICOAGULATED ON WARFARIN: Primary | ICD-10-CM

## 2025-07-02 LAB — INR BLD: 1.7 (ref 0.9–1.1)

## 2025-07-02 PROCEDURE — 85610 PROTHROMBIN TIME: CPT

## 2025-07-02 PROCEDURE — 36416 COLLJ CAPILLARY BLOOD SPEC: CPT

## 2025-07-02 NOTE — PROGRESS NOTES
ANTICOAGULATION MANAGEMENT     Vandana Thao 75 year old female is on warfarin with subtherapeutic INR result. (Goal INR Other - see comment)    Recent labs: (last 7 days)     07/02/25  1030   INR 1.7*       ASSESSMENT     Warfarin Lab Questionnaire    Warfarin Doses Last 7 Days      7/1/2025    11:10 AM   Dose in Tablet or Mg   TAB or MG? milligram (mg)     Pt Rptd Dose SUNDAY MONDAY TUESDAY WED THURS FRIDAY SATURDAY 7/1/2025  11:10 AM 7.5 5 5 7.5 5 5 5         7/1/2025   Warfarin Lab Questionnaire   Missed doses within past 14 days? No   Changes in diet or alcohol within past 14 days? No   Medication changes since last result? No   Injuries or illness since last result? No   New shortness of breath, severe headaches or sudden changes in vision since last result? No   Abnormal bleeding since last result? No   Upcoming surgery, procedure? No   Best number to call with results? [phone]     Previous result: Therapeutic last 2(+) visits  Additional findings: they are moving 7/18.  eating same foods.        PLAN     Recommended plan for no diet, medication or health factor changes affecting INR     Dosing Instructions: booster dose then continue your current warfarin dose with next INR in 2 weeks       Summary  As of 7/2/2025      Full warfarin instructions:  7/2: 10 mg; Otherwise 7.5 mg every Sun, Wed; 5 mg all other days   Next INR check:  7/16/2025               Telephone call with Vandana who verbalizes understanding and agrees to plan    Lab visit scheduled    Education provided: Please call back if any changes to your diet, medications or how you've been taking warfarin    Plan made per [facility] anticoagulation protocol    Johan Preston, CHARITY  7/2/2025  Anticoagulation Clinic  [address] for routing messages: ralph JOHN  [facility] patient phone line: [phone]        _______________________________________________________________________     Anticoagulation Episode Summary       Current INR goal:  Other -  see comment   TTR:  52.8% (1 y)   Target end date:  Indefinite   Send INR reminders to:  ANDREAS JOHN    Indications    Anticoagulated on warfarin [Z79.01]  Hx of mechanical aortic valve replacement [Z95.2]  H/O bicuspid aortic valve [Z87.74]             Comments:  Approved for 8 week checks per -- see encounter from 01/16/23  3/20/24: INR goal range changed to 1.8-2.5             Anticoagulation Care Providers       Provider Role Specialty Phone number    Lakeisha Marmolejo MD Referring Family Medicine [phone]    Maggi Muro MD Referring Family Medicine [phone]

## 2025-07-16 ENCOUNTER — ANTICOAGULATION THERAPY VISIT (OUTPATIENT)
Dept: ANTICOAGULATION | Facility: CLINIC | Age: 75
End: 2025-07-16

## 2025-07-16 ENCOUNTER — LAB (OUTPATIENT)
Dept: LAB | Facility: CLINIC | Age: 75
End: 2025-07-16
Payer: MEDICARE

## 2025-07-16 DIAGNOSIS — Z95.2 HX OF MECHANICAL AORTIC VALVE REPLACEMENT: ICD-10-CM

## 2025-07-16 DIAGNOSIS — Z87.74 H/O BICUSPID AORTIC VALVE: ICD-10-CM

## 2025-07-16 DIAGNOSIS — Z79.01 ANTICOAGULATED ON WARFARIN: Primary | ICD-10-CM

## 2025-07-16 LAB — INR BLD: 2 (ref 0.9–1.1)

## 2025-07-16 PROCEDURE — 36416 COLLJ CAPILLARY BLOOD SPEC: CPT

## 2025-07-16 PROCEDURE — 85610 PROTHROMBIN TIME: CPT

## 2025-07-16 NOTE — PROGRESS NOTES
ANTICOAGULATION MANAGEMENT     Vandana Thao 75 year old female is on warfarin with therapeutic INR result. (Goal INR Other - see comment)    Recent labs: (last 7 days)     07/16/25  1041   INR 2.0*       ASSESSMENT     Warfarin Lab Questionnaire    Warfarin Doses Last 7 Days      7/15/2025    10:56 AM   Dose in Tablet or Mg   TAB or MG? milligram (mg)     Pt Rptd Dose CLY[place] MONDAY TUESDAY WED THURS FRIDAY SATURDAY   7/15/2025  10:56 AM 7.5 5 5 7.5 5 5 5         7/15/2025   Warfarin Lab Questionnaire   Missed doses within past 14 days? No   Changes in diet or alcohol within past 14 days? No   Medication changes since last result? No   Injuries or illness since last result? No   New shortness of breath, severe headaches or sudden changes in vision since last result? No   Abnormal bleeding since last result? No   Upcoming surgery, procedure? No   Best number to call with results? [phone]     Previous result: Subtherapeutic  Additional findings: moving to [place] soon.        PLAN     Recommended plan for no diet, medication or health factor changes affecting INR     Dosing Instructions: Continue your current warfarin dose with next INR in 3 weeks       Summary  As of 7/16/2025      Full warfarin instructions:  7.5 mg every Sun, Wed; 5 mg all other days   Next INR check:  8/6/2025               Telephone call with Vandana who verbalizes understanding and agrees to plan    Lab visit scheduled    Education provided: Please call back if any changes to your diet, medications or how you've been taking warfarin    Plan made per [facility] anticoagulation protocol    Johan Preston RN  7/16/2025  Anticoagulation Clinic  [employer] for routing messages: ralph JOHN  [facility] patient phone line: [phone]        _______________________________________________________________________     Anticoagulation Episode Summary       Current INR goal:  Other - see comment   TTR:  48.9% (1 y)   Target end date:  Indefinite   Send INR  reminders to:  ANDREAS JOHN    Indications    Anticoagulated on warfarin [Z79.01]  Hx of mechanical aortic valve replacement [Z95.2]  H/O bicuspid aortic valve [Z87.74]             Comments:  Approved for 8 week checks per -- see encounter from 01/16/23  3/20/24: INR goal range changed to 1.8-2.5             Anticoagulation Care Providers       Provider Role Specialty Phone number    Lakeisha Marmolejo MD Referring Family Medicine [phone]    Maggi Muro MD Referring Family Medicine [phone]

## 2025-07-23 ENCOUNTER — OFFICE VISIT (OUTPATIENT)
Dept: CARDIOLOGY | Facility: CLINIC | Age: 75
End: 2025-07-23
Attending: INTERNAL MEDICINE
Payer: MEDICARE

## 2025-07-23 ENCOUNTER — TELEPHONE (OUTPATIENT)
Dept: CARDIOLOGY | Facility: CLINIC | Age: 75
End: 2025-07-23

## 2025-07-23 VITALS
WEIGHT: 184 LBS | OXYGEN SATURATION: 96 % | DIASTOLIC BLOOD PRESSURE: 72 MMHG | HEART RATE: 69 BPM | BODY MASS INDEX: 33.65 KG/M2 | SYSTOLIC BLOOD PRESSURE: 136 MMHG

## 2025-07-23 DIAGNOSIS — I47.29: ICD-10-CM

## 2025-07-23 DIAGNOSIS — I25.10 CORONARY ARTERY DISEASE INVOLVING NATIVE CORONARY ARTERY OF NATIVE HEART WITHOUT ANGINA PECTORIS: ICD-10-CM

## 2025-07-23 DIAGNOSIS — I49.5 SICK SINUS SYNDROME (H): Primary | ICD-10-CM

## 2025-07-23 DIAGNOSIS — I44.2 AV BLOCK, COMPLETE (H): ICD-10-CM

## 2025-07-23 DIAGNOSIS — Z95.0 PACEMAKER: ICD-10-CM

## 2025-07-23 DIAGNOSIS — E78.00 HYPERCHOLESTEROLEMIA: ICD-10-CM

## 2025-07-23 DIAGNOSIS — Z95.2 S/P AVR: Primary | ICD-10-CM

## 2025-07-23 PROCEDURE — 99214 OFFICE O/P EST MOD 30 MIN: CPT | Performed by: INTERNAL MEDICINE

## 2025-07-23 PROCEDURE — 3075F SYST BP GE 130 - 139MM HG: CPT | Performed by: INTERNAL MEDICINE

## 2025-07-23 PROCEDURE — 3078F DIAST BP <80 MM HG: CPT | Performed by: INTERNAL MEDICINE

## 2025-07-23 RX ORDER — ROSUVASTATIN CALCIUM 5 MG/1
5 TABLET, COATED ORAL DAILY
Qty: 90 TABLET | Refills: 3 | Status: SHIPPED | OUTPATIENT
Start: 2025-07-23

## 2025-07-23 NOTE — PROGRESS NOTES
Thank you, Dr. Maggi Muro, for asking the [facility] Heart Care team to see Ms. Vandana Thao to follow-up on aortic valve replacement, complete heart block status post permanent pacemaker insertion, fascicular tachycardia.    Assessment/Recommendations   Assessment:    1.  Aortic valve disease, status post mechanical aortic valve replacement in 2008 for severe stenosis.  Her most recent echocardiogram continues to show normal mechanical valve function.  INRs have remained stable.  2.  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  Intermittent complete heart block status post dual-chamber pacemaker insertion in 2018.  Device check today demonstrates normal device operation.  No evidence of atrial or ventricular arrhythmias.  4.  Hypercholesterolemia, currently treated with pravastatin as the patient states she was unable to tolerate atorvastatin due to muscle cramps.  Her most recent lipid profile shows an LDL of 130 which is well above the goal of less than 70 given presence of mild coronary artery disease at the time of her preoperative angiogram.  She does not recall being on rosuvastatin or Crestor.  Suggested we try 5 mg daily to see if she can tolerate it.  If so we will plan lipids in 3 months.    Plan:  1.  Discontinue pravastatin  2.  Begin rosuvastatin 5 mg daily  3.  Lipid profile in 3 months if able to tolerate rosuvastatin.       History of Present Illness    Ms. Vandana Thao is a 75 year old female with history of severe aortic valve stenosis status post mechanical aortic valve replacement in 2008 with preoperative angiogram suggesting only mild coronary disease, fascicular tachycardia suppressed with verapamil, intermittent complete heart block status post dual-chamber pacemaker insertion and hyperlipidemia who presents to the office today for her yearly visit.  Currently denies any symptoms of exertional chest discomfort or dyspnea.  No decline  in exercise capacity.  No orthopnea, PND or lower extremity edema.  INRs remain therapeutic.    ECG (personally reviewed): No ECG today.  A pacemaker check was performed in conjunction with her visit.  This demonstrates normal device function.  No evidence of atrial or ventricular arrhythmias    Cardiac Imaging Studies (personally reviewed): No recent cardiac imaging     Physical Examination Review of Systems   /72 (BP Location: Left arm, Patient Position: Sitting, Cuff Size: Adult Regular)   Pulse 69   Wt 83.5 kg (184 lb)   SpO2 96%   BMI 33.65 kg/m    Body mass index is 33.65 kg/m .  Wt Readings from Last 3 Encounters:   07/23/25 83.5 kg (184 lb)   03/18/25 86.6 kg (191 lb)   01/28/25 85.3 kg (188 lb)     General Appearance:   Awake, Alert, No acute distress.   HEENT:  No scleral icterus; the mucous membranes were pink and moist.   Neck: No cervical bruits or jugular venous distention    Chest: The spine was straight. The chest was symmetric.   Lungs:   Respirations unlabored; the lungs are clear to auscultation. No wheezing   Cardiovascular:   Regular rate and rhythm.  S1 normal, S2 crisp and mechanical.  No murmur or gallop   Abdomen:  No organomegaly, masses, bruits, or tenderness. Bowels sounds are present   Extremities: No peripheral edema   Skin: No xanthelasma. Warm, Dry.   Musculoskeletal: No tenderness.   Neurologic: Mood and affect are appropriate.    Encounter Vitals  BP: 136/72  Pulse: 69  SpO2: 96 %  Weight: 83.5 kg (184 lb)  Encounter Vitals  BP: 136/72  Pulse: 69  SpO2: 96 %  Weight: 83.5 kg (184 lb)                                      Medical History  Surgical History Family History Social History   Past Medical History:   Diagnosis Date    History of aortic stenosis 06/21/2018    Hyperlipidemia     Hypothyroidism     Valvular disease     aortic stenosis secondary to bicuspid valve    Past Surgical History:   Procedure Laterality Date    CARDIAC VALVE REPLACEMENT  01/01/2009    AVR     COLONOSCOPY  2013    CYSTOSTOMY W/ BLADDER BIOPSY  2000    EP PACEMAKER INSERT N/A 2018    Procedure: EP Pacemaker Insertion;  Surgeon: Luis Urena MD;  Location: [facility] Cath Lab;  Service:     EYE SURGERY  10/2022    OTHER SURGICAL HISTORY  2002    thyroidectomy    SPLENECTOMY  1979    ZZC REPLACE AORT VALV,PROSTH VALV      Description: Aortic Valve Replacement;  Proc Date: 2009;  Comments: #[facility] Prosthesis    Family History   Problem Relation Age of Onset    Acute Myocardial Infarction Brother 43    Rheumatoid Arthritis Mother          in 40s    Coronary Artery Disease Father     Hypertension Father     No Known Problems Maternal Grandmother     No Known Problems Maternal Grandfather     No Known Problems Paternal Grandmother     No Known Problems Paternal Grandfather     Aortic stenosis Brother         s/p surgery    No Known Problems Sister     Breast Cancer No family hx of     Colon Cancer No family hx of     Cervical Cancer No family hx of     Other Cancer No family hx of     Social History     Socioeconomic History    Marital status:      Spouse name: Not on file    Number of children: Not on file    Years of education: Not on file    Highest education level: Not on file   Occupational History    Not on file   Tobacco Use    Smoking status: Former     Current packs/day: 0.00     Average packs/day: 0.5 packs/day for 20.0 years (10.0 ttl pk-yrs)     Types: Cigarettes     Start date: 1959     Quit date: 1979     Years since quittin.5     Passive exposure: Past    Smokeless tobacco: Never   Vaping Use    Vaping status: Never Used   Substance and Sexual Activity    Alcohol use: Yes     Comment: 1-2 drinks per week or less    Drug use: No    Sexual activity: Yes     Partners: Male   Other Topics Concern    Parent/sibling w/ CABG, MI or angioplasty before 65F 55M? Yes     Comment: Brother 43 yrs   Social History Narrative    Not on  file     Social Drivers of Health     Financial Resource Strain: Low Risk  (3/13/2025)    Financial Resource Strain     Within the past 12 months, have you or your family members you live with been unable to get utilities (heat, electricity) when it was really needed?: No   Food Insecurity: Low Risk  (3/13/2025)    Food Insecurity     Within the past 12 months, did you worry that your food would run out before you got money to buy more?: No     Within the past 12 months, did the food you bought just not last and you didn t have money to get more?: No   Transportation Needs: Low Risk  (3/13/2025)    Transportation Needs     Within the past 12 months, has lack of transportation kept you from medical appointments, getting your medicines, non-medical meetings or appointments, work, or from getting things that you need?: No   Physical Activity: Sufficiently Active (3/13/2025)    Exercise Vital Sign     Days of Exercise per Week: 5 days     Minutes of Exercise per Session: 40 min   Stress: Stress Concern Present (3/13/2025)    [employer] - Occupational Stress Questionnaire     Feeling of Stress : To some extent   Social Connections: Unknown (3/13/2025)    Social Connection and Isolation Panel [NHANES]     Frequency of Communication with Friends and Family: Not on file     Frequency of Social Gatherings with Friends and Family: Once a week     Attends Mosque Services: Not on file     Active Member of Clubs or Organizations: Not on file     Attends Club or Organization Meetings: Not on file     Marital Status: Not on file   Interpersonal Safety: Low Risk  (3/18/2025)    Interpersonal Safety     Do you feel physically and emotionally safe where you currently live?: Yes     Within the past 12 months, have you been hit, slapped, kicked or otherwise physically hurt by someone?: No     Within the past 12 months, have you been humiliated or emotionally abused in other ways by your partner or  ex-partner?: No   Housing Stability: Low Risk  (3/13/2025)    Housing Stability     Do you have housing? : Yes     Are you worried about losing your housing?: No          Medications  Allergies   Current Outpatient Medications   Medication Sig Dispense Refill    cholecalciferol, vitamin D3, 1,000 unit tablet [CHOLECALCIFEROL, VITAMIN D3, 1,000 UNIT TABLET] Take 1,000 Units by mouth daily.             evolocumab (REPATHA) 140 MG/ML prefilled autoinjector Inject 1 mL (140 mg) subcutaneously every 14 days. 6 mL 3    levothyroxine (SYNTHROID/LEVOTHROID) 100 MCG tablet Take 1 tablet (100 mcg) by mouth daily. 90 tablet 4    Magnesium 400 MG TABS Take 400 mg by mouth daily.      pravastatin (PRAVACHOL) 40 MG tablet Take 1 tablet (40 mg) by mouth daily. 90 tablet 4    rosuvastatin (CRESTOR) 5 MG tablet Take 1 tablet (5 mg) by mouth daily. 90 tablet 3    verapamil ER (VERELAN) 240 MG 24 hr capsule Take 1 capsule (240 mg) by mouth daily. 90 capsule 4    warfarin ANTICOAGULANT (COUMADIN) 5 MG tablet Take 1-1.5 tablets (5-7.5 mg) by mouth daily. Adjust dose per INR as directed by  tablet 4    amoxicillin (AMOXIL) 500 MG capsule Take 4 capsules (2,000 mg) by mouth once for 1 dose. Prior to dental procedure. (Patient not taking: Reported on 7/23/2025) 4 capsule 3      No Known Allergies      Lab Results    Chemistry/lipid CBC Cardiac Enzymes/BNP/TSH/INR   Recent Labs   Lab Test 03/18/25  1049   TRIG 134   *   BUN 16.9      CO2 24    Recent Labs   Lab Test 03/18/25  1049   WBC 9.0   HGB 14.4   HCT 42.4   MCV 90       Recent Labs   Lab Test 07/16/25  1041 04/01/25  1015 03/18/25  1049 06/21/18  0327 06/20/18  2129   TROPONINI  --   --   --   --  <0.01   BNP  --   --   --   --  41   TSH  --   --  1.29   < > 0.25*   INR 2.0*   < > 1.7*   < > 2.02*    < > = values in this interval not displayed.          The longitudinal plan of care for the diagnosis(es)/condition(s) as documented were addressed during this  visit. Due to the added complexity in care, I will continue to support Vandana in the subsequent management and with ongoing continuity of care.

## 2025-07-23 NOTE — PATIENT INSTRUCTIONS
Stop pravastatin and begin rosuvastatin 5 mg daily to get your LDL lower  Plan to recheck lipids in 3 months

## 2025-07-23 NOTE — LETTER
7/23/2025    Maggi Muro MD  [address]  [facility] [address]    RE: Vandana Thao       Dear Colleague,     I had the pleasure of seeing Vandana Thao in the [facility].      Thank you, Dr. Maggi Muro, for asking the [facility] team to see Ms. Vandana Thao to follow-up on aortic valve replacement, complete heart block status post permanent pacemaker insertion, fascicular tachycardia.    Assessment/Recommendations   Assessment:    1.  Aortic valve disease, status post mechanical aortic valve replacement in 2008 for severe stenosis.  Her most recent echocardiogram continues to show normal mechanical valve function.  INRs have remained stable.  2.  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  Intermittent complete heart block status post dual-chamber pacemaker insertion in 2018.  Device check today demonstrates normal device operation.  No evidence of atrial or ventricular arrhythmias.  4.  Hypercholesterolemia, currently treated with pravastatin as the patient states she was unable to tolerate atorvastatin due to muscle cramps.  Her most recent lipid profile shows an LDL of 130 which is well above the goal of less than 70 given presence of mild coronary artery disease at the time of her preoperative angiogram.  She does not recall being on rosuvastatin or Crestor.  Suggested we try 5 mg daily to see if she can tolerate it.  If so we will plan lipids in 3 months.    Plan:  1.  Discontinue pravastatin  2.  Begin rosuvastatin 5 mg daily  3.  Lipid profile in 3 months if able to tolerate rosuvastatin.       History of Present Illness    Ms. Vandana Thao is a 75 year old female with history of severe aortic valve stenosis status post mechanical aortic valve replacement in 2008 with preoperative angiogram suggesting only mild coronary disease, fascicular tachycardia suppressed with verapamil, intermittent complete  heart block status post dual-chamber pacemaker insertion and hyperlipidemia who presents to the office today for her yearly visit.  Currently denies any symptoms of exertional chest discomfort or dyspnea.  No decline in exercise capacity.  No orthopnea, PND or lower extremity edema.  INRs remain therapeutic.    ECG (personally reviewed): No ECG today.  A pacemaker check was performed in conjunction with her visit.  This demonstrates normal device function.  No evidence of atrial or ventricular arrhythmias    Cardiac Imaging Studies (personally reviewed): No recent cardiac imaging     Physical Examination Review of Systems   /72 (BP Location: Left arm, Patient Position: Sitting, Cuff Size: Adult Regular)   Pulse 69   Wt 83.5 kg (184 lb)   SpO2 96%   BMI 33.65 kg/m    Body mass index is 33.65 kg/m .  Wt Readings from Last 3 Encounters:   07/23/25 83.5 kg (184 lb)   03/18/25 86.6 kg (191 lb)   01/28/25 85.3 kg (188 lb)     General Appearance:   Awake, Alert, No acute distress.   HEENT:  No scleral icterus; the mucous membranes were pink and moist.   Neck: No cervical bruits or jugular venous distention    Chest: The spine was straight. The chest was symmetric.   Lungs:   Respirations unlabored; the lungs are clear to auscultation. No wheezing   Cardiovascular:   Regular rate and rhythm.  S1 normal, S2 crisp and mechanical.  No murmur or gallop   Abdomen:  No organomegaly, masses, bruits, or tenderness. Bowels sounds are present   Extremities: No peripheral edema   Skin: No xanthelasma. Warm, Dry.   Musculoskeletal: No tenderness.   Neurologic: Mood and affect are appropriate.    Encounter Vitals  BP: 136/72  Pulse: 69  SpO2: 96 %  Weight: 83.5 kg (184 lb)  Encounter Vitals  BP: 136/72  Pulse: 69  SpO2: 96 %  Weight: 83.5 kg (184 lb)                                      Medical History  Surgical History Family History Social History   Past Medical History:   Diagnosis Date     History of aortic stenosis  2018     Hyperlipidemia      Hypothyroidism      Valvular disease     aortic stenosis secondary to bicuspid valve    Past Surgical History:   Procedure Laterality Date     CARDIAC VALVE REPLACEMENT  2009    AVR     COLONOSCOPY  2013     CYSTOSTOMY W/ BLADDER BIOPSY  2000     EP PACEMAKER INSERT N/A 2018    Procedure: EP Pacemaker Insertion;  Surgeon: Luis Urena MD;  Location: [facility];  Service:      EYE SURGERY  10/2022     OTHER SURGICAL HISTORY  2002    thyroidectomy     SPLENECTOMY  1979     ZZC REPLACE AORT VALV,PROSTH VALV      Description: Aortic Valve Replacement;  Proc Date: 2009;  Comments: #[facility] [place] Prosthesis    Family History   Problem Relation Age of Onset     Acute Myocardial Infarction Brother 43     Rheumatoid Arthritis Mother          in 40s     Coronary Artery Disease Father      Hypertension Father      No Known Problems Maternal Grandmother      No Known Problems Maternal Grandfather      No Known Problems Paternal Grandmother      No Known Problems Paternal Grandfather      Aortic stenosis Brother         s/p surgery     No Known Problems Sister      Breast Cancer No family hx of      Colon Cancer No family hx of      Cervical Cancer No family hx of      Other Cancer No family hx of     Social History     Socioeconomic History     Marital status:      Spouse name: Not on file     Number of children: Not on file     Years of education: Not on file     Highest education level: Not on file   Occupational History     Not on file   Tobacco Use     Smoking status: Former     Current packs/day: 0.00     Average packs/day: 0.5 packs/day for 20.0 years (10.0 ttl pk-yrs)     Types: Cigarettes     Start date: 1959     Quit date: 1979     Years since quittin.5     Passive exposure: Past     Smokeless tobacco: Never   Vaping Use     Vaping status: Never Used   Substance and Sexual Activity     Alcohol use:  Yes     Comment: 1-2 drinks per week or less     Drug use: No     Sexual activity: Yes     Partners: Male   Other Topics Concern     Parent/sibling w/ CABG, MI or angioplasty before 65F 55M? Yes     Comment: Brother 43 yrs   Social History Narrative     Not on file     Social Drivers of Health     Financial Resource Strain: Low Risk  (3/13/2025)    Financial Resource Strain      Within the past 12 months, have you or your family members you live with been unable to get utilities (heat, electricity) when it was really needed?: No   Food Insecurity: Low Risk  (3/13/2025)    Food Insecurity      Within the past 12 months, did you worry that your food would run out before you got money to buy more?: No      Within the past 12 months, did the food you bought just not last and you didn t have money to get more?: No   Transportation Needs: Low Risk  (3/13/2025)    Transportation Needs      Within the past 12 months, has lack of transportation kept you from medical appointments, getting your medicines, non-medical meetings or appointments, work, or from getting things that you need?: No   Physical Activity: Sufficiently Active (3/13/2025)    Exercise Vital Sign      Days of Exercise per Week: 5 days      Minutes of Exercise per Session: 40 min   Stress: Stress Concern Present (3/13/2025)    [employer] [place] [employer] - Occupational Stress Questionnaire      Feeling of Stress : To some extent   Social Connections: Unknown (3/13/2025)    Social Connection and Isolation Panel [NHANES]      Frequency of Communication with Friends and Family: Not on file      Frequency of Social Gatherings with Friends and Family: Once a week      Attends Qu[employer] Services: Not on file      Active Member of Clubs or Organizations: Not on file      Attends Club or Organization Meetings: Not on file      Marital Status: Not on file   Interpersonal Safety: Low Risk  (3/18/2025)    Interpersonal Safety      Do you feel physically and  emotionally safe where you currently live?: Yes      Within the past 12 months, have you been hit, slapped, kicked or otherwise physically hurt by someone?: No      Within the past 12 months, have you been humiliated or emotionally abused in other ways by your partner or ex-partner?: No   Housing Stability: Low Risk  (3/13/2025)    Housing Stability      Do you have housing? : Yes      Are you worried about losing your housing?: No          Medications  Allergies   Current Outpatient Medications   Medication Sig Dispense Refill     cholecalciferol, vitamin D3, 1,000 unit tablet [CHOLECALCIFEROL, VITAMIN D3, 1,000 UNIT TABLET] Take 1,000 Units by mouth daily.              evolocumab (REPATHA) 140 MG/ML prefilled autoinjector Inject 1 mL (140 mg) subcutaneously every 14 days. 6 mL 3     levothyroxine (SYNTHROID/LEVOTHROID) 100 MCG tablet Take 1 tablet (100 mcg) by mouth daily. 90 tablet 4     Magnesium 400 MG TABS Take 400 mg by mouth daily.       pravastatin (PRAVACHOL) 40 MG tablet Take 1 tablet (40 mg) by mouth daily. 90 tablet 4     rosuvastatin (CRESTOR) 5 MG tablet Take 1 tablet (5 mg) by mouth daily. 90 tablet 3     verapamil ER (VERELAN) 240 MG 24 hr capsule Take 1 capsule (240 mg) by mouth daily. 90 capsule 4     warfarin ANTICOAGULANT (COUMADIN) 5 MG tablet Take 1-1.5 tablets (5-7.5 mg) by mouth daily. Adjust dose per INR as directed by  tablet 4     amoxicillin (AMOXIL) 500 MG capsule Take 4 capsules (2,000 mg) by mouth once for 1 dose. Prior to dental procedure. (Patient not taking: Reported on 7/23/2025) 4 capsule 3      No Known Allergies      Lab Results    Chemistry/lipid CBC Cardiac Enzymes/BNP/TSH/INR   Recent Labs   Lab Test 03/18/25  1049   TRIG 134   *   BUN 16.9      CO2 24    Recent Labs   Lab Test 03/18/25  1049   WBC 9.0   HGB 14.4   HCT 42.4   MCV 90       Recent Labs   Lab Test 07/16/25  1041 04/01/25  1015 03/18/25  1049 06/21/18  0327 06/20/18  2129   TROPONINI   --   --   --   --  <0.01   BNP  --   --   --   --  41   TSH  --   --  1.29   < > 0.25*   INR 2.0*   < > 1.7*   < > 2.02*    < > = values in this interval not displayed.          The longitudinal plan of care for the diagnosis(es)/condition(s) as documented were addressed during this visit. Due to the added complexity in care, I will continue to support Vandana in the subsequent management and with ongoing continuity of care.                      Thank you for allowing me to participate in the care of your patient.      Sincerely,     Nel Zhang MD     [facility]  cc:   Nel Zhang MD  [address], [address]  [place] [address]

## 2025-07-23 NOTE — TELEPHONE ENCOUNTER
Central Prior Authorization Team   Phone: [phone]    PA Initiation    Medication: Repatha 140mg/ml  Insurance Company: [employer] - Phone [phone] Fax [phone]  Pharmacy Filling the Rx: [id number] [place], [place] - [address] [place] [address] STREET   Filling Pharmacy Phone: [phone]  Filling Pharmacy Fax:    Start Date: 7/23/2025    Finished through EPA will run test claim once approved.

## 2025-07-24 LAB
MDC_IDC_LEAD_CONNECTION_STATUS: NORMAL
MDC_IDC_LEAD_CONNECTION_STATUS: NORMAL
MDC_IDC_LEAD_IMPLANT_DT: NORMAL
MDC_IDC_LEAD_IMPLANT_DT: NORMAL
MDC_IDC_LEAD_LOCATION: NORMAL
MDC_IDC_LEAD_LOCATION: NORMAL
MDC_IDC_LEAD_LOCATION_DETAIL_1: NORMAL
MDC_IDC_LEAD_LOCATION_DETAIL_1: NORMAL
MDC_IDC_LEAD_MFG: NORMAL
MDC_IDC_LEAD_MFG: NORMAL
MDC_IDC_LEAD_MODEL: NORMAL
MDC_IDC_LEAD_MODEL: NORMAL
MDC_IDC_LEAD_POLARITY_TYPE: NORMAL
MDC_IDC_LEAD_POLARITY_TYPE: NORMAL
MDC_IDC_LEAD_SERIAL: NORMAL
MDC_IDC_LEAD_SERIAL: NORMAL
MDC_IDC_LEAD_SPECIAL_FUNCTION: NORMAL
MDC_IDC_LEAD_SPECIAL_FUNCTION: NORMAL
MDC_IDC_MSMT_BATTERY_DTM: NORMAL
MDC_IDC_MSMT_BATTERY_REMAINING_LONGEVITY: 94 MO
MDC_IDC_MSMT_BATTERY_RRT_TRIGGER: 2.62
MDC_IDC_MSMT_BATTERY_STATUS: NORMAL
MDC_IDC_MSMT_BATTERY_VOLTAGE: 2.98 V
MDC_IDC_MSMT_LEADCHNL_RA_IMPEDANCE_VALUE: 380 OHM
MDC_IDC_MSMT_LEADCHNL_RA_IMPEDANCE_VALUE: 437 OHM
MDC_IDC_MSMT_LEADCHNL_RA_PACING_THRESHOLD_AMPLITUDE: 0.62 V
MDC_IDC_MSMT_LEADCHNL_RA_PACING_THRESHOLD_AMPLITUDE: 0.75 V
MDC_IDC_MSMT_LEADCHNL_RA_PACING_THRESHOLD_PULSEWIDTH: 0.4 MS
MDC_IDC_MSMT_LEADCHNL_RA_PACING_THRESHOLD_PULSEWIDTH: 0.4 MS
MDC_IDC_MSMT_LEADCHNL_RA_SENSING_INTR_AMPL: 4.12 MV
MDC_IDC_MSMT_LEADCHNL_RA_SENSING_INTR_AMPL: 4.5 MV
MDC_IDC_MSMT_LEADCHNL_RV_IMPEDANCE_VALUE: 361 OHM
MDC_IDC_MSMT_LEADCHNL_RV_IMPEDANCE_VALUE: 475 OHM
MDC_IDC_MSMT_LEADCHNL_RV_PACING_THRESHOLD_AMPLITUDE: 1.25 V
MDC_IDC_MSMT_LEADCHNL_RV_PACING_THRESHOLD_AMPLITUDE: 1.5 V
MDC_IDC_MSMT_LEADCHNL_RV_PACING_THRESHOLD_PULSEWIDTH: 0.4 MS
MDC_IDC_MSMT_LEADCHNL_RV_PACING_THRESHOLD_PULSEWIDTH: 0.4 MS
MDC_IDC_MSMT_LEADCHNL_RV_SENSING_INTR_AMPL: 6.88 MV
MDC_IDC_MSMT_LEADCHNL_RV_SENSING_INTR_AMPL: 9.38 MV
MDC_IDC_PG_IMPLANT_DTM: NORMAL
MDC_IDC_PG_MFG: NORMAL
MDC_IDC_PG_MODEL: NORMAL
MDC_IDC_PG_SERIAL: NORMAL
MDC_IDC_PG_TYPE: NORMAL
MDC_IDC_SESS_CLINIC_NAME: NORMAL
MDC_IDC_SESS_DTM: NORMAL
MDC_IDC_SESS_TYPE: NORMAL
MDC_IDC_SET_BRADY_AT_MODE_SWITCH_RATE: 171 {BEATS}/MIN
MDC_IDC_SET_BRADY_HYSTRATE: NORMAL
MDC_IDC_SET_BRADY_LOWRATE: 60 {BEATS}/MIN
MDC_IDC_SET_BRADY_MAX_SENSOR_RATE: 130 {BEATS}/MIN
MDC_IDC_SET_BRADY_MAX_TRACKING_RATE: 130 {BEATS}/MIN
MDC_IDC_SET_BRADY_MODE: NORMAL
MDC_IDC_SET_BRADY_PAV_DELAY_LOW: 270 MS
MDC_IDC_SET_BRADY_SAV_DELAY_LOW: 240 MS
MDC_IDC_SET_LEADCHNL_RA_PACING_AMPLITUDE: 1.5 V
MDC_IDC_SET_LEADCHNL_RA_PACING_ANODE_ELECTRODE_1: NORMAL
MDC_IDC_SET_LEADCHNL_RA_PACING_ANODE_LOCATION_1: NORMAL
MDC_IDC_SET_LEADCHNL_RA_PACING_CAPTURE_MODE: NORMAL
MDC_IDC_SET_LEADCHNL_RA_PACING_CATHODE_ELECTRODE_1: NORMAL
MDC_IDC_SET_LEADCHNL_RA_PACING_CATHODE_LOCATION_1: NORMAL
MDC_IDC_SET_LEADCHNL_RA_PACING_POLARITY: NORMAL
MDC_IDC_SET_LEADCHNL_RA_PACING_PULSEWIDTH: 0.4 MS
MDC_IDC_SET_LEADCHNL_RA_SENSING_ANODE_ELECTRODE_1: NORMAL
MDC_IDC_SET_LEADCHNL_RA_SENSING_ANODE_LOCATION_1: NORMAL
MDC_IDC_SET_LEADCHNL_RA_SENSING_CATHODE_ELECTRODE_1: NORMAL
MDC_IDC_SET_LEADCHNL_RA_SENSING_CATHODE_LOCATION_1: NORMAL
MDC_IDC_SET_LEADCHNL_RA_SENSING_POLARITY: NORMAL
MDC_IDC_SET_LEADCHNL_RA_SENSING_SENSITIVITY: 0.3 MV
MDC_IDC_SET_LEADCHNL_RV_PACING_AMPLITUDE: 2.25 V
MDC_IDC_SET_LEADCHNL_RV_PACING_ANODE_ELECTRODE_1: NORMAL
MDC_IDC_SET_LEADCHNL_RV_PACING_ANODE_LOCATION_1: NORMAL
MDC_IDC_SET_LEADCHNL_RV_PACING_CAPTURE_MODE: NORMAL
MDC_IDC_SET_LEADCHNL_RV_PACING_CATHODE_ELECTRODE_1: NORMAL
MDC_IDC_SET_LEADCHNL_RV_PACING_CATHODE_LOCATION_1: NORMAL
MDC_IDC_SET_LEADCHNL_RV_PACING_POLARITY: NORMAL
MDC_IDC_SET_LEADCHNL_RV_PACING_PULSEWIDTH: 0.4 MS
MDC_IDC_SET_LEADCHNL_RV_SENSING_ANODE_ELECTRODE_1: NORMAL
MDC_IDC_SET_LEADCHNL_RV_SENSING_ANODE_LOCATION_1: NORMAL
MDC_IDC_SET_LEADCHNL_RV_SENSING_CATHODE_ELECTRODE_1: NORMAL
MDC_IDC_SET_LEADCHNL_RV_SENSING_CATHODE_LOCATION_1: NORMAL
MDC_IDC_SET_LEADCHNL_RV_SENSING_POLARITY: NORMAL
MDC_IDC_SET_LEADCHNL_RV_SENSING_SENSITIVITY: 0.9 MV
MDC_IDC_SET_ZONE_DETECTION_INTERVAL: 350 MS
MDC_IDC_SET_ZONE_DETECTION_INTERVAL: 400 MS
MDC_IDC_SET_ZONE_STATUS: NORMAL
MDC_IDC_SET_ZONE_STATUS: NORMAL
MDC_IDC_SET_ZONE_TYPE: NORMAL
MDC_IDC_SET_ZONE_VENDOR_TYPE: NORMAL
MDC_IDC_STAT_AT_BURDEN_PERCENT: 0 %
MDC_IDC_STAT_AT_DTM_END: NORMAL
MDC_IDC_STAT_AT_DTM_START: NORMAL
MDC_IDC_STAT_BRADY_AP_VP_PERCENT: 1.54 %
MDC_IDC_STAT_BRADY_AP_VS_PERCENT: 52.79 %
MDC_IDC_STAT_BRADY_AS_VP_PERCENT: 0.08 %
MDC_IDC_STAT_BRADY_AS_VS_PERCENT: 45.58 %
MDC_IDC_STAT_BRADY_DTM_END: NORMAL
MDC_IDC_STAT_BRADY_DTM_START: NORMAL
MDC_IDC_STAT_BRADY_RA_PERCENT_PACED: 56.32 %
MDC_IDC_STAT_BRADY_RV_PERCENT_PACED: 1.63 %
MDC_IDC_STAT_EPISODE_RECENT_COUNT: 0
MDC_IDC_STAT_EPISODE_RECENT_COUNT_DTM_END: NORMAL
MDC_IDC_STAT_EPISODE_RECENT_COUNT_DTM_START: NORMAL
MDC_IDC_STAT_EPISODE_TOTAL_COUNT: 0
MDC_IDC_STAT_EPISODE_TOTAL_COUNT: 0
MDC_IDC_STAT_EPISODE_TOTAL_COUNT: 554
MDC_IDC_STAT_EPISODE_TOTAL_COUNT: 6
MDC_IDC_STAT_EPISODE_TOTAL_COUNT: NORMAL
MDC_IDC_STAT_EPISODE_TOTAL_COUNT_DTM_END: NORMAL
MDC_IDC_STAT_EPISODE_TOTAL_COUNT_DTM_START: NORMAL
MDC_IDC_STAT_EPISODE_TYPE: NORMAL
MDC_IDC_STAT_TACHYTHERAPY_RECENT_DTM_END: NORMAL
MDC_IDC_STAT_TACHYTHERAPY_RECENT_DTM_START: NORMAL
MDC_IDC_STAT_TACHYTHERAPY_TOTAL_DTM_END: NORMAL
MDC_IDC_STAT_TACHYTHERAPY_TOTAL_DTM_START: NORMAL

## 2025-08-06 ENCOUNTER — LAB (OUTPATIENT)
Dept: LAB | Facility: CLINIC | Age: 75
End: 2025-08-06
Payer: MEDICARE

## 2025-08-06 ENCOUNTER — ANTICOAGULATION THERAPY VISIT (OUTPATIENT)
Dept: ANTICOAGULATION | Facility: CLINIC | Age: 75
End: 2025-08-06

## 2025-08-06 DIAGNOSIS — Z87.74 H/O BICUSPID AORTIC VALVE: ICD-10-CM

## 2025-08-06 DIAGNOSIS — Z95.2 HX OF MECHANICAL AORTIC VALVE REPLACEMENT: ICD-10-CM

## 2025-08-06 DIAGNOSIS — Z79.01 ANTICOAGULATED ON WARFARIN: Primary | ICD-10-CM

## 2025-08-06 LAB — INR BLD: 1.7 (ref 0.9–1.1)

## 2025-08-06 PROCEDURE — 85610 PROTHROMBIN TIME: CPT

## 2025-08-06 PROCEDURE — 36416 COLLJ CAPILLARY BLOOD SPEC: CPT

## 2025-08-20 ENCOUNTER — ANTICOAGULATION THERAPY VISIT (OUTPATIENT)
Dept: ANTICOAGULATION | Facility: CLINIC | Age: 75
End: 2025-08-20

## 2025-08-20 ENCOUNTER — LAB (OUTPATIENT)
Dept: LAB | Facility: CLINIC | Age: 75
End: 2025-08-20
Payer: MEDICARE

## 2025-08-20 DIAGNOSIS — Z95.2 HX OF MECHANICAL AORTIC VALVE REPLACEMENT: ICD-10-CM

## 2025-08-20 DIAGNOSIS — Z87.74 H/O BICUSPID AORTIC VALVE: ICD-10-CM

## 2025-08-20 DIAGNOSIS — Z79.01 ANTICOAGULATED ON WARFARIN: Primary | ICD-10-CM

## 2025-08-20 LAB — INR BLD: 1.9 (ref 0.9–1.1)

## 2025-08-20 PROCEDURE — 85610 PROTHROMBIN TIME: CPT

## 2025-08-20 PROCEDURE — 36416 COLLJ CAPILLARY BLOOD SPEC: CPT
